# Patient Record
Sex: FEMALE | Race: BLACK OR AFRICAN AMERICAN | NOT HISPANIC OR LATINO | Employment: UNEMPLOYED | URBAN - METROPOLITAN AREA
[De-identification: names, ages, dates, MRNs, and addresses within clinical notes are randomized per-mention and may not be internally consistent; named-entity substitution may affect disease eponyms.]

---

## 2019-03-03 ENCOUNTER — HOSPITAL ENCOUNTER (EMERGENCY)
Facility: HOSPITAL | Age: 34
Discharge: HOME | End: 2019-03-03
Attending: EMERGENCY MEDICINE
Payer: MEDICAID

## 2019-03-03 VITALS
DIASTOLIC BLOOD PRESSURE: 66 MMHG | HEIGHT: 62 IN | WEIGHT: 200 LBS | RESPIRATION RATE: 18 BRPM | HEART RATE: 86 BPM | BODY MASS INDEX: 36.8 KG/M2 | SYSTOLIC BLOOD PRESSURE: 123 MMHG | TEMPERATURE: 99 F | OXYGEN SATURATION: 98 %

## 2019-03-03 DIAGNOSIS — S71.112A LACERATION OF LEFT THIGH, INITIAL ENCOUNTER: Primary | ICD-10-CM

## 2019-03-03 PROCEDURE — 63600175 PHARM REV CODE 636 W HCPCS: Performed by: EMERGENCY MEDICINE

## 2019-03-03 PROCEDURE — 90471 IMMUNIZATION ADMIN: CPT | Performed by: EMERGENCY MEDICINE

## 2019-03-03 PROCEDURE — 12001 RPR S/N/AX/GEN/TRNK 2.5CM/<: CPT

## 2019-03-03 PROCEDURE — 99284 EMERGENCY DEPT VISIT MOD MDM: CPT | Mod: 25

## 2019-03-03 PROCEDURE — 90715 TDAP VACCINE 7 YRS/> IM: CPT | Performed by: EMERGENCY MEDICINE

## 2019-03-03 PROCEDURE — 99283 PR EMERGENCY DEPT VISIT,LEVEL III: ICD-10-PCS | Mod: 25,,, | Performed by: EMERGENCY MEDICINE

## 2019-03-03 PROCEDURE — 12001 PR RESUPERF WND BODY <2.5CM: ICD-10-PCS | Mod: LT,,, | Performed by: EMERGENCY MEDICINE

## 2019-03-03 PROCEDURE — 99283 EMERGENCY DEPT VISIT LOW MDM: CPT | Mod: 25,,, | Performed by: EMERGENCY MEDICINE

## 2019-03-03 PROCEDURE — 12001 RPR S/N/AX/GEN/TRNK 2.5CM/<: CPT | Mod: LT,,, | Performed by: EMERGENCY MEDICINE

## 2019-03-03 RX ORDER — LIDOCAINE HYDROCHLORIDE 10 MG/ML
10 INJECTION INFILTRATION; PERINEURAL
Status: DISCONTINUED | OUTPATIENT
Start: 2019-03-03 | End: 2019-03-03 | Stop reason: HOSPADM

## 2019-03-03 RX ADMIN — CLOSTRIDIUM TETANI TOXOID ANTIGEN (FORMALDEHYDE INACTIVATED), CORYNEBACTERIUM DIPHTHERIAE TOXOID ANTIGEN (FORMALDEHYDE INACTIVATED), BORDETELLA PERTUSSIS TOXOID ANTIGEN (GLUTARALDEHYDE INACTIVATED), BORDETELLA PERTUSSIS FILAMENTOUS HEMAGGLUTININ ANTIGEN (FORMALDEHYDE INACTIVATED), BORDETELLA PERTUSSIS PERTACTIN ANTIGEN, AND BORDETELLA PERTUSSIS FIMBRIAE 2/3 ANTIGEN 0.5 ML: 5; 2; 2.5; 5; 3; 5 INJECTION, SUSPENSION INTRAMUSCULAR at 01:03

## 2019-03-03 NOTE — ED TRIAGE NOTES
Kevin Mancini, a 34 y.o. female presents to the ED w/ complaint of laceration. Pt reports cutting thigh accidentally with  while working on floor. Approx. 4 cm lac noted to right thigh - no active bleeding.     Triage note:  Chief Complaint   Patient presents with    Laceration     laceration to left upper thigh, currently not bleeding, pt reports cutting leg with       Review of patient's allergies indicates:  No Known Allergies  Past Medical History:   Diagnosis Date    Asthma       Adult Physical Assessment  LOC: Kevin Mancini, 34 y.o. female verified via two identifiers.  The patient is awake, alert, oriented and speaking appropriately at this time.  APPEARANCE: Patient resting comfortably and appears to be in no acute distress at this time. Patient is clean and well groomed, patient's clothing is properly fastened.  SKIN:The skin is warm and dry, color consistent with ethnicity, patient has normal skin turgor and moist mucus membranes, lac noted to right upper thigh, approximately 4 cm - no active bleeding.  MUSCULOSKELETAL: Patient moving all extremities well, no obvious swelling or deformities noted.  RESPIRATORY: Airway is open and patent, respirations are spontaneous, patient has a normal effort and rate, no accessory muscle use noted.  CARDIAC: Patient has a normal rate and rhythm, no periphreal edema noted in any extremity, capillary refill < 3 seconds in all extremities  ABDOMEN: Soft and non tender to palpation, no abdominal distention noted. Bowel sounds present in all four quadrants.  NEUROLOGIC: Eyes open spontaneously, behavior appropriate to situation, follows commands, facial expression symmetrical, bilateral hand grasp equal and even, purposeful motor response noted, normal sensation in all extremities when touched with a finger.

## 2019-03-03 NOTE — ED PROVIDER NOTES
Encounter Date: 3/3/2019    SCRIBE #1 NOTE: I, Dora Hany, am scribing for, and in the presence of,  Kang Ortiz MD. I have scribed the following portions of the note - Other sections scribed: HPI ROS PE.       History     Chief Complaint   Patient presents with    Laceration     laceration to left upper thigh, currently not bleeding, pt reports cutting leg with       The patient is a 34 year old female with a history of asthma who presents with a laceration to the left thigh. She was using a  to cut ribbons when she accidentally cut herself.  This occurred just prior to arrival.  She reports mild pain. There is a small amount of bleeding that was controlled with applying direct pressure.  She denies other injuries.  She denies loss of function to the extremity.  She does not know when her last tetanus immunization was received.      The history is provided by the patient.     Review of patient's allergies indicates:  No Known Allergies  Past Medical History:   Diagnosis Date    Asthma      History reviewed. No pertinent surgical history.  History reviewed. No pertinent family history.  Social History     Tobacco Use    Smoking status: Current Some Day Smoker     Types: Cigars   Substance Use Topics    Alcohol use: Not on file    Drug use: Not on file     Review of Systems   Gastrointestinal: Negative for nausea and vomiting.   Musculoskeletal: Negative for arthralgias and myalgias.   Skin:        + laceration to the anterior surface of the left thigh   Neurological: Negative for dizziness, syncope, weakness, light-headedness, numbness and headaches.   Hematological: Does not bruise/bleed easily.       Physical Exam     Initial Vitals [03/03/19 0006]   BP Pulse Resp Temp SpO2   123/66 86 18 98.6 °F (37 °C) 98 %      MAP       --         Physical Exam    Nursing note and vitals reviewed.  Constitutional: She appears well-developed and well-nourished. She is not diaphoretic. No distress.    Cardiovascular: Normal rate, regular rhythm and normal heart sounds. Exam reveals no gallop and no friction rub.    No murmur heard.  Pulmonary/Chest: Breath sounds normal. No respiratory distress. She has no wheezes. She has no rhonchi. She has no rales.   Musculoskeletal: Normal range of motion. She exhibits no edema or tenderness.   Neurological: She is alert and oriented to person, place, and time. She has normal strength.   Skin: Skin is warm and dry. Laceration noted. No pallor.   2.5 cm simple laceration to anterior surface of mid left thigh. Mild tenderness. No active bleeding.         ED Course   Lac Repair  Date/Time: 3/3/2019 3:15 AM  Performed by: Kang Ortiz III, MD  Authorized by: Kang Ortiz III, MD   Body area: lower extremity  Location details: left upper leg  Laceration length: 2.5 cm  Foreign bodies: no foreign bodies  Tendon involvement: none  Nerve involvement: none  Anesthesia: local infiltration    Anesthesia:  Local Anesthetic: lidocaine 1% without epinephrine  Anesthetic total: 2 mL  Patient sedated: no  Preparation: Patient was prepped and draped in the usual sterile fashion.  Irrigation solution: saline  Irrigation method: syringe  Amount of cleaning: standard  Debridement: none  Degree of undermining: none  Skin closure: 3-0 Prolene  Number of sutures: 5  Technique: simple  Approximation: close  Approximation difficulty: simple  Dressing: 4x4 sterile gauze  Patient tolerance: Patient tolerated the procedure well with no immediate complications        Labs Reviewed - No data to display       Imaging Results    None          Medical Decision Making:   History:   Old Medical Records: I decided to obtain old medical records.            Scribe Attestation:   Scribe #1: I performed the above scribed service and the documentation accurately describes the services I performed. I attest to the accuracy of the note.    Attending Attestation:             Attending ED Notes:   Portions of  this chart were completed by the scribe by interpretive transcription of statements made the patient as a result of my questions and ques at the bedside. Other portions were completed by the scribe from statements made by me for direct transcription into the medical record. Following completion of the charting by the scribe, I made modifications for both correctness and proper phrasing.  - Kang Ortiz III, M.D.             Clinical Impression:       ICD-10-CM ICD-9-CM   1. Laceration of left thigh, initial encounter S71.112A 890.0         Disposition:   Disposition: Discharged  Condition: Stable                        Kang Ortiz III, MD  03/03/19 0322

## 2019-03-13 ENCOUNTER — HOSPITAL ENCOUNTER (EMERGENCY)
Facility: HOSPITAL | Age: 34
Discharge: HOME | End: 2019-03-13
Attending: EMERGENCY MEDICINE
Payer: MEDICAID

## 2019-03-13 VITALS
BODY MASS INDEX: 34.41 KG/M2 | HEIGHT: 62 IN | WEIGHT: 187 LBS | TEMPERATURE: 99 F | DIASTOLIC BLOOD PRESSURE: 86 MMHG | RESPIRATION RATE: 18 BRPM | HEART RATE: 88 BPM | SYSTOLIC BLOOD PRESSURE: 126 MMHG | OXYGEN SATURATION: 100 %

## 2019-03-13 DIAGNOSIS — Z48.02 VISIT FOR SUTURE REMOVAL: Primary | ICD-10-CM

## 2019-03-13 LAB
B-HCG UR QL: NEGATIVE
CTP QC/QA: YES

## 2019-03-13 PROCEDURE — 99283 EMERGENCY DEPT VISIT LOW MDM: CPT | Mod: ER

## 2019-03-13 PROCEDURE — 81025 URINE PREGNANCY TEST: CPT | Mod: ER | Performed by: EMERGENCY MEDICINE

## 2019-03-13 NOTE — ED PROVIDER NOTES
Encounter Date: 3/13/2019    SCRIBE #1 NOTE: I, Anup Anderson, am scribing for, and in the presence of,  Toussaint Battley, FNP. I have scribed the following portions of the note - Other sections scribed: HPI, ROS, PE.       History     Chief Complaint   Patient presents with    Suture / Staple Removal     pt here to have sutures removed from left thigh that were placed 3/3 at [facility]     This is a 34 y.o. female who presents to the ED for a removal of five sutures on her left thigh that were placed on 3/3/19 at [facility].  Pt accidentally injured herself while using a boxcutter.  Pt states her symptoms have been improving.  Denies drainage, redness, swelling, or pain.  Denies fever, chills, CP, SOB, nausea, emesis, or abdominal pain.          The history is provided by the patient. No  was used.   Suture / Staple Removal    The sutures were placed 11 to 14 days ago (10 days ago ). There has been no drainage from the wound. There is no redness present. There is no swelling present. There is no pain present. She has no difficulty moving the affected extremity or digit.     Review of patient's allergies indicates:  No Known Allergies  Past Medical History:   Diagnosis Date    Asthma      History reviewed. No pertinent surgical history.  History reviewed. No pertinent family history.  Social History     Tobacco Use    Smoking status: Current Some Day Smoker     Types: Cigars   Substance Use Topics    Alcohol use: Not on file    Drug use: Not on file     Review of Systems   Constitutional: Negative for chills and fever.   HENT: Negative for congestion, rhinorrhea and sore throat.    Eyes: Negative for visual disturbance.   Respiratory: Negative for shortness of breath.    Cardiovascular: Negative for chest pain and leg swelling.   Gastrointestinal: Negative for abdominal pain, nausea and vomiting.   Genitourinary: Negative for dysuria.   Musculoskeletal: Negative for myalgias.   Skin: Positive  for wound (Suture removal). Negative for color change.   Neurological: Negative for weakness and numbness.   All other systems reviewed and are negative.      Physical Exam     Initial Vitals [03/13/19 1208]   BP Pulse Resp Temp SpO2   124/70 67 18 98.1 °F (36.7 °C) 100 %      MAP       --         Physical Exam    Nursing note and vitals reviewed.  Constitutional: She appears well-developed and well-nourished.   HENT:   Head: Normocephalic and atraumatic.   Eyes: Conjunctivae are normal.   Neck: Normal range of motion. Neck supple.   Cardiovascular: Normal rate, regular rhythm, normal heart sounds and intact distal pulses. Exam reveals no gallop and no friction rub.    No murmur heard.  Pulmonary/Chest: Effort normal and breath sounds normal. No respiratory distress. She has no wheezes. She has no rhonchi. She has no rales. She exhibits no tenderness.   Musculoskeletal: Normal range of motion.   Neurological: She is alert and oriented to person, place, and time.   Skin: Skin is warm and dry. No erythema.        Psychiatric: She has a normal mood and affect.         ED Course   Suture Removal  Date/Time: 3/13/2019 1:18 PM  Location procedure was performed: [facility] EMERGENCY DEPARTMENT  Performed by: Toussaint Battley III, FNP  Authorized by: Lynette Mcleod MD   Pre-operative diagnosis: suture removal  Post-operative diagnosis: suture removal  Body area: lower extremity  Location details: left upper leg  Wound Appearance: clean, well healed, normal color, nontender, no drainage and nonpurulent  Sutures Removed: 5  Post-removal: dressing applied and antibiotic ointment applied  Complications: No  Estimated blood loss (mL): 0.  Specimens: No  Implants: No  Patient tolerance: Patient tolerated the procedure well with no immediate complications        Labs Reviewed   POCT URINE PREGNANCY          Imaging Results    None          Medical Decision Making:   Initial Assessment:   Suture removal  Differential Diagnosis:    Wound infection  Clinical Tests:   Lab Tests: Ordered  ED Management:  Patient will be discharged home with instructions to follow up with her primary care provider in 2 days for wound recheck and return to the ER as needed if symptoms worsen or fail to improve.  Patient verbalized understanding of discharge instruction treatment plan.            Scribe Attestation:   Scribe #1: I performed the above scribed service and the documentation accurately describes the services I performed. I attest to the accuracy of the note.               Clinical Impression:     1. Visit for suture removal                                  Toussaint Battley III, [facility]  03/13/19 3024

## 2020-07-13 ENCOUNTER — HOSPITAL ENCOUNTER (EMERGENCY)
Facility: HOSPITAL | Age: 35
Discharge: HOME | End: 2020-07-13
Attending: EMERGENCY MEDICINE
Payer: MEDICAID

## 2020-07-13 VITALS
DIASTOLIC BLOOD PRESSURE: 72 MMHG | HEART RATE: 79 BPM | HEIGHT: 65 IN | OXYGEN SATURATION: 98 % | BODY MASS INDEX: 33.32 KG/M2 | TEMPERATURE: 99 F | WEIGHT: 200 LBS | RESPIRATION RATE: 16 BRPM | SYSTOLIC BLOOD PRESSURE: 145 MMHG

## 2020-07-13 DIAGNOSIS — Z20.822 SUSPECTED COVID-19 VIRUS INFECTION: ICD-10-CM

## 2020-07-13 DIAGNOSIS — R07.9 CHEST PAIN: ICD-10-CM

## 2020-07-13 DIAGNOSIS — R07.89 CHEST TIGHTNESS: ICD-10-CM

## 2020-07-13 DIAGNOSIS — J06.9 UPPER RESPIRATORY TRACT INFECTION, UNSPECIFIED TYPE: Primary | ICD-10-CM

## 2020-07-13 LAB
B-HCG UR QL: NEGATIVE
CTP QC/QA: YES
CTP QC/QA: YES
POC MOLECULAR INFLUENZA A AGN: NEGATIVE
POC MOLECULAR INFLUENZA B AGN: NEGATIVE

## 2020-07-13 PROCEDURE — U0003 INFECTIOUS AGENT DETECTION BY NUCLEIC ACID (DNA OR RNA); SEVERE ACUTE RESPIRATORY SYNDROME CORONAVIRUS 2 (SARS-COV-2) (CORONAVIRUS DISEASE [COVID-19]), AMPLIFIED PROBE TECHNIQUE, MAKING USE OF HIGH THROUGHPUT TECHNOLOGIES AS DESCRIBED BY CMS-2020-01-R: HCPCS

## 2020-07-13 PROCEDURE — 87502 INFLUENZA DNA AMP PROBE: CPT | Mod: ER

## 2020-07-13 PROCEDURE — 93005 ELECTROCARDIOGRAM TRACING: CPT | Mod: ER

## 2020-07-13 PROCEDURE — 93010 EKG 12-LEAD: ICD-10-PCS | Mod: ,,, | Performed by: INTERNAL MEDICINE

## 2020-07-13 PROCEDURE — 25000003 PHARM REV CODE 250: Mod: ER | Performed by: NURSE PRACTITIONER

## 2020-07-13 PROCEDURE — 99284 EMERGENCY DEPT VISIT MOD MDM: CPT | Mod: 25,ER

## 2020-07-13 PROCEDURE — 93010 ELECTROCARDIOGRAM REPORT: CPT | Mod: ,,, | Performed by: INTERNAL MEDICINE

## 2020-07-13 PROCEDURE — 81025 URINE PREGNANCY TEST: CPT | Mod: ER | Performed by: EMERGENCY MEDICINE

## 2020-07-13 RX ORDER — ACETAMINOPHEN 500 MG
1000 TABLET ORAL EVERY 6 HOURS PRN
Qty: 30 TABLET | Refills: 0 | Status: SHIPPED | OUTPATIENT
Start: 2020-07-13 | End: 2021-06-15

## 2020-07-13 RX ORDER — ONDANSETRON 4 MG/1
8 TABLET, ORALLY DISINTEGRATING ORAL ONCE
Status: COMPLETED | OUTPATIENT
Start: 2020-07-13 | End: 2020-07-13

## 2020-07-13 RX ORDER — ONDANSETRON 4 MG/1
4 TABLET, ORALLY DISINTEGRATING ORAL EVERY 8 HOURS PRN
Qty: 20 TABLET | Refills: 0 | Status: SHIPPED | OUTPATIENT
Start: 2020-07-13 | End: 2021-06-15

## 2020-07-13 RX ORDER — PROMETHAZINE HYDROCHLORIDE AND DEXTROMETHORPHAN HYDROBROMIDE 6.25; 15 MG/5ML; MG/5ML
5 SYRUP ORAL EVERY 6 HOURS PRN
Qty: 150 ML | Refills: 0 | Status: SHIPPED | OUTPATIENT
Start: 2020-07-13 | End: 2020-07-23

## 2020-07-13 RX ADMIN — ONDANSETRON 8 MG: 4 TABLET, ORALLY DISINTEGRATING ORAL at 06:07

## 2020-07-13 NOTE — ED PROVIDER NOTES
Encounter Date: 7/13/2020    SCRIBE #1 NOTE: I, Angelita Gisselle, am scribing for, and in the presence of,  JUANCARLOS Zamora. I have scribed the following portions of the note - Other sections scribed: HPI, ROS, PE.       History   No chief complaint on file.    This is a nontoxic appearing 35 y.o. female who presents to the ED for evaluation of weakness, fever, cough, and generalized body aches for a few days. She reports chest soreness with coughing. Patient reports vomiting and diarrhea that started yesterday. She has had 2 episode of vomiting today and 4-5 episodes of diarrhea today. Denies abdominal pain.  Patient has had positive exposure to COVID-19. Pt has a hx of asthma.    The history is provided by the patient. No  was used.   URI  The primary symptoms include fever, cough and vomiting. Primary symptoms do not include abdominal pain. The current episode started several days ago. The problem has been gradually worsening. The fever began several days ago (100.4). The fever has been gradually improving since its onset.   The cough began 3 to 5 days ago. The cough is non-productive.   Symptoms associated with the illness include congestion.   Emesis   This is a new problem. The current episode started 2 to 3 hours ago. The problem occurs 2 - 4 times per day. The problem has been unchanged. Associated symptoms include cough, diarrhea, a fever and URI. Pertinent negatives include no abdominal pain.     Review of patient's allergies indicates:  No Known Allergies  Past Medical History:   Diagnosis Date    Asthma      History reviewed. No pertinent surgical history.  No family history on file.  Social History     Tobacco Use    Smoking status: Current Some Day Smoker     Types: Cigars    Smokeless tobacco: Never Used    Tobacco comment: social   Substance Use Topics    Alcohol use: Yes     Alcohol/week: 1.0 standard drinks     Types: 1 Glasses of wine per week     Comment: social    Drug  use: Not Currently     Review of Systems   Constitutional: Positive for fever.        Body aches   HENT: Positive for congestion.    Eyes: Negative.    Respiratory: Positive for cough. Negative for shortness of breath.    Cardiovascular: Negative for chest pain.        Chest soreness with cough   Gastrointestinal: Positive for diarrhea and vomiting. Negative for abdominal pain and blood in stool.   Endocrine: Negative.    Genitourinary: Negative.    Musculoskeletal: Negative.    Skin: Negative.    Allergic/Immunologic: Negative.    Neurological: Negative.    Hematological: Negative.    Psychiatric/Behavioral: Negative.    All other systems reviewed and are negative.      Physical Exam     Initial Vitals [07/13/20 1603]   BP Pulse Resp Temp SpO2   115/78 85 18 98.6 °F (37 °C) 98 %      MAP       --         Physical Exam    Nursing note and vitals reviewed.  Constitutional: She appears well-developed.   HENT:   Nose: Nose normal.   Mouth/Throat: Oropharynx is clear and moist.   Eyes: Conjunctivae are normal.   Neck: Normal range of motion. Neck supple.   Cardiovascular: Normal rate, regular rhythm, S1 normal, S2 normal and normal heart sounds. Exam reveals no gallop and no friction rub.    No murmur heard.  LLE with swelling or tenderness.    Pulmonary/Chest: Breath sounds normal.   Abdominal: Soft. Bowel sounds are normal. There is no abdominal tenderness. There is no CVA tenderness.   Musculoskeletal: Normal range of motion.   Neurological: She is alert and oriented to person, place, and time.   Skin: Skin is warm and dry. Capillary refill takes less than 2 seconds.   Psychiatric: She has a normal mood and affect. Her behavior is normal.         ED Course   Procedures  Labs Reviewed   SARS-COV-2 (COVID-19) QUALITATIVE PCR   POCT INFLUENZA A/B MOLECULAR   POCT URINE PREGNANCY     EKG Readings: (Independently Interpreted)   No STEMI. Rate of 74. Sinus Arrhythmia. Normal Axis. Abnormal EKG. Non-specific T-waves. QTc  normal at 435. No prior EKG for comparison.       Imaging Results          X-Ray Chest AP Portable (Final result)  Result time 07/13/20 17:35:28    Final result by Mateo Grissom MD (07/13/20 17:35:28)                 Narrative:    EXAMINATION:  XR CHEST AP PORTABLE    CLINICAL HISTORY:  Chest pain, unspecified    TECHNIQUE:  Single frontal view of the chest was performed.    COMPARISON:  None    FINDINGS:  The cardiomediastinal silhouette is not enlarged.  There is no pleural effusion.  The trachea is midline.  The lungs are symmetrically expanded bilaterally without evidence of acute parenchymal process. No large focal consolidation seen.  There is no pneumothorax.  The osseous structures are unremarkable.      Electronically signed by: Mateo Grissom MD  Date:    07/13/2020  Time:    17:35                               Medical Decision Making:   History:   Old Medical Records: I decided to obtain old medical records.  Initial Assessment:   This is a nontoxic appearing 35 y.o. female who presents to the ED for evaluation of weakness, fever, cough, and generalized body aches for a few days. Pt reports vomiting and diarrhea that started yesterday. She has had 2 episode of vomiting today and 4-5 episodes of diarrhea today. + COVID-19 exposure.   Differential Diagnosis:   Gastroenteritis, Influenza, Sinusitis, Bronchitis, Pneumonia, COVID-19   Independently Interpreted Test(s):   I have ordered and independently interpreted X-rays - see prior notes.  I have ordered and independently interpreted EKG Reading(s) - see prior notes  Clinical Tests:   Lab Tests: Ordered and Reviewed  The following lab test(s) were unremarkable: UPT  Radiological Study: Ordered and Reviewed  Medical Tests: Ordered and Reviewed  ED Management:  Physical exam.  EKG with sinus arrhythmia. No evidence of STEMI.  Discharge with Promethazine DM, Zofran, and Tylenol prn.   Follow-up with PCP in 2 days.                Scribe Attestation:    Scribe #1: I performed the above scribed service and the documentation accurately describes the services I performed. I attest to the accuracy of the note.    This document was produced by a scribe under my direction and in my presence. I agree with the content of the note and have made any necessary edits.     JUANCARLOS Zamora    07/14/2020 10:44 AM                      Clinical Impression:     1. Upper respiratory tract infection, unspecified type    2. Chest tightness    3. Chest pain    4. Suspected Covid-19 Virus Infection                ED Disposition Condition    Discharge Stable        ED Prescriptions     Medication Sig Dispense Start Date End Date Auth. Provider    promethazine-dextromethorphan (PROMETHAZINE-DM) 6.25-15 mg/5 mL Syrp Take 5 mLs by mouth every 6 (six) hours as needed (cough). 150 mL 7/13/2020 7/23/2020 JUANCARLOS Milner    ondansetron (ZOFRAN-ODT) 4 MG TbDL Take 1 tablet (4 mg total) by mouth every 8 (eight) hours as needed (nausea). 20 tablet 7/13/2020  JUANCARLOS Milner    acetaminophen (TYLENOL) 500 MG tablet Take 2 tablets (1,000 mg total) by mouth every 6 (six) hours as needed for Pain. 30 tablet 7/13/2020  JUANCARLOS Milner        Follow-up Information     Follow up With Specialties Details Why Contact Info    Gay Mejía, JOHN Family [facility] In 2 days  [address] [facility] [address] [facility]  [place] [id number] [phone]                                       JUANCARLOS Milner  07/14/20 1043       JUANCARLOS Milner  07/14/20 1043

## 2020-07-13 NOTE — Clinical Note
Kevin Mancini was seen and treated in our emergency department on 7/13/2020.  She may return to work on 07/17/2020.       If you have any questions or concerns, please don't hesitate to call.      JUANCARLOS Milner

## 2020-07-13 NOTE — PROVIDER PROGRESS NOTES - EMERGENCY DEPT.
Emergency Department TeleTRIAGE Encounter Note      CHIEF COMPLAINT    No chief complaint on file.      VITAL SIGNS   Initial Vitals [07/13/20 1603]   BP Pulse Resp Temp SpO2   115/78 85 18 98.6 °F (37 °C) 98 %      MAP       --            ALLERGIES    Review of patient's allergies indicates:  No Known Allergies    PROVIDER TRIAGE NOTE  This is a teletriage evaluation of a 35 y.o. female presenting to the ED with c/o cough congestion, GI symptoms and chest tightness.  Reports family member with similar symptoms. Initial orders will be placed and care will be transferred to an alternate provider when patient is roomed for a full evaluation. Any additional orders and the final disposition will be determined by that provider.         ORDERS  Labs Reviewed   SARS-COV-2 (COVID-19) QUALITATIVE PCR   POCT INFLUENZA A/B MOLECULAR   POCT URINE PREGNANCY       ED Orders (720h ago, onward)    Start Ordered     Status Ordering Provider    07/13/20 1712 07/13/20 1711  X-Ray Chest AP Portable  1 time imaging      Ordered NICHOLAS OGDEN    07/13/20 1702 07/13/20 1701  POCT urine pregnancy  Once      Acknowledged YANA PHELAN    07/13/20 1657 07/13/20 1656  EKG 12-lead  Once  Completed    Completed by JACKI BOYD on 7/13/2020 at  4:57 PM YANA PHELAN    07/13/20 1657 07/13/20 1656  COVID-19 Routine Screening  STAT      In process YANA PHELAN    07/13/20 1657 07/13/20 1656  POCT Influenza A/B Molecular  Once      Acknowledged YANA PHELAN            Virtual Visit Note: The provider triage portion of this emergency department evaluation and documentation was performed via [employer], a HIPAA-compliant telemedicine application, in concert with a tele-presenter in the room. A face to face patient evaluation with one of my colleagues will occur once the patient is placed in an emergency department room.      DISCLAIMER: This note was prepared with [employer] voice recognition transcription software. Garbled syntax, mangled pronouns,  and other bizarre constructions may be attributed to that software system.

## 2020-07-15 LAB — SARS-COV-2 RNA RESP QL NAA+PROBE: NOT DETECTED

## 2021-04-12 ENCOUNTER — IMMUNIZATION (OUTPATIENT)
Dept: PRIMARY CARE CLINIC | Facility: CLINIC | Age: 36
End: 2021-04-12
Payer: MEDICAID

## 2021-04-12 DIAGNOSIS — Z23 NEED FOR VACCINATION: Primary | ICD-10-CM

## 2021-04-12 PROCEDURE — 0031A PR IMMUNIZ ADMIN, SARS-COV-2 COVID-19 VACC, 5X10VP/0.5ML: ICD-10-PCS | Mod: CV19,S$GLB,, | Performed by: INTERNAL MEDICINE

## 2021-04-12 PROCEDURE — 91303 PR SARSCOV2 VAC AD26 .5ML IM: CPT | Mod: S$GLB,,, | Performed by: INTERNAL MEDICINE

## 2021-04-12 PROCEDURE — 0031A PR IMMUNIZ ADMIN, SARS-COV-2 COVID-19 VACC, 5X10VP/0.5ML: CPT | Mod: CV19,S$GLB,, | Performed by: INTERNAL MEDICINE

## 2021-04-12 PROCEDURE — 91303 PR SARSCOV2 VAC AD26 .5ML IM: ICD-10-PCS | Mod: S$GLB,,, | Performed by: INTERNAL MEDICINE

## 2021-06-11 ENCOUNTER — TELEPHONE (OUTPATIENT)
Dept: ORTHOPEDICS | Facility: CLINIC | Age: 36
End: 2021-06-11

## 2021-06-11 DIAGNOSIS — R52 PAIN: Primary | ICD-10-CM

## 2021-06-15 ENCOUNTER — OFFICE VISIT (OUTPATIENT)
Dept: ORTHOPEDICS | Facility: CLINIC | Age: 36
End: 2021-06-15
Payer: MEDICAID

## 2021-06-15 VITALS
SYSTOLIC BLOOD PRESSURE: 110 MMHG | HEART RATE: 64 BPM | WEIGHT: 193 LBS | DIASTOLIC BLOOD PRESSURE: 72 MMHG | BODY MASS INDEX: 32.12 KG/M2 | RESPIRATION RATE: 18 BRPM

## 2021-06-15 DIAGNOSIS — M17.12 PRIMARY OSTEOARTHRITIS OF LEFT KNEE: Primary | ICD-10-CM

## 2021-06-15 DIAGNOSIS — M71.22 BAKER'S CYST OF KNEE, LEFT: ICD-10-CM

## 2021-06-15 PROCEDURE — 99999 PR PBB SHADOW E&M-EST. PATIENT-LVL III: ICD-10-PCS | Mod: PBBFAC,,, | Performed by: PHYSICIAN ASSISTANT

## 2021-06-15 PROCEDURE — 99203 PR OFFICE/OUTPT VISIT, NEW, LEVL III, 30-44 MIN: ICD-10-PCS | Mod: S$PBB,,, | Performed by: PHYSICIAN ASSISTANT

## 2021-06-15 PROCEDURE — 99213 OFFICE O/P EST LOW 20 MIN: CPT | Mod: PBBFAC,PN | Performed by: PHYSICIAN ASSISTANT

## 2021-06-15 PROCEDURE — 99999 PR PBB SHADOW E&M-EST. PATIENT-LVL III: CPT | Mod: PBBFAC,,, | Performed by: PHYSICIAN ASSISTANT

## 2021-06-15 PROCEDURE — 99203 OFFICE O/P NEW LOW 30 MIN: CPT | Mod: S$PBB,,, | Performed by: PHYSICIAN ASSISTANT

## 2021-06-15 RX ORDER — DICLOFENAC SODIUM 75 MG/1
75 TABLET, DELAYED RELEASE ORAL 2 TIMES DAILY WITH MEALS
Qty: 60 TABLET | Refills: 2 | Status: ON HOLD | OUTPATIENT
Start: 2021-06-15 | End: 2022-03-02 | Stop reason: HOSPADM

## 2021-06-21 ENCOUNTER — OFFICE VISIT (OUTPATIENT)
Dept: SPORTS MEDICINE | Facility: CLINIC | Age: 36
End: 2021-06-21
Payer: MEDICAID

## 2021-06-21 VITALS
WEIGHT: 193 LBS | HEIGHT: 65 IN | BODY MASS INDEX: 32.15 KG/M2 | DIASTOLIC BLOOD PRESSURE: 78 MMHG | SYSTOLIC BLOOD PRESSURE: 130 MMHG

## 2021-06-21 DIAGNOSIS — M71.22 BAKER'S CYST, LEFT: ICD-10-CM

## 2021-06-21 DIAGNOSIS — M25.562 CHRONIC PAIN OF LEFT KNEE: Primary | ICD-10-CM

## 2021-06-21 DIAGNOSIS — M25.362 INSTABILITY OF LEFT KNEE JOINT: ICD-10-CM

## 2021-06-21 DIAGNOSIS — G89.29 CHRONIC PAIN OF LEFT KNEE: Primary | ICD-10-CM

## 2021-06-21 PROCEDURE — 99999 PR PBB SHADOW E&M-EST. PATIENT-LVL III: CPT | Mod: PBBFAC,,, | Performed by: ORTHOPAEDIC SURGERY

## 2021-06-21 PROCEDURE — 99999 PR PBB SHADOW E&M-EST. PATIENT-LVL III: ICD-10-PCS | Mod: PBBFAC,,, | Performed by: ORTHOPAEDIC SURGERY

## 2021-06-21 PROCEDURE — 99204 OFFICE O/P NEW MOD 45 MIN: CPT | Mod: S$PBB,,, | Performed by: ORTHOPAEDIC SURGERY

## 2021-06-21 PROCEDURE — 99213 OFFICE O/P EST LOW 20 MIN: CPT | Mod: PBBFAC,PN | Performed by: ORTHOPAEDIC SURGERY

## 2021-06-21 PROCEDURE — 99204 PR OFFICE/OUTPT VISIT, NEW, LEVL IV, 45-59 MIN: ICD-10-PCS | Mod: S$PBB,,, | Performed by: ORTHOPAEDIC SURGERY

## 2021-06-21 RX ORDER — PREDNISONE 10 MG/1
30 TABLET ORAL DAILY
Qty: 15 TABLET | Refills: 0 | Status: SHIPPED | OUTPATIENT
Start: 2021-06-21 | End: 2021-10-21

## 2021-06-30 ENCOUNTER — HOSPITAL ENCOUNTER (OUTPATIENT)
Dept: RADIOLOGY | Facility: HOSPITAL | Age: 36
Discharge: HOME | End: 2021-06-30
Attending: ORTHOPAEDIC SURGERY
Payer: MEDICAID

## 2021-06-30 DIAGNOSIS — M25.362 INSTABILITY OF LEFT KNEE JOINT: ICD-10-CM

## 2021-06-30 DIAGNOSIS — M25.562 CHRONIC PAIN OF LEFT KNEE: ICD-10-CM

## 2021-06-30 DIAGNOSIS — G89.29 CHRONIC PAIN OF LEFT KNEE: ICD-10-CM

## 2021-06-30 PROCEDURE — 73721 MRI JNT OF LWR EXTRE W/O DYE: CPT | Mod: 26,LT,, | Performed by: RADIOLOGY

## 2021-06-30 PROCEDURE — 73721 MRI KNEE WITHOUT CONTRAST LEFT: ICD-10-PCS | Mod: 26,LT,, | Performed by: RADIOLOGY

## 2021-06-30 PROCEDURE — 73721 MRI JNT OF LWR EXTRE W/O DYE: CPT | Mod: TC,LT

## 2021-07-01 ENCOUNTER — PATIENT MESSAGE (OUTPATIENT)
Dept: SPORTS MEDICINE | Facility: CLINIC | Age: 36
End: 2021-07-01

## 2021-07-12 ENCOUNTER — OFFICE VISIT (OUTPATIENT)
Dept: SPORTS MEDICINE | Facility: CLINIC | Age: 36
End: 2021-07-12
Payer: MEDICAID

## 2021-07-12 VITALS
BODY MASS INDEX: 32.15 KG/M2 | HEIGHT: 65 IN | SYSTOLIC BLOOD PRESSURE: 124 MMHG | DIASTOLIC BLOOD PRESSURE: 72 MMHG | WEIGHT: 193 LBS

## 2021-07-12 DIAGNOSIS — M25.862 CYST OF LEFT KNEE JOINT: ICD-10-CM

## 2021-07-12 DIAGNOSIS — G89.29 CHRONIC PAIN OF LEFT KNEE: Primary | ICD-10-CM

## 2021-07-12 DIAGNOSIS — M25.562 CHRONIC PAIN OF LEFT KNEE: Primary | ICD-10-CM

## 2021-07-12 PROCEDURE — 99999 PR PBB SHADOW E&M-EST. PATIENT-LVL II: ICD-10-PCS | Mod: PBBFAC,,, | Performed by: ORTHOPAEDIC SURGERY

## 2021-07-12 PROCEDURE — 99214 PR OFFICE/OUTPT VISIT, EST, LEVL IV, 30-39 MIN: ICD-10-PCS | Mod: 25,S$PBB,, | Performed by: ORTHOPAEDIC SURGERY

## 2021-07-12 PROCEDURE — 20612 PR ASPIRAT/INJECTION GANGLION CYST(S): ICD-10-PCS | Mod: S$PBB,LT,, | Performed by: ORTHOPAEDIC SURGERY

## 2021-07-12 PROCEDURE — 76942 ECHO GUIDE FOR BIOPSY: CPT | Mod: 26,S$PBB,, | Performed by: ORTHOPAEDIC SURGERY

## 2021-07-12 PROCEDURE — 99999 PR PBB SHADOW E&M-EST. PATIENT-LVL II: CPT | Mod: PBBFAC,,, | Performed by: ORTHOPAEDIC SURGERY

## 2021-07-12 PROCEDURE — 76942 ECHO GUIDE FOR BIOPSY: CPT | Mod: PBBFAC,PN | Performed by: ORTHOPAEDIC SURGERY

## 2021-07-12 PROCEDURE — 99214 OFFICE O/P EST MOD 30 MIN: CPT | Mod: 25,S$PBB,, | Performed by: ORTHOPAEDIC SURGERY

## 2021-07-12 PROCEDURE — 76942 PR U/S GUIDANCE FOR NEEDLE GUIDANCE: ICD-10-PCS | Mod: 26,S$PBB,, | Performed by: ORTHOPAEDIC SURGERY

## 2021-07-12 PROCEDURE — 20612 ASPIRATE/INJ GANGLION CYST: CPT | Mod: S$PBB,LT,, | Performed by: ORTHOPAEDIC SURGERY

## 2021-07-12 PROCEDURE — 99212 OFFICE O/P EST SF 10 MIN: CPT | Mod: PBBFAC,PN | Performed by: ORTHOPAEDIC SURGERY

## 2021-07-12 PROCEDURE — 20612 ASPIRATE/INJ GANGLION CYST: CPT | Mod: PBBFAC,PN | Performed by: ORTHOPAEDIC SURGERY

## 2021-07-20 ENCOUNTER — PATIENT MESSAGE (OUTPATIENT)
Dept: SPORTS MEDICINE | Facility: CLINIC | Age: 36
End: 2021-07-20

## 2021-07-20 ENCOUNTER — TELEPHONE (OUTPATIENT)
Dept: SPORTS MEDICINE | Facility: CLINIC | Age: 36
End: 2021-07-20

## 2021-07-20 DIAGNOSIS — M54.42 CHRONIC LEFT-SIDED LOW BACK PAIN WITH LEFT-SIDED SCIATICA: Primary | ICD-10-CM

## 2021-07-20 DIAGNOSIS — G89.29 CHRONIC LEFT-SIDED LOW BACK PAIN WITH LEFT-SIDED SCIATICA: Primary | ICD-10-CM

## 2021-07-22 ENCOUNTER — PATIENT MESSAGE (OUTPATIENT)
Dept: SPORTS MEDICINE | Facility: CLINIC | Age: 36
End: 2021-07-22

## 2021-07-22 ENCOUNTER — TELEPHONE (OUTPATIENT)
Dept: PAIN MEDICINE | Facility: CLINIC | Age: 36
End: 2021-07-22

## 2021-09-21 ENCOUNTER — PATIENT MESSAGE (OUTPATIENT)
Dept: SPORTS MEDICINE | Facility: CLINIC | Age: 36
End: 2021-09-21

## 2021-10-18 ENCOUNTER — HOSPITAL ENCOUNTER (EMERGENCY)
Facility: HOSPITAL | Age: 36
Discharge: HOME | End: 2021-10-18
Attending: EMERGENCY MEDICINE
Payer: MEDICAID

## 2021-10-18 VITALS
HEIGHT: 65 IN | OXYGEN SATURATION: 99 % | DIASTOLIC BLOOD PRESSURE: 80 MMHG | WEIGHT: 200 LBS | SYSTOLIC BLOOD PRESSURE: 134 MMHG | RESPIRATION RATE: 16 BRPM | BODY MASS INDEX: 33.32 KG/M2 | TEMPERATURE: 98 F | HEART RATE: 80 BPM

## 2021-10-18 DIAGNOSIS — M71.22 BAKER'S CYST OF KNEE, LEFT: Primary | ICD-10-CM

## 2021-10-18 DIAGNOSIS — M79.605 LEFT LEG PAIN: ICD-10-CM

## 2021-10-18 LAB
B-HCG UR QL: NEGATIVE
CTP QC/QA: YES

## 2021-10-18 PROCEDURE — 99284 PR EMERGENCY DEPT VISIT,LEVEL IV: ICD-10-PCS | Mod: ,,, | Performed by: PHYSICIAN ASSISTANT

## 2021-10-18 PROCEDURE — 81025 URINE PREGNANCY TEST: CPT | Performed by: PHYSICIAN ASSISTANT

## 2021-10-18 PROCEDURE — 99284 EMERGENCY DEPT VISIT MOD MDM: CPT | Mod: 25

## 2021-10-18 PROCEDURE — 99284 EMERGENCY DEPT VISIT MOD MDM: CPT | Mod: ,,, | Performed by: PHYSICIAN ASSISTANT

## 2021-10-18 PROCEDURE — 25000003 PHARM REV CODE 250: Performed by: PHYSICIAN ASSISTANT

## 2021-10-18 RX ORDER — TRAMADOL HYDROCHLORIDE AND ACETAMINOPHEN 37.5; 325 MG/1; MG/1
1 TABLET, FILM COATED ORAL EVERY 6 HOURS PRN
Qty: 12 TABLET | Refills: 0 | Status: SHIPPED | OUTPATIENT
Start: 2021-10-18 | End: 2021-10-21

## 2021-10-18 RX ORDER — LIDOCAINE 50 MG/G
1 PATCH TOPICAL
Status: DISCONTINUED | OUTPATIENT
Start: 2021-10-18 | End: 2021-10-18 | Stop reason: HOSPADM

## 2021-10-18 RX ADMIN — LIDOCAINE 5% 1 PATCH: 700 PATCH TOPICAL at 09:10

## 2021-10-21 ENCOUNTER — OFFICE VISIT (OUTPATIENT)
Dept: ORTHOPEDICS | Facility: CLINIC | Age: 36
End: 2021-10-21
Payer: MEDICAID

## 2021-10-21 VITALS
SYSTOLIC BLOOD PRESSURE: 126 MMHG | DIASTOLIC BLOOD PRESSURE: 74 MMHG | WEIGHT: 199 LBS | RESPIRATION RATE: 18 BRPM | HEART RATE: 68 BPM | BODY MASS INDEX: 33.12 KG/M2

## 2021-10-21 DIAGNOSIS — G89.29 CHRONIC PAIN OF LEFT KNEE: ICD-10-CM

## 2021-10-21 DIAGNOSIS — M54.14 THORACIC AND LUMBOSACRAL NEURITIS: Primary | ICD-10-CM

## 2021-10-21 DIAGNOSIS — M54.17 THORACIC AND LUMBOSACRAL NEURITIS: Primary | ICD-10-CM

## 2021-10-21 DIAGNOSIS — M25.562 CHRONIC PAIN OF LEFT KNEE: ICD-10-CM

## 2021-10-21 PROCEDURE — 99213 PR OFFICE/OUTPT VISIT, EST, LEVL III, 20-29 MIN: ICD-10-PCS | Mod: S$PBB,,, | Performed by: PHYSICIAN ASSISTANT

## 2021-10-21 PROCEDURE — 99999 PR PBB SHADOW E&M-EST. PATIENT-LVL III: ICD-10-PCS | Mod: PBBFAC,,, | Performed by: PHYSICIAN ASSISTANT

## 2021-10-21 PROCEDURE — 99213 OFFICE O/P EST LOW 20 MIN: CPT | Mod: S$PBB,,, | Performed by: PHYSICIAN ASSISTANT

## 2021-10-21 PROCEDURE — 99999 PR PBB SHADOW E&M-EST. PATIENT-LVL III: CPT | Mod: PBBFAC,,, | Performed by: PHYSICIAN ASSISTANT

## 2021-10-21 PROCEDURE — 99213 OFFICE O/P EST LOW 20 MIN: CPT | Mod: PBBFAC,PN | Performed by: PHYSICIAN ASSISTANT

## 2021-10-21 RX ORDER — CYCLOBENZAPRINE HCL 10 MG
10 TABLET ORAL 3 TIMES DAILY PRN
Qty: 30 TABLET | Refills: 0 | Status: SHIPPED | OUTPATIENT
Start: 2021-10-21 | End: 2021-10-31

## 2021-10-22 ENCOUNTER — TELEPHONE (OUTPATIENT)
Dept: NEUROSURGERY | Facility: CLINIC | Age: 36
End: 2021-10-22
Payer: MEDICAID

## 2021-10-22 DIAGNOSIS — M79.605 PAIN OF LEFT LOWER EXTREMITY: Primary | ICD-10-CM

## 2021-10-26 ENCOUNTER — HOSPITAL ENCOUNTER (OUTPATIENT)
Dept: RADIOLOGY | Facility: HOSPITAL | Age: 36
Discharge: HOME | End: 2021-10-26
Attending: PHYSICIAN ASSISTANT
Payer: MEDICAID

## 2021-10-26 ENCOUNTER — OFFICE VISIT (OUTPATIENT)
Dept: NEUROSURGERY | Facility: CLINIC | Age: 36
End: 2021-10-26
Payer: MEDICAID

## 2021-10-26 VITALS — BODY MASS INDEX: 33.16 KG/M2 | HEIGHT: 65 IN | WEIGHT: 199.06 LBS

## 2021-10-26 DIAGNOSIS — R29.898 LEFT LEG WEAKNESS: Primary | ICD-10-CM

## 2021-10-26 DIAGNOSIS — M79.605 PAIN OF LEFT LOWER EXTREMITY: ICD-10-CM

## 2021-10-26 DIAGNOSIS — M51.26 HNP (HERNIATED NUCLEUS PULPOSUS), LUMBAR: ICD-10-CM

## 2021-10-26 DIAGNOSIS — M79.605 LEFT LEG PAIN: ICD-10-CM

## 2021-10-26 DIAGNOSIS — M51.36 DDD (DEGENERATIVE DISC DISEASE), LUMBAR: ICD-10-CM

## 2021-10-26 PROCEDURE — 72110 X-RAY EXAM L-2 SPINE 4/>VWS: CPT | Mod: 26,,, | Performed by: RADIOLOGY

## 2021-10-26 PROCEDURE — 99204 PR OFFICE/OUTPT VISIT, NEW, LEVL IV, 45-59 MIN: ICD-10-PCS | Mod: S$PBB,,, | Performed by: PHYSICIAN ASSISTANT

## 2021-10-26 PROCEDURE — 99999 PR PBB SHADOW E&M-EST. PATIENT-LVL III: ICD-10-PCS | Mod: PBBFAC,,, | Performed by: PHYSICIAN ASSISTANT

## 2021-10-26 PROCEDURE — 99204 OFFICE O/P NEW MOD 45 MIN: CPT | Mod: S$PBB,,, | Performed by: PHYSICIAN ASSISTANT

## 2021-10-26 PROCEDURE — 99999 PR PBB SHADOW E&M-EST. PATIENT-LVL III: CPT | Mod: PBBFAC,,, | Performed by: PHYSICIAN ASSISTANT

## 2021-10-26 PROCEDURE — 72110 X-RAY EXAM L-2 SPINE 4/>VWS: CPT | Mod: TC,PN

## 2021-10-26 PROCEDURE — 72110 XR LUMBAR SPINE AP AND LAT WITH FLEX/EXT: ICD-10-PCS | Mod: 26,,, | Performed by: RADIOLOGY

## 2021-10-26 PROCEDURE — 99213 OFFICE O/P EST LOW 20 MIN: CPT | Mod: PBBFAC,PN | Performed by: PHYSICIAN ASSISTANT

## 2021-10-26 RX ORDER — GABAPENTIN 300 MG/1
CAPSULE ORAL
Qty: 270 CAPSULE | Refills: 0 | Status: SHIPPED | OUTPATIENT
Start: 2021-10-26 | End: 2022-02-09

## 2021-11-09 ENCOUNTER — HOSPITAL ENCOUNTER (OUTPATIENT)
Dept: RADIOLOGY | Facility: HOSPITAL | Age: 36
Discharge: HOME | End: 2021-11-09
Attending: PHYSICIAN ASSISTANT
Payer: MEDICAID

## 2021-11-09 DIAGNOSIS — M79.605 LEFT LEG PAIN: ICD-10-CM

## 2021-11-09 DIAGNOSIS — R29.898 LEFT LEG WEAKNESS: ICD-10-CM

## 2021-11-09 PROCEDURE — 72148 MRI LUMBAR SPINE W/O DYE: CPT | Mod: 26,,, | Performed by: RADIOLOGY

## 2021-11-09 PROCEDURE — 72148 MRI LUMBAR SPINE W/O DYE: CPT | Mod: TC

## 2021-11-09 PROCEDURE — 72148 MRI LUMBAR SPINE WITHOUT CONTRAST: ICD-10-PCS | Mod: 26,,, | Performed by: RADIOLOGY

## 2021-11-11 ENCOUNTER — PATIENT MESSAGE (OUTPATIENT)
Dept: NEUROSURGERY | Facility: CLINIC | Age: 36
End: 2021-11-11
Payer: MEDICAID

## 2021-11-11 RX ORDER — OXYCODONE AND ACETAMINOPHEN 5; 325 MG/1; MG/1
1 TABLET ORAL EVERY 6 HOURS PRN
Qty: 28 TABLET | Refills: 0 | Status: SHIPPED | OUTPATIENT
Start: 2021-11-11 | End: 2021-11-26 | Stop reason: SDUPTHER

## 2021-11-12 ENCOUNTER — PATIENT MESSAGE (OUTPATIENT)
Dept: NEUROSURGERY | Facility: CLINIC | Age: 36
End: 2021-11-12
Payer: MEDICAID

## 2021-11-18 ENCOUNTER — CLINICAL SUPPORT (OUTPATIENT)
Dept: REHABILITATION | Facility: HOSPITAL | Age: 36
End: 2021-11-18
Payer: MEDICAID

## 2021-11-18 DIAGNOSIS — M54.17 THORACIC AND LUMBOSACRAL NEURITIS: ICD-10-CM

## 2021-11-18 DIAGNOSIS — G89.29 CHRONIC PAIN OF LEFT KNEE: ICD-10-CM

## 2021-11-18 DIAGNOSIS — M54.42 CHRONIC LEFT-SIDED LOW BACK PAIN WITH LEFT-SIDED SCIATICA: ICD-10-CM

## 2021-11-18 DIAGNOSIS — G89.29 CHRONIC LEFT-SIDED LOW BACK PAIN WITH LEFT-SIDED SCIATICA: ICD-10-CM

## 2021-11-18 DIAGNOSIS — M25.562 CHRONIC PAIN OF LEFT KNEE: ICD-10-CM

## 2021-11-18 DIAGNOSIS — M53.86 DECREASED RANGE OF MOTION OF LUMBAR SPINE: ICD-10-CM

## 2021-11-18 DIAGNOSIS — M54.14 THORACIC AND LUMBOSACRAL NEURITIS: ICD-10-CM

## 2021-11-18 DIAGNOSIS — R29.898 WEAKNESS OF LEFT LOWER EXTREMITY: ICD-10-CM

## 2021-11-18 DIAGNOSIS — R26.9 GAIT DIFFICULTY: ICD-10-CM

## 2021-11-18 PROCEDURE — 97110 THERAPEUTIC EXERCISES: CPT | Mod: PN

## 2021-11-18 PROCEDURE — 97161 PT EVAL LOW COMPLEX 20 MIN: CPT | Mod: PN

## 2021-11-23 ENCOUNTER — CLINICAL SUPPORT (OUTPATIENT)
Dept: REHABILITATION | Facility: HOSPITAL | Age: 36
End: 2021-11-23
Payer: MEDICAID

## 2021-11-23 DIAGNOSIS — R26.9 GAIT DIFFICULTY: ICD-10-CM

## 2021-11-23 DIAGNOSIS — G89.29 CHRONIC LEFT-SIDED LOW BACK PAIN WITH LEFT-SIDED SCIATICA: ICD-10-CM

## 2021-11-23 DIAGNOSIS — R29.898 WEAKNESS OF LEFT LOWER EXTREMITY: ICD-10-CM

## 2021-11-23 DIAGNOSIS — M54.42 CHRONIC LEFT-SIDED LOW BACK PAIN WITH LEFT-SIDED SCIATICA: ICD-10-CM

## 2021-11-23 DIAGNOSIS — M53.86 DECREASED RANGE OF MOTION OF LUMBAR SPINE: ICD-10-CM

## 2021-11-23 PROCEDURE — 97110 THERAPEUTIC EXERCISES: CPT | Mod: PN

## 2021-11-26 ENCOUNTER — PATIENT MESSAGE (OUTPATIENT)
Dept: NEUROSURGERY | Facility: CLINIC | Age: 36
End: 2021-11-26
Payer: MEDICAID

## 2021-11-29 RX ORDER — OXYCODONE AND ACETAMINOPHEN 5; 325 MG/1; MG/1
1 TABLET ORAL EVERY 6 HOURS PRN
Qty: 28 TABLET | Refills: 0 | Status: SHIPPED | OUTPATIENT
Start: 2021-11-29 | End: 2021-12-14 | Stop reason: SDUPTHER

## 2021-12-07 ENCOUNTER — CLINICAL SUPPORT (OUTPATIENT)
Dept: REHABILITATION | Facility: HOSPITAL | Age: 36
End: 2021-12-07
Payer: MEDICAID

## 2021-12-07 DIAGNOSIS — M54.42 CHRONIC LEFT-SIDED LOW BACK PAIN WITH LEFT-SIDED SCIATICA: Primary | ICD-10-CM

## 2021-12-07 DIAGNOSIS — R26.9 GAIT DIFFICULTY: ICD-10-CM

## 2021-12-07 DIAGNOSIS — M53.86 DECREASED RANGE OF MOTION OF LUMBAR SPINE: ICD-10-CM

## 2021-12-07 DIAGNOSIS — R29.898 WEAKNESS OF LEFT LOWER EXTREMITY: ICD-10-CM

## 2021-12-07 DIAGNOSIS — G89.29 CHRONIC LEFT-SIDED LOW BACK PAIN WITH LEFT-SIDED SCIATICA: Primary | ICD-10-CM

## 2021-12-07 PROCEDURE — 97140 MANUAL THERAPY 1/> REGIONS: CPT | Mod: PN,CQ

## 2021-12-07 PROCEDURE — 97110 THERAPEUTIC EXERCISES: CPT | Mod: PN,CQ

## 2021-12-10 ENCOUNTER — CLINICAL SUPPORT (OUTPATIENT)
Dept: REHABILITATION | Facility: HOSPITAL | Age: 36
End: 2021-12-10
Payer: MEDICAID

## 2021-12-10 DIAGNOSIS — G89.29 CHRONIC LEFT-SIDED LOW BACK PAIN WITH LEFT-SIDED SCIATICA: ICD-10-CM

## 2021-12-10 DIAGNOSIS — M54.42 CHRONIC LEFT-SIDED LOW BACK PAIN WITH LEFT-SIDED SCIATICA: ICD-10-CM

## 2021-12-10 DIAGNOSIS — R29.898 WEAKNESS OF LEFT LOWER EXTREMITY: ICD-10-CM

## 2021-12-10 DIAGNOSIS — M53.86 DECREASED RANGE OF MOTION OF LUMBAR SPINE: ICD-10-CM

## 2021-12-10 DIAGNOSIS — R26.9 GAIT DIFFICULTY: ICD-10-CM

## 2021-12-10 PROCEDURE — 97110 THERAPEUTIC EXERCISES: CPT | Mod: PN

## 2021-12-14 ENCOUNTER — CLINICAL SUPPORT (OUTPATIENT)
Dept: REHABILITATION | Facility: HOSPITAL | Age: 36
End: 2021-12-14
Payer: MEDICAID

## 2021-12-14 DIAGNOSIS — G89.29 CHRONIC LEFT-SIDED LOW BACK PAIN WITH LEFT-SIDED SCIATICA: ICD-10-CM

## 2021-12-14 DIAGNOSIS — M54.42 CHRONIC LEFT-SIDED LOW BACK PAIN WITH LEFT-SIDED SCIATICA: ICD-10-CM

## 2021-12-14 DIAGNOSIS — R26.9 GAIT DIFFICULTY: ICD-10-CM

## 2021-12-14 DIAGNOSIS — M53.86 DECREASED RANGE OF MOTION OF LUMBAR SPINE: ICD-10-CM

## 2021-12-14 DIAGNOSIS — R29.898 WEAKNESS OF LEFT LOWER EXTREMITY: ICD-10-CM

## 2021-12-14 PROCEDURE — 97110 THERAPEUTIC EXERCISES: CPT | Mod: PN

## 2021-12-16 ENCOUNTER — CLINICAL SUPPORT (OUTPATIENT)
Dept: REHABILITATION | Facility: HOSPITAL | Age: 36
End: 2021-12-16
Payer: MEDICAID

## 2021-12-16 DIAGNOSIS — M53.86 DECREASED RANGE OF MOTION OF LUMBAR SPINE: ICD-10-CM

## 2021-12-16 DIAGNOSIS — M54.42 CHRONIC LEFT-SIDED LOW BACK PAIN WITH LEFT-SIDED SCIATICA: ICD-10-CM

## 2021-12-16 DIAGNOSIS — R29.898 WEAKNESS OF LEFT LOWER EXTREMITY: ICD-10-CM

## 2021-12-16 DIAGNOSIS — G89.29 CHRONIC LEFT-SIDED LOW BACK PAIN WITH LEFT-SIDED SCIATICA: ICD-10-CM

## 2021-12-16 DIAGNOSIS — R26.9 GAIT DIFFICULTY: ICD-10-CM

## 2021-12-16 PROCEDURE — 97110 THERAPEUTIC EXERCISES: CPT | Mod: PN

## 2021-12-20 ENCOUNTER — CLINICAL SUPPORT (OUTPATIENT)
Dept: REHABILITATION | Facility: HOSPITAL | Age: 36
End: 2021-12-20
Payer: MEDICAID

## 2021-12-20 DIAGNOSIS — G89.29 CHRONIC LEFT-SIDED LOW BACK PAIN WITH LEFT-SIDED SCIATICA: Primary | ICD-10-CM

## 2021-12-20 DIAGNOSIS — R29.898 WEAKNESS OF LEFT LOWER EXTREMITY: ICD-10-CM

## 2021-12-20 DIAGNOSIS — R26.9 GAIT DIFFICULTY: ICD-10-CM

## 2021-12-20 DIAGNOSIS — M54.42 CHRONIC LEFT-SIDED LOW BACK PAIN WITH LEFT-SIDED SCIATICA: Primary | ICD-10-CM

## 2021-12-20 DIAGNOSIS — M53.86 DECREASED RANGE OF MOTION OF LUMBAR SPINE: ICD-10-CM

## 2021-12-20 PROCEDURE — 97110 THERAPEUTIC EXERCISES: CPT | Mod: PN

## 2021-12-21 ENCOUNTER — CLINICAL SUPPORT (OUTPATIENT)
Dept: REHABILITATION | Facility: HOSPITAL | Age: 36
End: 2021-12-21
Payer: MEDICAID

## 2021-12-21 DIAGNOSIS — R26.9 GAIT DIFFICULTY: ICD-10-CM

## 2021-12-21 DIAGNOSIS — M54.42 CHRONIC LEFT-SIDED LOW BACK PAIN WITH LEFT-SIDED SCIATICA: ICD-10-CM

## 2021-12-21 DIAGNOSIS — R29.898 WEAKNESS OF LEFT LOWER EXTREMITY: ICD-10-CM

## 2021-12-21 DIAGNOSIS — M53.86 DECREASED RANGE OF MOTION OF LUMBAR SPINE: ICD-10-CM

## 2021-12-21 DIAGNOSIS — G89.29 CHRONIC LEFT-SIDED LOW BACK PAIN WITH LEFT-SIDED SCIATICA: ICD-10-CM

## 2021-12-21 PROCEDURE — 97110 THERAPEUTIC EXERCISES: CPT | Mod: PN

## 2021-12-22 ENCOUNTER — HOSPITAL ENCOUNTER (OUTPATIENT)
Dept: INTERVENTIONAL RADIOLOGY/VASCULAR | Facility: HOSPITAL | Age: 36
Discharge: HOME | End: 2021-12-22
Attending: PHYSICIAN ASSISTANT
Payer: MEDICAID

## 2021-12-22 VITALS
HEART RATE: 71 BPM | SYSTOLIC BLOOD PRESSURE: 142 MMHG | OXYGEN SATURATION: 99 % | DIASTOLIC BLOOD PRESSURE: 81 MMHG | RESPIRATION RATE: 18 BRPM

## 2021-12-22 DIAGNOSIS — R29.898 LEFT LEG WEAKNESS: ICD-10-CM

## 2021-12-22 DIAGNOSIS — M79.605 LEFT LEG PAIN: ICD-10-CM

## 2021-12-22 DIAGNOSIS — M51.36 DDD (DEGENERATIVE DISC DISEASE), LUMBAR: ICD-10-CM

## 2021-12-22 DIAGNOSIS — M51.26 HNP (HERNIATED NUCLEUS PULPOSUS), LUMBAR: ICD-10-CM

## 2021-12-22 PROCEDURE — A4550 SURGICAL TRAYS: HCPCS

## 2021-12-22 PROCEDURE — 64484 NJX AA&/STRD TFRM EPI L/S EA: CPT | Mod: LT,,, | Performed by: RADIOLOGY

## 2021-12-22 PROCEDURE — 64484 NJX AA&/STRD TFRM EPI L/S EA: CPT | Performed by: RADIOLOGY

## 2021-12-22 PROCEDURE — 64483 IR EPIDURAL TRANSFORAMINAL INJ  EA ADD VERT LUMBAR UNI: ICD-10-PCS | Mod: LT,,, | Performed by: RADIOLOGY

## 2021-12-22 PROCEDURE — 63600175 PHARM REV CODE 636 W HCPCS: Performed by: RADIOLOGY

## 2021-12-22 PROCEDURE — 64484 PRA INJECT ANES/STEROID FORAMEN LUMBAR/SACRAL W IMG GUIDE ,EA ADD LEVEL: ICD-10-PCS | Mod: LT,,, | Performed by: RADIOLOGY

## 2021-12-22 PROCEDURE — 64483 NJX AA&/STRD TFRM EPI L/S 1: CPT | Mod: LT | Performed by: RADIOLOGY

## 2021-12-22 RX ORDER — METHYLPREDNISOLONE ACETATE 40 MG/ML
INJECTION, SUSPENSION INTRA-ARTICULAR; INTRALESIONAL; INTRAMUSCULAR; SOFT TISSUE CODE/TRAUMA/SEDATION MEDICATION
Status: COMPLETED | OUTPATIENT
Start: 2021-12-22 | End: 2021-12-22

## 2021-12-22 RX ADMIN — METHYLPREDNISOLONE ACETATE 80 MG: 40 INJECTION, SUSPENSION INTRA-ARTICULAR; INTRALESIONAL; INTRAMUSCULAR; SOFT TISSUE at 10:12

## 2021-12-31 ENCOUNTER — PATIENT MESSAGE (OUTPATIENT)
Dept: NEUROSURGERY | Facility: CLINIC | Age: 36
End: 2021-12-31
Payer: MEDICAID

## 2022-01-12 RX ORDER — OXYCODONE AND ACETAMINOPHEN 5; 325 MG/1; MG/1
1 TABLET ORAL EVERY 8 HOURS PRN
Qty: 21 TABLET | Refills: 0 | Status: SHIPPED | OUTPATIENT
Start: 2022-01-12 | End: 2022-01-20 | Stop reason: SDUPTHER

## 2022-01-20 ENCOUNTER — PATIENT MESSAGE (OUTPATIENT)
Dept: NEUROSURGERY | Facility: CLINIC | Age: 37
End: 2022-01-20
Payer: MEDICAID

## 2022-01-21 RX ORDER — OXYCODONE AND ACETAMINOPHEN 5; 325 MG/1; MG/1
1 TABLET ORAL EVERY 8 HOURS PRN
Qty: 21 TABLET | Refills: 0 | Status: SHIPPED | OUTPATIENT
Start: 2022-01-21 | End: 2022-01-31 | Stop reason: SDUPTHER

## 2022-01-21 NOTE — TELEPHONE ENCOUNTER
Please schedule her a surgical consult with Dr. Zaragoza within the next 2 weeks.    Thanks,  Stacey Thakur [facility], PA-C  Neurosurgery  [facility]  01/21/2022

## 2022-01-26 ENCOUNTER — PATIENT MESSAGE (OUTPATIENT)
Dept: NEUROSURGERY | Facility: CLINIC | Age: 37
End: 2022-01-26
Payer: MEDICAID

## 2022-01-31 ENCOUNTER — OFFICE VISIT (OUTPATIENT)
Dept: NEUROSURGERY | Facility: CLINIC | Age: 37
End: 2022-01-31
Payer: MEDICAID

## 2022-01-31 VITALS — BODY MASS INDEX: 33.16 KG/M2 | HEIGHT: 65 IN | WEIGHT: 199.06 LBS

## 2022-01-31 DIAGNOSIS — M54.17 LUMBOSACRAL RADICULOPATHY AT S1: ICD-10-CM

## 2022-01-31 DIAGNOSIS — M51.16 LUMBAR DISC HERNIATION WITH RADICULOPATHY: Primary | ICD-10-CM

## 2022-01-31 DIAGNOSIS — M51.36 DDD (DEGENERATIVE DISC DISEASE), LUMBAR: ICD-10-CM

## 2022-01-31 PROCEDURE — 1159F MED LIST DOCD IN RCRD: CPT | Mod: CPTII,,, | Performed by: NEUROLOGICAL SURGERY

## 2022-01-31 PROCEDURE — 3008F PR BODY MASS INDEX (BMI) DOCUMENTED: ICD-10-PCS | Mod: CPTII,,, | Performed by: NEUROLOGICAL SURGERY

## 2022-01-31 PROCEDURE — 99999 PR PBB SHADOW E&M-EST. PATIENT-LVL II: ICD-10-PCS | Mod: PBBFAC,,, | Performed by: NEUROLOGICAL SURGERY

## 2022-01-31 PROCEDURE — 1159F PR MEDICATION LIST DOCUMENTED IN MEDICAL RECORD: ICD-10-PCS | Mod: CPTII,,, | Performed by: NEUROLOGICAL SURGERY

## 2022-01-31 PROCEDURE — 99214 PR OFFICE/OUTPT VISIT, EST, LEVL IV, 30-39 MIN: ICD-10-PCS | Mod: S$PBB,,, | Performed by: NEUROLOGICAL SURGERY

## 2022-01-31 PROCEDURE — 3008F BODY MASS INDEX DOCD: CPT | Mod: CPTII,,, | Performed by: NEUROLOGICAL SURGERY

## 2022-01-31 PROCEDURE — 99999 PR PBB SHADOW E&M-EST. PATIENT-LVL II: CPT | Mod: PBBFAC,,, | Performed by: NEUROLOGICAL SURGERY

## 2022-01-31 PROCEDURE — 99214 OFFICE O/P EST MOD 30 MIN: CPT | Mod: S$PBB,,, | Performed by: NEUROLOGICAL SURGERY

## 2022-01-31 PROCEDURE — 99212 OFFICE O/P EST SF 10 MIN: CPT | Mod: PBBFAC,PN | Performed by: NEUROLOGICAL SURGERY

## 2022-01-31 RX ORDER — OXYCODONE AND ACETAMINOPHEN 5; 325 MG/1; MG/1
1 TABLET ORAL EVERY 8 HOURS PRN
Qty: 21 TABLET | Refills: 0 | Status: SHIPPED | OUTPATIENT
Start: 2022-01-31 | End: 2022-02-05 | Stop reason: SDUPTHER

## 2022-01-31 NOTE — PROGRESS NOTES
NEUROSURGICAL PROGRESS NOTE    DATE OF SERVICE:  01/31/2022    ATTENDING PHYSICIAN:  Bakari Zaragoza MD    SUBJECTIVE:  Kevin Mancini is a 36 y.o. female who presents with the above CC.  Patent states she fell backwards in December 2020 and at that time her left leg twisted outwards.  2 months later she started to have pain in the left posterior leg from the mid thigh region to the ankle and numbness in the last three toes.  Pain is much worse with walking and better when she leaning her body over in back flexion.  She had knee cysts addressed but this did not help relieve the left leg pain.  She denies and back or right leg pain.     EMG in April 2021.     Patient has not had PT or ESIs.  No spine surgery.  Patient is currently taking diclofenac and flexeril with minimal relief.     Patient denies any recent accidents or trauma, no saddle anesthesias, and no bowel or bladder incontinence.    INTERIM HISTORY:  She works as a manager at [employer]  Did a full conservative management including physical therapy, recently had a right L5-S1 transforaminal epidural injection and S1 intraforaminal injection at [facility].  Unfortunately she did not have significant pain relief after the injection.  She is taking gabapentin 300 mg 3 times daily.  She reports that the right leg pain in the S1 distribution is affecting her quality of life and functional status.  She is unable to cook, she has difficulty doing her grocery, walking for prolonged period of time.  She has some relief when she lean over.  Her gait has changed.  Left leg pain is associated with numbness.  No new onset of motor weakness or sphincter dysfunction symptoms.              PAST MEDICAL HISTORY:  Active Ambulatory Problems     Diagnosis Date Noted    Chronic left-sided low back pain with left-sided sciatica 11/18/2021    Decreased range of motion of lumbar spine 11/18/2021    Weakness of left lower extremity 11/18/2021    Gait  difficulty 11/18/2021     Resolved Ambulatory Problems     Diagnosis Date Noted    No Resolved Ambulatory Problems     Past Medical History:   Diagnosis Date    Asthma        PAST SURGICAL HISTORY:  No past surgical history on file.    SOCIAL HISTORY:   Social History     Socioeconomic History    Marital status: Single   Tobacco Use    Smoking status: Current Some Day Smoker     Types: Cigars    Smokeless tobacco: Never Used    Tobacco comment: social   Substance and Sexual Activity    Alcohol use: Yes     Alcohol/week: 1.0 standard drink     Types: 1 Glasses of wine per week     Comment: social    Drug use: Not Currently       FAMILY HISTORY:  No family history on file.    CURRENTS MEDICATIONS:  Current Outpatient Medications on File Prior to Visit   Medication Sig Dispense Refill    diclofenac (VOLTAREN) 75 MG EC tablet Take 1 tablet (75 mg total) by mouth 2 (two) times daily with meals. 60 tablet 2    gabapentin (NEURONTIN) 300 MG capsule Take one capsule PO at night for one week then increased to one capsule PO every 12 hours for one week then increased to one capsule PO every 8 hours and continue with three times a day 270 capsule 0    [DISCONTINUED] oxyCODONE-acetaminophen (PERCOCET) 5-325 mg per tablet Take 1 tablet by mouth every 8 (eight) hours as needed for Pain. 21 tablet 0     No current facility-administered medications on file prior to visit.       ALLERGIES:  Review of patient's allergies indicates:  No Known Allergies    REVIEW OF SYSTEMS:  Review of Systems   Constitutional: Negative for diaphoresis, fever and weight loss.   Respiratory: Negative for shortness of breath.    Cardiovascular: Negative for chest pain.   Gastrointestinal: Negative for blood in stool.   Genitourinary: Negative for hematuria.   Endo/Heme/Allergies: Does not bruise/bleed easily.   All other systems reviewed and are negative.        OBJECTIVE:    PHYSICAL EXAMINATION:   There were no vitals filed for this  visit.    Physical Exam:  Vitals reviewed.    Constitutional: She appears well-developed and well-nourished.     Eyes: Pupils are equal, round, and reactive to light. Conjunctivae and EOM are normal.     Cardiovascular: Normal distal pulses and no edema.     Abdominal: Soft.     Skin: Skin displays no rash on trunk and no rash on extremities. Skin displays no lesions on trunk and no lesions on extremities.     Psych/Behavior: She is alert. She is oriented to person, place, and time. She has a normal mood and affect.     Musculoskeletal:        Neck: Range of motion is full.     Neurological:        DTRs: Tricep reflexes are 2+ on the right side and 2+ on the left side. Bicep reflexes are 2+ on the right side and 2+ on the left side. Brachioradialis reflexes are 2+ on the right side and 2+ on the left side. Patellar reflexes are 2+ on the right side and 2+ on the left side. Achilles reflexes are 2+ on the right side and 0 on the left side.       Back Exam     Tenderness   The patient is experiencing no tenderness.     Range of Motion   Extension: normal   Flexion: normal   Lateral bend right: normal   Lateral bend left: normal   Rotation right: normal   Rotation left: normal     Muscle Strength   Right Quadriceps:  5/5   Left Quadriceps:  5/5   Right Hamstrings:  5/5   Left Hamstrings:  5/5     Tests   Straight leg raise right: negative  Straight leg raise left: positive    Other   Toe walk: abnormal (Some difficulty with standing on to be toe on the left side but is able to do so)  Heel walk: normal            SI joint:   Palpation at the right and left SI joints not painful  RABIA test is negative bilaterally  Gaenslen test is negative bilaterally  Thigh thrust test is negative bilaterally    Neurologic Exam     Mental Status   Oriented to person, place, and time.   Speech: speech is normal   Level of consciousness: alert    Cranial Nerves   Cranial nerves II through XII intact.     CN III, IV, VI   Pupils are  equal, round, and reactive to light.  Extraocular motions are normal.     Motor Exam   Muscle bulk: normal  Overall muscle tone: normal    Strength   Right deltoid: 5/5  Left deltoid: 5/5  Right biceps: 5/5  Left biceps: 5/5  Right triceps: 5/5  Left triceps: 5/5  Right wrist flexion: 5/5  Left wrist flexion: 5/5  Right wrist extension: 5/5  Left wrist extension: 5/5  Right interossei: 5/5  Left interossei: 5/5  Right iliopsoas: 5/5  Left iliopsoas: 5/5  Right quadriceps: 5/5  Left quadriceps: 5/5  Right hamstrin/5  Left hamstrin/5  Right anterior tibial: 5/5  Left anterior tibial: 5/5  Right posterior tibial: 5/5  Left posterior tibial: 5/5  Right peroneal: 5/5  Left peroneal: 5/5  Right gastroc: 5/5  Left gastroc: 5/5    Sensory Exam   Light touch normal.   Pinprick normal.     Gait, Coordination, and Reflexes     Gait  Gait: normal    Coordination   Finger to nose coordination: normal  Tandem walking coordination: normal    Reflexes   Right brachioradialis: 2+  Left brachioradialis: 2+  Right biceps: 2+  Left biceps: 2+  Right triceps: 2+  Left triceps: 2+  Right patellar: 2+  Left patellar: 2+  Right achilles: 2+  Left achilles: 0  Right plantar: normal  Left plantar: normal  Right Villasenor: absent  Left Villasenor: absent  Right ankle clonus: absent  Left ankle clonus: absent        DIAGNOSTIC DATA:  I personally interpreted the following imaging:   Lumbar spine MRI 2020 shows degenerative disc disease at L4-5 and L5-S1, L4-5 central disc bulge with mild stenosis, left L5-S1 disc herniation with extrusion of disc posteriorly but more on the left side, causing severe left L5-S1 lateral recess stenosis and compression of the left S1 nerve root,    ASSESMENT:  This is a 36 y.o. female with     Problem List Items Addressed This Visit    None     Visit Diagnoses     Lumbar disc herniation with radiculopathy    -  Primary    Lumbosacral radiculopathy at S1        DDD (degenerative disc disease), lumbar         BMI 33.0-33.9,adult                PLAN:  I explained the natural history of the disease and all treatment options. I recommended a left L5-S1 laminotomy and microdiskectomy to prevent worsening S1 radiculopathy, improved left leg pain, ability to walk on functional status/quality of life.      We have discussed the risks of surgery including death, coma, bleeding, infection, failure of surgery, CSF leak, nerve root injury, spinal cord injury, ureter injury, weakness, paralysis, peripheral neuropathy, need for reoperation. Patient understands the risks and would like to proceed with surgery.          Bakari Zaragoza MD  Cell:[phone]

## 2022-02-08 RX ORDER — OXYCODONE AND ACETAMINOPHEN 5; 325 MG/1; MG/1
1 TABLET ORAL EVERY 8 HOURS PRN
Qty: 21 TABLET | Refills: 0 | OUTPATIENT
Start: 2022-02-08

## 2022-02-08 RX ORDER — OXYCODONE AND ACETAMINOPHEN 5; 325 MG/1; MG/1
1 TABLET ORAL EVERY 8 HOURS PRN
Qty: 21 TABLET | Refills: 0 | Status: SHIPPED | OUTPATIENT
Start: 2022-02-08 | End: 2022-02-15 | Stop reason: SDUPTHER

## 2022-02-09 ENCOUNTER — TELEPHONE (OUTPATIENT)
Dept: NEUROSURGERY | Facility: CLINIC | Age: 37
End: 2022-02-09
Payer: MEDICAID

## 2022-02-09 DIAGNOSIS — M54.16 LUMBAR RADICULOPATHY: ICD-10-CM

## 2022-02-09 DIAGNOSIS — Z41.9 SURGERY, ELECTIVE: Primary | ICD-10-CM

## 2022-02-09 DIAGNOSIS — M51.26 HERNIATED NUCLEUS PULPOSUS, LUMBAR: ICD-10-CM

## 2022-02-09 DIAGNOSIS — M54.30 SCIATICA, UNSPECIFIED LATERALITY: ICD-10-CM

## 2022-02-16 RX ORDER — OXYCODONE AND ACETAMINOPHEN 5; 325 MG/1; MG/1
1 TABLET ORAL EVERY 8 HOURS PRN
Qty: 21 TABLET | Refills: 0 | Status: SHIPPED | OUTPATIENT
Start: 2022-02-16 | End: 2022-02-22 | Stop reason: SDUPTHER

## 2022-02-21 ENCOUNTER — PATIENT MESSAGE (OUTPATIENT)
Dept: SURGERY | Facility: HOSPITAL | Age: 37
End: 2022-02-21
Payer: MEDICAID

## 2022-02-22 ENCOUNTER — PATIENT MESSAGE (OUTPATIENT)
Dept: SURGERY | Facility: HOSPITAL | Age: 37
End: 2022-02-22
Payer: MEDICAID

## 2022-02-22 NOTE — DISCHARGE INSTRUCTIONS
Your surgery is scheduled for 3/2/22.    Please report to [facility] on the 1st Floor at 0530 a.m.    THIS TIME IS SUBJECT TO CHANGE.  YOU WILL RECEIVE A PHONE CALL THE DAY BEFORE SURGERY BY 3:30 PM TO CONFIRM YOUR TIME OF ARRIVAL.  IF YOU HAVE NOT RECEIVED A PHONE CALL BY 3:30 PM THE DAY BEFORE YOUR SURGERY PLEASE CALL [phone]     INSTRUCTIONS IMPORTANT!!!  ¨ Do not eat or drink after 12 midnight-including water, candy, gum, & mints. OK to brush teeth.      ____  Proceed to [facility] on *** for additional testing.        ____  Do not wear makeup, including mascara.  ____  No powder, lotions or creams to surgical area.  ____  Please remove all jewelry, including piercings and leave at home.  ____  No money or valuables needed. Please leave at home.  ____  Please bring any documents given by your doctor.  ____  If going home the same day, arrange for a ride home. You will not be able to             drive if Anesthesia was used.  ____  Children under 18 years require a parent / guardian present the entire time             they are in surgery / recovery.  ____  Wear loose fitting clothing. Allow for dressings, bandages.  ____  Stop Aspirin, Ibuprofen, Motrin, Aleve, Goody's/BC powders, Excedrine and Naproxen (NSAIDS) at least 3-5 days before surgery, unless otherwise instructed by your doctor, or the nurse.   You MAY use Tylenol/acetaminophen until day of surgery.  ____  Wash the surgical area with Hibiclens the night before surgery, and again the             morning of surgery.  Be sure to rinse hibiclens off completely (if instructed by   nurse).  ____  If you take diabetic medication, do not take am of surgery unless instructed by Doctor.  ____  Call MD for temperature above 101 degrees or any other signs of infection such as Urinary (bladder) infection, Upper respiratory infection, skin boils, etc.  ____ Stop taking any Fish Oil supplement or any Vitamins that contain Vitamin E at  least 5 days prior to surgery.  ____ Do Not wear your contact lenses the day of your procedure.  You may wear your glasses.      ____Do not shave surgical site for 3 days prior to surgery.  ____ Practice Good hand washing before, during, and after procedure.      I have read or had read and explained to me, and understand the above information.  Additional comments or instructions:  For additional questions call [phone]      ANESTHESIA SIDE EFFECTS  -For the first 24 hours after surgery:  Do not drive, use heavy equipment, make important decisions, or drink alcohol  -It is normal to feel sleepy for several hours.  Rest until you are more awake.  -Have someone stay with you, if needed.  They can watch for problems and help keep you safe.  -Some possible post anesthesia side effects include: nausea and vomiting, sore throat and hoarseness, sleepiness, and dizziness.        Pre-Op Bathing Instructions    Before surgery, you can play an important role in your own health.    Because skin is not sterile, we need to be sure that your skin is as free of germs as possible. By following the instructions below, you can reduce the number of germs on your skin before surgery.    IMPORTANT: You will need to shower with a special soap called Hibiclens*, available at any pharmacy.  If you are allergic to Chlorhexidine (the antiseptic in Hibiclens), use an antibacterial soap such as Dial Soap for your preoperative shower.  You will shower with Hibiclens both the night before your surgery and the morning of your surgery.  Do not use Hibiclens on the head, face or genitals to avoid injury to those areas.    STEP #1: THE NIGHT BEFORE YOUR SURGERY     Do not shave the area of your body where your surgery will be performed.  Shower and wash your hair and body as usual with your normal soap and shampoo.  Rinse your hair and body thoroughly after you shower to remove all soap residue.  With your hand, apply one packet of Hibiclens soap to  the surgical site.   Wash the site gently for five (5) minutes. Do not scrub your skin too hard.   Do not wash with your regular soap after Hibiclens is used.  Rinse your body thoroughly.  Pat yourself dry with a clean, soft towel.  Do not use lotion, cream, or powder.  Wear clean clothes.    STEP #2: THE MORNING OF YOUR SURGERY     Repeat Step #1.    * Not to be used by people allergic to Chlorhexidine.

## 2022-02-22 NOTE — TELEPHONE ENCOUNTER
----- Message from Lalito Tom sent at 2/22/2022 11:31 AM CST -----  Type:  Patient Requesting Call Back    Who Called:Katherine Mancini  Would the patient rather a call back or a response via [facility]? Call back  Best Call Back Number:[phone]  Additional Information: Patient Requesting Call Back regarding Neurosurgery.

## 2022-02-23 ENCOUNTER — TELEPHONE (OUTPATIENT)
Dept: NEUROSURGERY | Facility: CLINIC | Age: 37
End: 2022-02-23
Payer: MEDICAID

## 2022-02-23 ENCOUNTER — HOSPITAL ENCOUNTER (OUTPATIENT)
Dept: PREADMISSION TESTING | Facility: HOSPITAL | Age: 37
Discharge: HOME | End: 2022-02-23
Attending: NEUROLOGICAL SURGERY
Payer: MEDICAID

## 2022-02-23 ENCOUNTER — ANESTHESIA EVENT (OUTPATIENT)
Dept: SURGERY | Facility: HOSPITAL | Age: 37
End: 2022-02-23
Payer: MEDICAID

## 2022-02-23 VITALS
RESPIRATION RATE: 16 BRPM | SYSTOLIC BLOOD PRESSURE: 137 MMHG | HEIGHT: 65 IN | BODY MASS INDEX: 33.72 KG/M2 | WEIGHT: 202.38 LBS | HEART RATE: 97 BPM | OXYGEN SATURATION: 98 % | TEMPERATURE: 98 F | DIASTOLIC BLOOD PRESSURE: 77 MMHG

## 2022-02-23 PROBLEM — Z72.0 TOBACCO USER: Status: ACTIVE | Noted: 2019-01-02

## 2022-02-23 PROBLEM — J44.1 CHRONIC ASTHMATIC BRONCHITIS WITH ACUTE EXACERBATION: Status: ACTIVE | Noted: 2019-08-01

## 2022-02-23 PROBLEM — J45.990 EXERCISE-INDUCED ASTHMA: Status: ACTIVE | Noted: 2019-01-02

## 2022-02-23 RX ORDER — FERROUS SULFATE 325(65) MG
325 TABLET ORAL
Status: ON HOLD | COMMUNITY
End: 2024-03-01 | Stop reason: HOSPADM

## 2022-02-23 RX ORDER — OXYCODONE AND ACETAMINOPHEN 5; 325 MG/1; MG/1
1 TABLET ORAL EVERY 8 HOURS PRN
Qty: 21 TABLET | Refills: 0 | Status: SHIPPED | OUTPATIENT
Start: 2022-02-23 | End: 2022-02-24

## 2022-02-23 RX ORDER — SODIUM CHLORIDE, SODIUM LACTATE, POTASSIUM CHLORIDE, CALCIUM CHLORIDE 600; 310; 30; 20 MG/100ML; MG/100ML; MG/100ML; MG/100ML
INJECTION, SOLUTION INTRAVENOUS CONTINUOUS
Status: CANCELLED | OUTPATIENT
Start: 2022-02-23

## 2022-02-23 RX ORDER — LIDOCAINE HYDROCHLORIDE 10 MG/ML
1 INJECTION, SOLUTION EPIDURAL; INFILTRATION; INTRACAUDAL; PERINEURAL ONCE
Status: CANCELLED | OUTPATIENT
Start: 2022-02-23 | End: 2022-02-23

## 2022-02-23 NOTE — TELEPHONE ENCOUNTER
----- Message from Yuly Cole LPN sent at 2/23/2022 11:49 AM CST -----  Morning, she was seen in pre admit today. I do not see a clearance on her in the chart. Do you by chance have it? Thanks Gi

## 2022-02-23 NOTE — ANESTHESIA PREPROCEDURE EVALUATION
02/23/2022  Kevin Mancini is a 36 y.o., female scheduled for MICRODISCECTOMY, SPINE Left L5-S1 Microdiscectomy (Left ) 3/2/22.    Past Medical History:   Diagnosis Date    Asthma      No past surgical history on file.    Pre-op Assessment    I have reviewed the Patient Summary Reports.     I have reviewed the Nursing Notes.    I have reviewed the Medications.     Review of Systems  Anesthesia Hx:  No problems with previous Anesthesia Denies Hx of Anesthetic complications  Neg history of prior surgery. Denies Family Hx of Anesthesia complications.   Denies Personal Hx of Anesthesia complications.   Social:  Smoker, Alcohol Use    Hematology/Oncology:  Hematology Normal   Oncology Normal     EENT/Dental:EENT/Dental Normal   Cardiovascular:   Exercise tolerance: good Denies Pacemaker.  Denies Hypertension.  Denies Dysrhythmias.   Denies Angina. no hyperlipidemia    Pulmonary:   COPD, mild Asthma mild    Renal/:  Renal/ Normal     Hepatic/GI:  Hepatic/GI Normal  Denies GERD.    Musculoskeletal:  Spine Disorders: lumbar Chronic Pain, Degenerative disease and Disc disease    Neurological:   Denies TIA. Denies CVA. Neuromuscular Disease,  Denies Seizures.    Endocrine:  Endocrine Normal  Obesity / BMI > 30  Psych:  Psychiatric Normal           Physical Exam  General: Well nourished and Cooperative    Airway:  Mallampati: I   Mouth Opening: Normal  TM Distance: Normal  Tongue: Normal  Neck ROM: Normal ROM    Dental:  Intact    Chest/Lungs:  Clear to auscultation, Normal Respiratory Rate    Heart:  Rate: Normal  Rhythm: Regular Rhythm  Sounds: Normal        Anesthesia Plan  Type of Anesthesia, risks & benefits discussed:    Anesthesia Type: Gen ETT  Intra-op Monitoring Plan: Standard ASA Monitors  Post Op Pain Control Plan: multimodal analgesia  Induction:  IV  ASA Score: 2  Anesthesia Plan Notes:  Anesthesia consent to be signed prior to surgery 3/2/22      Ready For Surgery From Anesthesia Perspective.     .

## 2022-02-23 NOTE — PRE-PROCEDURE INSTRUCTIONS
Carlos dalton [phone]    Allergies, medical, surgical, family and psychosocial histories reviewed with patient. Periop plan of care reviewed. Preop instructions given, including medications to take and to hold. Hibiclens soap and instructions on use given. Time allotted for questions to be addressed.  Patient verbalized understanding.

## 2022-02-24 ENCOUNTER — TELEPHONE (OUTPATIENT)
Dept: NEUROSURGERY | Facility: CLINIC | Age: 37
End: 2022-02-24
Payer: MEDICAID

## 2022-02-24 RX ORDER — OXYCODONE AND ACETAMINOPHEN 5; 325 MG/1; MG/1
1 TABLET ORAL EVERY 8 HOURS PRN
Qty: 21 TABLET | Refills: 0 | Status: ON HOLD | OUTPATIENT
Start: 2022-02-24 | End: 2022-03-02 | Stop reason: HOSPADM

## 2022-02-24 NOTE — TELEPHONE ENCOUNTER
----- Message from Darin Pitt sent at 2/24/2022  9:46 AM CST -----  Contact: patient/  [phone]  Patient states Rx oxyCODONE-acetaminophen (PERCOCET) 5-325 mg per tablet [facility] [address] [place] [address]# [phone]

## 2022-02-24 NOTE — TELEPHONE ENCOUNTER
----- Message from Iona Vee sent at 2/24/2022 12:55 PM CST -----  Regarding: Call back  Contact: [phone]  Type:  Patient Call Back    Who Called:pt  What this is regarding?:was received fax for x ray  Would the patient rather a call back or a response via [facility]? Call back  Best Call Back Number:[phone]  Additional Information:     Advised to call back directly if there are further questions, or if these symptoms fail to improve as anticipated or worsen.

## 2022-02-24 NOTE — TELEPHONE ENCOUNTER
Call [facility] to cancel the Percocet prescription Dr. Zaragoza just did.    Thanks,  Stacey Thakur, [facility], PA-C  Neurosurgery  [facility]  02/24/2022

## 2022-02-25 ENCOUNTER — HOSPITAL ENCOUNTER (OUTPATIENT)
Dept: RADIOLOGY | Facility: HOSPITAL | Age: 37
Discharge: HOME | End: 2022-02-25
Attending: NURSE PRACTITIONER
Payer: MEDICAID

## 2022-02-25 DIAGNOSIS — Z01.818 ENCOUNTER FOR OTHER PREPROCEDURAL EXAMINATION: ICD-10-CM

## 2022-02-25 PROCEDURE — 71045 XR CHEST 1 VIEW PRE-OP: ICD-10-PCS | Mod: 26,,, | Performed by: RADIOLOGY

## 2022-02-25 PROCEDURE — 71045 X-RAY EXAM CHEST 1 VIEW: CPT | Mod: 26,,, | Performed by: RADIOLOGY

## 2022-02-25 PROCEDURE — 71045 X-RAY EXAM CHEST 1 VIEW: CPT | Mod: TC,FY

## 2022-02-27 ENCOUNTER — LAB VISIT (OUTPATIENT)
Dept: PRIMARY CARE CLINIC | Facility: CLINIC | Age: 37
End: 2022-02-27
Payer: MEDICAID

## 2022-02-27 DIAGNOSIS — Z41.9 SURGERY, ELECTIVE: ICD-10-CM

## 2022-02-27 PROCEDURE — U0005 INFEC AGEN DETEC AMPLI PROBE: HCPCS | Performed by: NEUROLOGICAL SURGERY

## 2022-02-27 PROCEDURE — U0003 INFECTIOUS AGENT DETECTION BY NUCLEIC ACID (DNA OR RNA); SEVERE ACUTE RESPIRATORY SYNDROME CORONAVIRUS 2 (SARS-COV-2) (CORONAVIRUS DISEASE [COVID-19]), AMPLIFIED PROBE TECHNIQUE, MAKING USE OF HIGH THROUGHPUT TECHNOLOGIES AS DESCRIBED BY CMS-2020-01-R: HCPCS | Performed by: NEUROLOGICAL SURGERY

## 2022-02-28 LAB
SARS-COV-2 RNA RESP QL NAA+PROBE: NOT DETECTED
SARS-COV-2- CYCLE NUMBER: NORMAL

## 2022-03-02 ENCOUNTER — HOSPITAL ENCOUNTER (OUTPATIENT)
Facility: HOSPITAL | Age: 37
Discharge: HOME | End: 2022-03-02
Attending: NEUROLOGICAL SURGERY | Admitting: NEUROLOGICAL SURGERY
Payer: MEDICAID

## 2022-03-02 ENCOUNTER — ANESTHESIA (OUTPATIENT)
Dept: SURGERY | Facility: HOSPITAL | Age: 37
End: 2022-03-02
Payer: MEDICAID

## 2022-03-02 VITALS
HEART RATE: 72 BPM | TEMPERATURE: 98 F | RESPIRATION RATE: 16 BRPM | DIASTOLIC BLOOD PRESSURE: 64 MMHG | SYSTOLIC BLOOD PRESSURE: 128 MMHG | WEIGHT: 201 LBS | BODY MASS INDEX: 34.31 KG/M2 | OXYGEN SATURATION: 100 % | HEIGHT: 64 IN

## 2022-03-02 VITALS — OXYGEN SATURATION: 96 % | DIASTOLIC BLOOD PRESSURE: 40 MMHG | HEART RATE: 73 BPM | SYSTOLIC BLOOD PRESSURE: 89 MMHG

## 2022-03-02 DIAGNOSIS — Z98.890 POST-OPERATIVE STATE: ICD-10-CM

## 2022-03-02 DIAGNOSIS — M51.26 LUMBAR DISC HERNIATION: Primary | ICD-10-CM

## 2022-03-02 DIAGNOSIS — I49.9 DISORDER OF HEART RHYTHM: ICD-10-CM

## 2022-03-02 PROBLEM — M51.16 LUMBAR DISC HERNIATION WITH RADICULOPATHY: Status: ACTIVE | Noted: 2022-03-02

## 2022-03-02 LAB
B-HCG UR QL: NEGATIVE
CTP QC/QA: YES

## 2022-03-02 PROCEDURE — 00630 ANES PX LUMBAR REGION NOS: CPT | Performed by: NEUROLOGICAL SURGERY

## 2022-03-02 PROCEDURE — 27201423 OPTIME MED/SURG SUP & DEVICES STERILE SUPPLY: Performed by: NEUROLOGICAL SURGERY

## 2022-03-02 PROCEDURE — 93005 ELECTROCARDIOGRAM TRACING: CPT | Mod: 59

## 2022-03-02 PROCEDURE — 93010 ELECTROCARDIOGRAM REPORT: CPT | Mod: ,,, | Performed by: INTERNAL MEDICINE

## 2022-03-02 PROCEDURE — 63600175 PHARM REV CODE 636 W HCPCS: Performed by: NURSE PRACTITIONER

## 2022-03-02 PROCEDURE — 63600175 PHARM REV CODE 636 W HCPCS: Performed by: NEUROLOGICAL SURGERY

## 2022-03-02 PROCEDURE — 25000003 PHARM REV CODE 250: Performed by: NEUROLOGICAL SURGERY

## 2022-03-02 PROCEDURE — 36000710: Performed by: NEUROLOGICAL SURGERY

## 2022-03-02 PROCEDURE — 63600175 PHARM REV CODE 636 W HCPCS: Performed by: ANESTHESIOLOGY

## 2022-03-02 PROCEDURE — 37000008 HC ANESTHESIA 1ST 15 MINUTES: Performed by: NEUROLOGICAL SURGERY

## 2022-03-02 PROCEDURE — 63600175 PHARM REV CODE 636 W HCPCS: Performed by: NURSE ANESTHETIST, CERTIFIED REGISTERED

## 2022-03-02 PROCEDURE — 37000009 HC ANESTHESIA EA ADD 15 MINS: Performed by: NEUROLOGICAL SURGERY

## 2022-03-02 PROCEDURE — 71000015 HC POSTOP RECOV 1ST HR: Performed by: NEUROLOGICAL SURGERY

## 2022-03-02 PROCEDURE — 63030 PR LAMINOTOMY,LUMBAR DISK,1 INTRSP: ICD-10-PCS | Mod: LT,,, | Performed by: NEUROLOGICAL SURGERY

## 2022-03-02 PROCEDURE — 81025 URINE PREGNANCY TEST: CPT | Performed by: NEUROLOGICAL SURGERY

## 2022-03-02 PROCEDURE — 97161 PT EVAL LOW COMPLEX 20 MIN: CPT

## 2022-03-02 PROCEDURE — 71000033 HC RECOVERY, INTIAL HOUR: Performed by: NEUROLOGICAL SURGERY

## 2022-03-02 PROCEDURE — 63030 LAMOT DCMPRN NRV RT 1 LMBR: CPT | Mod: LT,,, | Performed by: NEUROLOGICAL SURGERY

## 2022-03-02 PROCEDURE — 97116 GAIT TRAINING THERAPY: CPT

## 2022-03-02 PROCEDURE — 36000711: Performed by: NEUROLOGICAL SURGERY

## 2022-03-02 PROCEDURE — 93010 EKG 12-LEAD: ICD-10-PCS | Mod: ,,, | Performed by: INTERNAL MEDICINE

## 2022-03-02 PROCEDURE — 25000003 PHARM REV CODE 250: Performed by: NURSE ANESTHETIST, CERTIFIED REGISTERED

## 2022-03-02 PROCEDURE — 71000016 HC POSTOP RECOV ADDL HR: Performed by: NEUROLOGICAL SURGERY

## 2022-03-02 PROCEDURE — 71000039 HC RECOVERY, EACH ADD'L HOUR: Performed by: NEUROLOGICAL SURGERY

## 2022-03-02 RX ORDER — VASOPRESSIN 20 [USP'U]/ML
INJECTION, SOLUTION INTRAMUSCULAR; SUBCUTANEOUS
Status: DISCONTINUED | OUTPATIENT
Start: 2022-03-02 | End: 2022-03-02

## 2022-03-02 RX ORDER — ONDANSETRON 2 MG/ML
4 INJECTION INTRAMUSCULAR; INTRAVENOUS EVERY 8 HOURS PRN
Status: DISCONTINUED | OUTPATIENT
Start: 2022-03-02 | End: 2022-03-02 | Stop reason: HOSPADM

## 2022-03-02 RX ORDER — FENTANYL CITRATE 50 UG/ML
INJECTION, SOLUTION INTRAMUSCULAR; INTRAVENOUS
Status: DISCONTINUED | OUTPATIENT
Start: 2022-03-02 | End: 2022-03-02

## 2022-03-02 RX ORDER — ACETAMINOPHEN 325 MG/1
650 TABLET ORAL
Status: COMPLETED | OUTPATIENT
Start: 2022-03-02 | End: 2022-03-02

## 2022-03-02 RX ORDER — HYDROMORPHONE HYDROCHLORIDE 2 MG/ML
0.5 INJECTION, SOLUTION INTRAMUSCULAR; INTRAVENOUS; SUBCUTANEOUS EVERY 5 MIN PRN
Status: ACTIVE | OUTPATIENT
Start: 2022-03-02 | End: 2022-03-02

## 2022-03-02 RX ORDER — OXYCODONE AND ACETAMINOPHEN 10; 325 MG/1; MG/1
1 TABLET ORAL EVERY 4 HOURS PRN
Status: DISCONTINUED | OUTPATIENT
Start: 2022-03-02 | End: 2022-03-02 | Stop reason: HOSPADM

## 2022-03-02 RX ORDER — SUCCINYLCHOLINE CHLORIDE 20 MG/ML
INJECTION INTRAMUSCULAR; INTRAVENOUS
Status: DISCONTINUED | OUTPATIENT
Start: 2022-03-02 | End: 2022-03-02

## 2022-03-02 RX ORDER — METHOCARBAMOL 750 MG/1
750 TABLET, FILM COATED ORAL EVERY 8 HOURS PRN
Status: DISCONTINUED | OUTPATIENT
Start: 2022-03-02 | End: 2022-03-02 | Stop reason: HOSPADM

## 2022-03-02 RX ORDER — CEFAZOLIN SODIUM 2 G/50ML
2 SOLUTION INTRAVENOUS ONCE
Status: COMPLETED | OUTPATIENT
Start: 2022-03-02 | End: 2022-03-02

## 2022-03-02 RX ORDER — CELECOXIB 100 MG/1
200 CAPSULE ORAL
Status: COMPLETED | OUTPATIENT
Start: 2022-03-02 | End: 2022-03-02

## 2022-03-02 RX ORDER — PHENYLEPHRINE HYDROCHLORIDE 10 MG/ML
INJECTION INTRAVENOUS
Status: DISCONTINUED | OUTPATIENT
Start: 2022-03-02 | End: 2022-03-02

## 2022-03-02 RX ORDER — HYDROMORPHONE HYDROCHLORIDE 2 MG/ML
0.5 INJECTION, SOLUTION INTRAMUSCULAR; INTRAVENOUS; SUBCUTANEOUS EVERY 5 MIN PRN
Status: DISPENSED | OUTPATIENT
Start: 2022-03-02 | End: 2022-03-02

## 2022-03-02 RX ORDER — OXYCODONE AND ACETAMINOPHEN 5; 325 MG/1; MG/1
1 TABLET ORAL EVERY 4 HOURS PRN
Status: DISCONTINUED | OUTPATIENT
Start: 2022-03-02 | End: 2022-03-02 | Stop reason: HOSPADM

## 2022-03-02 RX ORDER — ONDANSETRON 2 MG/ML
4 INJECTION INTRAMUSCULAR; INTRAVENOUS ONCE AS NEEDED
Status: DISCONTINUED | OUTPATIENT
Start: 2022-03-02 | End: 2022-03-02 | Stop reason: HOSPADM

## 2022-03-02 RX ORDER — SODIUM CHLORIDE 0.9 % (FLUSH) 0.9 %
10 SYRINGE (ML) INJECTION
Status: DISCONTINUED | OUTPATIENT
Start: 2022-03-02 | End: 2022-03-02 | Stop reason: HOSPADM

## 2022-03-02 RX ORDER — METHYLPREDNISOLONE ACETATE 40 MG/ML
INJECTION, SUSPENSION INTRA-ARTICULAR; INTRALESIONAL; INTRAMUSCULAR; SOFT TISSUE
Status: DISCONTINUED | OUTPATIENT
Start: 2022-03-02 | End: 2022-03-02 | Stop reason: HOSPADM

## 2022-03-02 RX ORDER — ONDANSETRON HYDROCHLORIDE 2 MG/ML
INJECTION, SOLUTION INTRAMUSCULAR; INTRAVENOUS
Status: DISCONTINUED | OUTPATIENT
Start: 2022-03-02 | End: 2022-03-02

## 2022-03-02 RX ORDER — MIDAZOLAM HYDROCHLORIDE 1 MG/ML
INJECTION INTRAMUSCULAR; INTRAVENOUS
Status: DISCONTINUED | OUTPATIENT
Start: 2022-03-02 | End: 2022-03-02

## 2022-03-02 RX ORDER — CYCLOBENZAPRINE HCL 10 MG
10 TABLET ORAL
Status: COMPLETED | OUTPATIENT
Start: 2022-03-02 | End: 2022-03-02

## 2022-03-02 RX ORDER — PROPOFOL 10 MG/ML
VIAL (ML) INTRAVENOUS
Status: DISCONTINUED | OUTPATIENT
Start: 2022-03-02 | End: 2022-03-02

## 2022-03-02 RX ORDER — METHOCARBAMOL 750 MG/1
750 TABLET, FILM COATED ORAL 3 TIMES DAILY PRN
Qty: 45 TABLET | Refills: 2 | Status: SHIPPED | OUTPATIENT
Start: 2022-03-02 | End: 2022-04-16

## 2022-03-02 RX ORDER — SODIUM CHLORIDE 9 MG/ML
INJECTION, SOLUTION INTRAVENOUS CONTINUOUS
Status: DISCONTINUED | OUTPATIENT
Start: 2022-03-02 | End: 2022-03-02 | Stop reason: HOSPADM

## 2022-03-02 RX ORDER — METOPROLOL TARTRATE 1 MG/ML
INJECTION, SOLUTION INTRAVENOUS
Status: DISCONTINUED | OUTPATIENT
Start: 2022-03-02 | End: 2022-03-02

## 2022-03-02 RX ORDER — BUPIVACAINE HYDROCHLORIDE 5 MG/ML
INJECTION, SOLUTION EPIDURAL; INTRACAUDAL
Status: DISCONTINUED | OUTPATIENT
Start: 2022-03-02 | End: 2022-03-02 | Stop reason: HOSPADM

## 2022-03-02 RX ORDER — HYDROMORPHONE HYDROCHLORIDE 2 MG/ML
1 INJECTION, SOLUTION INTRAMUSCULAR; INTRAVENOUS; SUBCUTANEOUS
Status: DISCONTINUED | OUTPATIENT
Start: 2022-03-02 | End: 2022-03-02 | Stop reason: HOSPADM

## 2022-03-02 RX ORDER — OXYCODONE AND ACETAMINOPHEN 5; 325 MG/1; MG/1
1 TABLET ORAL EVERY 8 HOURS PRN
Qty: 21 TABLET | Refills: 0 | OUTPATIENT
Start: 2022-03-02

## 2022-03-02 RX ORDER — NEOSTIGMINE METHYLSULFATE 1 MG/ML
INJECTION, SOLUTION INTRAVENOUS
Status: DISCONTINUED | OUTPATIENT
Start: 2022-03-02 | End: 2022-03-02

## 2022-03-02 RX ORDER — OXYCODONE AND ACETAMINOPHEN 10; 325 MG/1; MG/1
1 TABLET ORAL EVERY 6 HOURS PRN
Qty: 28 TABLET | Refills: 0 | Status: SHIPPED | OUTPATIENT
Start: 2022-03-02 | End: 2022-03-09 | Stop reason: SDUPTHER

## 2022-03-02 RX ORDER — EPINEPHRINE 1 MG/ML
INJECTION INTRAMUSCULAR; INTRAVENOUS; SUBCUTANEOUS
Status: DISCONTINUED | OUTPATIENT
Start: 2022-03-02 | End: 2022-03-02 | Stop reason: HOSPADM

## 2022-03-02 RX ORDER — ACETAMINOPHEN 325 MG/1
325 TABLET ORAL EVERY 6 HOURS PRN
Status: DISCONTINUED | OUTPATIENT
Start: 2022-03-02 | End: 2022-03-02 | Stop reason: HOSPADM

## 2022-03-02 RX ORDER — PREGABALIN 75 MG/1
75 CAPSULE ORAL
Status: COMPLETED | OUTPATIENT
Start: 2022-03-02 | End: 2022-03-02

## 2022-03-02 RX ORDER — SODIUM CHLORIDE, SODIUM LACTATE, POTASSIUM CHLORIDE, CALCIUM CHLORIDE 600; 310; 30; 20 MG/100ML; MG/100ML; MG/100ML; MG/100ML
INJECTION, SOLUTION INTRAVENOUS CONTINUOUS
Status: DISCONTINUED | OUTPATIENT
Start: 2022-03-02 | End: 2022-03-02 | Stop reason: HOSPADM

## 2022-03-02 RX ORDER — OXYCODONE HCL 10 MG/1
10 TABLET, FILM COATED, EXTENDED RELEASE ORAL
Status: COMPLETED | OUTPATIENT
Start: 2022-03-02 | End: 2022-03-02

## 2022-03-02 RX ORDER — ROCURONIUM BROMIDE 10 MG/ML
INJECTION, SOLUTION INTRAVENOUS
Status: DISCONTINUED | OUTPATIENT
Start: 2022-03-02 | End: 2022-03-02

## 2022-03-02 RX ORDER — LIDOCAINE HCL/PF 100 MG/5ML
SYRINGE (ML) INTRAVENOUS
Status: DISCONTINUED | OUTPATIENT
Start: 2022-03-02 | End: 2022-03-02

## 2022-03-02 RX ORDER — LIDOCAINE HYDROCHLORIDE 10 MG/ML
1 INJECTION, SOLUTION EPIDURAL; INFILTRATION; INTRACAUDAL; PERINEURAL ONCE
Status: DISCONTINUED | OUTPATIENT
Start: 2022-03-02 | End: 2022-03-02 | Stop reason: HOSPADM

## 2022-03-02 RX ORDER — MUPIROCIN 20 MG/G
OINTMENT TOPICAL 2 TIMES DAILY
Status: DISCONTINUED | OUTPATIENT
Start: 2022-03-02 | End: 2022-03-02 | Stop reason: HOSPADM

## 2022-03-02 RX ORDER — DEXAMETHASONE SODIUM PHOSPHATE 4 MG/ML
INJECTION, SOLUTION INTRA-ARTICULAR; INTRALESIONAL; INTRAMUSCULAR; INTRAVENOUS; SOFT TISSUE
Status: DISCONTINUED | OUTPATIENT
Start: 2022-03-02 | End: 2022-03-02

## 2022-03-02 RX ADMIN — GLYCOPYRROLATE 0.2 MG: 0.2 INJECTION, SOLUTION INTRAMUSCULAR; INTRAVITREAL at 08:03

## 2022-03-02 RX ADMIN — GLYCOPYRROLATE 0.6 MG: 0.2 INJECTION, SOLUTION INTRAMUSCULAR; INTRAVITREAL at 09:03

## 2022-03-02 RX ADMIN — HYDROMORPHONE HYDROCHLORIDE 0.5 MG: 2 INJECTION, SOLUTION INTRAMUSCULAR; INTRAVENOUS; SUBCUTANEOUS at 10:03

## 2022-03-02 RX ADMIN — CEFAZOLIN SODIUM 2 G: 2 SOLUTION INTRAVENOUS at 07:03

## 2022-03-02 RX ADMIN — MIDAZOLAM HYDROCHLORIDE 2 MG: 1 INJECTION, SOLUTION INTRAMUSCULAR; INTRAVENOUS at 07:03

## 2022-03-02 RX ADMIN — SODIUM CHLORIDE, SODIUM LACTATE, POTASSIUM CHLORIDE, AND CALCIUM CHLORIDE: .6; .31; .03; .02 INJECTION, SOLUTION INTRAVENOUS at 06:03

## 2022-03-02 RX ADMIN — ESMOLOL HYDROCHLORIDE 20 MG: 10 INJECTION INTRAVENOUS at 08:03

## 2022-03-02 RX ADMIN — OXYCODONE AND ACETAMINOPHEN 1 TABLET: 10; 325 TABLET ORAL at 12:03

## 2022-03-02 RX ADMIN — VASOPRESSIN 1 UNITS: 20 INJECTION, SOLUTION INTRAMUSCULAR; SUBCUTANEOUS at 08:03

## 2022-03-02 RX ADMIN — PROPOFOL 100 MG: 10 INJECTION, EMULSION INTRAVENOUS at 08:03

## 2022-03-02 RX ADMIN — NEOSTIGMINE METHYLSULFATE 4 MG: 1 INJECTION INTRAVENOUS at 09:03

## 2022-03-02 RX ADMIN — FENTANYL CITRATE 150 MCG: 50 INJECTION, SOLUTION INTRAMUSCULAR; INTRAVENOUS at 07:03

## 2022-03-02 RX ADMIN — ROCURONIUM BROMIDE 25 MG: 10 INJECTION, SOLUTION INTRAVENOUS at 07:03

## 2022-03-02 RX ADMIN — METOPROLOL TARTRATE 2.5 MG: 1 INJECTION, SOLUTION INTRAVENOUS at 08:03

## 2022-03-02 RX ADMIN — CELECOXIB 200 MG: 100 CAPSULE ORAL at 06:03

## 2022-03-02 RX ADMIN — LIDOCAINE HYDROCHLORIDE 80 MG: 20 INJECTION, SOLUTION INTRAVENOUS at 07:03

## 2022-03-02 RX ADMIN — PROPOFOL 100 MG: 10 INJECTION, EMULSION INTRAVENOUS at 07:03

## 2022-03-02 RX ADMIN — CYCLOBENZAPRINE 10 MG: 10 TABLET, FILM COATED ORAL at 06:03

## 2022-03-02 RX ADMIN — ONDANSETRON 8 MG: 2 INJECTION, SOLUTION INTRAMUSCULAR; INTRAVENOUS at 09:03

## 2022-03-02 RX ADMIN — FENTANYL CITRATE 100 MCG: 50 INJECTION, SOLUTION INTRAMUSCULAR; INTRAVENOUS at 07:03

## 2022-03-02 RX ADMIN — SODIUM CHLORIDE, SODIUM LACTATE, POTASSIUM CHLORIDE, AND CALCIUM CHLORIDE: .6; .31; .03; .02 INJECTION, SOLUTION INTRAVENOUS at 09:03

## 2022-03-02 RX ADMIN — PHENYLEPHRINE HYDROCHLORIDE 100 MCG: 10 INJECTION INTRAVENOUS at 08:03

## 2022-03-02 RX ADMIN — PROPOFOL 200 MG: 10 INJECTION, EMULSION INTRAVENOUS at 07:03

## 2022-03-02 RX ADMIN — SUCCINYLCHOLINE CHLORIDE 120 MG: 20 INJECTION, SOLUTION INTRAMUSCULAR; INTRAVENOUS at 07:03

## 2022-03-02 RX ADMIN — OXYCODONE HYDROCHLORIDE 10 MG: 10 TABLET, FILM COATED, EXTENDED RELEASE ORAL at 06:03

## 2022-03-02 RX ADMIN — ROCURONIUM BROMIDE 5 MG: 10 INJECTION, SOLUTION INTRAVENOUS at 07:03

## 2022-03-02 RX ADMIN — PREGABALIN 75 MG: 75 CAPSULE ORAL at 06:03

## 2022-03-02 RX ADMIN — DEXAMETHASONE SODIUM PHOSPHATE 8 MG: 4 INJECTION, SOLUTION INTRA-ARTICULAR; INTRALESIONAL; INTRAMUSCULAR; INTRAVENOUS; SOFT TISSUE at 07:03

## 2022-03-02 RX ADMIN — ACETAMINOPHEN 650 MG: 325 TABLET ORAL at 06:03

## 2022-03-02 NOTE — TRANSFER OF CARE
"Anesthesia Transfer of Care Note    Patient: Kevin Mancini    Procedure(s) Performed: Procedure(s) (LRB):  MICRODISCECTOMY, SPINE Left L5-S1 Microdiscectomy (N/A)    Patient location: PACU    Transport from OR: Transported from OR on 6-10 L/min O2 by face mask with adequate spontaneous ventilation    Post pain: adequate analgesia    Post assessment: no apparent anesthetic complications    Post vital signs: stable    Level of consciousness: sedated    Nausea/Vomiting: no nausea/vomiting    Complications: none    Transfer of care protocol was followedComments: EKG ordered in PACU due to labile BP and HR during surgery      Last vitals:   Visit Vitals  /74 (BP Location: Left arm, Patient Position: Lying)   Pulse 66   Temp 37.1 °C (98.8 °F) (Skin)   Resp 18   Ht 5' 4" (1.626 m)   Wt 91.2 kg (201 lb)   LMP 02/09/2022 (Approximate)   SpO2 100%   Breastfeeding No   BMI 34.50 kg/m²     "

## 2022-03-02 NOTE — DISCHARGE INSTRUCTIONS
ANESTHESIA  -For the first 24 hours after surgery:  Do not drive, use heavy equipment, make important decisions, or drink alcohol  -It is normal to feel sleepy for several hours.  Rest until you are more awake.  -Have someone stay with you, if needed.  They can watch for problems and help keep you safe.  -Some possible post anesthesia side effects include: nausea and vomiting, sore throat and hoarseness, sleepiness, and dizziness.    PAIN  -If you have pain after surgery, pain medicine will help you feel better.  Take it as directed, before pain becomes severe.  Most pain relievers taken by mouth need at least 20-30 minutes to start working.  -Do not drive or drink alcohol while taking pain medicine.  -Pain medication can upset your stomach.  Taking them with a little food may help.  -Other ways to help control pain: elevation, ice, and relaxation  -Call your surgeon if still having unmanageable pain an hour after taking pain medicine.  -Pain medicine can cause constipation.  Taking an over-the counter stool softener while on prescription pain medicine and drinking plenty of fluids can prevent this side effect.  -Call your surgeon if you have severe side effects like: breathing problems, trouble waking up, dizziness, confusion, or severe constipation.    NAUSEA  -Some people have nausea after surgery.  This is often because of anesthesia, pain, pain medicine, or the stress of surgery.  -Do not push yourself to eat.  Start off with clear liquids and soup.  Slowly move to solid foods.  Don't eat fatty, rich, spicy foods at first.  Eat smaller amounts.  -If you develop persistent nausea and vomiting please notify your surgeon immediately.    BLEEDING  -Different types of surgery require different types of care and dressing changes.  It is important to follow all instructions and advice from your surgeon.  Change dressing as directed.  Call your surgeon for any concerns regarding postop bleeding.    SIGNS OF  INFECTION  -Signs of infection include: fever, swelling, drainage, and redness  -Notify your surgeon if you have a fever of 100.4 F (38.0 C) or higher.  -Notify your surgeon if you notice redness, swelling, increased pain, pus, or a foul smell at the incision site.

## 2022-03-02 NOTE — OP NOTE
Date of surgery 03/02/2022    Preop diagnosis  1. L5-S1 disc herniation with radiculopathy    Postop diagnosis  Same    Surgery  1. Left minimally invasive L5-S1 laminotomy, medial facetectomy, microdiskectomy for decompression of left S1 traversing nerve root  2. Fluoroscopy    Surgeon  Bakari Zaragoza MD    Indication   Kevin Mancini is a 36 y.o. female who presents with the above CC.  Patent states she fell backwards in December 2020 and at that time her left leg twisted outwards.  2 months later she started to have pain in the left posterior leg from the mid thigh region to the ankle and numbness in the last three toes.  Pain is much worse with walking and better when she leaning her body over in back flexion.  She had knee cysts addressed but this did not help relieve the left leg pain.  She denies and back or right leg pain.     EMG in April 2021.     Patient has not had PT or ESIs.  No spine surgery.  Patient is currently taking diclofenac and flexeril with minimal relief.     Patient denies any recent accidents or trauma, no saddle anesthesias, and no bowel or bladder incontinence.     INTERIM HISTORY:  She works as a manager at [employer]  Did a full conservative management including physical therapy, recently had a left L5-S1 transforaminal epidural injection and S1 intraforaminal injection at [facility].  Unfortunately she did not have significant pain relief after the injection.  She is taking gabapentin 300 mg 3 times daily.  She reports that the left leg pain in the S1 distribution is affecting her quality of life and functional status.  She is unable to cook, she has difficulty doing her grocery, walking for prolonged period of time.  She has some relief when she lean over.  Her gait has changed.  Left leg pain is associated with numbness.  No new onset of motor weakness or sphincter dysfunction symptoms.    Procedure  The patient was intubated under general anesthesia and positioned  prone on a Girish table.  All pressure points were carefully padded.  The lumbar area was prepped and draped in a typical sterile fashion.  Using fluoroscopy we planned a 20 mm incision at L5-S1, 15 mm of midline on the left side.  Local anesthesia with 0.5 Marcaine with epi.  The skin was incised and serial dilation was performed through the thoracal lumbar fascia and a 7 cm by 18 mm tubular retractor was docked at the junction of the left L5-S1 lamina and medial facet.  Subperiosteal dissection with the Bovie.  Using the high-speed drill we drilled the ipsilateral inferior L5 laminotomy and S1 superior laminotomy as well as a left medial L5-S1 facetectomy.  The yellow ligament was identified and resected in a piecemeal fashion.  We saw the traversing S1 nerve root.  We disconnected the S1 nerve root from the epidural venous plexus and retracted the nerve root medially.  A large disc herniation was palpated.  The annulus was divided and the herniation was resected in a piecemeal fashion.  All disc material that was loose was removed.  The disc space was irrigated.  Hemostasis.  We then left 40 mg of Depo-Medrol mixed with Marcaine over the nerve root.  The retractor was removed.  Hemostasis.  The thoracolumbar fascia was closed with interrupted 0 Vicryl.  The dermal layer was closed with inverted interrupted 2 0 Vicryl.  The skin was closed with staple.  The incision was dressed.  No complication.  The patient was transferred to recovery under the care of the anesthesiologist.  Blood loss was estimated at less than 30 cc.

## 2022-03-02 NOTE — PT/OT/SLP EVAL
Physical Therapy Evaluation and Discharge Note    Patient Name:  Kevin Mancini   MRN:  [id number]    Recommendations:     Discharge Recommendations:  home   Discharge Equipment Recommendations: none   Barriers to Discharge: None    Assessment:     Kevin Mancini is a 37 y.o. female admitted with a medical diagnosis of Lumbar disc herniation with radiculopathy. Educated pt and pt's fiance on spinal precautions and ambulated 50 ft with no AD at SBA/supervision level. Completed step negotiation with CGA. Plan for pt to d/c home today.    Recent Surgery: Procedure(s) (LRB):  MICRODISCECTOMY, SPINE Left L5-S1 Microdiscectomy (N/A) Day of Surgery    Plan:     During this hospitalization, patient does not require further acute PT services.  Please re-consult if situation changes.      Subjective     Chief Complaint: pain  Patient/Family Comments/goals: pt reporting no questions/concerns regarding d/c  Pain/Comfort:  · Pain Rating 1: 7/10  · Location - Orientation 1: lower  · Location 1: back to LLE  · Pain Addressed 1: Reposition, Distraction  · Pain Rating Post-Intervention 1: 7/10    Patients cultural, spiritual, Sikhism conflicts given the current situation: no    Living Environment:  Pt lives with her fiance in a [facility] with 14 JONAH with B rails and tub/shower combo.  Prior to admission, patients level of function was independent without AD for mobility and ADLs, drives, and works at [employer].  Equipment used at home: none.  DME owned (not currently used): none.  Upon discharge, patient will have assistance from her fiance.    Objective:     Communicated with nurse Howell prior to session.  Patient found HOB elevated with peripheral IV upon PT entry to room.    General Precautions: Standard,     Orthopedic Precautions:spinal precautions   Braces: N/A   Respiratory Status: Room air    Exams:  · Gross Motor Coordination:  WFL  · Postural Exam:  Patient presented with the following abnormalities:    · -  "      Rounded shoulders  · Sensation:    · -       Intact  · Skin Integrity/Edema:      · -       Skin integrity: surgical incision with dressing in place to lower back  · RLE ROM: WFL  · RLE Strength: WFL  · LLE ROM: WFL  · LLE Strength: WFL    Functional Mobility:  · Bed Mobility:     · Rolling Left:  contact guard assistance  · Scooting: stand by assistance  · Supine to Sit: minimum assistance  · Sit to Supine: stand by assistance  · Transfers:     · Sit to Stand:  contact guard assistance with no AD  · Gait: 50 ft with no AD with SBA progressing to supervision - ambulates at slow pace with stiff gait pattern, minimal arm swing. Improves with continued ambulation and VCs.   · Stairs: 6" step x 2 with HHA and CGA    AM-PAC 6 CLICK MOBILITY  Total Score:18       Therapeutic Activities and Exercises:  Educated pt and pt's fiance on role of PT and pt agreeable to participate in therapy session.   Educated on spinal precautions and logroll method.  Transitioned to sit EOB ambulated as reported above.  Sat to rest then negotiated 6" step 2x with HHA to simulate HR.  Instructed in car transfers.  Pt and pt's fiance reporting no further questions/concerns.    AM-PAC 6 CLICK MOBILITY  Total Score:18     Patient left HOB elevated with all lines intact, call button in reach, nurse notified and pt's fiance present.    GOALS:   Multidisciplinary Problems     Physical Therapy Goals     Not on file          Multidisciplinary Problems (Resolved)        Problem: Physical Therapy Goal    Goal Priority Disciplines Outcome Goal Variances Interventions   Physical Therapy Goal   (Resolved)     PT, PT/OT Met                     History:     Past Medical History:   Diagnosis Date    Asthma        History reviewed. No pertinent surgical history.    Time Tracking:     PT Received On: 03/02/22  PT Start Time: 1234     PT Stop Time: 1257  PT Total Time (min): 23 min     Billable Minutes: Evaluation 13 and Gait Training 10      03/02/2022  "

## 2022-03-02 NOTE — H&P
NEUROSURGICAL PROGRESS NOTE     DATE OF SERVICE:  03/02/22     ATTENDING PHYSICIAN:  Bakari Zaragoza MD     SUBJECTIVE:  Kevin Mancini is a 36 y.o. female who presents with the above CC.  Patent states she fell backwards in December 2020 and at that time her left leg twisted outwards.  2 months later she started to have pain in the left posterior leg from the mid thigh region to the ankle and numbness in the last three toes.  Pain is much worse with walking and better when she leaning her body over in back flexion.  She had knee cysts addressed but this did not help relieve the left leg pain.  She denies and back or right leg pain.     EMG in April 2021.     Patient has not had PT or ESIs.  No spine surgery.  Patient is currently taking diclofenac and flexeril with minimal relief.     Patient denies any recent accidents or trauma, no saddle anesthesias, and no bowel or bladder incontinence.     INTERIM HISTORY:  She works as a manager at [employer]  Did a full conservative management including physical therapy, recently had a left L5-S1 transforaminal epidural injection and S1 intraforaminal injection at [facility].  Unfortunately she did not have significant pain relief after the injection.  She is taking gabapentin 300 mg 3 times daily.  She reports that the left leg pain in the S1 distribution is affecting her quality of life and functional status.  She is unable to cook, she has difficulty doing her grocery, walking for prolonged period of time.  She has some relief when she lean over.  Her gait has changed.  Left leg pain is associated with numbness.  No new onset of motor weakness or sphincter dysfunction symptoms.           PAST MEDICAL HISTORY:       Active Ambulatory Problems     Diagnosis Date Noted    Chronic left-sided low back pain with left-sided sciatica 11/18/2021    Decreased range of motion of lumbar spine 11/18/2021    Weakness of left lower extremity 11/18/2021    Gait  difficulty 11/18/2021      Resolved Ambulatory Problems     Diagnosis Date Noted    No Resolved Ambulatory Problems           Past Medical History:   Diagnosis Date    Asthma           PAST SURGICAL HISTORY:  No past surgical history on file.     SOCIAL HISTORY:   Social History               Socioeconomic History    Marital status: Single   Tobacco Use    Smoking status: Current Some Day Smoker       Types: Cigars    Smokeless tobacco: Never Used    Tobacco comment: social   Substance and Sexual Activity    Alcohol use: Yes       Alcohol/week: 1.0 standard drink       Types: 1 Glasses of wine per week       Comment: social    Drug use: Not Currently            FAMILY HISTORY:  No family history on file.     CURRENTS MEDICATIONS:         Current Outpatient Medications on File Prior to Visit   Medication Sig Dispense Refill    diclofenac (VOLTAREN) 75 MG EC tablet Take 1 tablet (75 mg total) by mouth 2 (two) times daily with meals. 60 tablet 2    gabapentin (NEURONTIN) 300 MG capsule Take one capsule PO at night for one week then increased to one capsule PO every 12 hours for one week then increased to one capsule PO every 8 hours and continue with three times a day 270 capsule 0    [DISCONTINUED] oxyCODONE-acetaminophen (PERCOCET) 5-325 mg per tablet Take 1 tablet by mouth every 8 (eight) hours as needed for Pain. 21 tablet 0      No current facility-administered medications on file prior to visit.         ALLERGIES:  Review of patient's allergies indicates:  No Known Allergies     REVIEW OF SYSTEMS:  Review of Systems   Constitutional: Negative for diaphoresis, fever and weight loss.   Respiratory: Negative for shortness of breath.    Cardiovascular: Negative for chest pain.   Gastrointestinal: Negative for blood in stool.   Genitourinary: Negative for hematuria.   Endo/Heme/Allergies: Does not bruise/bleed easily.   All other systems reviewed and are negative.           OBJECTIVE:     PHYSICAL  EXAMINATION:   There were no vitals filed for this visit.     Physical Exam:  Vitals reviewed.     Constitutional: She appears well-developed and well-nourished.      Eyes: Pupils are equal, round, and reactive to light. Conjunctivae and EOM are normal.      Cardiovascular: Normal distal pulses and no edema.      Abdominal: Soft.      Skin: Skin displays no rash on trunk and no rash on extremities. Skin displays no lesions on trunk and no lesions on extremities.     Psych/Behavior: She is alert. She is oriented to person, place, and time. She has a normal mood and affect.     Musculoskeletal:        Neck: Range of motion is full.     Neurological:        DTRs: Tricep reflexes are 2+ on the right side and 2+ on the left side. Bicep reflexes are 2+ on the right side and 2+ on the left side. Brachioradialis reflexes are 2+ on the right side and 2+ on the left side. Patellar reflexes are 2+ on the right side and 2+ on the left side. Achilles reflexes are 2+ on the right side and 0 on the left side.         Back Exam      Tenderness   The patient is experiencing no tenderness.      Range of Motion   Extension: normal   Flexion: normal   Lateral bend right: normal   Lateral bend left: normal   Rotation right: normal   Rotation left: normal      Muscle Strength   Right Quadriceps:  5/5   Left Quadriceps:  5/5   Right Hamstrings:  5/5   Left Hamstrings:  5/5      Tests   Straight leg raise right: negative  Straight leg raise left: positive     Other   Toe walk: abnormal (Some difficulty with standing on to be toe on the left side but is able to do so)  Heel walk: normal                 SI joint:   Palpation at the right and left SI joints not painful  RABIA test is negative bilaterally  Gaenslen test is negative bilaterally  Thigh thrust test is negative bilaterally     Neurologic Exam      Mental Status   Oriented to person, place, and time.   Speech: speech is normal   Level of consciousness: alert     Cranial Nerves    Cranial nerves II through XII intact.      CN III, IV, VI   Pupils are equal, round, and reactive to light.  Extraocular motions are normal.      Motor Exam   Muscle bulk: normal  Overall muscle tone: normal     Strength   Right deltoid: 5/5  Left deltoid: 5/5  Right biceps: 5/5  Left biceps: 5/5  Right triceps: 5/5  Left triceps: 5/5  Right wrist flexion: 5/5  Left wrist flexion: 5/5  Right wrist extension: 5/5  Left wrist extension: 5/5  Right interossei: 5/5  Left interossei: 5/5  Right iliopsoas: 5/5  Left iliopsoas: 5/5  Right quadriceps: 5/5  Left quadriceps: 5/5  Right hamstrin/5  Left hamstrin/5  Right anterior tibial: 5/5  Left anterior tibial: 5/5  Right posterior tibial: 5/5  Left posterior tibial: 5/5  Right peroneal: 5/5  Left peroneal: 5/5  Right gastroc: 5/5  Left gastroc: 5/5     Sensory Exam   Light touch normal.   Pinprick normal.      Gait, Coordination, and Reflexes      Gait  Gait: normal     Coordination   Finger to nose coordination: normal  Tandem walking coordination: normal     Reflexes   Right brachioradialis: 2+  Left brachioradialis: 2+  Right biceps: 2+  Left biceps: 2+  Right triceps: 2+  Left triceps: 2+  Right patellar: 2+  Left patellar: 2+  Right achilles: 2+  Left achilles: 0  Right plantar: normal  Left plantar: normal  Right Villasenor: absent  Left Villasenor: absent  Right ankle clonus: absent  Left ankle clonus: absent           DIAGNOSTIC DATA:  I personally interpreted the following imaging:   Lumbar spine MRI 2020 shows degenerative disc disease at L4-5 and L5-S1, L4-5 central disc bulge with mild stenosis, left L5-S1 disc herniation with extrusion of disc posteriorly but more on the left side, causing severe left L5-S1 lateral recess stenosis and compression of the left S1 nerve root,     ASSESMENT:  This is a 36 y.o. female with      Problem List Items Addressed This Visit    None              Visit Diagnoses      Lumbar disc herniation with radiculopathy     -  Primary     Lumbosacral radiculopathy at S1         DDD (degenerative disc disease), lumbar         BMI 33.0-33.9,adult                    PLAN:  I explained the natural history of the disease and all treatment options. I recommended a left L5-S1 laminotomy and microdiskectomy to prevent worsening S1 radiculopathy, improved left leg pain, ability to walk on functional status/quality of life.       We have discussed the risks of surgery including death, coma, bleeding, infection, failure of surgery, CSF leak, nerve root injury, spinal cord injury, ureter injury, weakness, paralysis, peripheral neuropathy, need for reoperation. Patient understands the risks and would like to proceed with surgery.             Bakari Zaragoza MD  Cell:[phone]

## 2022-03-02 NOTE — ANESTHESIA PROCEDURE NOTES
Intubation    Date/Time: 3/2/2022 7:06 AM  Performed by: Francisca Briceno CRNA  Authorized by: Navjot Bhagat MD     Intubation:     Induction:  Intravenous    Intubated:  Postinduction    Mask Ventilation:  Easy mask    Attempts:  1    Attempted By:  Student    Method of Intubation:  Direct    Blade:  Meli 3    Laryngeal View Grade: Grade I - full view of cords      Difficult Airway Encountered?: No      Complications:  None    Airway Device:  Oral endotracheal tube    Airway Device Size:  7.5    Style/Cuff Inflation:  Cuffed    Tube secured:  21    Secured at:  The lips    Placement Verified By:  Capnometry    Complicating Factors:  None    Findings Post-Intubation:  BS equal bilateral

## 2022-03-02 NOTE — ANESTHESIA POSTPROCEDURE EVALUATION
Anesthesia Post Evaluation    Patient: Kevin Mancini    Procedure(s) Performed: Procedure(s) (LRB):  MICRODISCECTOMY, SPINE Left L5-S1 Microdiscectomy (N/A)    Final Anesthesia Type: general      Patient location during evaluation: PACU  Patient participation: Yes- Able to Participate  Level of consciousness: awake and alert  Post-procedure vital signs: reviewed and stable  Pain management: adequate  Airway patency: patent    PONV status at discharge: No PONV  Anesthetic complications: no      Cardiovascular status: blood pressure returned to baseline and hemodynamically stable  Respiratory status: unassisted  Hydration status: euvolemic  Follow-up not needed.          Vitals Value Taken Time   /64 03/02/22 1315   Temp 36.8 °C (98.2 °F) 03/02/22 1315   Pulse 72 03/02/22 1315   Resp 16 03/02/22 1315   SpO2 100 % 03/02/22 1315         Event Time   Out of Recovery 11:38:53         Pain/Serjio Score: Pain Rating Prior to Med Admin: 7 (3/2/2022 12:19 PM)  Serjio Score: 10 (3/2/2022  1:15 PM)

## 2022-03-02 NOTE — PLAN OF CARE
12 lead EKG done.  Dr. Bhagat at bedside to review it.  ok'd to discharge pt once PT is finished with the eval.  PT is currently at bedside for eval.

## 2022-03-02 NOTE — PLAN OF CARE
Pt has met criteria for discharge home.  Called and spoke with Dr. Bhagat to see if we can repeat her EKG now.  Dr. Bhagat ok'd to have EKG done now.  Orders placed in Epic and EKG tech notified

## 2022-03-02 NOTE — PATIENT INSTRUCTIONS
Remain active with walking and other light activities daily.  No heavy lifting, no extreme bending or twisting.  Limit upright sitting to 15 minutes per hour.   Remove dressing on 03/04/22. Ok to shower on 03/04/22, but do not immerse wound in water

## 2022-03-02 NOTE — PLAN OF CARE
Problem: Physical Therapy Goal  Goal: Physical Therapy Goal  Outcome: Met     Educated pt and pt's fiance on spinal precautions and ambulated 50 ft with no AD at SBA/supervision level. Completed step negotiation with CGA. Plan for pt to d/c home today.

## 2022-03-08 ENCOUNTER — PATIENT MESSAGE (OUTPATIENT)
Dept: NEUROSURGERY | Facility: CLINIC | Age: 37
End: 2022-03-08
Payer: MEDICAID

## 2022-03-09 RX ORDER — OXYCODONE AND ACETAMINOPHEN 10; 325 MG/1; MG/1
1 TABLET ORAL EVERY 6 HOURS PRN
Qty: 28 TABLET | Refills: 0 | Status: SHIPPED | OUTPATIENT
Start: 2022-03-09 | End: 2022-03-14 | Stop reason: SDUPTHER

## 2022-03-09 NOTE — DISCHARGE SUMMARY
[facility]  Neurosurgery  Discharge Summary      Patient Name: Kevin Mancini  MRN: [id number]  Admission Date: 3/2/2022  Hospital Length of Stay: 0 days  Discharge Date and Time: 3/2/2022  1:20 PM  Attending Physician: No att. providers found   Discharging Provider: Bakari Zaragoza MD  Primary Care Provider: Lily Baires NP     HPI: Kevin Mancini is a 36 y.o. female who presents with the above CC.  Patent states she fell backwards in December 2020 and at that time her left leg twisted outwards.  2 months later she started to have pain in the left posterior leg from the mid thigh region to the ankle and numbness in the last three toes.  Pain is much worse with walking and better when she leaning her body over in back flexion.  She had knee cysts addressed but this did not help relieve the left leg pain.  She denies and back or right leg pain.     EMG in April 2021.     Patient has not had PT or ESIs.  No spine surgery.  Patient is currently taking diclofenac and flexeril with minimal relief.     Patient denies any recent accidents or trauma, no saddle anesthesias, and no bowel or bladder incontinence.     INTERIM HISTORY:  She works as a manager at [employer]  Did a full conservative management including physical therapy, recently had a left L5-S1 transforaminal epidural injection and S1 intraforaminal injection at [facility].  Unfortunately she did not have significant pain relief after the injection.  She is taking gabapentin 300 mg 3 times daily.  She reports that the left leg pain in the S1 distribution is affecting her quality of life and functional status.  She is unable to cook, she has difficulty doing her grocery, walking for prolonged period of time.  She has some relief when she lean over.  Her gait has changed.  Left leg pain is associated with numbness.  No new onset of motor weakness or sphincter dysfunction symptoms.    Procedure(s)  (LRB):  MICRODISCECTOMY, SPINE Left L5-S1 Microdiscectomy (N/A)     Hospital Course: The surgery was uncomplicated and postoperative course uneventful.  Patient was able to be discharged after voiding.  No new onset of motor weakness or numbness.      Consults:  PT    Significant Diagnostic Studies:  None      Pending Diagnostic Studies:     None        Final Active Diagnoses:    Diagnosis Date Noted POA    PRINCIPAL PROBLEM:  Lumbar disc herniation with radiculopathy [M51.16] 03/02/2022 Yes      Problems Resolved During this Admission:      Discharged Condition: good    Disposition: Home or Self Care    Follow Up:    Patient Instructions:      Diet general     Remove dressing in 48 hours     Medications:  Reconciled Home Medications:      Medication List      START taking these medications    methocarbamoL 750 MG Tab  Commonly known as: ROBAXIN  Take 1 tablet (750 mg total) by mouth 3 (three) times daily as needed (muscles spasms).     oxyCODONE-acetaminophen  mg per tablet  Commonly known as: PERCOCET  Take 1 tablet by mouth every 6 (six) hours as needed for Pain.  Replaces: oxyCODONE-acetaminophen 5-325 mg per tablet        CONTINUE taking these medications    ferrous sulfate 325 mg (65 mg iron) Tab tablet  Commonly known as: FEOSOL  Take 325 mg by mouth daily with breakfast.     gabapentin 300 MG capsule  Commonly known as: NEURONTIN  Take 1 capsule (300 mg total) by mouth 3 (three) times daily.        STOP taking these medications    diclofenac 75 MG EC tablet  Commonly known as: VOLTAREN     oxyCODONE-acetaminophen 5-325 mg per tablet  Commonly known as: PERCOCET  Replaced by: oxyCODONE-acetaminophen  mg per tablet            Bakari Zaragoza MD  Neurosurgery  [facility])

## 2022-03-16 ENCOUNTER — OFFICE VISIT (OUTPATIENT)
Dept: NEUROSURGERY | Facility: CLINIC | Age: 37
End: 2022-03-16
Payer: MEDICAID

## 2022-03-16 VITALS
HEIGHT: 64 IN | BODY MASS INDEX: 34.33 KG/M2 | HEART RATE: 90 BPM | DIASTOLIC BLOOD PRESSURE: 69 MMHG | WEIGHT: 201.06 LBS | SYSTOLIC BLOOD PRESSURE: 106 MMHG

## 2022-03-16 DIAGNOSIS — Z98.890 S/P LUMBAR MICRODISCECTOMY: Primary | ICD-10-CM

## 2022-03-16 PROCEDURE — 99024 POSTOP FOLLOW-UP VISIT: CPT | Mod: ,,, | Performed by: PHYSICIAN ASSISTANT

## 2022-03-16 PROCEDURE — 1160F RVW MEDS BY RX/DR IN RCRD: CPT | Mod: CPTII,,, | Performed by: PHYSICIAN ASSISTANT

## 2022-03-16 PROCEDURE — 1159F PR MEDICATION LIST DOCUMENTED IN MEDICAL RECORD: ICD-10-PCS | Mod: CPTII,,, | Performed by: PHYSICIAN ASSISTANT

## 2022-03-16 PROCEDURE — 99999 PR PBB SHADOW E&M-EST. PATIENT-LVL III: ICD-10-PCS | Mod: PBBFAC,,, | Performed by: PHYSICIAN ASSISTANT

## 2022-03-16 PROCEDURE — 3074F PR MOST RECENT SYSTOLIC BLOOD PRESSURE < 130 MM HG: ICD-10-PCS | Mod: CPTII,,, | Performed by: PHYSICIAN ASSISTANT

## 2022-03-16 PROCEDURE — 1159F MED LIST DOCD IN RCRD: CPT | Mod: CPTII,,, | Performed by: PHYSICIAN ASSISTANT

## 2022-03-16 PROCEDURE — 1160F PR REVIEW ALL MEDS BY PRESCRIBER/CLIN PHARMACIST DOCUMENTED: ICD-10-PCS | Mod: CPTII,,, | Performed by: PHYSICIAN ASSISTANT

## 2022-03-16 PROCEDURE — 3078F DIAST BP <80 MM HG: CPT | Mod: CPTII,,, | Performed by: PHYSICIAN ASSISTANT

## 2022-03-16 PROCEDURE — 3078F PR MOST RECENT DIASTOLIC BLOOD PRESSURE < 80 MM HG: ICD-10-PCS | Mod: CPTII,,, | Performed by: PHYSICIAN ASSISTANT

## 2022-03-16 PROCEDURE — 99024 PR POST-OP FOLLOW-UP VISIT: ICD-10-PCS | Mod: ,,, | Performed by: PHYSICIAN ASSISTANT

## 2022-03-16 PROCEDURE — 99213 OFFICE O/P EST LOW 20 MIN: CPT | Mod: PBBFAC,PN | Performed by: PHYSICIAN ASSISTANT

## 2022-03-16 PROCEDURE — 3008F PR BODY MASS INDEX (BMI) DOCUMENTED: ICD-10-PCS | Mod: CPTII,,, | Performed by: PHYSICIAN ASSISTANT

## 2022-03-16 PROCEDURE — 3074F SYST BP LT 130 MM HG: CPT | Mod: CPTII,,, | Performed by: PHYSICIAN ASSISTANT

## 2022-03-16 PROCEDURE — 99999 PR PBB SHADOW E&M-EST. PATIENT-LVL III: CPT | Mod: PBBFAC,,, | Performed by: PHYSICIAN ASSISTANT

## 2022-03-16 PROCEDURE — 3008F BODY MASS INDEX DOCD: CPT | Mod: CPTII,,, | Performed by: PHYSICIAN ASSISTANT

## 2022-03-16 NOTE — PROGRESS NOTES
"Postoperative Wound Check:    Date of surgery 03/02/2022     Preop diagnosis  1. L5-S1 disc herniation with radiculopathy     Postop diagnosis  Same     Surgery  1. Left minimally invasive L5-S1 laminotomy, medial facetectomy, microdiskectomy for decompression of left S1 traversing nerve root  2. Fluoroscopy     Surgeon  Bakari Zaragoza MD    HPI:    Patient states that the left leg pain she had before surgery is gone but she has a different type of pain.  She has pain in the bilateral buttocks and pain and spasms in the posterior left leg to the calf that comes and goes in intensity.  No right leg pain.    She is taking tizanidine, oxycodone, and gabapentin.    Patient denies and problems with the incisions.  No redness, swelling, or drainage, and no fever, chills, or sweats.      Physical Exam:    Vitals:    03/16/22 1357   BP: 106/69   Pulse: 90   Weight: 91.2 kg (201 lb 1 oz)   Height: 5' 4" (1.626 m)   PainSc:   6         Incision:  Wound edges are approximated.  No redness, swelling, or drainage.      Diagnosis:     1. S/P lumbar microdiscectomy              Plan:           -staples removed without complication.  ChloraPrep applied  -start taking Aleve 2 tablets twice a day for 10 days and then as needed  -start taking tizanidine and oxycodone as needed  -keep taking gabapentin schedule  -continue walking  -left leg pain should get better over time    The patient will follow up with me in 4 weeks for the 6 week po fu.    Stacey Thakur, [facility], PA-C  Neurosurgery  [facility]          "

## 2022-03-21 ENCOUNTER — PATIENT MESSAGE (OUTPATIENT)
Dept: NEUROSURGERY | Facility: CLINIC | Age: 37
End: 2022-03-21
Payer: MEDICAID

## 2022-03-21 RX ORDER — OXYCODONE AND ACETAMINOPHEN 10; 325 MG/1; MG/1
1 TABLET ORAL EVERY 6 HOURS PRN
Qty: 28 TABLET | Refills: 0 | Status: SHIPPED | OUTPATIENT
Start: 2022-03-21 | End: 2022-03-28 | Stop reason: SDUPTHER

## 2022-03-29 RX ORDER — OXYCODONE AND ACETAMINOPHEN 10; 325 MG/1; MG/1
1 TABLET ORAL EVERY 6 HOURS PRN
Qty: 28 TABLET | Refills: 0 | Status: SHIPPED | OUTPATIENT
Start: 2022-03-29 | End: 2022-04-04 | Stop reason: SDUPTHER

## 2022-04-05 RX ORDER — OXYCODONE AND ACETAMINOPHEN 10; 325 MG/1; MG/1
1 TABLET ORAL EVERY 6 HOURS PRN
Qty: 28 TABLET | Refills: 0 | Status: SHIPPED | OUTPATIENT
Start: 2022-04-05 | End: 2022-04-06 | Stop reason: SDUPTHER

## 2022-04-06 NOTE — TELEPHONE ENCOUNTER
----- Message from Letty Kitchen sent at 4/6/2022  3:40 PM CDT -----  Type:  RX Refill Request    Who Called: Pt  Refill or New Rx:refill  RX Name and Strength: oxyCODONE-acetaminophen (PERCOCET)  mg per tablet and methocarbamoL (ROBAXIN) 750 MG Tab  How is the patient currently taking it? (ex. 1XDay):1  Is this a 30 day or 90 day RX:28  Preferred Pharmacy with phone number: [id number] [place] [address]  Local or Mail Order:local  Ordering Provider:mallory  Would the patient rather a call back or a response via [facility]? call  Best Call Back Number:[phone]  Additional Information:     Pharmacy stated they did not receive anything

## 2022-04-09 RX ORDER — OXYCODONE AND ACETAMINOPHEN 10; 325 MG/1; MG/1
1 TABLET ORAL EVERY 6 HOURS PRN
Qty: 28 TABLET | Refills: 0 | Status: SHIPPED | OUTPATIENT
Start: 2022-04-09 | End: 2022-04-11 | Stop reason: SDUPTHER

## 2022-04-12 RX ORDER — OXYCODONE AND ACETAMINOPHEN 10; 325 MG/1; MG/1
1 TABLET ORAL EVERY 6 HOURS PRN
Qty: 28 TABLET | Refills: 0 | Status: SHIPPED | OUTPATIENT
Start: 2022-04-12 | End: 2022-04-18 | Stop reason: SDUPTHER

## 2022-04-13 ENCOUNTER — OFFICE VISIT (OUTPATIENT)
Dept: NEUROSURGERY | Facility: CLINIC | Age: 37
End: 2022-04-13
Payer: MEDICAID

## 2022-04-13 ENCOUNTER — TELEPHONE (OUTPATIENT)
Dept: NEUROSURGERY | Facility: CLINIC | Age: 37
End: 2022-04-13
Payer: MEDICAID

## 2022-04-13 VITALS — WEIGHT: 201.06 LBS | BODY MASS INDEX: 34.33 KG/M2 | HEIGHT: 64 IN

## 2022-04-13 DIAGNOSIS — Z98.890 S/P LUMBAR MICRODISCECTOMY: ICD-10-CM

## 2022-04-13 DIAGNOSIS — M54.16 LUMBAR RADICULOPATHY: ICD-10-CM

## 2022-04-13 DIAGNOSIS — M54.42 CHRONIC LEFT-SIDED LOW BACK PAIN WITH LEFT-SIDED SCIATICA: Primary | ICD-10-CM

## 2022-04-13 DIAGNOSIS — R29.2 HOFFMAN SIGN PRESENT: ICD-10-CM

## 2022-04-13 DIAGNOSIS — R29.2 HYPERREFLEXIA: ICD-10-CM

## 2022-04-13 DIAGNOSIS — G89.29 CHRONIC LEFT-SIDED LOW BACK PAIN WITH LEFT-SIDED SCIATICA: Primary | ICD-10-CM

## 2022-04-13 PROCEDURE — 99024 PR POST-OP FOLLOW-UP VISIT: ICD-10-PCS | Mod: ,,, | Performed by: PHYSICIAN ASSISTANT

## 2022-04-13 PROCEDURE — 99999 PR PBB SHADOW E&M-EST. PATIENT-LVL III: CPT | Mod: PBBFAC,,, | Performed by: PHYSICIAN ASSISTANT

## 2022-04-13 PROCEDURE — 3008F PR BODY MASS INDEX (BMI) DOCUMENTED: ICD-10-PCS | Mod: CPTII,,, | Performed by: PHYSICIAN ASSISTANT

## 2022-04-13 PROCEDURE — 99213 OFFICE O/P EST LOW 20 MIN: CPT | Mod: PBBFAC,PN | Performed by: PHYSICIAN ASSISTANT

## 2022-04-13 PROCEDURE — 99999 PR PBB SHADOW E&M-EST. PATIENT-LVL III: ICD-10-PCS | Mod: PBBFAC,,, | Performed by: PHYSICIAN ASSISTANT

## 2022-04-13 PROCEDURE — 3008F BODY MASS INDEX DOCD: CPT | Mod: CPTII,,, | Performed by: PHYSICIAN ASSISTANT

## 2022-04-13 PROCEDURE — 1160F PR REVIEW ALL MEDS BY PRESCRIBER/CLIN PHARMACIST DOCUMENTED: ICD-10-PCS | Mod: CPTII,,, | Performed by: PHYSICIAN ASSISTANT

## 2022-04-13 PROCEDURE — 99024 POSTOP FOLLOW-UP VISIT: CPT | Mod: ,,, | Performed by: PHYSICIAN ASSISTANT

## 2022-04-13 PROCEDURE — 1159F PR MEDICATION LIST DOCUMENTED IN MEDICAL RECORD: ICD-10-PCS | Mod: CPTII,,, | Performed by: PHYSICIAN ASSISTANT

## 2022-04-13 PROCEDURE — 1160F RVW MEDS BY RX/DR IN RCRD: CPT | Mod: CPTII,,, | Performed by: PHYSICIAN ASSISTANT

## 2022-04-13 PROCEDURE — 1159F MED LIST DOCD IN RCRD: CPT | Mod: CPTII,,, | Performed by: PHYSICIAN ASSISTANT

## 2022-04-13 RX ORDER — METHYLPREDNISOLONE 4 MG/1
TABLET ORAL
Qty: 1 EACH | Refills: 0 | Status: SHIPPED | OUTPATIENT
Start: 2022-04-13 | End: 2022-12-12

## 2022-04-13 NOTE — TELEPHONE ENCOUNTER
Called patient on yesterday 4/12 to confirm appointment on 4/13. Patient called at 10:56am stating she would be about 15min late for 11am appointment. Marked patient as a no show at 11:30am.

## 2022-04-13 NOTE — TELEPHONE ENCOUNTER
----- Message from Kelsey Morales sent at 4/13/2022 10:56 AM CDT -----  Type:  Needs Medical Advice    Who Called: pt  Symptoms (please be specific): pt running about 15 min. Late for appt for today @11:00     Would the patient rather a call back or a response via [facility]? #call  Best Call Back Number: [phone]  Additional Information:

## 2022-04-13 NOTE — PROGRESS NOTES
"Subjective:     Patient ID:  Kevin Mancini is a 37 y.o. female.    Nik    Chief Complaint:  6 week po fu    Date of surgery 03/02/2022     Preop diagnosis  1. L5-S1 disc herniation with radiculopathy     Postop diagnosis  Same     Surgery  1. Left minimally invasive L5-S1 laminotomy, medial facetectomy, microdiskectomy for decompression of left S1 traversing nerve root  2. Fluoroscopy     Surgeon      HPI    Kevin Mancini is a 37 y.o. female who presents for follow up.  Patient states that the severe left leg pain she had before surgery is gone but she has a different type of pain that she had at the postop wound check visit.  It has gotten slightly better but is still very painful.  She has pain and spasms in the left low back buttock and down the left lateral leg to the mid calf.  At times her leg will spasm and cramp and get very stiff.  She had difficulty walking and ambulating because the pain.  She has difficulty switching positions and standing.  This happens for about 3 minutes and then eases off.  No right leg pain or paresthesias.    No neck pain or arm pain or paresthesias.  No difficulty with fine motor skills.  She does have some balance trouble but primarily due to pain.    She is taking oxycodone, methocarbamol, and gabapentin 3 times a day.  She tried disc space up oxycodone son but it was too painful.    Patient denies any recent accidents or trauma, no saddle anesthesias, and no bowel or bladder incontinence.        Review of Systems:  Constitution: Negative for chills, fever, night sweats and weight loss.   Musculoskeletal: Negative for falls.   Gastrointestinal: Negative for bowel incontinence, nausea and vomiting.   Genitourinary: Negative for bladder incontinence.   Neurological: Negative for disturbances in coordination and loss of balance.      Objective:      Vitals:    04/13/22 1150   Weight: 91.2 kg (201 lb 1 oz)   Height: 5' 4" (1.626 m)   PainSc:   7 "       Physical Exam:      General:  Kevin Mancini is well-developed, well-nourished, appears stated age, in no acute distress, alert and oriented to person, place, and time.    Pulmonary/Chest:  Respiratory effort normal  Abdominal: Exhibits no distension  Psychiatric:  Normal mood and affect.  Behavior is normal.  Judgement and thought content normal      Musculoskeletal:    Patient gets up from a sitting to a standing position with pain.    Lumbar ROM:   Pain in lumbar flexion, extension, left lateral bending, and right lateral bending.    Lumbar Spine Inspection:  Healed surgical scars with no visible rashes.    Lumbar Spine Palpation:  No tenderness to low back palpation.    SI Joint Palpation:  No tenderness to SI Joint palpation.    Straight Leg Raise:  Negative right and positive left SLR.      Neurological:     Muscle strength against resistance:     Right Left   Deltoid  5 / 5 5 / 5   Biceps  5 / 5 5 / 5   Triceps 5 / 5 5 / 5   Wrist flexion  5 / 5 5 / 5   Wrist extension 5 / 5 5 / 5   Finger abduction 5 / 5 5 / 5   Thumb opposition 5 / 5 5 / 5   Handgrip 5 / 5 5 / 5   Hip flexion  5 / 5 4 / 5   Hip extension 5 / 5 4 / 5   Hip abduction 5 / 5 4 / 5   Hip adduction 5/ 5 4 / 5   Knee extension  5 / 5 5 / 5   Knee flexion  5 / 5 5 / 5   Dorsiflexion  5 / 5 5 / 5   EHL  5 / 5 4 / 5   Plantar flexion  5 / 5 5 / 5   Inversion of the feet 5 / 5 5 / 5   Eversion of the feet 5 / 5 5 / 5       Reflexes:     Right Left   Triceps 3+ 3+   Biceps 3+ 3+   Brachioradialis 3+ 3+   Patellar 3+ 3+   Achilles 3+ 3+     Clonus:  Negative bilaterally  Villasenor: Positive bilaterally    On gross examination of the bilateral upper and lower extremities, patient has no signs of clubbing, cyanosis, or edema.         Assessment:          1. Chronic left-sided low back pain with left-sided sciatica    2. Lumbar radiculopathy    3. S/P lumbar microdiscectomy    4. Hyperreflexia    5. Villasenor sign present            Plan:           Orders Placed This Encounter    MRI Cervical Spine Without Contrast    MRI Lumbar Spine Without Contrast    methylPREDNISolone (MEDROL DOSEPACK) 4 mg tablet     Patient 6 weeks status post left L5-S1 microdiskectomy with significant leg pain spasms and cramping.  She is still taking oxycodone, methocarbamol, and gabapentin to 3 times a day.  She has increased reflexes on exam throughout the upper and lower extremities and bilateral Hannah.  We will get MRI lumbar spine and cervical spine for further evaluation.  Will do a phone review after for results.    Follow-up in 6 weeks with Dr. Zaragoza for 3 month postop follow-up.      Follow-Up:  Follow up in about 6 weeks (around 5/25/2022). If there are any questions prior to this, the patient was instructed to contact the office.       Stacey Thakur [facility], PA-C  Neurosurgery  [facility]  04/13/2022

## 2022-04-18 RX ORDER — OXYCODONE AND ACETAMINOPHEN 10; 325 MG/1; MG/1
1 TABLET ORAL EVERY 6 HOURS PRN
Qty: 28 TABLET | Refills: 0 | Status: SHIPPED | OUTPATIENT
Start: 2022-04-18 | End: 2022-04-25 | Stop reason: SDUPTHER

## 2022-04-27 ENCOUNTER — HOSPITAL ENCOUNTER (OUTPATIENT)
Dept: RADIOLOGY | Facility: HOSPITAL | Age: 37
Discharge: HOME | End: 2022-04-27
Attending: PHYSICIAN ASSISTANT
Payer: MEDICAID

## 2022-04-27 ENCOUNTER — TELEPHONE (OUTPATIENT)
Dept: NEUROSURGERY | Facility: CLINIC | Age: 37
End: 2022-04-27
Payer: MEDICAID

## 2022-04-27 DIAGNOSIS — M54.42 CHRONIC LEFT-SIDED LOW BACK PAIN WITH LEFT-SIDED SCIATICA: ICD-10-CM

## 2022-04-27 DIAGNOSIS — M54.16 LUMBAR RADICULOPATHY: ICD-10-CM

## 2022-04-27 DIAGNOSIS — G89.29 CHRONIC LEFT-SIDED LOW BACK PAIN WITH LEFT-SIDED SCIATICA: ICD-10-CM

## 2022-04-27 DIAGNOSIS — R29.2 HOFFMAN SIGN PRESENT: ICD-10-CM

## 2022-04-27 DIAGNOSIS — Z98.890 S/P LUMBAR MICRODISCECTOMY: ICD-10-CM

## 2022-04-27 DIAGNOSIS — R29.2 HYPERREFLEXIA: ICD-10-CM

## 2022-04-27 PROCEDURE — 72148 MRI LUMBAR SPINE W/O DYE: CPT | Mod: 26,,, | Performed by: RADIOLOGY

## 2022-04-27 PROCEDURE — 72148 MRI LUMBAR SPINE W/O DYE: CPT | Mod: TC,PO

## 2022-04-27 PROCEDURE — 72141 MRI CERVICAL SPINE WITHOUT CONTRAST: ICD-10-PCS | Mod: 26,,, | Performed by: RADIOLOGY

## 2022-04-27 PROCEDURE — 72148 MRI LUMBAR SPINE WITHOUT CONTRAST: ICD-10-PCS | Mod: 26,,, | Performed by: RADIOLOGY

## 2022-04-27 PROCEDURE — 72141 MRI NECK SPINE W/O DYE: CPT | Mod: TC,PO

## 2022-04-27 PROCEDURE — 72141 MRI NECK SPINE W/O DYE: CPT | Mod: 26,,, | Performed by: RADIOLOGY

## 2022-04-27 NOTE — TELEPHONE ENCOUNTER
----- Message from Shannon Ramsay sent at 4/27/2022 12:31 PM CDT -----  Regarding: Refill  Type:  RX Refill Request    Who Called: pt  Refill or New Rx: refill  RX Name and Strength: Methocarbamol 750 mg  Preferred Pharmacy with phone number: [facility]/pharmacy #[id number] [address] [address]  Phone: [phone] Fax: [phone]      Would the patient rather a call back or a response via [facility]? call  Best Call Back Number: [phone]

## 2022-04-28 RX ORDER — METHOCARBAMOL 750 MG/1
750 TABLET, FILM COATED ORAL
Qty: 90 TABLET | Refills: 2 | Status: SHIPPED | OUTPATIENT
Start: 2022-04-28 | End: 2022-05-08

## 2022-05-03 RX ORDER — OXYCODONE AND ACETAMINOPHEN 10; 325 MG/1; MG/1
1 TABLET ORAL EVERY 8 HOURS PRN
Qty: 21 TABLET | Refills: 0 | Status: SHIPPED | OUTPATIENT
Start: 2022-05-03 | End: 2022-05-09 | Stop reason: SDUPTHER

## 2022-05-10 RX ORDER — OXYCODONE AND ACETAMINOPHEN 10; 325 MG/1; MG/1
1 TABLET ORAL EVERY 8 HOURS PRN
Qty: 21 TABLET | Refills: 0 | Status: SHIPPED | OUTPATIENT
Start: 2022-05-10 | End: 2022-05-16 | Stop reason: SDUPTHER

## 2022-05-10 NOTE — TELEPHONE ENCOUNTER
Dr. Zaragoza,    I keep getting refill requests for oxycodone for her.  How would you like this handled?    Thanks,  Stacey Thakur, [facility], PA-C  Neurosurgery  [facility]  05/10/2022

## 2022-05-17 ENCOUNTER — OFFICE VISIT (OUTPATIENT)
Dept: NEUROSURGERY | Facility: CLINIC | Age: 37
End: 2022-05-17
Payer: MEDICAID

## 2022-05-17 DIAGNOSIS — Z98.890 S/P LUMBAR MICRODISCECTOMY: Primary | ICD-10-CM

## 2022-05-17 PROCEDURE — 1159F PR MEDICATION LIST DOCUMENTED IN MEDICAL RECORD: ICD-10-PCS | Mod: CPTII,95,, | Performed by: PHYSICIAN ASSISTANT

## 2022-05-17 PROCEDURE — 99024 PR POST-OP FOLLOW-UP VISIT: ICD-10-PCS | Mod: 95,,, | Performed by: PHYSICIAN ASSISTANT

## 2022-05-17 PROCEDURE — 99024 POSTOP FOLLOW-UP VISIT: CPT | Mod: 95,,, | Performed by: PHYSICIAN ASSISTANT

## 2022-05-17 PROCEDURE — 1159F MED LIST DOCD IN RCRD: CPT | Mod: CPTII,95,, | Performed by: PHYSICIAN ASSISTANT

## 2022-05-17 PROCEDURE — 1160F RVW MEDS BY RX/DR IN RCRD: CPT | Mod: CPTII,95,, | Performed by: PHYSICIAN ASSISTANT

## 2022-05-17 PROCEDURE — 1160F PR REVIEW ALL MEDS BY PRESCRIBER/CLIN PHARMACIST DOCUMENTED: ICD-10-PCS | Mod: CPTII,95,, | Performed by: PHYSICIAN ASSISTANT

## 2022-05-17 RX ORDER — GABAPENTIN 600 MG/1
600 TABLET ORAL 3 TIMES DAILY
Qty: 90 TABLET | Refills: 2 | Status: SHIPPED | OUTPATIENT
Start: 2022-05-17 | End: 2022-06-06

## 2022-05-17 RX ORDER — OXYCODONE AND ACETAMINOPHEN 10; 325 MG/1; MG/1
1 TABLET ORAL EVERY 8 HOURS PRN
Qty: 21 TABLET | Refills: 0 | Status: SHIPPED | OUTPATIENT
Start: 2022-05-17 | End: 2022-05-25 | Stop reason: SDUPTHER

## 2022-05-17 NOTE — PROGRESS NOTES
Established Patient - Audio Only Telehealth Visit     The patient location is: [place]  The chief complaint leading to consultation is: Left leg pain   Visit type: Virtual visit with audio only (telephone)  Total time spent with patient: 7 minutes    More than half of the time was spent counseling or coordinating care including prognosis, differential diagnosis, risks and benefits of treatment, instructions, compliance risk reductions         The reason for the audio only service rather than synchronous audio and video virtual visit was related to technical difficulties or patient preference/necessity.     Each patient to whom I provide medical services by telemedicine is:  (1) informed of the relationship between the physician and patient and the respective role of any other health care provider with respect to management of the patient; and (2) notified that they may decline to receive medical services by telemedicine and may withdraw from such care at any time. Patient verbally consented to receive this service via voice-only telephone call.         This service was not originating from a related E/M service provided within the previous 7 days nor will  to an E/M service or procedure within the next 24 hours or my soonest available appointment.  Prevailing standard of care was able to be met in this audio-only visit.      Patient ID:  Kevin Mancini is a 37 y.o. female.     Nik     Chief Complaint:  MRI FU    Date of surgery 03/02/2022     Preop diagnosis  1. L5-S1 disc herniation with radiculopathy     Postop diagnosis  Same     Surgery  1. Left minimally invasive L5-S1 laminotomy, medial facetectomy, microdiskectomy for decompression of left S1 traversing nerve root  2. Fluoroscopy     Surgeon        HPI     Kevin Mancini is a 37 y.o. female who presents for follow up.  Patient states that the severe left leg pain she had before surgery is gone but she has a different type of pain that  she had at the postop wound check visit.  It has gotten slightly better but is still very painful.  She has pain and spasms in the left low back buttock and down the left lateral leg to the mid calf.  At times her leg will spasm and cramp and get very stiff.  She had difficulty walking and ambulating because the pain.  She has difficulty switching positions and standing.  This happens for about 3 minutes and then eases off.  No right leg pain or paresthesias.     No neck pain or arm pain or paresthesias.  No difficulty with fine motor skills.  She does have some balance trouble but primarily due to pain.     She is taking oxycodone, methocarbamol, and gabapentin 3 times a day.  She tried disc space up oxycodone son but it was too painful.     Patient denies any recent accidents or trauma, no saddle anesthesias, and no bowel or bladder incontinence.       Interval history    Since I saw her last the pain has gotten somewhat better.  She continues to have pain in the left buttocks down the left lateral thigh to the calf.  She describes this as pain and spasms.  The numbness and tingling is gone.  She denies any right leg pain or paresthesias.    She is taking oxycodone every 8 hours, Robaxin at night, and gabapentin 300 mg 3 times a day.        Review of Systems:  Constitution: Negative for chills, fever, night sweats and weight loss.   Musculoskeletal: Negative for falls.   Gastrointestinal: Negative for bowel incontinence, nausea and vomiting.   Genitourinary: Negative for bladder incontinence.   Neurological: Negative for disturbances in coordination and loss of balance.   Objective:       Cervical spine MRI was personally reviewed today.  No central stenosis, herniated disc, neural foraminal stenosis.    Lumbar spine MRI was personally reviewed today.  Left L5-S1 laminotomy defect.  I do not see any recurrent disc or nerve compression.        Assessment:       1. Chronic left-sided low back pain with left-sided  sciatica    2. Lumbar radiculopathy    3. S/P lumbar microdiscectomy    4. Hyperreflexia    5. Villasenor sign present          Plan:         MRI cervical spine negative for cord compression.  MRI lumbar spine looks much improved compared to preoperatively.  I would think that the lef leg pain would continue to get better over time.  I do recommend increasing gabapentin from 300-600 mg 3 times a day which I have prescribed today.    She is already scheduled to follow-up with Dr. Zaragoza in 3 weeks for 3 month postop follow-up.       Follow-Up:  Follow up in about 6 weeks (around 5/25/2022). If there are any questions prior to this, the patient was instructed to contact the office.         Stacey Thakur [facility], PA-C  Neurosurgery  [facility]  04/13/2022

## 2022-05-25 RX ORDER — OXYCODONE AND ACETAMINOPHEN 10; 325 MG/1; MG/1
1 TABLET ORAL EVERY 8 HOURS PRN
Qty: 21 TABLET | Refills: 0 | Status: SHIPPED | OUTPATIENT
Start: 2022-05-25 | End: 2022-05-31 | Stop reason: SDUPTHER

## 2022-06-01 RX ORDER — OXYCODONE AND ACETAMINOPHEN 10; 325 MG/1; MG/1
1 TABLET ORAL EVERY 8 HOURS PRN
Qty: 21 TABLET | Refills: 0 | Status: SHIPPED | OUTPATIENT
Start: 2022-06-01 | End: 2022-06-08 | Stop reason: SDUPTHER

## 2022-06-06 ENCOUNTER — HOSPITAL ENCOUNTER (OUTPATIENT)
Dept: RADIOLOGY | Facility: HOSPITAL | Age: 37
Discharge: HOME | End: 2022-06-06
Attending: NEUROLOGICAL SURGERY
Payer: MEDICAID

## 2022-06-06 ENCOUNTER — OFFICE VISIT (OUTPATIENT)
Dept: NEUROSURGERY | Facility: CLINIC | Age: 37
End: 2022-06-06
Payer: MEDICAID

## 2022-06-06 VITALS — WEIGHT: 201.06 LBS | BODY MASS INDEX: 34.33 KG/M2 | HEIGHT: 64 IN

## 2022-06-06 DIAGNOSIS — Z98.890 S/P LUMBAR MICRODISCECTOMY: ICD-10-CM

## 2022-06-06 DIAGNOSIS — Z98.890 S/P LUMBAR MICRODISCECTOMY: Primary | ICD-10-CM

## 2022-06-06 DIAGNOSIS — M51.36 DDD (DEGENERATIVE DISC DISEASE), LUMBAR: ICD-10-CM

## 2022-06-06 PROCEDURE — 1159F MED LIST DOCD IN RCRD: CPT | Mod: CPTII,,, | Performed by: NEUROLOGICAL SURGERY

## 2022-06-06 PROCEDURE — 99214 PR OFFICE/OUTPT VISIT, EST, LEVL IV, 30-39 MIN: ICD-10-PCS | Mod: S$PBB,,, | Performed by: NEUROLOGICAL SURGERY

## 2022-06-06 PROCEDURE — 1159F PR MEDICATION LIST DOCUMENTED IN MEDICAL RECORD: ICD-10-PCS | Mod: CPTII,,, | Performed by: NEUROLOGICAL SURGERY

## 2022-06-06 PROCEDURE — 99999 PR PBB SHADOW E&M-EST. PATIENT-LVL III: CPT | Mod: PBBFAC,,, | Performed by: NEUROLOGICAL SURGERY

## 2022-06-06 PROCEDURE — 99999 PR PBB SHADOW E&M-EST. PATIENT-LVL III: ICD-10-PCS | Mod: PBBFAC,,, | Performed by: NEUROLOGICAL SURGERY

## 2022-06-06 PROCEDURE — 72110 X-RAY EXAM L-2 SPINE 4/>VWS: CPT | Mod: 26,,, | Performed by: INTERNAL MEDICINE

## 2022-06-06 PROCEDURE — 3008F BODY MASS INDEX DOCD: CPT | Mod: CPTII,,, | Performed by: NEUROLOGICAL SURGERY

## 2022-06-06 PROCEDURE — 3008F PR BODY MASS INDEX (BMI) DOCUMENTED: ICD-10-PCS | Mod: CPTII,,, | Performed by: NEUROLOGICAL SURGERY

## 2022-06-06 PROCEDURE — 72110 XR LUMBAR SPINE AP AND LAT WITH FLEX/EXT: ICD-10-PCS | Mod: 26,,, | Performed by: INTERNAL MEDICINE

## 2022-06-06 PROCEDURE — 72110 X-RAY EXAM L-2 SPINE 4/>VWS: CPT | Mod: TC,PN

## 2022-06-06 PROCEDURE — 99214 OFFICE O/P EST MOD 30 MIN: CPT | Mod: S$PBB,,, | Performed by: NEUROLOGICAL SURGERY

## 2022-06-06 PROCEDURE — 99213 OFFICE O/P EST LOW 20 MIN: CPT | Mod: PBBFAC,PN | Performed by: NEUROLOGICAL SURGERY

## 2022-06-06 RX ORDER — GABAPENTIN 600 MG/1
900 TABLET ORAL 3 TIMES DAILY
Qty: 135 TABLET | Refills: 11 | Status: SHIPPED | OUTPATIENT
Start: 2022-06-06 | End: 2022-10-24 | Stop reason: SDUPTHER

## 2022-06-06 NOTE — PROGRESS NOTES
NEUROSURGICAL PROGRESS NOTE    DATE OF SERVICE:  06/06/2022    ATTENDING PHYSICIAN:  Bakari Zaragoza MD    SUBJECTIVE:   Kevin Mancini is a 36 y.o. female who presents with the above CC.  Patent states she fell backwards in December 2020 and at that time her left leg twisted outwards.  2 months later she started to have pain in the left posterior leg from the mid thigh region to the ankle and numbness in the last three toes.  Pain is much worse with walking and better when she leaning her body over in back flexion.  She had knee cysts addressed but this did not help relieve the left leg pain.  She denies and back or right leg pain.     EMG in April 2021.     Patient has not had PT or ESIs.  No spine surgery.  Patient is currently taking diclofenac and flexeril with minimal relief.     Patient denies any recent accidents or trauma, no saddle anesthesias, and no bowel or bladder incontinence.     INTERIM HISTORY:  She works as a manager at [email or web address]  Did a full conservative management including physical therapy, recently had a left L5-S1 transforaminal epidural injection and S1 intraforaminal injection at [facility].  Unfortunately she did not have significant pain relief after the injection.  She is taking gabapentin 300 mg 3 times daily.  She reports that the left leg pain in the S1 distribution is affecting her quality of life and functional status.  She is unable to cook, she has difficulty doing her grocery, walking for prolonged period of time.  She has some relief when she lean over.  Her gait has changed.  Left leg pain is associated with numbness.  No new onset of motor weakness or sphincter dysfunction symptoms.    INTERIM HISTORY:    She is 3 months status post left L5-S1 microdiskectomy.  She reports improved sensation down her left leg compared to before surgery.  Overall the pain is better compared to before surgery.  She still has significant low back pain and left leg pain.  She is  unable to sleep on her side due to increased pain.  She is taking gabapentin 600 mg 3 times daily, Percocet 10 mg twice daily and Robaxin during the day.  She still has difficulty function at home due to the pain.  Sitting for long period of time increase the pain.  She has not started physical therapy yet.  No new onset of motor weakness numbness or sphincter dysfunction symptoms.              PAST MEDICAL HISTORY:  Active Ambulatory Problems     Diagnosis Date Noted    Chronic left-sided low back pain with left-sided sciatica 11/18/2021    Decreased range of motion of lumbar spine 11/18/2021    Weakness of left lower extremity 11/18/2021    Gait difficulty 11/18/2021    Tobacco user 01/02/2019    Chronic asthmatic bronchitis with acute exacerbation 08/01/2019    Exercise-induced asthma 01/02/2019    Lumbar disc herniation with radiculopathy 03/02/2022     Resolved Ambulatory Problems     Diagnosis Date Noted    No Resolved Ambulatory Problems     Past Medical History:   Diagnosis Date    Asthma        PAST SURGICAL HISTORY:  Past Surgical History:   Procedure Laterality Date    MICRODISCECTOMY OF SPINE N/A 3/2/2022    Procedure: MICRODISCECTOMY, SPINE Left L5-S1 Microdiscectomy;  Surgeon: Bakari Zaragoza MD;  Location: [facility];  Service: Neurosurgery;  Laterality: N/A;  Procedure: Left L5-S1 Microdiscectomy  Surgery Time: 1.5hr  LOS: 0-1  Anesthesia: General  Blood: Type & Screen  Radiology: C-arm  Microscope: Metrx  Bed: [place] [id number] Poster  Position: Prone       SOCIAL HISTORY:   Social History     Socioeconomic History    Marital status: Single   Tobacco Use    Smoking status: Current Some Day Smoker     Types: Cigars    Smokeless tobacco: Never Used    Tobacco comment: social   Substance and Sexual Activity    Alcohol use: Yes     Alcohol/week: 1.0 standard drink     Types: 1 Glasses of wine per week     Comment: social    Drug use: Not Currently       FAMILY HISTORY:  No family history on  file.    CURRENTS MEDICATIONS:  Current Outpatient Medications on File Prior to Visit   Medication Sig Dispense Refill    ferrous sulfate (FEOSOL) 325 mg (65 mg iron) Tab tablet Take 325 mg by mouth daily with breakfast.      methylPREDNISolone (MEDROL DOSEPACK) 4 mg tablet use as directed 1 each 0    oxyCODONE-acetaminophen (PERCOCET)  mg per tablet Take 1 tablet by mouth every 8 (eight) hours as needed for Pain. 21 tablet 0    [DISCONTINUED] gabapentin (NEURONTIN) 300 MG capsule Take 1 capsule (300 mg total) by mouth 3 (three) times daily. 90 capsule 2    [DISCONTINUED] gabapentin (NEURONTIN) 600 MG tablet Take 1 tablet (600 mg total) by mouth 3 (three) times daily. 90 tablet 2     No current facility-administered medications on file prior to visit.       ALLERGIES:  Review of patient's allergies indicates:  No Known Allergies    REVIEW OF SYSTEMS:  Review of Systems   Constitutional: Negative for diaphoresis, fever and weight loss.   Respiratory: Negative for shortness of breath.    Cardiovascular: Negative for chest pain.   Gastrointestinal: Negative for blood in stool.   Genitourinary: Negative for hematuria.   Endo/Heme/Allergies: Does not bruise/bleed easily.   All other systems reviewed and are negative.        OBJECTIVE:    PHYSICAL EXAMINATION:   There were no vitals filed for this visit.    Physical Exam:  Vitals reviewed.    Constitutional: She appears well-developed and well-nourished.     Eyes: Pupils are equal, round, and reactive to light. Conjunctivae and EOM are normal.     Cardiovascular: Normal distal pulses and no edema.     Abdominal: Soft.     Skin: Skin displays no rash on trunk and no rash on extremities. Skin displays no lesions on trunk and no lesions on extremities.     Psych/Behavior: She is alert. She is oriented to person, place, and time. She has a normal mood and affect.     Musculoskeletal:        Neck: Range of motion is full.     Neurological:        DTRs: Tricep  reflexes are 2+ on the right side and 2+ on the left side. Bicep reflexes are 2+ on the right side and 2+ on the left side. Brachioradialis reflexes are 2+ on the right side and 2+ on the left side. Patellar reflexes are 2+ on the right side and 2+ on the left side. Achilles reflexes are 2+ on the right side and 2+ on the left side.       Back Exam     Tenderness   The patient is experiencing tenderness in the lumbar.    Range of Motion   Extension: normal   Flexion: normal   Lateral bend right: normal   Lateral bend left: normal   Rotation right: normal   Rotation left: normal     Muscle Strength   Right Quadriceps:  5/5   Left Quadriceps:  5/5   Right Hamstrings:  5/5   Left Hamstrings:  5/5     Tests   Straight leg raise right: negative  Straight leg raise left: negative    Other   Toe walk: normal  Heel walk: normal            SI joint:   Palpation at the right and left SI joints not painful  RABIA test is negative bilaterally  Gaenslen test is negative bilaterally  Thigh thrust test is negative bilaterally    Neurologic Exam     Mental Status   Oriented to person, place, and time.   Speech: speech is normal   Level of consciousness: alert    Cranial Nerves   Cranial nerves II through XII intact.     CN III, IV, VI   Pupils are equal, round, and reactive to light.  Extraocular motions are normal.     Motor Exam   Muscle bulk: normal  Overall muscle tone: normal    Strength   Right deltoid: 5/5  Left deltoid: 5/5  Right biceps: 5/5  Left biceps: 5/5  Right triceps: 5/5  Left triceps: 5/5  Right wrist flexion: 5/5  Left wrist flexion: 5/5  Right wrist extension: 5/5  Left wrist extension: 5/5  Right interossei: 5/5  Left interossei: 5/5  Right iliopsoas: 5/5  Left iliopsoas: 5/5  Right quadriceps: 5/5  Left quadriceps: 5/5  Right hamstrin/5  Left hamstrin/5  Right anterior tibial: 5/5  Left anterior tibial: 5/5  Right posterior tibial: 5/5  Left posterior tibial: 5/5  Right peroneal: 5/5  Left peroneal:  5/5  Right gastroc: 5/5  Left gastroc: 5/5    Sensory Exam   Light touch normal.   Pinprick normal.     Gait, Coordination, and Reflexes     Gait  Gait: normal    Coordination   Finger to nose coordination: normal  Tandem walking coordination: normal    Reflexes   Right brachioradialis: 2+  Left brachioradialis: 2+  Right biceps: 2+  Left biceps: 2+  Right triceps: 2+  Left triceps: 2+  Right patellar: 2+  Left patellar: 2+  Right achilles: 2+  Left achilles: 2+  Right plantar: normal  Left plantar: normal  Right Villasenor: absent  Left Villasenor: absent  Right ankle clonus: absent  Left ankle clonus: absent        DIAGNOSTIC DATA:  I personally interpreted the following imaging:   Flexion-extension x-rays today shows hypermobility at the L4-5 level without significant spondylolisthesis  Lumbar spine MRI April 2022 postoperative shows good decompression at L5-S1, L4-5 central disc bulge with annular tear    ASSESMENT:  This is a 37 y.o. female with     Problem List Items Addressed This Visit    None     Visit Diagnoses     S/P lumbar microdiscectomy    -  Primary    Relevant Orders    X-Ray Lumbar Spine Ap Lateral w/Flex Ext    Ambulatory referral/consult to Physical/Occupational Therapy    DDD (degenerative disc disease), lumbar        Relevant Orders    X-Ray Lumbar Spine Ap Lateral w/Flex Ext    Ambulatory referral/consult to Physical/Occupational Therapy            PLAN:  Physical therapy 3 times a week for 6 weeks, Sharmila exercises  Increasing gabapentin to 900 mg 3 times daily  Follow-up in 3 months  Avoid bending at the waist, heavy lifting  All questions answered  More than 50% of the time was spent on discussing conservative management treatments (medication, physical therapy exercises) and possible interventions (spinal injections and surgical procedures). Care coordination was discussed.      30 minutes      Bakari Zaragoza MD  Cell:[phone]

## 2022-06-10 ENCOUNTER — PATIENT MESSAGE (OUTPATIENT)
Dept: NEUROSURGERY | Facility: CLINIC | Age: 37
End: 2022-06-10
Payer: MEDICAID

## 2022-06-12 RX ORDER — OXYCODONE AND ACETAMINOPHEN 10; 325 MG/1; MG/1
1 TABLET ORAL EVERY 8 HOURS PRN
Qty: 21 TABLET | Refills: 0 | Status: SHIPPED | OUTPATIENT
Start: 2022-06-12 | End: 2022-06-17 | Stop reason: SDUPTHER

## 2022-06-12 RX ORDER — OXYCODONE AND ACETAMINOPHEN 10; 325 MG/1; MG/1
1 TABLET ORAL EVERY 8 HOURS PRN
Qty: 21 TABLET | Refills: 0 | OUTPATIENT
Start: 2022-06-12

## 2022-06-13 ENCOUNTER — CLINICAL SUPPORT (OUTPATIENT)
Dept: REHABILITATION | Facility: HOSPITAL | Age: 37
End: 2022-06-13
Attending: NEUROLOGICAL SURGERY
Payer: MEDICAID

## 2022-06-13 DIAGNOSIS — R26.9 GAIT DIFFICULTY: ICD-10-CM

## 2022-06-13 DIAGNOSIS — G89.29 CHRONIC LEFT-SIDED LOW BACK PAIN WITH LEFT-SIDED SCIATICA: Primary | ICD-10-CM

## 2022-06-13 DIAGNOSIS — Z98.890 S/P LUMBAR MICRODISCECTOMY: ICD-10-CM

## 2022-06-13 DIAGNOSIS — M51.36 DDD (DEGENERATIVE DISC DISEASE), LUMBAR: ICD-10-CM

## 2022-06-13 DIAGNOSIS — M53.86 DECREASED RANGE OF MOTION OF LUMBAR SPINE: ICD-10-CM

## 2022-06-13 DIAGNOSIS — R29.898 WEAKNESS OF LEFT LOWER EXTREMITY: ICD-10-CM

## 2022-06-13 DIAGNOSIS — M54.42 CHRONIC LEFT-SIDED LOW BACK PAIN WITH LEFT-SIDED SCIATICA: Primary | ICD-10-CM

## 2022-06-13 PROCEDURE — 97161 PT EVAL LOW COMPLEX 20 MIN: CPT

## 2022-06-13 NOTE — PLAN OF CARE
[facility]  [place] [id number]  Physical Therapy Initial Evaluation    Date: 6/13/2022   Name: Kevin Mancini  Clinic Number: [id number]    Therapy Diagnosis:   Encounter Diagnoses   Name Primary?    S/P lumbar microdiscectomy     DDD (degenerative disc disease), lumbar     Chronic left-sided low back pain with left-sided sciatica Yes    Weakness of left lower extremity     Gait difficulty     Decreased range of motion of lumbar spine      Physician: Bakari Zaragoza MD    Physician Orders: PT Eval and Treat 3 months s/p left L5-S1 microdiscectomy. Lumbar PT 3 times a week for 6 weeks. Sharmila exercises.   Medical Diagnosis from Referral: Z98.890 (ICD-10-CM) - S/P lumbar microdiscectomy M51.36 (ICD-10-CM) - DDD (degenerative disc disease), lumbar   Evaluation Date: 6/13/2022  Authorization Period Expiration: 6/6/2023  Plan of Care Expiration: 12/13/2022  Visit # / Visits authorized: 1/ 1    Time In: 11:06  Time Out: 12:01  Total Appointment Time (timed & untimed codes): 55 minutes    Precautions: Standard         Subjective   Date of onset: 3/2/2022 surgery date  History of current condition - [facility] reports: left sided low back pain that travels to left buttock, posterior thigh, calf, and foot. Standing and sitting upright are better than sitting and bending. She tries to walk 2 miles each day, but has to take breaks during the 2 miles. Patient has taken 8 weeks off from work and will return to work in August.   Initial injury: Patent states she fell backwards in December 2020 and at that time her left leg twisted outwards.  2 months later she started to have pain in the left posterior leg from the mid thigh region to the ankle and numbness in the last three toes.  Pain is much worse with walking and better when she leaning her body over in back flexion.  She had knee cysts addressed but this did not help relieve the left leg pain.        Medical History:   Past Medical History:    Diagnosis Date    Asthma        Surgical History:   Kevin Mancini  has a past surgical history that includes Microdiscectomy of spine (N/A, 3/2/2022).    Medications:   Kevin has a current medication list which includes the following prescription(s): ferrous sulfate, gabapentin, methylprednisolone, and oxycodone-acetaminophen.    Allergies:   Review of patient's allergies indicates:  No Known Allergies     Imaging, see chart for all    Prior Therapy: completed prior to surgery  Social History:  lives with their family; 16 steps up to home  Occupation: She works as a manager at [employer]     Prior Level of Function: able to work, complete ADLs, cook, and exercise without limitations or pain  Current Level of Function: can only stand 1.5-2 hours before needing to sit; difficulty going from sitting to standing; weakness in left leg; symptoms down left leg    Pain:  Current 5/10, worst 10/10, best 3/10   Location: { left low back, buttock, and posterior leg   Description: variable  Aggravating Factors: bending, sitting in slouched posture, prolonged static positioning   Easing Factors: changing positions, sitting upright, lying supine (flat)    Pts goals: get back to work, improve mobility, bend down and walk without breaks or increased symptoms    Objective     Observation: enters clinic walking with antalgic gait pattern and right sided lean      Lumbar Range of Motion:    Limitations Pain   Flexion 50%   increased        Extension 25%   decreased            Lower Extremity Strength  Right LE  Left LE    Quadriceps: 5/5 Quadriceps: 4/5   Hamstrings: 5/5 Hamstrings: 4/5   Hip flexion: 5/5 Hip flexion: 4/5   Ankle dorsiflexion: 5/5 Ankle dorsiflexion: 4/5   Ankle plantarflexion: 5/5 Ankle plantarflexion: 4/5     Sensation: reduced light touch on left posterior thigh and calf as compared to right    Reflexes:  -Patellar (L3-L4): 2+ bilaterally  -Achilles (S1): 2+ bilaterally    Special Tests:  -SLR  Test: positive on left  -Repeated Flexion: increased pain  -Repeated Ext: reduced pain  -Slump Test: positive on left        TREATMENT   Treatment Time In: 11:30  Treatment Time Out: 12:02  Total Treatment time (time-based codes) separate from Evaluation: 32 minutes    Kevin received therapeutic exercises to develop strength, endurance, ROM, flexibility and posture for 22 minutes including:  Repeated extension in standing x10  Supine sciatic nerve glides x20  Education on HEP, current condition      Kevin received hot pack for 10 minutes to low back - skin intact post.      Home Exercises and Patient Education Provided    Education provided:   - HEP    Written Home Exercises Provided: yes.  Exercises were reviewed and patient was able to demonstrate them prior to the end of the session.  Patient demonstrated good  understanding of the education provided.     See EMR under Patient Instructions for exercisesprovided 6/13/2022.    Assessment   Kevin is a 37 y.o. female referred to outpatient Physical Therapy with a medical diagnosis of low back pain with radiculopathy. Pt presents with limited lumbar ROM, poor transitional movement, ,gait abnormality, LLE weakness, and adverse neural tension on the left.     Pt prognosis is Good.   Pt will benefit from skilled outpatient Physical Therapy to address the deficits stated above and in the chart below, provide pt/family education, and to maximize pt's level of independence.     Plan of care discussed with patient: Yes  Pt's spiritual, cultural and educational needs considered and patient is agreeable to the plan of care and goals as stated below:     Anticipated Barriers for therapy: previous PT with limited benefit    Medical Necessity is demonstrated by the following  History  Co-morbidities and personal factors that may impact the plan of care Co-morbidities:   asthma    Personal Factors:   no deficits     low   Examination  Body Structures and Functions,  activity limitations and participation restrictions that may impact the plan of care Body Regions:   back    Body Systems:    gross symmetry  ROM  strength  gait  transitions  motor control    Participation Restrictions:   covid-19    Activity limitations:   Learning and applying knowledge  no deficits    General Tasks and Commands  no deficits    Communication  no deficits    Mobility  lifting and carrying objects  walking  driving (bike, car, motorcycle)    Self care  washing oneself (bathing, drying, washing hands)  dressing    Domestic Life  no deficits    Interactions/Relationships  no deficits    Life Areas  no deficits    Community and Social Life  no deficits         low   Clinical Presentation stable and uncomplicated low   Decision Making/ Complexity Score: low     Goals:  Short Term Goals:  4 weeks  1.Report decreased LLE pain  < / =  5/10  to increase tolerance for walking and standing.  2. Increase ROM by 25% where limited in order to perform ADLs without difficulty.  3. Increase strength by 1/3 MMT grade in LLE  to increase tolerance for ADL and work activities.  4. Pt to tolerate HEP to improve ROM and independence with ADL's    Long Term Goals: 6 months  1.Report decreased low back pain < / = 2/10  to increase tolerance for prolonged sitting at work.  2.Patient goal: walk 2 miles without taking breaks  3.Increase strength to 5/5 in  LLE  to increase tolerance for ADL and work activities.      Plan   Plan of care Certification: 6/13/2022 to 12/13/2022.    Outpatient Physical Therapy 2-3 times weekly for 6 months to include the following interventions: Manual Therapy, Neuromuscular Re-ed, Patient Education, Therapeutic Activities and Therapeutic Exercise.     Nila Rivera, PT, DPT, OCS

## 2022-06-20 NOTE — TELEPHONE ENCOUNTER
----- Message from Elsa Peterson sent at 6/17/2022  4:03 PM CDT -----  Type:  RX Refill Request    Who Called: pt        oxyCODONE-acetaminophen (PERCOCET)  mg per tablet 21 tablet 0 6/12/2022  No  Sig - Route: Take 1 tablet by mouth every 8 (eight) hours as needed for Pain. - Oral  Sent to pharmacy as: oxyCODONE-acetaminophen (PERCOCET)  mg per tablet  Class: Normal  Earliest Fill Date: 6/12/2022  Notes to Pharmacy: Quantity prescribed more than 7 day supply? No  Order: [id number]  Date/Time Signed: 6/12/2022 17:18      E-Prescribing Status: Receipt confirmed by pharmacy (6/12/2022  5:18 PM CDT        [id number] [address]   Phone:  [phone]  Fax:  [phone]          Would the patient rather a call back or a response via [facility]? call  Best Call Back Number:[phone] (M)   Additional Information: pt will be out of medicine by tomorrow

## 2022-06-21 RX ORDER — OXYCODONE AND ACETAMINOPHEN 10; 325 MG/1; MG/1
1 TABLET ORAL EVERY 8 HOURS PRN
Qty: 21 TABLET | Refills: 0 | Status: SHIPPED | OUTPATIENT
Start: 2022-06-21 | End: 2022-06-26 | Stop reason: SDUPTHER

## 2022-06-21 RX ORDER — OXYCODONE AND ACETAMINOPHEN 10; 325 MG/1; MG/1
1 TABLET ORAL EVERY 8 HOURS PRN
Qty: 21 TABLET | Refills: 0 | OUTPATIENT
Start: 2022-06-21

## 2022-06-22 NOTE — PROGRESS NOTES
[facility]   Physical Therapy Treatment Note     Name: Kevin Mancini  Clinic Number: [id number]    Therapy Diagnosis:   Encounter Diagnoses   Name Primary?    Weakness of left lower extremity Yes    Chronic left-sided low back pain with left-sided sciatica     Gait difficulty      Physician: Bakari Zaragoza MD    Visit Date: 6/23/2022    Physician Orders: PT Eval and Treat 3 months s/p left L5-S1 microdiscectomy. Lumbar PT 3 times a week for 6 weeks. Sharmila exercises.   Medical Diagnosis from Referral: Z98.890 (ICD-10-CM) - S/P lumbar microdiscectomy M51.36 (ICD-10-CM) - DDD (degenerative disc disease), lumbar   Evaluation Date: 6/13/2022  Authorization Period Expiration: 6/6/2023  Plan of Care Expiration: 12/13/2022  Visit # / Visits authorized: 1/ 20  PTA Visit #: 0/5     Time In: 12:50  Time Out: 13:45  Total Billable Time: 55 minutes    Precautions: Standard    SUBJECTIVE     Pt reports: minor improvements in symptoms, but still having significant radicular pain with flexion activities..  She was compliant with home exercise program.  Response to previous treatment: Initial Evaluation  Functional change: Ongoing    Pain: 7/10  Location: bilateral low back and Left lower extremity     OBJECTIVE     Objective Measures updated at progress report unless specified.     Special Tests:  -SLR Test: positive on left  -Repeated Flexion: increased pain  -Repeated Ext: reduced pain  -Slump Test: positive on left    Treatment     Kevin received the treatments listed below:      Therapeutic Exercises to develop strength, endurance, ROM and flexibility for 55 minutes including:  Repeated extension in standing x10  Supine sciatic nerve glides 2x20  Prone on Elbows - 3 minutes x2  Prone Press ups - 3x30  Prone hip extension - bilateral 3x10  Bridges - 3x10    Patient Education and Home Exercises     Home Exercises Provided and Patient Education Provided     Education provided:    Symptom management and plan of care progressions.  Home Exercise Program.  Log Roll    Written Home Exercises Provided: yes. Exercises were reviewed and Kevin was able to demonstrate them prior to the end of the session.  Kevin demonstrated good  understanding of the education provided. See EMR under Patient Instructions for exercises provided during therapy sessions    ASSESSMENT     Kevin presents back for her first follow-up. Minor improvements in symptoms following initial evaluation. Continued with extension based activities with marked centralization of symptoms (up to the knee). Updated home exercise program with today's exercises.     From Initial Evaluation on 6/13/2022 - Kevin is a 37 y.o. female referred to outpatient Physical Therapy with a medical diagnosis of low back pain with radiculopathy. Pt presents with limited lumbar ROM, poor transitional movement, ,gait abnormality, LLE weakness, and adverse neural tension on the left.     Kevin Is progressing well towards her goals.   Pt prognosis is Good.     Pt will continue to benefit from skilled outpatient physical therapy to address the deficits listed in the problem list box on initial evaluation, provide pt/family education and to maximize pt's level of independence in the home and community environment.   Pt's spiritual, cultural and educational needs considered and pt agreeable to plan of care and goals.     Anticipated barriers to physical therapy: previous physical therapy with limited benefit.    Goals:  Short Term Goals:  4 weeks  1.Report decreased LLE pain  < / =  5/10  to increase tolerance for walking and standing. (Not Met - Progressing)   2. Increase ROM by 25% where limited in order to perform ADLs without difficulty. (Not Met - Progressing)  3. Increase strength by 1/3 MMT grade in LLE  to increase tolerance for ADL and work activities. (Not Met - Progressing)  4. Pt to tolerate HEP to improve ROM and independence with  activities of daily living's. (Not Met - Progressing)     Long Term Goals: 6 months  1.Report decreased low back pain < / = 2/10  to increase tolerance for prolonged sitting at work. (Not Met - Progressing)  2.Patient goal: walk 2 miles without taking breaks. (Not Met - Progressing)  3.Increase strength to 5/5 in  LLE  to increase tolerance for ADL and work activities. (Not Met - Progressing)    PLAN     Continue with extension based treatment. Progress core strengthening as symptoms improve.    Plan of care Certification: 6/13/2022 to 12/13/2022.  Outpatient Physical Therapy 2-3 times weekly for 6 months     Faraz Doe, PT , DPT, OCS

## 2022-06-23 ENCOUNTER — CLINICAL SUPPORT (OUTPATIENT)
Dept: REHABILITATION | Facility: HOSPITAL | Age: 37
End: 2022-06-23
Attending: NEUROLOGICAL SURGERY
Payer: MEDICAID

## 2022-06-23 DIAGNOSIS — R26.9 GAIT DIFFICULTY: ICD-10-CM

## 2022-06-23 DIAGNOSIS — R29.898 WEAKNESS OF LEFT LOWER EXTREMITY: Primary | ICD-10-CM

## 2022-06-23 DIAGNOSIS — M54.42 CHRONIC LEFT-SIDED LOW BACK PAIN WITH LEFT-SIDED SCIATICA: ICD-10-CM

## 2022-06-23 DIAGNOSIS — G89.29 CHRONIC LEFT-SIDED LOW BACK PAIN WITH LEFT-SIDED SCIATICA: ICD-10-CM

## 2022-06-23 PROCEDURE — 97110 THERAPEUTIC EXERCISES: CPT

## 2022-06-27 ENCOUNTER — CLINICAL SUPPORT (OUTPATIENT)
Dept: REHABILITATION | Facility: HOSPITAL | Age: 37
End: 2022-06-27
Attending: NEUROLOGICAL SURGERY
Payer: MEDICAID

## 2022-06-27 DIAGNOSIS — M54.42 CHRONIC LEFT-SIDED LOW BACK PAIN WITH LEFT-SIDED SCIATICA: ICD-10-CM

## 2022-06-27 DIAGNOSIS — R29.898 WEAKNESS OF LEFT LOWER EXTREMITY: Primary | ICD-10-CM

## 2022-06-27 DIAGNOSIS — R26.9 GAIT DIFFICULTY: ICD-10-CM

## 2022-06-27 DIAGNOSIS — G89.29 CHRONIC LEFT-SIDED LOW BACK PAIN WITH LEFT-SIDED SCIATICA: ICD-10-CM

## 2022-06-27 PROCEDURE — 97110 THERAPEUTIC EXERCISES: CPT

## 2022-06-27 NOTE — PROGRESS NOTES
[facility]   Physical Therapy Treatment Note     Name: Kevin Mancini  [facility] Number: [id number]    Therapy Diagnosis:   Encounter Diagnoses   Name Primary?    Weakness of left lower extremity Yes    Chronic left-sided low back pain with left-sided sciatica     Gait difficulty      Physician: Bakari Zaragoza MD    Visit Date: 6/27/2022    Physician Orders: PT Eval and Treat 3 months s/p left L5-S1 microdiscectomy. Lumbar PT 3 times a week for 6 weeks. Sharmila exercises.   Medical Diagnosis from Referral: Z98.890 (ICD-10-CM) - S/P lumbar microdiscectomy M51.36 (ICD-10-CM) - DDD (degenerative disc disease), lumbar   Evaluation Date: 6/13/2022  Authorization Period Expiration: 6/6/2023  Plan of Care Expiration: 12/13/2022  Visit # / Visits authorized: 2/ 20  PTA Visit #: 0/5     Time In: 16:45  Time Out: 17:40  Total Billable Time: 55 minutes    Precautions: Standard    SUBJECTIVE     Pt reports: decreased numbness and increased spasms since the last visit.  She was compliant with home exercise program.  Response to previous treatment: centralization of pain  Functional change: Ongoing    Pain: 6/10  Location: bilateral low back and Left lower extremity     OBJECTIVE     Objective Measures updated at progress report unless specified.     Special Tests:  · SLR Test:  · Pre manual treatment = 10 degrees  · Post manual treatment = 25 degrees  · Repeated Flexion: increased pain  · Repeated Ext: reduced pain  · Slump Test: positive on left    Treatment     Kevin received the treatments listed below:      Therapeutic Exercises to develop strength, endurance, ROM and flexibility for 50 minutes including:  Supine sciatic nerve glides 2x20  lower trunk rotation - 5 second holds x20 bilateral  Prone on Elbows - 5 minutes  Prone Press ups - 2x30  Prone hip extension - bilateral 3x10  Superman's - 5second holds 2x10  Bridges - 3x10  Straight Leg Raise - 2x10  Stationary Bike - Level 2;  8 minutes    Manual Therapy Techniques: Joint mobilizations were applied to the: thoracic spine and Left lower extremity  for 5 minutes, including:  Left lower extremity long axis HVLAT  Thoracic HVLAT    Patient Education and Home Exercises     Home Exercises Provided and Patient Education Provided     Education provided:   Symptom management and plan of care progressions.  Home Exercise Program.    Written Home Exercises Provided: yes. Exercises were reviewed and Kevin was able to demonstrate them prior to the end of the session.  Kevin demonstrated good  understanding of the education provided. See EMR under Patient Instructions for exercises provided during therapy sessions    ASSESSMENT     Kevin presents back with continued decreases in numbness symptoms. Increased Straight Leg Raise following manual therapy treatments. Progressed exercise program to reinforce positive changes following manual. Ongoing centralization with no exacerbations following treatments.    From Initial Evaluation on 6/13/2022 - Kevin is a 37 y.o. female referred to outpatient Physical Therapy with a medical diagnosis of low back pain with radiculopathy. Pt presents with limited lumbar ROM, poor transitional movement, ,gait abnormality, LLE weakness, and adverse neural tension on the left.     Kevin Is progressing well towards her goals.   Pt prognosis is Good.     Pt will continue to benefit from skilled outpatient physical therapy to address the deficits listed in the problem list box on initial evaluation, provide pt/family education and to maximize pt's level of independence in the home and community environment.   Pt's spiritual, cultural and educational needs considered and pt agreeable to plan of care and goals.     Anticipated barriers to physical therapy: previous physical therapy with limited benefit.    Goals:  Short Term Goals:  4 weeks  1.Report decreased LLE pain  < / =  5/10  to increase tolerance for  walking and standing. (Not Met - Progressing)   2. Increase ROM by 25% where limited in order to perform ADLs without difficulty. (Not Met - Progressing)  3. Increase strength by 1/3 MMT grade in LLE  to increase tolerance for ADL and work activities. (Not Met - Progressing)  4. Pt to tolerate HEP to improve ROM and independence with activities of daily living's. (Not Met - Progressing)     Long Term Goals: 6 months  1.Report decreased low back pain < / = 2/10  to increase tolerance for prolonged sitting at work. (Not Met - Progressing)  2.Patient goal: walk 2 miles without taking breaks. (Not Met - Progressing)  3.Increase strength to 5/5 in  LLE  to increase tolerance for ADL and work activities. (Not Met - Progressing)    PLAN     Continue with extension based treatment. Progress core strengthening as symptoms improve.    Plan of care Certification: 6/13/2022 to 12/13/2022.  Outpatient Physical Therapy 2-3 times weekly for 6 months     Faraz Doe, PT , DPT, OCS

## 2022-06-29 ENCOUNTER — CLINICAL SUPPORT (OUTPATIENT)
Dept: REHABILITATION | Facility: HOSPITAL | Age: 37
End: 2022-06-29
Attending: NEUROLOGICAL SURGERY
Payer: MEDICAID

## 2022-06-29 DIAGNOSIS — G89.29 CHRONIC LEFT-SIDED LOW BACK PAIN WITH LEFT-SIDED SCIATICA: ICD-10-CM

## 2022-06-29 DIAGNOSIS — M54.42 CHRONIC LEFT-SIDED LOW BACK PAIN WITH LEFT-SIDED SCIATICA: ICD-10-CM

## 2022-06-29 DIAGNOSIS — R26.9 GAIT DIFFICULTY: ICD-10-CM

## 2022-06-29 DIAGNOSIS — R29.898 WEAKNESS OF LEFT LOWER EXTREMITY: Primary | ICD-10-CM

## 2022-06-29 PROCEDURE — 97110 THERAPEUTIC EXERCISES: CPT

## 2022-06-29 NOTE — PROGRESS NOTES
[facility]   Physical Therapy Treatment Note     Name: Kevin Mancini  [facility] Number: [id number]    Therapy Diagnosis:   Encounter Diagnoses   Name Primary?    Weakness of left lower extremity Yes    Chronic left-sided low back pain with left-sided sciatica     Gait difficulty      Physician: Bakari Zaragoza MD    Visit Date: 6/29/2022    Physician Orders: PT Eval and Treat 3 months s/p left L5-S1 microdiscectomy. Lumbar PT 3 times a week for 6 weeks. Sharmila exercises.   Medical Diagnosis from Referral: Z98.890 (ICD-10-CM) - S/P lumbar microdiscectomy M51.36 (ICD-10-CM) - DDD (degenerative disc disease), lumbar   Evaluation Date: 6/13/2022  Authorization Period Expiration: 6/6/2023  Plan of Care Expiration: 12/13/2022  Visit # / Visits authorized: 3/ 20     Time In: 12:00  Time Out: 13:00  Total Billable Time: 55 minutes    Precautions: Standard    SUBJECTIVE     Pt reports: she went from taking breaks every 10 minutes during her walks to now breaking at 18 minutes.  She was compliant with home exercise program.  Response to previous treatment: centralization of pain  Functional change: increased walking duration.    Pain: 8/10 (soreness from yesterday's increased walking distance from 2 miles to 3 miles)  Location: bilateral low back and Left lower extremity     OBJECTIVE     Objective Measures updated at progress report unless specified.     Special Tests:  · SLR Test:  · Pre manual treatment = 20 degrees  · Post manual treatment = 30 degrees  · Repeated Flexion: increased pain  · Repeated Ext: reduced pain  · Slump Test: positive on left    Treatment     Kevin received the treatments listed below:      Therapeutic Exercises to develop strength, endurance, ROM and flexibility for 50 minutes including:  Supine sciatic nerve glides 2x20  lower trunk rotation - 5 second holds x20 bilateral  Prone on Elbows - 3 minutes  Prone Press ups - x30  Prone hip extension -  bilateral 3x10  Superman's - 5 second holds 2x10  Bridges - 3x10  Straight Leg Raise - 2x10  Stationary Bike - Level 2; 10 minutes    Manual Therapy Techniques: Joint mobilizations were applied to the: thoracic spine and Left lower extremity  for 5 minutes, including:  Left lower extremity long axis HVLAT  Thoracic HVLAT    Patient Education and Home Exercises     Home Exercises Provided and Patient Education Provided     Education provided:   Symptom management and plan of care progressions.  Home Exercise Program.    Written Home Exercises Provided: yes. Exercises were reviewed and Kevin was able to demonstrate them prior to the end of the session.  Kevin demonstrated good  understanding of the education provided. See EMR under Patient Instructions for exercises provided during therapy sessions    ASSESSMENT     Excellent therapeutic carry over from the last session as noted by increased overall walking duration and increased baseline Straight Leg Raise. Continued current exercise program due to soreness from increased walking distance yesterday. Encouraged continued exercise and functional activities outside of the clinic. She gave verbal acknowledgement and understanding of all instructions. Pain at 6/10 upon completion of today's treatments.    From Initial Evaluation on 6/13/2022 - Kevin is a 37 y.o. female referred to outpatient Physical Therapy with a medical diagnosis of low back pain with radiculopathy. Pt presents with limited lumbar ROM, poor transitional movement, ,gait abnormality, LLE weakness, and adverse neural tension on the left.     Kevin Is progressing well towards her goals.   Pt prognosis is Good.     Pt will continue to benefit from skilled outpatient physical therapy to address the deficits listed in the problem list box on initial evaluation, provide pt/family education and to maximize pt's level of independence in the home and community environment.   Pt's spiritual, cultural  and educational needs considered and pt agreeable to plan of care and goals.     Anticipated barriers to physical therapy: previous physical therapy with limited benefit.    Goals:  Short Term Goals:  4 weeks  1.Report decreased LLE pain  < / =  5/10  to increase tolerance for walking and standing. (Not Met - Progressing)   2. Increase ROM by 25% where limited in order to perform ADLs without difficulty. (Not Met - Progressing)  3. Increase strength by 1/3 MMT grade in LLE  to increase tolerance for ADL and work activities. (Not Met - Progressing)  4. Pt to tolerate HEP to improve ROM and independence with activities of daily living's. (Not Met - Progressing)     Long Term Goals: 6 months  1.Report decreased low back pain < / = 2/10  to increase tolerance for prolonged sitting at work. (Not Met - Progressing)  2.Patient goal: walk 2 miles without taking breaks. (Not Met - Progressing)  3.Increase strength to 5/5 in  LLE  to increase tolerance for ADL and work activities. (Not Met - Progressing)    PLAN     Continue with extension based treatment. Progress core strengthening as symptoms improve.    Plan of care Certification: 6/13/2022 to 12/13/2022.  Outpatient Physical Therapy 2-3 times weekly for 6 months     Faraz Doe, PT , DPT, OCS

## 2022-07-06 ENCOUNTER — CLINICAL SUPPORT (OUTPATIENT)
Dept: REHABILITATION | Facility: HOSPITAL | Age: 37
End: 2022-07-06
Attending: NEUROLOGICAL SURGERY
Payer: MEDICAID

## 2022-07-06 DIAGNOSIS — G89.29 CHRONIC LEFT-SIDED LOW BACK PAIN WITH LEFT-SIDED SCIATICA: ICD-10-CM

## 2022-07-06 DIAGNOSIS — M54.42 CHRONIC LEFT-SIDED LOW BACK PAIN WITH LEFT-SIDED SCIATICA: ICD-10-CM

## 2022-07-06 DIAGNOSIS — R26.9 GAIT DIFFICULTY: ICD-10-CM

## 2022-07-06 DIAGNOSIS — R29.898 WEAKNESS OF LEFT LOWER EXTREMITY: Primary | ICD-10-CM

## 2022-07-06 PROCEDURE — 97110 THERAPEUTIC EXERCISES: CPT

## 2022-07-06 NOTE — PROGRESS NOTES
[facility]   Physical Therapy Treatment Note     Name: Kevin Mancini  [facility] Number: [id number]    Therapy Diagnosis:   Encounter Diagnoses   Name Primary?    Weakness of left lower extremity Yes    Chronic left-sided low back pain with left-sided sciatica     Gait difficulty      Physician: Bakari Zaragoza MD    Visit Date: 7/6/2022    Physician Orders: PT Eval and Treat 3 months s/p left L5-S1 microdiscectomy. Lumbar PT 3 times a week for 6 weeks. Sharmila exercises.   Medical Diagnosis from Referral: Z98.890 (ICD-10-CM) - S/P lumbar microdiscectomy M51.36 (ICD-10-CM) - DDD (degenerative disc disease), lumbar   Evaluation Date: 6/13/2022  Authorization Period Expiration: 6/6/2023  Plan of Care Expiration: 12/13/2022  Visit # / Visits authorized: 4/ 20     Time In: 12:00  Time Out: 13:00  Total Billable Time: 55 minutes    Precautions: Standard    SUBJECTIVE     Pt reports: minimal numbness and tingling, but increases in pain down the posterior Left lower extremity.  She was compliant with home exercise program.  Response to previous treatment: increased nervous function  Functional change: increased walking duration.    Pain: 8/10  Location: bilateral low back and Left lower extremity     OBJECTIVE     Objective Measures updated at progress report unless specified.     Special Tests:  · SLR Test:  · Pre manual treatment = 30 degrees  · Post manual treatment = 40 degrees  · Repeated Flexion: increased pain  · Repeated Ext: reduced pain  · Slump Test: positive on left    Treatment     Kevin received the treatments listed below:      Therapeutic Exercises to develop strength, endurance, ROM and flexibility for 50 minutes including:  Supine sciatic nerve glides 2x20  lower trunk rotation - 5 second holds x20 bilateral  Prone on Elbows - 3 minutes  Prone Press ups - x30  Prone hip extension - bilateral 3x10  Superman's - 5 second holds 2x10  Bridges - 3x10  Straight Leg  Raise - 2x10  Stationary Bike - seat at level 1; Level 2; 10 minutes    Manual Therapy Techniques: Joint mobilizations were applied to the: thoracic spine and Left lower extremity  for 5 minutes, including:  Left lower extremity long axis HVLAT  Thoracic HVLAT    Patient Education and Home Exercises     Home Exercises Provided and Patient Education Provided     Education provided:   Symptom management and plan of care progressions.  Home Exercise Program.    Written Home Exercises Provided: yes. Exercises were reviewed and Kevin was able to demonstrate them prior to the end of the session.  Kevin demonstrated good  understanding of the education provided. See EMR under Patient Instructions for exercises provided during therapy sessions    ASSESSMENT     Steady increases baseline Straight Leg Raise measurement. No changes to exercise program secondary to increased pain in the Left lower extremity from increased nervous function. Encouraged continued exercise and functional activities outside of the clinic.    From Initial Evaluation on 6/13/2022 - Kevin is a 37 y.o. female referred to outpatient Physical Therapy with a medical diagnosis of low back pain with radiculopathy. Pt presents with limited lumbar ROM, poor transitional movement, ,gait abnormality, LLE weakness, and adverse neural tension on the left.     Kevin Is progressing well towards her goals.   Pt prognosis is Good.     Pt will continue to benefit from skilled outpatient physical therapy to address the deficits listed in the problem list box on initial evaluation, provide pt/family education and to maximize pt's level of independence in the home and community environment.   Pt's spiritual, cultural and educational needs considered and pt agreeable to plan of care and goals.     Anticipated barriers to physical therapy: previous physical therapy with limited benefit.    Goals:  Short Term Goals:  4 weeks  1.Report decreased LLE pain  < / =   5/10  to increase tolerance for walking and standing. (Not Met - Progressing)   2. Increase ROM by 25% where limited in order to perform ADLs without difficulty. (Not Met - Progressing)  3. Increase strength by 1/3 MMT grade in LLE  to increase tolerance for ADL and work activities. (Not Met - Progressing)  4. Pt to tolerate HEP to improve ROM and independence with activities of daily living's. (Not Met - Progressing)     Long Term Goals: 6 months  1.Report decreased low back pain < / = 2/10  to increase tolerance for prolonged sitting at work. (Not Met - Progressing)  2.Patient goal: walk 2 miles without taking breaks. (Not Met - Progressing)  3.Increase strength to 5/5 in  LLE  to increase tolerance for ADL and work activities. (Not Met - Progressing)    PLAN     Continue with extension based treatment. Progress core strengthening as symptoms improve.    Plan of care Certification: 6/13/2022 to 12/13/2022.  Outpatient Physical Therapy 2-3 times weekly for 6 months     Faraz Doe, PT , DPT, OCS

## 2022-07-08 ENCOUNTER — CLINICAL SUPPORT (OUTPATIENT)
Dept: REHABILITATION | Facility: HOSPITAL | Age: 37
End: 2022-07-08
Attending: NEUROLOGICAL SURGERY
Payer: MEDICAID

## 2022-07-08 DIAGNOSIS — M54.42 CHRONIC LEFT-SIDED LOW BACK PAIN WITH LEFT-SIDED SCIATICA: ICD-10-CM

## 2022-07-08 DIAGNOSIS — G89.29 CHRONIC LEFT-SIDED LOW BACK PAIN WITH LEFT-SIDED SCIATICA: ICD-10-CM

## 2022-07-08 DIAGNOSIS — R29.898 WEAKNESS OF LEFT LOWER EXTREMITY: Primary | ICD-10-CM

## 2022-07-08 DIAGNOSIS — R26.9 GAIT DIFFICULTY: ICD-10-CM

## 2022-07-08 PROCEDURE — 97110 THERAPEUTIC EXERCISES: CPT

## 2022-07-08 NOTE — PROGRESS NOTES
[facility]   Physical Therapy Treatment Note     Name: Kevin Mancini  [facility] Number: [id number]    Therapy Diagnosis:   Encounter Diagnoses   Name Primary?    Weakness of left lower extremity Yes    Chronic left-sided low back pain with left-sided sciatica     Gait difficulty      Physician: Bakari Zaragoza MD    Visit Date: 7/8/2022    Physician Orders: PT Eval and Treat 3 months s/p left L5-S1 microdiscectomy. Lumbar PT 3 times a week for 6 weeks. Sharmila exercises.   Medical Diagnosis from Referral: Z98.890 (ICD-10-CM) - S/P lumbar microdiscectomy M51.36 (ICD-10-CM) - DDD (degenerative disc disease), lumbar   Evaluation Date: 6/13/2022  Authorization Period Expiration: 6/6/2023  Plan of Care Expiration: 12/13/2022  Visit # / Visits authorized: 5/ 20     Time In: 12:00  Time Out: 13:00  Total Billable Time: 55 minutes    Precautions: Standard    SUBJECTIVE     Pt reports: pain and spasms have continued to centralize. Now to posterior knee.  She was compliant with home exercise program.  Response to previous treatment: increased nervous function  Functional change: increased walking duration.    Pain: 7/10  Location: bilateral low back and Left lower extremity     OBJECTIVE     Objective Measures updated at progress report unless specified.     Special Tests:  · SLR Test:  · Pre manual treatment = 35 degrees  · Post manual treatment = 45 degrees  · Repeated Flexion: increased pain  · Repeated Ext: reduced pain    Treatment     Kevin received the treatments listed below:      Therapeutic Exercises to develop strength, endurance, ROM and flexibility for 50 minutes including:  Supine sciatic nerve glides 2x20  lower trunk rotation - 5 second holds x20 bilateral  Prone on Elbows - 3 minutes  Prone Press ups - x30  Prone hip extension - bilateral 3x10  Superman's - 5 second holds 2x10  Bridges - 3x10  Straight Leg Raise - 2x10  Standing Hip Extension - bilateral  x30  Stationary Bike - seat at level 1; Level 2; 8 minutes    Manual Therapy Techniques: Joint mobilizations were applied to the: thoracic spine and Left lower extremity  for 5 minutes, including:  Left lower extremity long axis HVLAT  Thoracic HVLAT    Patient Education and Home Exercises     Home Exercises Provided and Patient Education Provided     Education provided:   Symptom management and plan of care progressions.  Home Exercise Program.    Written Home Exercises Provided: yes. Exercises were reviewed and Kevin was able to demonstrate them prior to the end of the session.  Kevin demonstrated good  understanding of the education provided. See EMR under Patient Instructions for exercises provided during therapy sessions    ASSESSMENT     Steady increases baseline Straight Leg Raise measurement.  Left lower extremity spasm during superman exercise, but relieved with plantarflexion and dorsiflexion. Progressed extension program to standing hip extension with no difficulties. Continues to display ongoing centralization.    From Initial Evaluation on 6/13/2022 - Kevin is a 37 y.o. female referred to outpatient Physical Therapy with a medical diagnosis of low back pain with radiculopathy. Pt presents with limited lumbar ROM, poor transitional movement, ,gait abnormality, LLE weakness, and adverse neural tension on the left.     Kevin Is progressing well towards her goals.   Pt prognosis is Good.     Pt will continue to benefit from skilled outpatient physical therapy to address the deficits listed in the problem list box on initial evaluation, provide pt/family education and to maximize pt's level of independence in the home and community environment.   Pt's spiritual, cultural and educational needs considered and pt agreeable to plan of care and goals.     Anticipated barriers to physical therapy: previous physical therapy with limited benefit.    Goals:  Short Term Goals:  4 weeks  1.Report decreased  LLE pain  < / =  5/10  to increase tolerance for walking and standing. (Not Met - Progressing)   2. Increase ROM by 25% where limited in order to perform ADLs without difficulty. (Not Met - Progressing)  3. Increase strength by 1/3 MMT grade in LLE  to increase tolerance for ADL and work activities. (Not Met - Progressing)  4. Pt to tolerate HEP to improve ROM and independence with activities of daily living's. (Not Met - Progressing)     Long Term Goals: 6 months  1.Report decreased low back pain < / = 2/10  to increase tolerance for prolonged sitting at work. (Not Met - Progressing)  2.Patient goal: walk 2 miles without taking breaks. (Not Met - Progressing)  3.Increase strength to 5/5 in  LLE  to increase tolerance for ADL and work activities. (Not Met - Progressing)    PLAN     Continue with extension based treatment. Progress core strengthening as symptoms improve.    Plan of care Certification: 6/13/2022 to 12/13/2022.  Outpatient Physical Therapy 2-3 times weekly for 6 months     Faraz Doe, PT , DPT, OCS

## 2022-07-13 ENCOUNTER — CLINICAL SUPPORT (OUTPATIENT)
Dept: REHABILITATION | Facility: HOSPITAL | Age: 37
End: 2022-07-13
Attending: NEUROLOGICAL SURGERY
Payer: MEDICAID

## 2022-07-13 DIAGNOSIS — R29.898 WEAKNESS OF LEFT LOWER EXTREMITY: Primary | ICD-10-CM

## 2022-07-13 DIAGNOSIS — G89.29 CHRONIC LEFT-SIDED LOW BACK PAIN WITH LEFT-SIDED SCIATICA: ICD-10-CM

## 2022-07-13 DIAGNOSIS — M54.42 CHRONIC LEFT-SIDED LOW BACK PAIN WITH LEFT-SIDED SCIATICA: ICD-10-CM

## 2022-07-13 DIAGNOSIS — R26.9 GAIT DIFFICULTY: ICD-10-CM

## 2022-07-13 PROCEDURE — 97110 THERAPEUTIC EXERCISES: CPT

## 2022-07-13 NOTE — PROGRESS NOTES
[facility]   Physical Therapy Treatment Note     Name: Kevin Mancini  [facility] Number: [id number]    Therapy Diagnosis:   Encounter Diagnoses   Name Primary?    Weakness of left lower extremity Yes    Chronic left-sided low back pain with left-sided sciatica     Gait difficulty      Physician: Bakari Zaragoza MD    Visit Date: 7/13/2022    Physician Orders: PT Eval and Treat 3 months s/p left L5-S1 microdiscectomy. Lumbar PT 3 times a week for 6 weeks. Sharmila exercises.   Medical Diagnosis from Referral: Z98.890 (ICD-10-CM) - S/P lumbar microdiscectomy M51.36 (ICD-10-CM) - DDD (degenerative disc disease), lumbar   Evaluation Date: 6/13/2022  Authorization Period Expiration: 6/6/2023  Plan of Care Expiration: 12/13/2022  Visit # / Visits authorized: 6/ 20     Time In: 12:00  Time Out: 13:00  Total Billable Time: 55 minutes    Precautions: Standard    SUBJECTIVE     Pt reports: decreased spasms, but unremarkable centralization since the last visit.  She was compliant with home exercise program.  Response to previous treatment: increased nervous function  Functional change: increased walking duration.    Pain: 6/10  Location: bilateral low back and Left lower extremity     OBJECTIVE     Objective Measures updated at progress report unless specified.     Special Tests:  · SLR Test:  · Pre manual treatment = 40 degrees  · Post manual treatment = 55 degrees  · Repeated Flexion: increased pain  · Repeated Ext: reduced pain    Treatment     Kevin received the treatments listed below:      Therapeutic Exercises to develop strength, endurance, ROM and flexibility for 52 minutes including:  Supine sciatic nerve glides 2x20  lower trunk rotation - 5 second holds x20 bilateral  Prone on Elbows - 3 minutes  Prone Press ups - x30  Superman's - 5 second holds 2x10  Bridges - 3x10  Straight Leg Raise - 2x10  Standing Hip Extension - bilateral x30  Shuttle Leg Press Double leg - 2 bands  top / 1 band bottom; 3x8  Shuttle leg press Single leg - 2.5 bands top; 3x8  Stationary Bike - seat at level 1; Level 2; 8 minutes - Np    Manual Therapy Techniques: Joint mobilizations were applied to the: thoracic spine and Left lower extremity  for 3 minutes, including:  Left lower extremity long axis HVLAT  Thoracic HVLAT  NEXT > left prone SI HVLAT    Patient Education and Home Exercises     Home Exercises Provided and Patient Education Provided     Education provided:   Symptom management and plan of care progressions.  Home Exercise Program.    Written Home Exercises Provided: yes. Exercises were reviewed and Kevin was able to demonstrate them prior to the end of the session.  Kevin demonstrated good  understanding of the education provided. See EMR under Patient Instructions for exercises provided during therapy sessions    ASSESSMENT     Steady increases baseline Straight Leg Raise measurement. Increasing speed of rolling transfer and gait speed. Decreased left SI pain following shuttle leg press.    From Initial Evaluation on 6/13/2022 - Kevin is a 37 y.o. female referred to outpatient Physical Therapy with a medical diagnosis of low back pain with radiculopathy. Pt presents with limited lumbar ROM, poor transitional movement, ,gait abnormality, LLE weakness, and adverse neural tension on the left.     Kevin Is progressing well towards her goals.   Pt prognosis is Good.     Pt will continue to benefit from skilled outpatient physical therapy to address the deficits listed in the problem list box on initial evaluation, provide pt/family education and to maximize pt's level of independence in the home and community environment.   Pt's spiritual, cultural and educational needs considered and pt agreeable to plan of care and goals.     Anticipated barriers to physical therapy: previous physical therapy with limited benefit.    Goals:  Short Term Goals:  4 weeks  1.Report decreased LLE pain  < / =   5/10  to increase tolerance for walking and standing. (Not Met - Progressing)   2. Increase ROM by 25% where limited in order to perform ADLs without difficulty. (Not Met - Progressing)  3. Increase strength by 1/3 MMT grade in LLE  to increase tolerance for ADL and work activities. (Not Met - Progressing)  4. Pt to tolerate HEP to improve ROM and independence with activities of daily living's. (Not Met - Progressing)     Long Term Goals: 6 months  1.Report decreased low back pain < / = 2/10  to increase tolerance for prolonged sitting at work. (Not Met - Progressing)  2.Patient goal: walk 2 miles without taking breaks. (Not Met - Progressing)  3.Increase strength to 5/5 in  LLE  to increase tolerance for ADL and work activities. (Not Met - Progressing)    PLAN     Continue with extension based treatment. Progress core strengthening as symptoms improve.    Plan of care Certification: 6/13/2022 to 12/13/2022.  Outpatient Physical Therapy 2-3 times weekly for 6 months     Faraz Doe, PT , DPT, OCS

## 2022-07-15 ENCOUNTER — CLINICAL SUPPORT (OUTPATIENT)
Dept: REHABILITATION | Facility: HOSPITAL | Age: 37
End: 2022-07-15
Attending: NEUROLOGICAL SURGERY
Payer: MEDICAID

## 2022-07-15 DIAGNOSIS — R29.898 WEAKNESS OF LEFT LOWER EXTREMITY: Primary | ICD-10-CM

## 2022-07-15 DIAGNOSIS — M54.42 CHRONIC LEFT-SIDED LOW BACK PAIN WITH LEFT-SIDED SCIATICA: ICD-10-CM

## 2022-07-15 DIAGNOSIS — G89.29 CHRONIC LEFT-SIDED LOW BACK PAIN WITH LEFT-SIDED SCIATICA: ICD-10-CM

## 2022-07-15 DIAGNOSIS — R26.9 GAIT DIFFICULTY: ICD-10-CM

## 2022-07-15 PROCEDURE — 97110 THERAPEUTIC EXERCISES: CPT

## 2022-07-15 NOTE — PROGRESS NOTES
[facility]   Physical Therapy Treatment Note     Name: Kevin Mancini  Clinic Number: [id number]    Therapy Diagnosis:   Encounter Diagnoses   Name Primary?    Weakness of left lower extremity Yes    Chronic left-sided low back pain with left-sided sciatica     Gait difficulty      Physician: Bakari Zaragoza MD    Visit Date: 7/15/2022    Physician Orders: PT Eval and Treat 3 months s/p left L5-S1 microdiscectomy. Lumbar PT 3 times a week for 6 weeks. Sharmila exercises.   Medical Diagnosis from Referral: Z98.890 (ICD-10-CM) - S/P lumbar microdiscectomy M51.36 (ICD-10-CM) - DDD (degenerative disc disease), lumbar   Evaluation Date: 6/13/2022  Authorization Period Expiration: 6/6/2023  Plan of Care Expiration: 12/13/2022  Visit # / Visits authorized: 6/ 20     Time In: 12:05  Time Out: 13:00  Total Billable Time: 55 minutes    Precautions: Standard    SUBJECTIVE     Pt reports: sore from cooking all day yesterday.  She was compliant with home exercise program.  Response to previous treatment: increased nervous function  Functional change: increased walking duration.    Pain: 7/10  Location: bilateral low back and Left lower extremity     OBJECTIVE     Objective Measures updated at progress report unless specified.     Special Tests:  · SLR Test:  · Pre manual treatment = 51 degrees  · Post manual treatment = 60 degrees  · Repeated Flexion: increased pain  · Repeated Ext: reduced pain    Treatment     Kevin received the treatments listed below:      Therapeutic Exercises to develop strength, endurance, ROM and flexibility for 53 minutes including:  Supine sciatic nerve glides 2x20  lower trunk rotation with Swiss ball- 5 second holds x20 bilateral  Prone Press ups - x30  Superman's - 5 second holds 2x10  Bridges - 3x10  Straight Leg Raise - 2x10  Standing Hip Extension - bilateral x30  Shuttle Leg Press Double leg - 2 bands top / 1 band bottom; 3x8  Shuttle leg press Single  leg - 2.5 bands top; 3x8  Stationary Bike - seat at level 1; Level 2; 8 minutes    Manual Therapy Techniques: Joint mobilizations were applied to the: thoracic spine and Left lower extremity  for 2 minutes, including:  Left lower extremity long axis HVLAT  Thoracic HVLAT  NEXT > left prone SI HVLAT    Patient Education and Home Exercises     Home Exercises Provided and Patient Education Provided     Education provided:   Symptom management and plan of care progressions.  Home Exercise Program.    Written Home Exercises Provided: yes. Exercises were reviewed and Kevin was able to demonstrate them prior to the end of the session.  Kevin demonstrated good  understanding of the education provided. See EMR under Patient Instructions for exercises provided during therapy sessions    ASSESSMENT     Increases in functional abilities as mentioned in subjective. 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 Progressing well under current plan of care.    From Initial Evaluation on 6/13/2022 - Kevin is a 37 y.o. female referred to outpatient Physical Therapy with a medical diagnosis of low back pain with radiculopathy. Pt presents with limited lumbar ROM, poor transitional movement, ,gait abnormality, LLE weakness, and adverse neural tension on the left.     Kevin Is progressing well towards her goals.   Pt prognosis is Good.     Pt will continue to benefit from skilled outpatient physical therapy to address the deficits listed in the problem list box on initial evaluation, provide pt/family education and to maximize pt's level of independence in the home and community environment.   Pt's spiritual, cultural and educational needs considered and pt agreeable to plan of care and goals.     Anticipated barriers to physical therapy: previous physical therapy with limited benefit.    Goals:  Short Term Goals:  4 weeks  1.Report decreased LLE pain  < / =  5/10  to increase tolerance for walking and standing. (Not  Met - Progressing)   2. Increase ROM by 25% where limited in order to perform ADLs without difficulty. (Not Met - Progressing)  3. Increase strength by 1/3 MMT grade in LLE  to increase tolerance for ADL and work activities. (Not Met - Progressing)  4. Pt to tolerate HEP to improve ROM and independence with activities of daily living's. (Not Met - Progressing)     Long Term Goals: 6 months  1.Report decreased low back pain < / = 2/10  to increase tolerance for prolonged sitting at work. (Not Met - Progressing)  2.Patient goal: walk 2 miles without taking breaks. (Not Met - Progressing)  3.Increase strength to 5/5 in  LLE  to increase tolerance for ADL and work activities. (Not Met - Progressing)    PLAN     Continue with extension based treatment. Progress core strengthening as symptoms improve.    Plan of care Certification: 6/13/2022 to 12/13/2022.  Outpatient Physical Therapy 2-3 times weekly for 6 months     Faraz Doe, PT , DPT, OCS

## 2022-07-20 ENCOUNTER — CLINICAL SUPPORT (OUTPATIENT)
Dept: REHABILITATION | Facility: HOSPITAL | Age: 37
End: 2022-07-20
Attending: NEUROLOGICAL SURGERY
Payer: MEDICAID

## 2022-07-20 DIAGNOSIS — R29.898 WEAKNESS OF LEFT LOWER EXTREMITY: Primary | ICD-10-CM

## 2022-07-20 DIAGNOSIS — M54.42 CHRONIC LEFT-SIDED LOW BACK PAIN WITH LEFT-SIDED SCIATICA: ICD-10-CM

## 2022-07-20 DIAGNOSIS — R26.9 GAIT DIFFICULTY: ICD-10-CM

## 2022-07-20 DIAGNOSIS — G89.29 CHRONIC LEFT-SIDED LOW BACK PAIN WITH LEFT-SIDED SCIATICA: ICD-10-CM

## 2022-07-20 PROCEDURE — 97110 THERAPEUTIC EXERCISES: CPT

## 2022-07-20 NOTE — PROGRESS NOTES
[facility]   Physical Therapy Treatment Note     Name: Kevin Mancini  [facility] Number: [id number]    Therapy Diagnosis:   Encounter Diagnoses   Name Primary?    Weakness of left lower extremity Yes    Chronic left-sided low back pain with left-sided sciatica     Gait difficulty      Physician: Bakari Zaragoza MD    Visit Date: 7/20/2022    Physician Orders: PT Eval and Treat 3 months s/p left L5-S1 microdiscectomy. Lumbar PT 3 times a week for 6 weeks. Sharmila exercises.   Medical Diagnosis from Referral: Z98.890 (ICD-10-CM) - S/P lumbar microdiscectomy M51.36 (ICD-10-CM) - DDD (degenerative disc disease), lumbar   Evaluation Date: 6/13/2022  Authorization Period Expiration: 6/6/2023  Plan of Care Expiration: 12/13/2022  Visit # / Visits authorized: 8/ 20     Time In: 12:00  Time Out: 12:57  Total Billable Time: 55 minutes    Precautions: Standard    SUBJECTIVE     Pt reports: she has left SI pain after standing for 2-3 hours. Decreasing Left lower extremity pain.  She was compliant with home exercise program.  Response to previous treatment: increased nervous function  Functional change: increased walking duration.    Pain: 7/10  Location: bilateral low back and Left lower extremity     OBJECTIVE     Objective Measures updated at progress report unless specified.     Special Tests:  · SLR Test:  · Pre manual treatment = 55 degrees  · Post manual treatment = 60 degrees    Treatment     Kevin received the treatments listed below:      Therapeutic Exercises to develop strength, endurance, ROM and flexibility for 53 minutes including:  Supine sciatic nerve glides x20  lower trunk rotation with Swiss ball- 5 second holds x20 bilateral  Prone Press ups - x30  Superman's - 5 second holds 2x10  Bridges - 3x10  Straight Leg Raise - 2x10  Sit to stands - 2x15  Farmer's Carries - unilateral 15# DB; 1 lap each extremity  Shuttle Leg Press Double leg - 2.5 bands top / 1 band bottom;  3x8  Shuttle leg press Single leg - 2.5 bands top; 3x8  Stationary Bike - seat at level 1; Level 2; 8 minutes - NP    Manual Therapy Techniques: Joint mobilizations were applied to the: thoracic spine and Left lower extremity for 2 minutes, including:  Thoracic HVLAT (not today)  Left prone SI HVLAT    Patient Education and Home Exercises     Home Exercises Provided and Patient Education Provided     Education provided:   Symptom management and plan of care progressions.  Home Exercise Program.    Written Home Exercises Provided: yes. Exercises were reviewed and Kevin was able to demonstrate them prior to the end of the session.  Kevin demonstrated good  understanding of the education provided. See EMR under Patient Instructions for exercises provided during therapy sessions    ASSESSMENT     Beginning transition from table exercises to standing activities to progress hip and core utilizations. Adequately challenged with all activities. Demonstrated good understanding of core control for gait and sit to stand mechanics. Decreasing fear avoidance movement patterns. No exacerbation in symptoms upon completion of today's treatments.    From Initial Evaluation on 6/13/2022 - Kevin is a 37 y.o. female referred to outpatient Physical Therapy with a medical diagnosis of low back pain with radiculopathy. Pt presents with limited lumbar ROM, poor transitional movement, ,gait abnormality, LLE weakness, and adverse neural tension on the left.     Kevin Is progressing well towards her goals.   Pt prognosis is Good.     Pt will continue to benefit from skilled outpatient physical therapy to address the deficits listed in the problem list box on initial evaluation, provide pt/family education and to maximize pt's level of independence in the home and community environment.   Pt's spiritual, cultural and educational needs considered and pt agreeable to plan of care and goals.     Anticipated barriers to physical  therapy: previous physical therapy with limited benefit.    Goals:  Short Term Goals:  4 weeks  1.Report decreased LLE pain  < / =  5/10  to increase tolerance for walking and standing. (Not Met - Progressing)   2. Increase ROM by 25% where limited in order to perform ADLs without difficulty. (Not Met - Progressing)  3. Increase strength by 1/3 MMT grade in LLE  to increase tolerance for ADL and work activities. (Not Met - Progressing)  4. Pt to tolerate HEP to improve ROM and independence with activities of daily living's. (Not Met - Progressing)     Long Term Goals: 6 months  1.Report decreased low back pain < / = 2/10  to increase tolerance for prolonged sitting at work. (Not Met - Progressing)  2.Patient goal: walk 2 miles without taking breaks. (Not Met - Progressing)  3.Increase strength to 5/5 in  LLE  to increase tolerance for ADL and work activities. (Not Met - Progressing)    PLAN     Continue with extension based treatment. Progress core strengthening as symptoms improve.    Plan of care Certification: 6/13/2022 to 12/13/2022.  Outpatient Physical Therapy 2-3 times weekly for 6 months     Faraz Doe, PT , DPT, OCS

## 2022-07-21 NOTE — PROGRESS NOTES
[facility]   Physical Therapy Treatment Note     Name: Kevin Mancini  Clinic Number: [id number]    Therapy Diagnosis:   Encounter Diagnoses   Name Primary?    Weakness of left lower extremity Yes    Chronic left-sided low back pain with left-sided sciatica     Gait difficulty      Physician: Bakari Zaragoza MD    Visit Date: 7/22/2022    Physician Orders: PT Eval and Treat 3 months s/p left L5-S1 microdiscectomy. Lumbar PT 3 times a week for 6 weeks. Sharmila exercises.   Medical Diagnosis from Referral: Z98.890 (ICD-10-CM) - S/P lumbar microdiscectomy M51.36 (ICD-10-CM) - DDD (degenerative disc disease), lumbar   Evaluation Date: 6/13/2022  Authorization Period Expiration: 6/6/2023  Plan of Care Expiration: 12/13/2022  Visit # / Visits authorized: 9/ 20     Time In: 12:00  Time Out: 13:00  Total Billable Time: 55 minutes    Precautions: Standard    SUBJECTIVE     Pt reports: pain relief for one day following last session  She was compliant with home exercise program.  Response to previous treatment: no pain  Functional change: increased walking duration.    Pain: 6/10  Location: bilateral low back and Left lower extremity     OBJECTIVE     Objective Measures updated at progress report unless specified.     Special Tests:  · SLR Test:  · Pre manual treatment = 55 degrees  · Post manual treatment = 60 degrees    Treatment     Kevin received the treatments listed below:      Therapeutic Exercises to develop strength, endurance, ROM and flexibility for 53 minutes including:  Supine Hamstring stretch with strap - 45 seconds x2  lower trunk rotation with Swiss ball- 5 second holds x20 bilateral  Prone Press ups - x30  Superman's - 5 second holds 2x10  Bridges - 3x10  Straight Leg Raise - 2x10  Sit to stands - 15# DB 2x15  Farmer's Carries - unilateral 25# KB; 1 lap each extremity  Shuttle Leg Press Double leg - 2.5 bands top / 1 band bottom; 3x10  Shuttle leg press Single leg  - 2.5 bands top; 3x8  Stationary Bike - seat at level 1; Level 2; 8 minutes - NP    Manual Therapy Techniques: Joint mobilizations were applied to the: thoracic spine and Left lower extremity for 2 minutes, including:  Thoracic HVLAT   Left prone SI HVLAT (not today)    Patient Education and Home Exercises     Home Exercises Provided and Patient Education Provided     Education provided:   Symptom management and plan of care progressions.  Home Exercise Program.    Written Home Exercises Provided: yes. Exercises were reviewed and Kevin was able to demonstrate them prior to the end of the session.  Kevin demonstrated good  understanding of the education provided. See EMR under Patient Instructions for exercises provided during therapy sessions    ASSESSMENT     Unremarkable changes in Straight Leg Raise secondary to restricted hamstrings. Progressed resistances of several activities today with decreased symptoms upon completion.    From Initial Evaluation on 6/13/2022 - Kevin is a 37 y.o. female referred to outpatient Physical Therapy with a medical diagnosis of low back pain with radiculopathy. Pt presents with limited lumbar ROM, poor transitional movement, ,gait abnormality, LLE weakness, and adverse neural tension on the left.     Kevin Is progressing well towards her goals.   Pt prognosis is Good.     Pt will continue to benefit from skilled outpatient physical therapy to address the deficits listed in the problem list box on initial evaluation, provide pt/family education and to maximize pt's level of independence in the home and community environment.   Pt's spiritual, cultural and educational needs considered and pt agreeable to plan of care and goals.     Anticipated barriers to physical therapy: previous physical therapy with limited benefit.    Goals:  Short Term Goals:  4 weeks  1.Report decreased LLE pain  < / =  5/10  to increase tolerance for walking and standing. (Not Met - Progressing)    2. Increase ROM by 25% where limited in order to perform ADLs without difficulty. (Not Met - Progressing)  3. Increase strength by 1/3 MMT grade in LLE  to increase tolerance for ADL and work activities. (Not Met - Progressing)  4. Pt to tolerate HEP to improve ROM and independence with activities of daily living's. (Not Met - Progressing)     Long Term Goals: 6 months  1.Report decreased low back pain < / = 2/10  to increase tolerance for prolonged sitting at work. (Not Met - Progressing)  2.Patient goal: walk 2 miles without taking breaks. (Not Met - Progressing)  3.Increase strength to 5/5 in  LLE  to increase tolerance for ADL and work activities. (Not Met - Progressing)    PLAN     Continue with extension based treatment. Progress core strengthening as symptoms improve.    Plan of care Certification: 6/13/2022 to 12/13/2022.  Outpatient Physical Therapy 2-3 times weekly for 6 months     Faraz Doe, PT , DPT, OCS

## 2022-07-22 ENCOUNTER — CLINICAL SUPPORT (OUTPATIENT)
Dept: REHABILITATION | Facility: HOSPITAL | Age: 37
End: 2022-07-22
Attending: NEUROLOGICAL SURGERY
Payer: MEDICAID

## 2022-07-22 DIAGNOSIS — G89.29 CHRONIC LEFT-SIDED LOW BACK PAIN WITH LEFT-SIDED SCIATICA: ICD-10-CM

## 2022-07-22 DIAGNOSIS — R29.898 WEAKNESS OF LEFT LOWER EXTREMITY: Primary | ICD-10-CM

## 2022-07-22 DIAGNOSIS — M54.42 CHRONIC LEFT-SIDED LOW BACK PAIN WITH LEFT-SIDED SCIATICA: ICD-10-CM

## 2022-07-22 DIAGNOSIS — R26.9 GAIT DIFFICULTY: ICD-10-CM

## 2022-07-22 PROCEDURE — 97110 THERAPEUTIC EXERCISES: CPT

## 2022-07-27 ENCOUNTER — CLINICAL SUPPORT (OUTPATIENT)
Dept: REHABILITATION | Facility: HOSPITAL | Age: 37
End: 2022-07-27
Attending: NEUROLOGICAL SURGERY
Payer: MEDICAID

## 2022-07-27 DIAGNOSIS — G89.29 CHRONIC LEFT-SIDED LOW BACK PAIN WITH LEFT-SIDED SCIATICA: ICD-10-CM

## 2022-07-27 DIAGNOSIS — R26.9 GAIT DIFFICULTY: ICD-10-CM

## 2022-07-27 DIAGNOSIS — R29.898 WEAKNESS OF LEFT LOWER EXTREMITY: Primary | ICD-10-CM

## 2022-07-27 DIAGNOSIS — M54.42 CHRONIC LEFT-SIDED LOW BACK PAIN WITH LEFT-SIDED SCIATICA: ICD-10-CM

## 2022-07-27 PROCEDURE — 97110 THERAPEUTIC EXERCISES: CPT

## 2022-07-27 NOTE — PROGRESS NOTES
[facility]   Physical Therapy Treatment Note     Name: Kevin Méndez [facility] Number: [id number]    Therapy Diagnosis:   Encounter Diagnoses   Name Primary?    Weakness of left lower extremity Yes    Chronic left-sided low back pain with left-sided sciatica     Gait difficulty      Physician: Bakari Zaragoza MD    Visit Date: 7/27/2022    Physician Orders: PT Eval and Treat 3 months s/p left L5-S1 microdiscectomy. Lumbar PT 3 times a week for 6 weeks. Sharmila exercises.   Medical Diagnosis from Referral: Z98.890 (ICD-10-CM) - S/P lumbar microdiscectomy M51.36 (ICD-10-CM) - DDD (degenerative disc disease), lumbar   Evaluation Date: 6/13/2022  Authorization Period Expiration: 9/30/2023  Plan of Care Expiration: 12/13/2022  Visit # / Visits authorized: 10/ 12     Time In: 12:00  Time Out: 13:00  Total Billable Time: 55 minutes    Precautions: Standard    SUBJECTIVE     Pt reports: her symptoms increase the day before therapy because she is feeling well enough to do more  She was compliant with home exercise program.  Response to previous treatment: no pain  Functional change: increased walking duration.    Pain: 6/10  Location: bilateral low back and Left lower extremity     OBJECTIVE     Objective Measures updated at progress report unless specified.     Lumbar Range of Motion:     Limitations Pain   Flexion 80%    None         Extension 75%    Normal               Lower Extremity Strength  Right LE   Left LE     Quadriceps: 5/5 Quadriceps: 4+/5   Hamstrings: 5/5 Hamstrings: 4+/5   Hip flexion: 5/5 Hip flexion: 4+/5   Ankle dorsiflexion: 5/5 Ankle dorsiflexion: 4+/5   Ankle plantarflexion: 5/5 Ankle plantarflexion: 4+/5       Special Tests:  · SLR Test:  · Pre manual treatment = 60 degrees  · Post manual treatment = 70 degrees    Treatment     Kevin received the treatments listed below:      Therapeutic Exercises to develop strength, endurance, ROM and flexibility for 53  minutes including:  Supine Hamstring stretch with strap - 45 seconds x2  lower trunk rotation with Swiss ball- 5 second holds x20 bilateral  Prone Press ups - x30  Superman's - 5 second holds 2x10  Straight Leg Raise - 3#; 2x10  Sit to stands - 20# DB 2x15  Farmer's Carries - unilateral 25# KB; 1 lap each extremity  Shuttle Leg Press Double leg - 3 bands top / 1 band bottom; 3x10  Shuttle leg press Single leg - 3 bands top; 3x8  Stationary Bike - seat at level 1; Level 3; 8 minutes    Manual Therapy Techniques: Joint mobilizations were applied to the: thoracic spine and Left lower extremity for 2 minutes, including:  Thoracic HVLAT   Left L4-5 rotation mobilization (grade 5)    Patient Education and Home Exercises     Home Exercises Provided and Patient Education Provided     Education provided:   Symptom management and plan of care progressions.  Home Exercise Program.    Written Home Exercises Provided: yes. Exercises were reviewed and Kevin was able to demonstrate them prior to the end of the session.  Kevin demonstrated good  understanding of the education provided. See EMR under Patient Instructions for exercises provided during therapy sessions    ASSESSMENT     Kevin has met 4 out of 7 goals as of the 11th visit and is progressing as expected towards her remaining goals. She continues to demonstrate functional improvements as noted by increased standing duration, increased walking, duration, and improved range of motion and strength. It is recommended that she continue with skilled physical therapy in order to meet her remaining goals.     From Initial Evaluation on 6/13/2022 - Kevin is a 37 y.o. female referred to outpatient Physical Therapy with a medical diagnosis of low back pain with radiculopathy. Pt presents with limited lumbar ROM, poor transitional movement, ,gait abnormality, LLE weakness, and adverse neural tension on the left.     Kevin Is progressing well towards her goals.   Pt  prognosis is Good.     Pt will continue to benefit from skilled outpatient physical therapy to address the deficits listed in the problem list box on initial evaluation, provide pt/family education and to maximize pt's level of independence in the home and community environment.   Pt's spiritual, cultural and educational needs considered and pt agreeable to plan of care and goals.     Anticipated barriers to physical therapy: previous physical therapy with limited benefit.    Goals:  Short Term Goals:  4 weeks  1.Report decreased LLE pain  < / =  5/10  to increase tolerance for walking and standing. (Met - 7/27/2022)   2. Increase ROM by 25% where limited in order to perform ADLs without difficulty. (Met - 7/27/2022)  3. Increase strength by 1/3 MMT grade in LLE  to increase tolerance for ADL and work activities. (Met - 7/27/2022)  4. Pt to tolerate HEP to improve ROM and independence with activities of daily living's. (Met - 7/27/2022)     Long Term Goals: 6 months  1.Report decreased low back pain < / = 2/10  to increase tolerance for prolonged sitting at work. (Not Met - Progressing)  2.Patient goal: walk 2 miles without taking breaks. (Not Met - Progressing)  3.Increase strength to 5/5 in  LLE  to increase tolerance for ADL and work activities. (Not Met - Progressing)    PLAN     Continue with extension based treatment. Progress core strengthening as symptoms improve.    Plan of care Certification: 6/13/2022 to 12/13/2022.  Outpatient Physical Therapy 2-3 times weekly for 6 months     Faraz Doe, PT , DPT, OCS

## 2022-07-29 ENCOUNTER — CLINICAL SUPPORT (OUTPATIENT)
Dept: REHABILITATION | Facility: HOSPITAL | Age: 37
End: 2022-07-29
Attending: NEUROLOGICAL SURGERY
Payer: MEDICAID

## 2022-07-29 DIAGNOSIS — M54.42 CHRONIC LEFT-SIDED LOW BACK PAIN WITH LEFT-SIDED SCIATICA: ICD-10-CM

## 2022-07-29 DIAGNOSIS — R29.898 WEAKNESS OF LEFT LOWER EXTREMITY: Primary | ICD-10-CM

## 2022-07-29 DIAGNOSIS — G89.29 CHRONIC LEFT-SIDED LOW BACK PAIN WITH LEFT-SIDED SCIATICA: ICD-10-CM

## 2022-07-29 DIAGNOSIS — R26.9 GAIT DIFFICULTY: ICD-10-CM

## 2022-07-29 PROCEDURE — 97110 THERAPEUTIC EXERCISES: CPT

## 2022-07-29 NOTE — PROGRESS NOTES
[facility]   Physical Therapy Treatment Note     Name: Kevin Mancini  Clinic Number: [id number]    Therapy Diagnosis:   Encounter Diagnoses   Name Primary?    Weakness of left lower extremity Yes    Chronic left-sided low back pain with left-sided sciatica     Gait difficulty      Physician: Bakari Zaragoza MD    Visit Date: 7/29/2022    Physician Orders: PT Eval and Treat 3 months s/p left L5-S1 microdiscectomy. Lumbar PT 3 times a week for 6 weeks. Sharmila exercises.   Medical Diagnosis from Referral: Z98.890 (ICD-10-CM) - S/P lumbar microdiscectomy M51.36 (ICD-10-CM) - DDD (degenerative disc disease), lumbar   Evaluation Date: 6/13/2022  Authorization Period Expiration: 9/30/2023  Plan of Care Expiration: 12/13/2022  Visit # / Visits authorized: 11/ 12     Time In: 12:15  Time Out: 13:00  Total Billable Time: 45 minutes    Precautions: Standard    SUBJECTIVE     Pt reports: she is regaining sensation in the left toes 3-5.  She was compliant with home exercise program.  Response to previous treatment: no pain  Functional change: decreasing     Pain: 6/10  Location: bilateral low back and Left lower extremity     OBJECTIVE     Objective Measures updated at progress report unless specified.     Special Tests:  · SLR Test:  · Pre manual treatment = 60 degrees  · Post manual treatment = 70 degrees    Treatment     Kevin received the treatments listed below:      Therapeutic Exercises to develop strength, endurance, ROM and flexibility for 43 minutes including:  Supine Hamstring stretch with strap - 45 seconds x2 - NP  lower trunk rotation with Swiss ball- 5 second holds x20 bilateral  Prone Press ups - x30 - NP  Prone Bird Dog on Swiss Ball- 5 second holds 2x10  Straight Leg Raise - 3#; 2x15  Sit to stands - 20# DB 2x15  Farmer's Carries - unilateral 25# KB; 1 lap each extremity  Shuttle Leg Press Double leg - 3 bands top / 1 band bottom; 3x10  Shuttle leg press Single leg  - 3 bands top; 3x8  Stationary Bike - seat at level 1; Level 3; 8 minutes - NP    Manual Therapy Techniques: Joint mobilizations were applied to the: thoracic spine and Left lower extremity for 2 minutes, including:  Thoracic HVLAT   Left L4-5 rotation mobilization (grade 5)    Patient Education and Home Exercises     Home Exercises Provided and Patient Education Provided     Education provided:   Symptom management and plan of care progressions.  Home Exercise Program.    Written Home Exercises Provided: yes. Exercises were reviewed and Kevin was able to demonstrate them prior to the end of the session.  Kevin demonstrated good  understanding of the education provided. See EMR under Patient Instructions for exercises provided during therapy sessions    ASSESSMENT     Steady centralization of symptoms and gradual return of sensation under current program. Mild progressions in exercise program today, primarily for core utilization. No exacerbations following treatments..     From Initial Evaluation on 6/13/2022 - Kevin is a 37 y.o. female referred to outpatient Physical Therapy with a medical diagnosis of low back pain with radiculopathy. Pt presents with limited lumbar ROM, poor transitional movement, ,gait abnormality, LLE weakness, and adverse neural tension on the left.     Kevin Is progressing well towards her goals.   Pt prognosis is Good.     Pt will continue to benefit from skilled outpatient physical therapy to address the deficits listed in the problem list box on initial evaluation, provide pt/family education and to maximize pt's level of independence in the home and community environment.   Pt's spiritual, cultural and educational needs considered and pt agreeable to plan of care and goals.     Anticipated barriers to physical therapy: previous physical therapy with limited benefit.    Goals:  Short Term Goals:  4 weeks  1.Report decreased LLE pain  < / =  5/10  to increase tolerance for  walking and standing. (Met - 7/27/2022)   2. Increase ROM by 25% where limited in order to perform ADLs without difficulty. (Met - 7/27/2022)  3. Increase strength by 1/3 MMT grade in LLE  to increase tolerance for ADL and work activities. (Met - 7/27/2022)  4. Pt to tolerate HEP to improve ROM and independence with activities of daily living's. (Met - 7/27/2022)     Long Term Goals: 6 months  1.Report decreased low back pain < / = 2/10  to increase tolerance for prolonged sitting at work. (Not Met - Progressing)  2.Patient goal: walk 2 miles without taking breaks. (Not Met - Progressing)  3.Increase strength to 5/5 in  LLE  to increase tolerance for ADL and work activities. (Not Met - Progressing)    PLAN     Continue with extension based treatment. Progress core strengthening as symptoms improve.    Plan of care Certification: 6/13/2022 to 12/13/2022.  Outpatient Physical Therapy 2-3 times weekly for 6 months     Faraz Doe, PT , DPT, OCS

## 2022-08-03 ENCOUNTER — CLINICAL SUPPORT (OUTPATIENT)
Dept: REHABILITATION | Facility: HOSPITAL | Age: 37
End: 2022-08-03
Attending: NEUROLOGICAL SURGERY
Payer: MEDICAID

## 2022-08-03 DIAGNOSIS — G89.29 CHRONIC LEFT-SIDED LOW BACK PAIN WITH LEFT-SIDED SCIATICA: ICD-10-CM

## 2022-08-03 DIAGNOSIS — M54.42 CHRONIC LEFT-SIDED LOW BACK PAIN WITH LEFT-SIDED SCIATICA: ICD-10-CM

## 2022-08-03 DIAGNOSIS — R29.898 WEAKNESS OF LEFT LOWER EXTREMITY: Primary | ICD-10-CM

## 2022-08-03 DIAGNOSIS — R26.9 GAIT DIFFICULTY: ICD-10-CM

## 2022-08-03 PROCEDURE — 97110 THERAPEUTIC EXERCISES: CPT

## 2022-08-03 NOTE — PROGRESS NOTES
[facility]   Physical Therapy Treatment Note     Name: Kevin Mancini  Clinic Number: [id number]    Therapy Diagnosis:   Encounter Diagnoses   Name Primary?    Weakness of left lower extremity Yes    Chronic left-sided low back pain with left-sided sciatica     Gait difficulty      Physician: Bakari Zaragoza MD    Visit Date: 8/3/2022    Physician Orders: PT Eval and Treat 3 months s/p left L5-S1 microdiscectomy. Lumbar PT 3 times a week for 6 weeks. Sharmila exercises.   Medical Diagnosis from Referral: Z98.890 (ICD-10-CM) - S/P lumbar microdiscectomy M51.36 (ICD-10-CM) - DDD (degenerative disc disease), lumbar   Evaluation Date: 6/13/2022  Authorization Period Expiration: 9/30/2023  Plan of Care Expiration: 12/13/2022  Visit # / Visits authorized: 12/ 12     Time In: 12:00  Time Out: 13:00  Total Billable Time: 45 minutes    Precautions: Standard    SUBJECTIVE     Pt reports: she is regaining sensation in the left toes 3-5.  She was compliant with home exercise program.  Response to previous treatment: no pain  Functional change: decreasing     Pain: 6/10  Location: bilateral low back and Left lower extremity     OBJECTIVE     Objective Measures updated at progress report unless specified.     Special Tests:  · SLR Test:  · Pre manual treatment = 60 degrees  · Post manual treatment = 70 degrees    Treatment     Kevin received the treatments listed below:      Therapeutic Exercises to develop strength, endurance, ROM and flexibility for 43 minutes including:  Supine Hamstring stretch with strap - 45 seconds x2 - NP  lower trunk rotation with Swiss ball- 5 second holds x20 bilateral  Prone Bird Dog on Swiss Ball- 5 second holds 2x10  Straight Leg Raise - 4#; 2x15  Sit to stands - 20# DB 2x15  Paloff Press - x20 bilateral; 7#  Farmer's Carries - unilateral 25# KB; 1 lap each extremity  Shuttle Leg Press Double leg - 3 bands top / 1 band bottom; 3x10  Shuttle leg press  Single leg - 3 bands top; 3x8  Stationary Bike - seat at level 1; Level 3; 5 minutes    Manual Therapy Techniques: Joint mobilizations were applied to the: thoracic spine and Left lower extremity for 2 minutes, including:  Thoracic HVLAT   Left L4-5 rotation mobilization (grade 5)    Patient Education and Home Exercises     Home Exercises Provided and Patient Education Provided     Education provided:   Symptom management and plan of care progressions.  Home Exercise Program.    Written Home Exercises Provided: yes. Exercises were reviewed and Kevin was able to demonstrate them prior to the end of the session.  Kevin demonstrated good  understanding of the education provided. See EMR under Patient Instructions for exercises provided during therapy sessions    ASSESSMENT     Unremarkable changes in symptoms following the last visit. Introduced Paloff press, primarily for core utilization, with adequate challenge noted. No exacerbations following treatments..     From Initial Evaluation on 6/13/2022 - Kevin is a 37 y.o. female referred to outpatient Physical Therapy with a medical diagnosis of low back pain with radiculopathy. Pt presents with limited lumbar ROM, poor transitional movement, ,gait abnormality, LLE weakness, and adverse neural tension on the left.     Kevin Is progressing well towards her goals.   Pt prognosis is Good.     Pt will continue to benefit from skilled outpatient physical therapy to address the deficits listed in the problem list box on initial evaluation, provide pt/family education and to maximize pt's level of independence in the home and community environment.   Pt's spiritual, cultural and educational needs considered and pt agreeable to plan of care and goals.     Anticipated barriers to physical therapy: previous physical therapy with limited benefit.    Goals:  Short Term Goals:  4 weeks  1.Report decreased LLE pain  < / =  5/10  to increase tolerance for walking and  standing. (Met - 7/27/2022)   2. Increase ROM by 25% where limited in order to perform ADLs without difficulty. (Met - 7/27/2022)  3. Increase strength by 1/3 MMT grade in LLE  to increase tolerance for ADL and work activities. (Met - 7/27/2022)  4. Pt to tolerate HEP to improve ROM and independence with activities of daily living's. (Met - 7/27/2022)     Long Term Goals: 6 months  1.Report decreased low back pain < / = 2/10  to increase tolerance for prolonged sitting at work. (Not Met - Progressing)  2.Patient goal: walk 2 miles without taking breaks. (Not Met - Progressing)  3.Increase strength to 5/5 in  LLE  to increase tolerance for ADL and work activities. (Not Met - Progressing)    PLAN     Continue with extension based treatment. Progress core strengthening as symptoms improve.    Plan of care Certification: 6/13/2022 to 12/13/2022.  Outpatient Physical Therapy 2-3 times weekly for 6 months     Faraz Doe, PT , DPT, OCS

## 2022-08-05 ENCOUNTER — CLINICAL SUPPORT (OUTPATIENT)
Dept: REHABILITATION | Facility: HOSPITAL | Age: 37
End: 2022-08-05
Attending: NEUROLOGICAL SURGERY
Payer: MEDICAID

## 2022-08-05 DIAGNOSIS — R29.898 WEAKNESS OF LEFT LOWER EXTREMITY: Primary | ICD-10-CM

## 2022-08-05 DIAGNOSIS — R26.9 GAIT DIFFICULTY: ICD-10-CM

## 2022-08-05 DIAGNOSIS — G89.29 CHRONIC LEFT-SIDED LOW BACK PAIN WITH LEFT-SIDED SCIATICA: ICD-10-CM

## 2022-08-05 DIAGNOSIS — M54.42 CHRONIC LEFT-SIDED LOW BACK PAIN WITH LEFT-SIDED SCIATICA: ICD-10-CM

## 2022-08-05 PROCEDURE — 97110 THERAPEUTIC EXERCISES: CPT

## 2022-08-05 NOTE — PROGRESS NOTES
[facility]   Physical Therapy Treatment Note     Name: Kevin Mancini  [facility] Number: [id number]    Therapy Diagnosis:   Encounter Diagnoses   Name Primary?    Weakness of left lower extremity Yes    Chronic left-sided low back pain with left-sided sciatica     Gait difficulty      Physician: Bakari Zaragoza MD    Visit Date: 8/5/2022    Physician Orders: PT Eval and Treat 3 months s/p left L5-S1 microdiscectomy. Lumbar PT 3 times a week for 6 weeks. Sharmila exercises.   Medical Diagnosis from Referral: Z98.890 (ICD-10-CM) - S/P lumbar microdiscectomy M51.36 (ICD-10-CM) - DDD (degenerative disc disease), lumbar   Evaluation Date: 6/13/2022  Authorization Period Expiration: 9/30/2023  Plan of Care Expiration: 12/13/2022  Visit # / Visits authorized: 13/ 12     Time In: 11:45  Time Out: 12:45  Total Billable Time: 55 minutes    Precautions: Standard    SUBJECTIVE     Pt reports: she was sore following the last visit.  She was compliant with home exercise program.  Response to previous treatment: no pain  Functional change: decreasing     Pain: 5/10  Location: bilateral low back and Left lower extremity     OBJECTIVE     Objective Measures updated at progress report unless specified.     Special Tests:  · SLR Test:  · Pre manual treatment = 60 degrees  · Post manual treatment = 70 degrees    Treatment     Kevin received the treatments listed below:      Therapeutic Exercises to develop strength, endurance, ROM and flexibility for 53 minutes including:  Supine Hamstring stretch with strap - 45 seconds x2 - NP  lower trunk rotation with Swiss ball- 5 second holds x20 bilateral - NP  Prone Bird Dog on Swiss Ball- 5 second holds 2x10  Straight Leg Raise - 4#; 2x15  Sit to stands - 20# DB 2x15  Paloff Press - x20 bilateral; 7#  Farmer's Carries - unilateral 25# KB; 2 laps each extremity  Shuttle Leg Press Double leg - 3 bands top / 1 band bottom; 3x10  Shuttle leg press Single  leg - 3 bands top; 3x8  Stationary Bike - seat at level 1; Level 3; 8 minutes    Manual Therapy Techniques: Joint mobilizations were applied to the: thoracic spine and Left lower extremity for 2 minutes, including:  Prone Left SI mobilization (grade 5)  Left L4-5 rotation mobilization (grade 5)    Patient Education and Home Exercises     Home Exercises Provided and Patient Education Provided     Education provided:   Symptom management and plan of care progressions.  Home Exercise Program.    Written Home Exercises Provided: Patient instructed to cont prior HEP. Exercises were reviewed and Kevin was able to demonstrate them prior to the end of the session.  Kevin demonstrated good  understanding of the education provided. See EMR under Patient Instructions for exercises provided during therapy sessions    ASSESSMENT     Continued current exercise program secondary to soreness and good prior treatment response. Improved performance of Paloff Press exercise. Gradual improvements in functional mobility and decreases in symptoms.    From Initial Evaluation on 6/13/2022 - Kevin is a 37 y.o. female referred to outpatient Physical Therapy with a medical diagnosis of low back pain with radiculopathy. Pt presents with limited lumbar ROM, poor transitional movement, ,gait abnormality, LLE weakness, and adverse neural tension on the left.     Kevin Is progressing well towards her goals.   Pt prognosis is Good.     Pt will continue to benefit from skilled outpatient physical therapy to address the deficits listed in the problem list box on initial evaluation, provide pt/family education and to maximize pt's level of independence in the home and community environment.   Pt's spiritual, cultural and educational needs considered and pt agreeable to plan of care and goals.     Anticipated barriers to physical therapy: previous physical therapy with limited benefit.    Goals:  Short Term Goals:  4 weeks  1.Report  decreased LLE pain  < / =  5/10  to increase tolerance for walking and standing. (Met - 7/27/2022)   2. Increase ROM by 25% where limited in order to perform ADLs without difficulty. (Met - 7/27/2022)  3. Increase strength by 1/3 MMT grade in LLE  to increase tolerance for ADL and work activities. (Met - 7/27/2022)  4. Pt to tolerate HEP to improve ROM and independence with activities of daily living's. (Met - 7/27/2022)     Long Term Goals: 6 months  1.Report decreased low back pain < / = 2/10  to increase tolerance for prolonged sitting at work. (Not Met - Progressing)  2.Patient goal: walk 2 miles without taking breaks. (Not Met - Progressing)  3.Increase strength to 5/5 in  LLE  to increase tolerance for ADL and work activities. (Not Met - Progressing)    PLAN     Continue with extension based treatment. Progress core strengthening as symptoms improve.    Plan of care Certification: 6/13/2022 to 12/13/2022.  Outpatient Physical Therapy 2-3 times weekly for 6 months     Faraz Doe, PT , DPT, OCS

## 2022-08-08 ENCOUNTER — CLINICAL SUPPORT (OUTPATIENT)
Dept: REHABILITATION | Facility: HOSPITAL | Age: 37
End: 2022-08-08
Attending: NEUROLOGICAL SURGERY
Payer: MEDICAID

## 2022-08-08 DIAGNOSIS — G89.29 CHRONIC LEFT-SIDED LOW BACK PAIN WITH LEFT-SIDED SCIATICA: ICD-10-CM

## 2022-08-08 DIAGNOSIS — R29.898 WEAKNESS OF LEFT LOWER EXTREMITY: Primary | ICD-10-CM

## 2022-08-08 DIAGNOSIS — M54.42 CHRONIC LEFT-SIDED LOW BACK PAIN WITH LEFT-SIDED SCIATICA: ICD-10-CM

## 2022-08-08 DIAGNOSIS — R26.9 GAIT DIFFICULTY: ICD-10-CM

## 2022-08-08 PROCEDURE — 97110 THERAPEUTIC EXERCISES: CPT

## 2022-08-08 NOTE — PROGRESS NOTES
[facility]   Physical Therapy Treatment Note     Name: Kevin Mancini  [facility] Number: [id number]    Therapy Diagnosis:   Encounter Diagnoses   Name Primary?    Weakness of left lower extremity Yes    Chronic left-sided low back pain with left-sided sciatica     Gait difficulty      Physician: Bakari Zaragoza MD    Visit Date: 8/8/2022    Physician Orders: PT Eval and Treat 3 months s/p left L5-S1 microdiscectomy. Lumbar PT 3 times a week for 6 weeks. Sharmila exercises.   Medical Diagnosis from Referral: Z98.890 (ICD-10-CM) - S/P lumbar microdiscectomy M51.36 (ICD-10-CM) - DDD (degenerative disc disease), lumbar   Evaluation Date: 6/13/2022  Authorization Period Expiration: 9/30/2023  Plan of Care Expiration: 12/13/2022  Visit # / Visits authorized: 14/ 12     Time In: 12:05  Time Out: 13:00  Total Billable Time: 55 minutes    Precautions: Standard    SUBJECTIVE     Pt reports: she did a lot of house work and cooking over the weekend since her back was feeling so good.  She was compliant with home exercise program.  Response to previous treatment: no pain  Functional change: decreasing     Pain: 5/10  Location: bilateral low back and Left lower extremity     OBJECTIVE     Objective Measures updated at progress report unless specified.     Special Tests:  · SLR Test:  · Pre manual treatment = 60 degrees  · Post manual treatment = 70 degrees    Treatment     Kevin received the treatments listed below:      Therapeutic Exercises to develop strength, endurance, ROM and flexibility for 50 minutes including:  lower trunk rotation with Swiss ball- 5 second holds x20 bilateral - NP  Prone Bird Dog on Swiss Ball- 5 second holds 2x10  Straight Leg Raise - 4#; 2x15 - NP  Sit to stands - 25# DB 2x15  Paloff Press - x20 bilateral; 7#  Farmer's Carries - unilateral 25# KB; 2 laps each extremity  Shuttle Leg Press Double leg - 3 bands top / 1 band bottom; 3x10 - NP  Shuttle leg press  Single leg - 3 bands top; 3x8 - NP  Stationary Bike - seat at level 1; Level 3; 8 minutes - NP  Hip Hinge - with dowel; x15  Dead Lift - 25# x10    Manual Therapy Techniques: Joint mobilizations were applied to the: thoracic spine and Left lower extremity for 5 minutes, including:  Prone Left SI mobilization (grade 5)  Left L4-5 rotation mobilization (grade 5)    Patient Education and Home Exercises     Home Exercises Provided and Patient Education Provided     Education provided:   Symptom management and plan of care progressions.  Home Exercise Program.    Written Home Exercises Provided: Patient instructed to cont prior HEP. Exercises were reviewed and Kevin was able to demonstrate them prior to the end of the session.  Kevin demonstrated good  understanding of the education provided. See EMR under Patient Instructions for exercises provided during therapy sessions    ASSESSMENT     Initiated education for lifting mechanics consisting of hip hinging and dead lifts. She was able to demonstrate good understanding and performance of today's education. No exacerbation in symptoms upon completion.    From Initial Evaluation on 6/13/2022 - Kevin is a 37 y.o. female referred to outpatient Physical Therapy with a medical diagnosis of low back pain with radiculopathy. Pt presents with limited lumbar ROM, poor transitional movement, ,gait abnormality, LLE weakness, and adverse neural tension on the left.     Kevin Is progressing well towards her goals.   Pt prognosis is Good.     Pt will continue to benefit from skilled outpatient physical therapy to address the deficits listed in the problem list box on initial evaluation, provide pt/family education and to maximize pt's level of independence in the home and community environment.   Pt's spiritual, cultural and educational needs considered and pt agreeable to plan of care and goals.     Anticipated barriers to physical therapy: previous physical therapy with  limited benefit.    Goals:  Short Term Goals:  4 weeks  1.Report decreased LLE pain  < / =  5/10  to increase tolerance for walking and standing. (Met - 7/27/2022)   2. Increase ROM by 25% where limited in order to perform ADLs without difficulty. (Met - 7/27/2022)  3. Increase strength by 1/3 MMT grade in LLE  to increase tolerance for ADL and work activities. (Met - 7/27/2022)  4. Pt to tolerate HEP to improve ROM and independence with activities of daily living's. (Met - 7/27/2022)     Long Term Goals: 6 months  1.Report decreased low back pain < / = 2/10  to increase tolerance for prolonged sitting at work. (Not Met - Progressing)  2.Patient goal: walk 2 miles without taking breaks. (Not Met - Progressing)  3.Increase strength to 5/5 in  LLE  to increase tolerance for ADL and work activities. (Not Met - Progressing)    PLAN     Continue with extension based treatment. Progress core strengthening as symptoms improve.    Plan of care Certification: 6/13/2022 to 12/13/2022.  Outpatient Physical Therapy 2-3 times weekly for 6 months     Faraz Doe, PT , DPT, OCS

## 2022-08-10 ENCOUNTER — CLINICAL SUPPORT (OUTPATIENT)
Dept: REHABILITATION | Facility: HOSPITAL | Age: 37
End: 2022-08-10
Attending: NEUROLOGICAL SURGERY
Payer: MEDICAID

## 2022-08-10 DIAGNOSIS — R29.898 WEAKNESS OF LEFT LOWER EXTREMITY: Primary | ICD-10-CM

## 2022-08-10 DIAGNOSIS — M54.42 CHRONIC LEFT-SIDED LOW BACK PAIN WITH LEFT-SIDED SCIATICA: ICD-10-CM

## 2022-08-10 DIAGNOSIS — G89.29 CHRONIC LEFT-SIDED LOW BACK PAIN WITH LEFT-SIDED SCIATICA: ICD-10-CM

## 2022-08-10 DIAGNOSIS — R26.9 GAIT DIFFICULTY: ICD-10-CM

## 2022-08-10 PROCEDURE — 97110 THERAPEUTIC EXERCISES: CPT

## 2022-08-10 NOTE — PROGRESS NOTES
[facility]   Physical Therapy Treatment Note     Name: Kevin Mancini  Clinic Number: [id number]    Therapy Diagnosis:   Encounter Diagnoses   Name Primary?    Weakness of left lower extremity Yes    Chronic left-sided low back pain with left-sided sciatica     Gait difficulty      Physician: Bakari Zaragoza MD    Visit Date: 8/10/2022    Physician Orders: PT Eval and Treat 3 months s/p left L5-S1 microdiscectomy. Lumbar PT 3 times a week for 6 weeks. Sharmila exercises.   Medical Diagnosis from Referral: Z98.890 (ICD-10-CM) - S/P lumbar microdiscectomy M51.36 (ICD-10-CM) - DDD (degenerative disc disease), lumbar   Evaluation Date: 6/13/2022  Authorization Period Expiration: 9/30/2023  Plan of Care Expiration: 12/13/2022  Visit # / Visits authorized: 15/ 12     Time In: 12:05  Time Out: 13:00  Total Billable Time: 55 minutes    Precautions: Standard    SUBJECTIVE     Pt reports: no changes in Left SI pain  She was compliant with home exercise program.  Response to previous treatment: no pain  Functional change: decreasing     Pain: 5/10  Location: bilateral low back and Left lower extremity     OBJECTIVE     Objective Measures updated at progress report unless specified.     Special Tests:  · SLR Test:  · Pre manual treatment = 60 degrees  · Post manual treatment = 70 degrees    Treatment     Kevin received the treatments listed below:      Therapeutic Exercises to develop strength, endurance, ROM and flexibility for 50 minutes including:  Sit to stands - 25# DB 2x15  Bicycles - green bands ; 2x10  Paloff Press - x20 bilateral; 7# - NP  Geller's Carries - unilateral 25# KB; 2 laps each extremity  Shuttle Leg Press Double leg - 3 bands top / 1 band bottom; 3x10  Shuttle leg press Single leg - 3 bands top; 3x8  Stationary Bike - seat at level 1; Level 3; 8 minutes - NP  Dead Lift - 25# x10 - NP    Manual Therapy Techniques: Joint mobilizations were applied to the: thoracic  spine and Left lower extremity for 5 minutes, including:  Prone Left SI mobilization (grade 5) - NP  Left L4-5 rotation mobilization (grade 5)  Pelvic muscle energy technique for left anterior innominate    Patient Education and Home Exercises     Home Exercises Provided and Patient Education Provided     Education provided:   Symptom management and plan of care progressions.  Home Exercise Program.    Written Home Exercises Provided: Patient instructed to cont prior HEP. Exercises were reviewed and Kevin was able to demonstrate them prior to the end of the session.  Kevin demonstrated good  understanding of the education provided. See EMR under Patient Instructions for exercises provided during therapy sessions    ASSESSMENT     Progressed core stability training due to ongoing left SI symptoms. Re-education on proper transfer techniques to reduce exacerbations. She was able to demonstrate good understanding and performance of today's education. No exacerbation in symptoms upon completion.    From Initial Evaluation on 6/13/2022 - Kevin is a 37 y.o. female referred to outpatient Physical Therapy with a medical diagnosis of low back pain with radiculopathy. Pt presents with limited lumbar ROM, poor transitional movement, ,gait abnormality, LLE weakness, and adverse neural tension on the left.     Kevin Is progressing well towards her goals.   Pt prognosis is Good.     Pt will continue to benefit from skilled outpatient physical therapy to address the deficits listed in the problem list box on initial evaluation, provide pt/family education and to maximize pt's level of independence in the home and community environment.   Pt's spiritual, cultural and educational needs considered and pt agreeable to plan of care and goals.     Anticipated barriers to physical therapy: previous physical therapy with limited benefit.    Goals:  Short Term Goals:  4 weeks  1.Report decreased LLE pain  < / =  5/10  to  increase tolerance for walking and standing. (Met - 7/27/2022)   2. Increase ROM by 25% where limited in order to perform ADLs without difficulty. (Met - 7/27/2022)  3. Increase strength by 1/3 MMT grade in LLE  to increase tolerance for ADL and work activities. (Met - 7/27/2022)  4. Pt to tolerate HEP to improve ROM and independence with activities of daily living's. (Met - 7/27/2022)     Long Term Goals: 6 months  1.Report decreased low back pain < / = 2/10  to increase tolerance for prolonged sitting at work. (Not Met - Progressing)  2.Patient goal: walk 2 miles without taking breaks. (Not Met - Progressing)  3.Increase strength to 5/5 in  LLE  to increase tolerance for ADL and work activities. (Not Met - Progressing)    PLAN     Continue with extension based treatment. Progress core strengthening as symptoms improve.    Plan of care Certification: 6/13/2022 to 12/13/2022.  Outpatient Physical Therapy 2-3 times weekly for 6 months     Faraz Doe, PT , DPT, OCS

## 2022-08-17 ENCOUNTER — CLINICAL SUPPORT (OUTPATIENT)
Dept: REHABILITATION | Facility: HOSPITAL | Age: 37
End: 2022-08-17
Attending: NEUROLOGICAL SURGERY
Payer: MEDICAID

## 2022-08-17 DIAGNOSIS — M54.42 CHRONIC LEFT-SIDED LOW BACK PAIN WITH LEFT-SIDED SCIATICA: ICD-10-CM

## 2022-08-17 DIAGNOSIS — G89.29 CHRONIC LEFT-SIDED LOW BACK PAIN WITH LEFT-SIDED SCIATICA: ICD-10-CM

## 2022-08-17 DIAGNOSIS — R29.898 WEAKNESS OF LEFT LOWER EXTREMITY: Primary | ICD-10-CM

## 2022-08-17 DIAGNOSIS — R26.9 GAIT DIFFICULTY: ICD-10-CM

## 2022-08-17 PROCEDURE — 97110 THERAPEUTIC EXERCISES: CPT

## 2022-08-17 NOTE — PROGRESS NOTES
[facility]   Physical Therapy Treatment Note     Name: Kevin Mancini  Clinic Number: [id number]    Therapy Diagnosis:   Encounter Diagnoses   Name Primary?    Weakness of left lower extremity Yes    Chronic left-sided low back pain with left-sided sciatica     Gait difficulty      Physician: Bakari Zaragoza MD    Visit Date: 8/17/2022    Physician Orders: PT Eval and Treat 3 months s/p left L5-S1 microdiscectomy. Lumbar PT 3 times a week for 6 weeks. Sharmila exercises.   Medical Diagnosis from Referral: Z98.890 (ICD-10-CM) - S/P lumbar microdiscectomy M51.36 (ICD-10-CM) - DDD (degenerative disc disease), lumbar   Evaluation Date: 6/13/2022  Authorization Period Expiration: 9/17/2023  Plan of Care Expiration: 12/13/2022  Visit # / Visits authorized: 16/ 24     Time In: 12:00  Time Out: 13:00  Total Billable Time: 55 minutes    Precautions: Standard    SUBJECTIVE     Pt reports: mild decrease in Left SI pain. Increased Left lower extremity spasms and improving sensation  She was compliant with home exercise program.  Response to previous treatment: no pain  Functional change: decreasing     Pain: 5/10  Location: bilateral low back and Left lower extremity     OBJECTIVE     Objective Measures updated at progress report unless specified.     Treatment     Kevin received the treatments listed below:      Therapeutic Exercises to develop strength, endurance, ROM and flexibility for 50 minutes including:  Sit to stands - 20# DB 2x15  Clamshells - Yellow band; bilateral x30  Bicycles - green bands ; 2x10 - NP  Paloff Press - x20 bilateral; 7#  Resisted Lateral Walking - Green band; 10 steps bilateral; x4  Farmer's Carries - unilateral 25# KB; 2 laps each extremity  Shuttle Leg Press Double leg - 3 bands top / 1 band bottom; 3x10 - NP  Shuttle leg press Single leg - 3 bands top; 3x8 - NP  Treadmill - 1.5 mph; 5 minutes  Dead Lift - 25# x10 - NP    Manual Therapy Techniques: Joint  mobilizations were applied to the: thoracic spine and Left lower extremity for 5 minutes, including:  Prone Left SI mobilization (grade 5)  Left L4-5 rotation mobilization (grade 5)  Pelvic muscle energy technique for left anterior innominate - NP    Patient Education and Home Exercises     Home Exercises Provided and Patient Education Provided     Education provided:   Symptom management and plan of care progressions.  Home Exercise Program.    Written Home Exercises Provided: Patient instructed to cont prior HEP. Exercises were reviewed and Kevin was able to demonstrate them prior to the end of the session.  Kevin demonstrated good  understanding of the education provided. See EMR under Patient Instructions for exercises provided during therapy sessions    ASSESSMENT     Continued core stability training in conjunction with new hip strengthening activities due to ongoing left SI symptoms. Challenged with all hip exercises today as noted by fatigue.    From Initial Evaluation on 6/13/2022 - Kevin is a 37 y.o. female referred to outpatient Physical Therapy with a medical diagnosis of low back pain with radiculopathy. Pt presents with limited lumbar ROM, poor transitional movement, ,gait abnormality, LLE weakness, and adverse neural tension on the left.     Kevin Is progressing well towards her goals.   Pt prognosis is Good.     Pt will continue to benefit from skilled outpatient physical therapy to address the deficits listed in the problem list box on initial evaluation, provide pt/family education and to maximize pt's level of independence in the home and community environment.   Pt's spiritual, cultural and educational needs considered and pt agreeable to plan of care and goals.     Anticipated barriers to physical therapy: previous physical therapy with limited benefit.    Goals:  Short Term Goals:  4 weeks  1.Report decreased LLE pain  < / =  5/10  to increase tolerance for walking and standing.  (Met - 7/27/2022)   2. Increase ROM by 25% where limited in order to perform ADLs without difficulty. (Met - 7/27/2022)  3. Increase strength by 1/3 MMT grade in LLE  to increase tolerance for ADL and work activities. (Met - 7/27/2022)  4. Pt to tolerate HEP to improve ROM and independence with activities of daily living's. (Met - 7/27/2022)     Long Term Goals: 6 months  1.Report decreased low back pain < / = 2/10  to increase tolerance for prolonged sitting at work. (Not Met - Progressing)  2.Patient goal: walk 2 miles without taking breaks. (Not Met - Progressing)  3.Increase strength to 5/5 in  LLE  to increase tolerance for ADL and work activities. (Not Met - Progressing)    PLAN     Continue with extension based treatment. Progress core strengthening as symptoms improve.    Plan of care Certification: 6/13/2022 to 12/13/2022.  Outpatient Physical Therapy 2-3 times weekly for 6 months     Faraz Doe, PT , DPT, OCS

## 2022-08-19 ENCOUNTER — CLINICAL SUPPORT (OUTPATIENT)
Dept: REHABILITATION | Facility: HOSPITAL | Age: 37
End: 2022-08-19
Attending: NEUROLOGICAL SURGERY
Payer: MEDICAID

## 2022-08-19 DIAGNOSIS — M54.42 CHRONIC LEFT-SIDED LOW BACK PAIN WITH LEFT-SIDED SCIATICA: ICD-10-CM

## 2022-08-19 DIAGNOSIS — R29.898 WEAKNESS OF LEFT LOWER EXTREMITY: Primary | ICD-10-CM

## 2022-08-19 DIAGNOSIS — G89.29 CHRONIC LEFT-SIDED LOW BACK PAIN WITH LEFT-SIDED SCIATICA: ICD-10-CM

## 2022-08-19 DIAGNOSIS — R26.9 GAIT DIFFICULTY: ICD-10-CM

## 2022-08-19 PROCEDURE — 97110 THERAPEUTIC EXERCISES: CPT

## 2022-08-19 NOTE — PROGRESS NOTES
[facility]   Physical Therapy Treatment Note     Name: Kevin Mancini  Clinic Number: [id number]    Therapy Diagnosis:   Encounter Diagnoses   Name Primary?    Weakness of left lower extremity Yes    Chronic left-sided low back pain with left-sided sciatica     Gait difficulty      Physician: Bakari Zaragoza MD    Visit Date: 8/19/2022    Physician Orders: PT Eval and Treat 3 months s/p left L5-S1 microdiscectomy. Lumbar PT 3 times a week for 6 weeks. Sharmila exercises.   Medical Diagnosis from Referral: Z98.890 (ICD-10-CM) - S/P lumbar microdiscectomy M51.36 (ICD-10-CM) - DDD (degenerative disc disease), lumbar   Evaluation Date: 6/13/2022  Authorization Period Expiration: 9/17/2023  Plan of Care Expiration: 12/13/2022  Visit # / Visits authorized: 16/ 24     Time In: 12:00  Time Out: 13:00  Total Billable Time: 55 minutes    Precautions: Standard    SUBJECTIVE     Pt reports: feeling more soreness in her left hip today  She was compliant with home exercise program.  Response to previous treatment: soreness  Functional change: decreasing     Pain: 5/10  Location: bilateral low back and Left lower extremity     OBJECTIVE     Objective Measures updated at progress report unless specified.     Treatment     Kevin received the treatments listed below:      Therapeutic Exercises to develop strength, endurance, ROM and flexibility for 53 minutes including:  Sit to stands - 20# DB 2x15 - NP  Clamshells - green band; bilateral x30  Bicycles - green band; 2x10  Paloff Press with rotation - x20 bilateral; 10#  Resisted Lateral Walking - Green band; 10 steps bilateral; x4 -NP  Farmer's Carries - unilateral 25# KB; 2 laps each extremity  Shuttle Leg Press Double leg - 3 bands top / 1 band bottom; 3x10 - NP  Shuttle leg press Single leg - 3 bands top; 3x8 - NP  Treadmill - 1.5 mph; 5 minutes  Dead Lift - 25# 2x10    Manual Therapy Techniques: Joint mobilizations were applied to the:  thoracic spine and Left lower extremity for 2 minutes, including:  Prone Left SI mobilization (grade 5)  Left L4-5 rotation mobilization (grade 5) - NP  Pelvic muscle energy technique for left anterior innominate - NP    Patient Education and Home Exercises     Home Exercises Provided and Patient Education Provided     Education provided:   Symptom management and plan of care progressions.  Home Exercise Program.    Written Home Exercises Provided: Patient instructed to cont prior HEP. Exercises were reviewed and Kevin was able to demonstrate them prior to the end of the session.  Kevin demonstrated good  understanding of the education provided. See EMR under Patient Instructions for exercises provided during therapy sessions    ASSESSMENT     Ongoing progressions for SI / hip stabilization as noted with resisted table exercises and increased reps and resistances of closed chain lifting. Decreased cues needed for proper hip hinge during dead lift. Reinforced all positive changes with gait on treadmill for treatment conclusion. Decreased overall soreness upon completion of session.    From Initial Evaluation on 6/13/2022 - Kevin is a 37 y.o. female referred to outpatient Physical Therapy with a medical diagnosis of low back pain with radiculopathy. Pt presents with limited lumbar ROM, poor transitional movement, ,gait abnormality, LLE weakness, and adverse neural tension on the left.     Kevin Is progressing well towards her goals.   Pt prognosis is Good.     Pt will continue to benefit from skilled outpatient physical therapy to address the deficits listed in the problem list box on initial evaluation, provide pt/family education and to maximize pt's level of independence in the home and community environment.   Pt's spiritual, cultural and educational needs considered and pt agreeable to plan of care and goals.     Anticipated barriers to physical therapy: previous physical therapy with limited  benefit.    Goals:  Short Term Goals:  4 weeks  1.Report decreased LLE pain  < / =  5/10  to increase tolerance for walking and standing. (Met - 7/27/2022)   2. Increase ROM by 25% where limited in order to perform ADLs without difficulty. (Met - 7/27/2022)  3. Increase strength by 1/3 MMT grade in LLE  to increase tolerance for ADL and work activities. (Met - 7/27/2022)  4. Pt to tolerate HEP to improve ROM and independence with activities of daily living's. (Met - 7/27/2022)     Long Term Goals: 6 months  1.Report decreased low back pain < / = 2/10  to increase tolerance for prolonged sitting at work. (Not Met - Progressing)  2.Patient goal: walk 2 miles without taking breaks. (Not Met - Progressing)  3.Increase strength to 5/5 in  LLE  to increase tolerance for ADL and work activities. (Not Met - Progressing)    PLAN     Continue with extension based treatment. Progress core strengthening as symptoms improve.    Plan of care Certification: 6/13/2022 to 12/13/2022.  Outpatient Physical Therapy 2-3 times weekly for 6 months     Faraz Doe, PT , DPT, OCS

## 2022-08-26 ENCOUNTER — CLINICAL SUPPORT (OUTPATIENT)
Dept: REHABILITATION | Facility: HOSPITAL | Age: 37
End: 2022-08-26
Payer: MEDICAID

## 2022-08-26 DIAGNOSIS — R29.898 WEAKNESS OF LEFT LOWER EXTREMITY: Primary | ICD-10-CM

## 2022-08-26 DIAGNOSIS — G89.29 CHRONIC LEFT-SIDED LOW BACK PAIN WITH LEFT-SIDED SCIATICA: ICD-10-CM

## 2022-08-26 DIAGNOSIS — R26.9 GAIT DIFFICULTY: ICD-10-CM

## 2022-08-26 DIAGNOSIS — M54.42 CHRONIC LEFT-SIDED LOW BACK PAIN WITH LEFT-SIDED SCIATICA: ICD-10-CM

## 2022-08-26 PROCEDURE — 97110 THERAPEUTIC EXERCISES: CPT

## 2022-08-26 NOTE — PROGRESS NOTES
[facility]   Physical Therapy Treatment Note     Name: Kevin Mancini  Clinic Number: [id number]    Therapy Diagnosis:   Encounter Diagnoses   Name Primary?    Weakness of left lower extremity Yes    Chronic left-sided low back pain with left-sided sciatica     Gait difficulty      Physician: Bakari Zaragoza MD    Visit Date: 8/26/2022    Physician Orders: PT Eval and Treat 3 months s/p left L5-S1 microdiscectomy. Lumbar PT 3 times a week for 6 weeks. Sharmila exercises.   Medical Diagnosis from Referral: Z98.890 (ICD-10-CM) - S/P lumbar microdiscectomy M51.36 (ICD-10-CM) - DDD (degenerative disc disease), lumbar   Evaluation Date: 6/13/2022  Authorization Period Expiration: 9/17/2023  Plan of Care Expiration: 12/13/2022  Visit # / Visits authorized: 18/ 24     Time In: 12:10  Time Out: 13:00  Total Billable Time: 45 minutes    Precautions: Standard    SUBJECTIVE     Pt reports: small decrease in stiffness and soreness in Left SI / glute region today.  She was compliant with home exercise program.  Response to previous treatment: soreness  Functional change: decreasing     Pain: 5/10  Location: bilateral low back and Left lower extremity     OBJECTIVE     Objective Measures updated at progress report unless specified.     Treatment     Kevin received the treatments listed below:      Therapeutic Exercises to develop strength, endurance, ROM and flexibility for 45 minutes including:  Sit to stands - 20# DB 2x15 - NP  Clamshells - green band; 5 second holds 2x10  Step Ups - forward; 6 inches; Left lower extremity only; 2x10  Paloff Press with rotation - x20 bilateral; 10#  Resisted Lateral Walking - Green band; 10 steps bilateral; x4 -NP  Farmer's Carries - unilateral 25# KB; 2 laps each extremity - NP  Shuttle Leg Press Double leg - 3 bands top / 1 band bottom; 3x10 - NP  Shuttle leg press Single leg - 3 bands top; 3x8 - NP  Treadmill - backwards at 0.5 mph (3 minutes) and  forward at 1.5 mph (3 minutes)  Dead Lift - 25# 2x10 - NP  Sled Push - 45#; 2 turf laps    Manual Therapy Techniques: Joint mobilizations were applied to the: thoracic spine and Left lower extremity for 0 minutes, including:  Prone Left SI mobilization (grade 5)  Left L4-5 rotation mobilization (grade 5) - NP  Pelvic muscle energy technique for left anterior innominate - NP    Patient Education and Home Exercises     Home Exercises Provided and Patient Education Provided     Education provided:   Symptom management and plan of care progressions.  Home Exercise Program.    Written Home Exercises Provided: Patient instructed to cont prior HEP. Exercises were reviewed and Kevin was able to demonstrate them prior to the end of the session.  Kevin demonstrated good  understanding of the education provided. See EMR under Patient Instructions for exercises provided during therapy sessions    ASSESSMENT     Progressed excise program to address ongoing Left SI / hip radicular symptoms with continued hip strengthening and the addition of functional training activities (pushing, step ups, walking, etc). She was adequately challenged with all exercises today as noted by discomfort during initial reps and decreased symptoms by end of last set.    From Initial Evaluation on 6/13/2022 - Kevin is a 37 y.o. female referred to outpatient Physical Therapy with a medical diagnosis of low back pain with radiculopathy. Pt presents with limited lumbar ROM, poor transitional movement, ,gait abnormality, LLE weakness, and adverse neural tension on the left.     Kevin Is progressing well towards her goals.   Pt prognosis is Good.     Pt will continue to benefit from skilled outpatient physical therapy to address the deficits listed in the problem list box on initial evaluation, provide pt/family education and to maximize pt's level of independence in the home and community environment.   Pt's spiritual, cultural and educational  needs considered and pt agreeable to plan of care and goals.     Anticipated barriers to physical therapy: previous physical therapy with limited benefit.    Goals:  Short Term Goals:  4 weeks  1.Report decreased LLE pain  < / =  5/10  to increase tolerance for walking and standing. (Met - 7/27/2022)   2. Increase ROM by 25% where limited in order to perform ADLs without difficulty. (Met - 7/27/2022)  3. Increase strength by 1/3 MMT grade in LLE  to increase tolerance for ADL and work activities. (Met - 7/27/2022)  4. Pt to tolerate HEP to improve ROM and independence with activities of daily living's. (Met - 7/27/2022)     Long Term Goals: 6 months  1.Report decreased low back pain < / = 2/10  to increase tolerance for prolonged sitting at work. (Not Met - Progressing)  2.Patient goal: walk 2 miles without taking breaks. (Not Met - Progressing)  3.Increase strength to 5/5 in  LLE  to increase tolerance for ADL and work activities. (Not Met - Progressing)    PLAN     Continue with extension based treatment. Progress core strengthening as symptoms improve.    Plan of care Certification: 6/13/2022 to 12/13/2022.  Outpatient Physical Therapy 2-3 times weekly for 6 months     Faraz Doe, PT , DPT, OCS

## 2022-08-29 RX ORDER — OXYCODONE AND ACETAMINOPHEN 10; 325 MG/1; MG/1
1 TABLET ORAL EVERY 12 HOURS PRN
Qty: 60 TABLET | Refills: 0 | Status: SHIPPED | OUTPATIENT
Start: 2022-08-29 | End: 2022-09-03

## 2022-08-31 ENCOUNTER — CLINICAL SUPPORT (OUTPATIENT)
Dept: REHABILITATION | Facility: HOSPITAL | Age: 37
End: 2022-08-31
Attending: NEUROLOGICAL SURGERY
Payer: MEDICAID

## 2022-08-31 DIAGNOSIS — M54.42 CHRONIC LEFT-SIDED LOW BACK PAIN WITH LEFT-SIDED SCIATICA: ICD-10-CM

## 2022-08-31 DIAGNOSIS — G89.29 CHRONIC LEFT-SIDED LOW BACK PAIN WITH LEFT-SIDED SCIATICA: ICD-10-CM

## 2022-08-31 DIAGNOSIS — R29.898 WEAKNESS OF LEFT LOWER EXTREMITY: Primary | ICD-10-CM

## 2022-08-31 DIAGNOSIS — R26.9 GAIT DIFFICULTY: ICD-10-CM

## 2022-08-31 PROCEDURE — 97110 THERAPEUTIC EXERCISES: CPT

## 2022-08-31 NOTE — PROGRESS NOTES
[facility]   Physical Therapy Treatment Note     Name: Kevin M[facility]dez [facility] Number: [id number]    Therapy Diagnosis:   Encounter Diagnoses   Name Primary?    Weakness of left lower extremity Yes    Chronic left-sided low back pain with left-sided sciatica     Gait difficulty        Physician: Bakari Zaragoza MD    Visit Date: 8/31/2022    Physician Orders: PT Eval and Treat 3 months s/p left L5-S1 microdiscectomy. Lumbar PT 3 times a week for 6 weeks. Sharmila exercises.   Medical Diagnosis from Referral: Z98.890 (ICD-10-CM) - S/P lumbar microdiscectomy M51.36 (ICD-10-CM) - DDD (degenerative disc disease), lumbar   Evaluation Date: 6/13/2022  Authorization Period Expiration: 9/17/2023  Plan of Care Expiration: 12/13/2022  Visit # / Visits authorized: 19/ 24     Time In: 12:10  Time Out: 13:00  Total Billable Time: 45 minutes    Precautions: Standard    SUBJECTIVE     Pt reports: that she can stand for 4 hours and sit for 2 hours before she notices her symptoms.  She was compliant with home exercise program.  Response to previous treatment: soreness  Functional change: increasing activity duration     Pain: 5/10  Location: bilateral low back and Left lower extremity     OBJECTIVE     Objective Measures updated at progress report unless specified.     Treatment     Kevin received the treatments listed below:      Therapeutic Exercises to develop strength, endurance, ROM and flexibility for 50 minutes including:  Forward Step Ups - 12 inches; Left lower extremity only; 2x10  Paloff Press with lunge - x20 bilateral; 10#  Resisted Lateral Walking - Green band; 10 steps bilateral; x4  Sled Push - 55#; 2 turf laps  Banded Hip Drags - Red power band; bilateral x15    Not Today:  Sit to stands - 20# DB 2x15 - NP  Clamshells - green band; 5 second holds 2x10  Farmer's Carries - unilateral 25# KB; 2 laps each extremity - NP  Shuttle leg press Single leg - 3 bands top; 3x8 -  NP  Treadmill - backwards at 0.5 mph (3 minutes) and forward at 1.5 mph (3 minutes)    Manual Therapy Techniques: Joint mobilizations were applied to the: thoracic spine and Left lower extremity for 3 minutes, including:  Prone Left SI mobilization (grade 5) - NP  Bilateral L4-5 rotation mobilization (grade 5)    Patient Education and Home Exercises     Home Exercises Provided and Patient Education Provided     Education provided:   Symptom management and plan of care progressions.  Home Exercise Program.    Written Home Exercises Provided: Patient instructed to cont prior HEP. Exercises were reviewed and Kevin was able to demonstrate them prior to the end of the session.  Kevin demonstrated good  understanding of the education provided. See EMR under Patient Instructions for exercises provided during therapy sessions    ASSESSMENT     Decreased SI symptoms as compared to previous visits. Progressed core and hip strengthening activities today while in closed chain for greater emphasis on functional improvements.    From Initial Evaluation on 6/13/2022 - Kevin is a 37 y.o. female referred to outpatient Physical Therapy with a medical diagnosis of low back pain with radiculopathy. Pt presents with limited lumbar ROM, poor transitional movement, ,gait abnormality, LLE weakness, and adverse neural tension on the left.     Kevin Is progressing well towards her goals.   Pt prognosis is Good.     Pt will continue to benefit from skilled outpatient physical therapy to address the deficits listed in the problem list box on initial evaluation, provide pt/family education and to maximize pt's level of independence in the home and community environment. Pt's spiritual, cultural and educational needs considered and pt agreeable to plan of care and goals.     Anticipated barriers to physical therapy: previous physical therapy with limited benefit.    Goals:  Short Term Goals:  4 weeks  1.Report decreased LLE pain  < /  =  5/10  to increase tolerance for walking and standing. (Met - 7/27/2022)   2. Increase ROM by 25% where limited in order to perform ADLs without difficulty. (Met - 7/27/2022)  3. Increase strength by 1/3 MMT grade in LLE  to increase tolerance for ADL and work activities. (Met - 7/27/2022)  4. Pt to tolerate HEP to improve ROM and independence with activities of daily living's. (Met - 7/27/2022)     Long Term Goals: 6 months  1.Report decreased low back pain < / = 2/10  to increase tolerance for prolonged sitting at work. (Not Met - Progressing)  2.Patient goal: walk 2 miles without taking breaks. (Not Met - Progressing)  3.Increase strength to 5/5 in  LLE  to increase tolerance for ADL and work activities. (Not Met - Progressing)    PLAN     Continue with extension based treatment. Progress core strengthening as symptoms improve.    Plan of care Certification: 6/13/2022 to 12/13/2022.  Outpatient Physical Therapy 2-3 times weekly for 6 months     Faraz Doe, PT , DPT, OCS

## 2022-09-02 ENCOUNTER — CLINICAL SUPPORT (OUTPATIENT)
Dept: REHABILITATION | Facility: HOSPITAL | Age: 37
End: 2022-09-02
Attending: NEUROLOGICAL SURGERY
Payer: MEDICAID

## 2022-09-02 DIAGNOSIS — R26.9 GAIT DIFFICULTY: ICD-10-CM

## 2022-09-02 DIAGNOSIS — M54.42 CHRONIC LEFT-SIDED LOW BACK PAIN WITH LEFT-SIDED SCIATICA: ICD-10-CM

## 2022-09-02 DIAGNOSIS — G89.29 CHRONIC LEFT-SIDED LOW BACK PAIN WITH LEFT-SIDED SCIATICA: ICD-10-CM

## 2022-09-02 DIAGNOSIS — R29.898 WEAKNESS OF LEFT LOWER EXTREMITY: Primary | ICD-10-CM

## 2022-09-02 PROCEDURE — 97110 THERAPEUTIC EXERCISES: CPT

## 2022-09-02 NOTE — PROGRESS NOTES
[facility]   Physical Therapy Treatment Note     Name: Kevin Mancini  Clinic Number: [id number]    Therapy Diagnosis:   Encounter Diagnoses   Name Primary?    Weakness of left lower extremity Yes    Chronic left-sided low back pain with left-sided sciatica     Gait difficulty        Physician: Bakari Zaragoza MD    Visit Date: 9/2/2022    Physician Orders: PT Eval and Treat 3 months s/p left L5-S1 microdiscectomy. Lumbar PT 3 times a week for 6 weeks. Sharmila exercises.   Medical Diagnosis from Referral: Z98.890 (ICD-10-CM) - S/P lumbar microdiscectomy M51.36 (ICD-10-CM) - DDD (degenerative disc disease), lumbar   Evaluation Date: 6/13/2022  Authorization Period Expiration: 9/17/2023  Plan of Care Expiration: 12/13/2022  Visit # / Visits authorized: 20/ 24     Time In: 12:00  Time Out: 13:00  Total Billable Time: 45 minutes    Precautions: Standard    SUBJECTIVE     Pt reports: she flared up her symptoms from standing and cooking this morning (over 4 hours).  She was compliant with home exercise program.  Response to previous treatment: soreness  Functional change: increasing activity duration     Pain: 5/10  Location: bilateral low back and Left lower extremity     OBJECTIVE     Objective Measures updated at progress report unless specified.     Treatment     Kevin received the treatments listed below:      Therapeutic Exercises to develop strength, endurance, ROM and flexibility for 45 minutes including:  Forward Step Ups - 4 inches with 15# KB in RUE; Left lower extremity only; 2x10  Paloff Press with lunge - x20 bilateral; 10# - NP  Seated Lumbar Flexion - x20  Resisted Lateral Walking - Green band; 10 steps bilateral; x4  Banded Hip Drags - Red power band; bilateral x15  Treadmill - backwards at 0.5 mph (3 minutes) and forward at 1.5 mph (3 minutes)    Not Today:  Sit to stands - 20# DB 2x15 - NP  Clamshells - green band; 5 second holds 2x10  Farmer's Carries -  unilateral 25# KB; 2 laps each extremity - NP  Shuttle leg press Single leg - 3 bands top; 3x8 - NP      Manual Therapy Techniques: Joint mobilizations were applied to the: thoracic spine and Left lower extremity for 0 minutes, including:  Prone Left SI mobilization (grade 5) - NP  Bilateral L4-5 rotation mobilization (grade 5)    Patient Education and Home Exercises     Home Exercises Provided and Patient Education Provided     Education provided:   Symptom management and plan of care progressions.  Home Exercise Program.    Written Home Exercises Provided: Patient instructed to cont prior HEP. Exercises were reviewed and Kevin was able to demonstrate them prior to the end of the session.  Kevin demonstrated good  understanding of the education provided. See EMR under Patient Instructions for exercises provided during therapy sessions    ASSESSMENT     Increased SI symptoms due to prolonged standing. Avoidance of glute activation during step ups and abhorrent motion during standing lumbar flexion and extension. Decreased symptoms following proper cuing and form. Updated home exercise program with today's exercises and cues. Reassess for carryover at next visit. Reduce to 1 visit per week frequency.    From Initial Evaluation on 6/13/2022 - Kevin is a 37 y.o. female referred to outpatient Physical Therapy with a medical diagnosis of low back pain with radiculopathy. Pt presents with limited lumbar ROM, poor transitional movement, ,gait abnormality, LLE weakness, and adverse neural tension on the left.     Kevin Is progressing well towards her goals.   Pt prognosis is Good.     Pt will continue to benefit from skilled outpatient physical therapy to address the deficits listed in the problem list box on initial evaluation, provide pt/family education and to maximize pt's level of independence in the home and community environment. Pt's spiritual, cultural and educational needs considered and pt agreeable  to plan of care and goals.     Anticipated barriers to physical therapy: previous physical therapy with limited benefit.    Goals:  Short Term Goals:  4 weeks  1.Report decreased LLE pain  < / =  5/10  to increase tolerance for walking and standing. (Met - 7/27/2022)   2. Increase ROM by 25% where limited in order to perform ADLs without difficulty. (Met - 7/27/2022)  3. Increase strength by 1/3 MMT grade in LLE  to increase tolerance for ADL and work activities. (Met - 7/27/2022)  4. Pt to tolerate HEP to improve ROM and independence with activities of daily living's. (Met - 7/27/2022)     Long Term Goals: 6 months  1.Report decreased low back pain < / = 2/10  to increase tolerance for prolonged sitting at work. (Not Met - Progressing)  2.Patient goal: walk 2 miles without taking breaks. (Not Met - Progressing)  3.Increase strength to 5/5 in  LLE  to increase tolerance for ADL and work activities. (Not Met - Progressing)    PLAN     Continue with extension based treatment. Progress core strengthening as symptoms improve.    Plan of care Certification: 6/13/2022 to 12/13/2022.  Outpatient Physical Therapy 2-3 times weekly for 6 months     Faraz Doe, PT , DPT, OCS

## 2022-09-03 ENCOUNTER — PATIENT MESSAGE (OUTPATIENT)
Dept: NEUROSURGERY | Facility: CLINIC | Age: 37
End: 2022-09-03
Payer: MEDICAID

## 2022-09-03 RX ORDER — TRAMADOL HYDROCHLORIDE 50 MG/1
50 TABLET ORAL EVERY 8 HOURS PRN
Qty: 90 TABLET | Refills: 3 | Status: SHIPPED | OUTPATIENT
Start: 2022-09-03 | End: 2024-01-09

## 2022-09-07 ENCOUNTER — PATIENT MESSAGE (OUTPATIENT)
Dept: NEUROSURGERY | Facility: CLINIC | Age: 37
End: 2022-09-07
Payer: MEDICAID

## 2022-09-08 ENCOUNTER — TELEPHONE (OUTPATIENT)
Dept: NEUROSURGERY | Facility: CLINIC | Age: 37
End: 2022-09-08

## 2022-09-08 ENCOUNTER — OFFICE VISIT (OUTPATIENT)
Dept: NEUROSURGERY | Facility: CLINIC | Age: 37
End: 2022-09-08
Payer: MEDICAID

## 2022-09-08 VITALS
HEART RATE: 81 BPM | DIASTOLIC BLOOD PRESSURE: 80 MMHG | WEIGHT: 193.13 LBS | BODY MASS INDEX: 33.15 KG/M2 | SYSTOLIC BLOOD PRESSURE: 127 MMHG

## 2022-09-08 DIAGNOSIS — Z98.890 S/P LUMBAR MICRODISCECTOMY: Primary | ICD-10-CM

## 2022-09-08 PROCEDURE — 99999 PR PBB SHADOW E&M-EST. PATIENT-LVL III: CPT | Mod: PBBFAC,,, | Performed by: PHYSICIAN ASSISTANT

## 2022-09-08 PROCEDURE — 3008F PR BODY MASS INDEX (BMI) DOCUMENTED: ICD-10-PCS | Mod: CPTII,,, | Performed by: PHYSICIAN ASSISTANT

## 2022-09-08 PROCEDURE — 3008F BODY MASS INDEX DOCD: CPT | Mod: CPTII,,, | Performed by: PHYSICIAN ASSISTANT

## 2022-09-08 PROCEDURE — 99214 PR OFFICE/OUTPT VISIT, EST, LEVL IV, 30-39 MIN: ICD-10-PCS | Mod: S$PBB,,, | Performed by: PHYSICIAN ASSISTANT

## 2022-09-08 PROCEDURE — 99999 PR PBB SHADOW E&M-EST. PATIENT-LVL III: ICD-10-PCS | Mod: PBBFAC,,, | Performed by: PHYSICIAN ASSISTANT

## 2022-09-08 PROCEDURE — 3079F DIAST BP 80-89 MM HG: CPT | Mod: CPTII,,, | Performed by: PHYSICIAN ASSISTANT

## 2022-09-08 PROCEDURE — 99213 OFFICE O/P EST LOW 20 MIN: CPT | Mod: PBBFAC,PN | Performed by: PHYSICIAN ASSISTANT

## 2022-09-08 PROCEDURE — 1160F RVW MEDS BY RX/DR IN RCRD: CPT | Mod: CPTII,,, | Performed by: PHYSICIAN ASSISTANT

## 2022-09-08 PROCEDURE — 99214 OFFICE O/P EST MOD 30 MIN: CPT | Mod: S$PBB,,, | Performed by: PHYSICIAN ASSISTANT

## 2022-09-08 PROCEDURE — 1159F MED LIST DOCD IN RCRD: CPT | Mod: CPTII,,, | Performed by: PHYSICIAN ASSISTANT

## 2022-09-08 PROCEDURE — 3079F PR MOST RECENT DIASTOLIC BLOOD PRESSURE 80-89 MM HG: ICD-10-PCS | Mod: CPTII,,, | Performed by: PHYSICIAN ASSISTANT

## 2022-09-08 PROCEDURE — 3074F PR MOST RECENT SYSTOLIC BLOOD PRESSURE < 130 MM HG: ICD-10-PCS | Mod: CPTII,,, | Performed by: PHYSICIAN ASSISTANT

## 2022-09-08 PROCEDURE — 1160F PR REVIEW ALL MEDS BY PRESCRIBER/CLIN PHARMACIST DOCUMENTED: ICD-10-PCS | Mod: CPTII,,, | Performed by: PHYSICIAN ASSISTANT

## 2022-09-08 PROCEDURE — 3074F SYST BP LT 130 MM HG: CPT | Mod: CPTII,,, | Performed by: PHYSICIAN ASSISTANT

## 2022-09-08 PROCEDURE — 1159F PR MEDICATION LIST DOCUMENTED IN MEDICAL RECORD: ICD-10-PCS | Mod: CPTII,,, | Performed by: PHYSICIAN ASSISTANT

## 2022-09-08 RX ORDER — DICLOFENAC SODIUM 75 MG/1
75 TABLET, DELAYED RELEASE ORAL 2 TIMES DAILY PRN
Qty: 60 TABLET | Refills: 2 | Status: SHIPPED | OUTPATIENT
Start: 2022-09-08 | End: 2022-12-22

## 2022-09-08 NOTE — TELEPHONE ENCOUNTER
Dr. Zaragoza,    Patient 6 months po left L5-S1 microdisectomy.  Still having pretty severe left low back and buttock pain.  Left leg pain gone.  You ordered PT and increased gabapentin to 900mg TID at her last visit 3 months ago.  These things have helped some but not a lot.  She had MRI lspine about 5 months ago and lspine xrays 3 months ago.    Please let me know what you would recommend next.     She is medicaid.    Thanks,  Stacey Thakur, [facility], PA-C  Neurosurgery  [facility]  09/08/2022

## 2022-09-08 NOTE — PROGRESS NOTES
Subjective:     Patient ID:  Kevin Mancini is a 37 y.o. female.    iNk    Chief Complaint: 6 month po fu    Date of surgery 03/02/2022     Preop diagnosis  1. L5-S1 disc herniation with radiculopathy     Postop diagnosis  Same     Surgery  1. Left minimally invasive L5-S1 laminotomy, medial facetectomy, microdiskectomy for decompression of left S1 traversing nerve root  2. Fluoroscopy     Surgeon  Bakari Zaragoza MD    HPI    Kevin Mancini is a 37 y.o. female who presents for follow up.  Patient has been going to physical therapy which has helped some.  She is still in physical therapy now.  She increased gabapentin to 900mg TID which has helped some.  She takes tramadol as needed.  Occasionally she get spasms in her left calf but no radiating leg pain.    Patient denies any recent accidents or trauma, no saddle anesthesias, and no bowel or bladder incontinence.      Review of Systems:  Constitution: Negative for chills, fever, night sweats and weight loss.   Musculoskeletal: Negative for falls.   Gastrointestinal: Negative for bowel incontinence, nausea and vomiting.   Genitourinary: Negative for bladder incontinence.   Neurological: Negative for disturbances in coordination and loss of balance.      Objective:      Vitals:    09/08/22 0813   BP: 127/80   Pulse: 81   Weight: 87.6 kg (193 lb 2 oz)   PainSc:   8         Physical Exam:      General:  Kevin Mancini is well-developed, well-nourished, appears stated age, in no acute distress, alert and oriented to person, place, and time.    Pulmonary/Chest:  Respiratory effort normal  Abdominal: Exhibits no distension  Psychiatric:  Normal mood and affect.  Behavior is normal.  Judgement and thought content normal      Musculoskeletal:    Patient is able to walk on heels and toes without difficulty.    Lumbar ROM:   No pain in lumbar flexion or right lateral bending.  Pain in extension and left lateral bending.    Lumbar Spine  Inspection:  Healed surgical scars with no visible rashes.    Straight Leg Raise:  Negative right SLR.  Mild pain in the left low back and buttock on left SLR.      Neurological:  Alert and oriented to person, place, and time    Muscle strength against resistance:     Right Left   Hip flexion  5 / 5 5 / 5   Hip extension 5 / 5 5 / 5   Hip abduction 5 / 5 5 / 5   Hip adduction  5 / 5 5 / 5   Knee extension  5 / 5 5 / 5   Knee flexion 5 / 5 5 / 5   Dorsiflexion  5 / 5 5 / 5   EHL  5 / 5 5 / 5   Plantar flexion  5 / 5 5 / 5   Inversion of the feet 5 / 5 5 / 5   Eversion of the feet  5 / 5 5 / 5       Reflexes:     Right Left   Patellar 2+ 2+   Achilles 2+ 2+     Clonus:  Negative bilaterally    On gross examination of the bilateral upper extremities, patient has full painfree ROM with no signs of clubbing, cyanosis, edema, or weakness.       XRAY Interpretation:     Lumbar spine xrays were personally reviewed today.  No fractures.  No movement on flexion and extension.      Assessment:          1. S/P lumbar microdiscectomy            Plan:          Orders Placed This Encounter    X-Ray Scoliosis Complete    X-Ray Lumbar Spine Ap Lateral w/Flex Ext    diclofenac (VOLTAREN) 75 MG EC tablet       Finish PT  Continue walking  Continue gabapentin and tramadol  Add diclofenac  Will review further with Dr. Zaragoza      Follow-Up:  Follow up if symptoms worsen or fail to improve. If there are any questions prior to this, the patient was instructed to contact the office.       BEKAH Swift PA-C  Neurosurgery  [facility]  09/13/2022    Addendum:    09/13/2022     I reviewed everything with Dr. Zaragoza.  He recommends she get scoliosis xrays and flexion and extension xrays and fu with him in clinic.    BEKAH Swift, SOM  Neurosurgery  [facility]

## 2022-09-09 ENCOUNTER — CLINICAL SUPPORT (OUTPATIENT)
Dept: REHABILITATION | Facility: HOSPITAL | Age: 37
End: 2022-09-09
Payer: MEDICAID

## 2022-09-09 DIAGNOSIS — G89.29 CHRONIC LEFT-SIDED LOW BACK PAIN WITH LEFT-SIDED SCIATICA: ICD-10-CM

## 2022-09-09 DIAGNOSIS — M54.42 CHRONIC LEFT-SIDED LOW BACK PAIN WITH LEFT-SIDED SCIATICA: ICD-10-CM

## 2022-09-09 DIAGNOSIS — R29.898 WEAKNESS OF LEFT LOWER EXTREMITY: Primary | ICD-10-CM

## 2022-09-09 DIAGNOSIS — R26.9 GAIT DIFFICULTY: ICD-10-CM

## 2022-09-09 PROCEDURE — 97110 THERAPEUTIC EXERCISES: CPT

## 2022-09-09 NOTE — PROGRESS NOTES
[facility]   Physical Therapy Treatment Note     Name: Kevin Méndez Live  Clinic Number: [id number]    Therapy Diagnosis:   Encounter Diagnoses   Name Primary?    Weakness of left lower extremity Yes    Chronic left-sided low back pain with left-sided sciatica     Gait difficulty          Physician: Bakari Zaragoza MD    Visit Date: 9/9/2022    Physician Orders: PT Eval and Treat 3 months s/p left L5-S1 microdiscectomy. Lumbar PT 3 times a week for 6 weeks. Sharmila exercises.   Medical Diagnosis from Referral: Z98.890 (ICD-10-CM) - S/P lumbar microdiscectomy M51.36 (ICD-10-CM) - DDD (degenerative disc disease), lumbar   Evaluation Date: 6/13/2022  Authorization Period Expiration: 9/17/2023  Plan of Care Expiration: 12/13/2022  Visit # / Visits authorized: 21/ 24     Time In: 12:00  Time Out: 13:00  Total Billable Time: 60 minutes    Precautions: Standard    SUBJECTIVE     Pt reports: increased symptoms due to prolonged standing again.  She was compliant with home exercise program.  Response to previous treatment: soreness  Functional change: increasing activity duration     Pain: 5/10  Location: bilateral low back and Left lower extremity     OBJECTIVE     Objective Measures updated at progress report unless specified.     Treatment     Kevin received the treatments listed below:      Therapeutic Exercises to develop strength, endurance, ROM and flexibility for 58 minutes including:  Forward Step Ups - 6 inches with 15# KB in RUE; Left lower extremity only; 2x10  Paloff Press with lunge - x20 bilateral; 10# - NP  Shuttle leg press Single leg - 3 bands top; 3x8  Resisted Lateral Walking - Green band; 10 steps bilateral; x4  Treadmill - backwards at 0.7 mph (3 minutes)  Standing open Books- Blue Band; bilateral x20  Farmer's Carries - unilateral 25# KB; 1 laps each extremity  Resisted Lateral Lumbar flexion - 25# KB; 2x10 bilateral    Not Today:  Sit to stands - 20# DB 2x15 -  NP  Clamshells - green band; 5 second holds 2x10  Seated Lumbar Flexion - x20      Manual Therapy Techniques: Joint mobilizations were applied to the: thoracic spine and Left lower extremity for 2 minutes, including:  Prone Left SI mobilization (grade 5) - NP  Bilateral L3-L4 rotation mobilization (grade 5)    Patient Education and Home Exercises     Home Exercises Provided and Patient Education Provided     Education provided:   Symptom management and plan of care progressions.  Home Exercise Program.    Written Home Exercises Provided: Patient instructed to cont prior HEP. Exercises were reviewed and Kevin was able to demonstrate them prior to the end of the session.  Kevin demonstrated good  understanding of the education provided. See EMR under Patient Instructions for exercises provided during therapy sessions    ASSESSMENT     Increased SI symptoms due to prolonged standing. Continues to need cuing to stand with upright posture and to engage core / glutes. Decreased symptoms following proper cuing and form. Minimal carryover form previous visit.    From Initial Evaluation on 6/13/2022 - Kevin is a 37 y.o. female referred to outpatient Physical Therapy with a medical diagnosis of low back pain with radiculopathy. Pt presents with limited lumbar ROM, poor transitional movement, ,gait abnormality, LLE weakness, and adverse neural tension on the left.     Kevin Is progressing well towards her goals.   Pt prognosis is Good.     Pt will continue to benefit from skilled outpatient physical therapy to address the deficits listed in the problem list box on initial evaluation, provide pt/family education and to maximize pt's level of independence in the home and community environment. Pt's spiritual, cultural and educational needs considered and pt agreeable to plan of care and goals.     Anticipated barriers to physical therapy: previous physical therapy with limited benefit.    Goals:  Short Term Goals:  4  weeks  1.Report decreased LLE pain  < / =  5/10  to increase tolerance for walking and standing. (Met - 7/27/2022)   2. Increase ROM by 25% where limited in order to perform ADLs without difficulty. (Met - 7/27/2022)  3. Increase strength by 1/3 MMT grade in LLE  to increase tolerance for ADL and work activities. (Met - 7/27/2022)  4. Pt to tolerate HEP to improve ROM and independence with activities of daily living's. (Met - 7/27/2022)     Long Term Goals: 6 months  1.Report decreased low back pain < / = 2/10  to increase tolerance for prolonged sitting at work. (Not Met - Progressing)  2.Patient goal: walk 2 miles without taking breaks. (Not Met - Progressing)  3.Increase strength to 5/5 in  LLE  to increase tolerance for ADL and work activities. (Not Met - Progressing)    PLAN     Continue with extension based treatment. Progress core strengthening as symptoms improve.    Plan of care Certification: 6/13/2022 to 12/13/2022.  Outpatient Physical Therapy 2-3 times weekly for 6 months     Faraz Doe, PT , DPT, OCS

## 2022-09-14 ENCOUNTER — CLINICAL SUPPORT (OUTPATIENT)
Dept: REHABILITATION | Facility: HOSPITAL | Age: 37
End: 2022-09-14
Payer: MEDICAID

## 2022-09-14 DIAGNOSIS — R26.9 GAIT DIFFICULTY: ICD-10-CM

## 2022-09-14 DIAGNOSIS — M54.42 CHRONIC LEFT-SIDED LOW BACK PAIN WITH LEFT-SIDED SCIATICA: ICD-10-CM

## 2022-09-14 DIAGNOSIS — G89.29 CHRONIC LEFT-SIDED LOW BACK PAIN WITH LEFT-SIDED SCIATICA: ICD-10-CM

## 2022-09-14 DIAGNOSIS — R29.898 WEAKNESS OF LEFT LOWER EXTREMITY: Primary | ICD-10-CM

## 2022-09-14 PROCEDURE — 97110 THERAPEUTIC EXERCISES: CPT

## 2022-09-14 NOTE — TELEPHONE ENCOUNTER
Please schedule scoliosis xrays and lumbar xrays and fu with Dr. Zaragoza and let the patient know I reviewed with him.    Thanks,  Stacey Thakur, [facility], PA-C  Neurosurgery  [facility]  09/13/2022

## 2022-09-14 NOTE — PROGRESS NOTES
[facility]   Physical Therapy Treatment Note     Name: Kevin Méndez [facility] Number: [id number]    Therapy Diagnosis:   Encounter Diagnoses   Name Primary?    Weakness of left lower extremity Yes    Chronic left-sided low back pain with left-sided sciatica     Gait difficulty          Physician: Bakari Zaragoza MD    Visit Date: 9/14/2022    Physician Orders: PT Eval and Treat 3 months s/p left L5-S1 microdiscectomy. Lumbar PT 3 times a week for 6 weeks. Sharmila exercises.   Medical Diagnosis from Referral: Z98.890 (ICD-10-CM) - S/P lumbar microdiscectomy M51.36 (ICD-10-CM) - DDD (degenerative disc disease), lumbar   Evaluation Date: 6/13/2022  Authorization Period Expiration: 9/17/2023  Plan of Care Expiration: 12/13/2022  Visit # / Visits authorized: 22/ 24     Time In: 11:00  Time Out: 11:55  Total Billable Time: 55 minutes    Precautions: Standard    SUBJECTIVE     Pt reports: feeling okay. She has some left buttock and posterior thigh pain today  She was compliant with home exercise program.  Response to previous treatment: soreness  Functional change: increasing activity duration     Pain: 5/10  Location: Left lower extremity     OBJECTIVE     Objective Measures updated at progress report unless specified.     Treatment     Kevin received the treatments listed below:      Therapeutic Exercises to develop strength, endurance, ROM and flexibility for 53 minutes including:  Supine nerve glides (leg on bolster SAQ then leg on bolster ankle DF/PF) x 20 each - at beginning and middle of session  Step out + pallof press green band x 10 each  Bilateral pull downs green band 2x10  Pilates reformer double leg press 1 spring x 20 (with and without diaphragmatic breathing)  Geller's carry 5# bilaterally @ mirror 4 x 40 ft  Farmer's carry 5# unilaterally @ mirror 2 x 40 ft  Education on strategy to lift items from floor      Patient Education and Home Exercises     Home Exercises  Provided and Patient Education Provided     Education provided:   Symptom management and plan of care progressions.  Home Exercise Program.    Written Home Exercises Provided: Patient instructed to cont prior HEP. Exercises were reviewed and Kevin was able to demonstrate them prior to the end of the session.  Kevin demonstrated good  understanding of the education provided. See EMR under Patient Instructions for exercises provided during therapy sessions    ASSESSMENT     Patient demo adverse neural tension on the left during slump testing and SLR testing today. Able to reduce symptoms with nerve glides in supine. Noticed lateral shift during farmer carries. Had patient perform in front of mirror to correct this.     Kevin Is progressing well towards her goals.   Pt prognosis is Good.     Pt will continue to benefit from skilled outpatient physical therapy to address the deficits listed in the problem list box on initial evaluation, provide pt/family education and to maximize pt's level of independence in the home and community environment. Pt's spiritual, cultural and educational needs considered and pt agreeable to plan of care and goals.     Anticipated barriers to physical therapy: previous physical therapy with limited benefit.    Goals:  Short Term Goals:  4 weeks  1.Report decreased LLE pain  < / =  5/10  to increase tolerance for walking and standing. (Met - 7/27/2022)   2. Increase ROM by 25% where limited in order to perform ADLs without difficulty. (Met - 7/27/2022)  3. Increase strength by 1/3 MMT grade in LLE  to increase tolerance for ADL and work activities. (Met - 7/27/2022)  4. Pt to tolerate HEP to improve ROM and independence with activities of daily living's. (Met - 7/27/2022)     Long Term Goals: 6 months  1.Report decreased low back pain < / = 2/10  to increase tolerance for prolonged sitting at work. (Not Met - Progressing)  2.Patient goal: walk 2 miles without taking breaks. (Not Met  - Progressing)  3.Increase strength to 5/5 in  LLE  to increase tolerance for ADL and work activities. (Not Met - Progressing)    PLAN     Continue with extension based treatment. Progress core and LE strengthening as symptoms improve.    Plan of care Certification: 6/13/2022 to 12/13/2022.  Outpatient Physical Therapy 2-3 times weekly for 6 months     Nila Rivera, PT , DPT, OCS

## 2022-09-15 ENCOUNTER — APPOINTMENT (OUTPATIENT)
Dept: RADIOLOGY | Facility: HOSPITAL | Age: 37
End: 2022-09-15
Attending: PHYSICIAN ASSISTANT
Payer: MEDICAID

## 2022-09-15 DIAGNOSIS — Z98.890 S/P LUMBAR MICRODISCECTOMY: ICD-10-CM

## 2022-09-15 PROCEDURE — 72082 X-RAY EXAM ENTIRE SPI 2/3 VW: CPT | Mod: 26,,, | Performed by: INTERNAL MEDICINE

## 2022-09-15 PROCEDURE — 72082 X-RAY EXAM ENTIRE SPI 2/3 VW: CPT | Mod: TC,FY,PN

## 2022-09-15 PROCEDURE — 72082 XR SCOLIOSIS COMPLETE: ICD-10-PCS | Mod: 26,,, | Performed by: INTERNAL MEDICINE

## 2022-09-16 ENCOUNTER — TELEPHONE (OUTPATIENT)
Dept: NEUROSURGERY | Facility: CLINIC | Age: 37
End: 2022-09-16
Payer: MEDICAID

## 2022-09-16 NOTE — TELEPHONE ENCOUNTER
Patient needs fu with Dr. Zaragoza within the month scheduled.    Thanks,  Stacey Thakur, [facility], PA-C  Neurosurgery  [facility]  09/16/2022

## 2022-09-21 ENCOUNTER — CLINICAL SUPPORT (OUTPATIENT)
Dept: REHABILITATION | Facility: HOSPITAL | Age: 37
End: 2022-09-21
Payer: MEDICAID

## 2022-09-21 DIAGNOSIS — R26.9 GAIT DIFFICULTY: ICD-10-CM

## 2022-09-21 DIAGNOSIS — R29.898 WEAKNESS OF LEFT LOWER EXTREMITY: Primary | ICD-10-CM

## 2022-09-21 DIAGNOSIS — G89.29 CHRONIC LEFT-SIDED LOW BACK PAIN WITH LEFT-SIDED SCIATICA: ICD-10-CM

## 2022-09-21 DIAGNOSIS — M54.42 CHRONIC LEFT-SIDED LOW BACK PAIN WITH LEFT-SIDED SCIATICA: ICD-10-CM

## 2022-09-21 PROCEDURE — 97110 THERAPEUTIC EXERCISES: CPT

## 2022-09-21 NOTE — PROGRESS NOTES
[facility] OUTPATIENT THERAPY AND WELLNESS   Physical Therapy Treatment Note     Name: Kevin Mancini  [facility] Number: [id number]    Therapy Diagnosis:   Encounter Diagnoses   Name Primary?    Weakness of left lower extremity Yes    Chronic left-sided low back pain with left-sided sciatica     Gait difficulty          Physician: No ref. provider found    Visit Date: 9/21/2022    Physician Orders: PT Eval and Treat 3 months s/p left L5-S1 microdiscectomy. Lumbar PT 3 times a week for 6 weeks. Sharmila exercises.   Medical Diagnosis from Referral: Z98.890 (ICD-10-CM) - S/P lumbar microdiscectomy M51.36 (ICD-10-CM) - DDD (degenerative disc disease), lumbar   Evaluation Date: 6/13/2022  Authorization Period Expiration: 9/17/2023  Plan of Care Expiration: 12/13/2022  Visit # / Visits authorized: 24     Time In: 11:04  Time Out: 11:55  Total Billable Time: 51 minutes    Precautions: Standard    SUBJECTIVE     Pt reports: reports buttock pain today; however no pain distal to that. Experienced relief with nerve glides at home  She was compliant with home exercise program.  Response to previous treatment: soreness  Functional change: increasing activity duration     Pain: 3/10  Location: Left buttock    OBJECTIVE     Objective Measures updated at progress report unless specified.     Treatment     Kevin received the treatments listed below:      Therapeutic Exercises to develop strength, endurance, ROM and flexibility for 46 minutes including:  Supine nerve glides (leg on bolster SAQ then leg on bolster ankle DF/PF) x 20 each - at end of session  Step out (2 steps) + pallof press green band 20 each  Bilateral pull downs green band 2x10  Pilates reformer double leg press (feet hip distance, feet wide toes out) 2 springs x 20 (with and without diaphragmatic breathing)  Farmer's carry 10# unilaterally @ mirror 2 x 40 ft  Single leg hip hinge for lifting items from floor   Double leg hip hinge with 10# KB lift      Patient  Education and Home Exercises     Home Exercises Provided and Patient Education Provided     Education provided:   Symptom management and plan of care progressions.  Home Exercise Program.    Written Home Exercises Provided: Patient instructed to cont prior HEP. Exercises were reviewed and Kevin was able to demonstrate them prior to the end of the session.  Kevin demonstrated good  understanding of the education provided. See EMR under Patient Instructions for exercises provided during therapy sessions    ASSESSMENT   Negative slump testing today, but positive SLR test on the left. Had patient perform nerve glides in supine which reduced buttock pain. No leg symptoms distal to buttock today. This was a large improvement from last session where we were limited with exercises due to symptoms.     Kevin Is progressing well towards her goals.   Pt prognosis is Good.     Pt will continue to benefit from skilled outpatient physical therapy to address the deficits listed in the problem list box on initial evaluation, provide pt/family education and to maximize pt's level of independence in the home and community environment. Pt's spiritual, cultural and educational needs considered and pt agreeable to plan of care and goals.     Anticipated barriers to physical therapy: previous physical therapy with limited benefit.    Goals:  Short Term Goals:  4 weeks  1.Report decreased LLE pain  < / =  5/10  to increase tolerance for walking and standing. (Met - 7/27/2022)   2. Increase ROM by 25% where limited in order to perform ADLs without difficulty. (Met - 7/27/2022)  3. Increase strength by 1/3 MMT grade in LLE  to increase tolerance for ADL and work activities. (Met - 7/27/2022)  4. Pt to tolerate HEP to improve ROM and independence with activities of daily living's. (Met - 7/27/2022)     Long Term Goals: 6 months  1.Report decreased low back pain < / = 2/10  to increase tolerance for prolonged sitting at work. (Not  Met - Progressing)  2.Patient goal: walk 2 miles without taking breaks. (Not Met - Progressing)  3.Increase strength to 5/5 in  LLE  to increase tolerance for ADL and work activities. (Not Met - Progressing)    PLAN     Continue with extension based treatment. Progress core and LE strengthening as symptoms improve.    Plan of care Certification: 6/13/2022 to 12/13/2022.  Outpatient Physical Therapy 2-3 times weekly for 6 months     Nila Rivera, PT , DPT, OCS

## 2022-09-23 ENCOUNTER — PATIENT MESSAGE (OUTPATIENT)
Dept: NEUROSURGERY | Facility: CLINIC | Age: 37
End: 2022-09-23
Payer: MEDICAID

## 2022-09-25 RX ORDER — HYDROCODONE BITARTRATE AND ACETAMINOPHEN 10; 325 MG/1; MG/1
1 TABLET ORAL 3 TIMES DAILY PRN
Qty: 90 TABLET | Refills: 0 | Status: SHIPPED | OUTPATIENT
Start: 2022-10-26 | End: 2022-10-27

## 2022-09-25 RX ORDER — HYDROCODONE BITARTRATE AND ACETAMINOPHEN 10; 325 MG/1; MG/1
1 TABLET ORAL 3 TIMES DAILY PRN
Qty: 90 TABLET | Refills: 0 | Status: SHIPPED | OUTPATIENT
Start: 2022-11-25 | End: 2022-10-27

## 2022-09-25 RX ORDER — HYDROCODONE BITARTRATE AND ACETAMINOPHEN 10; 325 MG/1; MG/1
1 TABLET ORAL 3 TIMES DAILY PRN
Qty: 90 TABLET | Refills: 0 | Status: SHIPPED | OUTPATIENT
Start: 2022-09-25 | End: 2022-10-26 | Stop reason: SDUPTHER

## 2022-09-28 ENCOUNTER — CLINICAL SUPPORT (OUTPATIENT)
Dept: REHABILITATION | Facility: HOSPITAL | Age: 37
End: 2022-09-28
Payer: MEDICAID

## 2022-09-28 DIAGNOSIS — R29.898 WEAKNESS OF LEFT LOWER EXTREMITY: Primary | ICD-10-CM

## 2022-09-28 DIAGNOSIS — G89.29 CHRONIC LEFT-SIDED LOW BACK PAIN WITH LEFT-SIDED SCIATICA: ICD-10-CM

## 2022-09-28 DIAGNOSIS — R26.9 GAIT DIFFICULTY: ICD-10-CM

## 2022-09-28 DIAGNOSIS — M54.42 CHRONIC LEFT-SIDED LOW BACK PAIN WITH LEFT-SIDED SCIATICA: ICD-10-CM

## 2022-09-28 PROCEDURE — 97110 THERAPEUTIC EXERCISES: CPT

## 2022-09-28 NOTE — PROGRESS NOTES
[facility]   Physical Therapy Treatment Note     Name: Kevin Mancini  Clinic Number: [id number]    Therapy Diagnosis:   Encounter Diagnoses   Name Primary?    Weakness of left lower extremity Yes    Chronic left-sided low back pain with left-sided sciatica     Gait difficulty          Physician: Bakari Zaragoza MD    Visit Date: 9/28/2022    Physician Orders: PT Eval and Treat 3 months s/p left L5-S1 microdiscectomy. Lumbar PT 3 times a week for 6 weeks. Sharmila exercises.   Medical Diagnosis from Referral: Z98.890 (ICD-10-CM) - S/P lumbar microdiscectomy M51.36 (ICD-10-CM) - DDD (degenerative disc disease), lumbar   Evaluation Date: 6/13/2022  Authorization Period Expiration: 9/17/2023  Plan of Care Expiration: 12/13/2022  Visit # / Visits authorized: 24     Time In: 11:35  Time Out: 12:30  Total Billable Time: 55 minutes    Precautions: Standard    SUBJECTIVE     Pt reports: having some LLE spasms today.   She was compliant with home exercise program.  Response to previous treatment: soreness  Functional change: increasing activity duration     Pain: 4/10  Location: Left buttock    OBJECTIVE       Kevin received the treatments listed below:      Therapeutic Exercises to develop strength, endurance, ROM and flexibility for 53 minutes including:  Recumbent Bike x 5min  Supine nerve glides (leg on bolster SAQ then leg on bolster ankle DF/PF) x 20 each - at end of session  Step out (2 steps) + pallof press blue band 25 each  Bilateral pull downs blue band 2x10  Pilates reformer double leg press (feet hip distance, feet wide toes out) 2 springs x 20 (with and without diaphragmatic breathing)  Pilates reformer single leg press 2 springs 3x5 on left  Pilates reformer doule leg calf raises 4x5 2 springs  Single leg hip hinge for lifting items from floor   Double leg hip hinge with 10# KB lift      Not today:  Farmer's carry 10# unilaterally @ mirror 2 x 40 ft      Patient  Education and Home Exercises     Home Exercises Provided and Patient Education Provided     Education provided:   Symptom management and plan of care progressions.  Home Exercise Program.    Written Home Exercises Provided: Patient instructed to cont prior HEP. Exercises were reviewed and Kevin was able to demonstrate them prior to the end of the session.  Kevin demonstrated good  understanding of the education provided. See EMR under Patient Instructions for exercises provided during therapy sessions    ASSESSMENT   Positive slump and SLR testing today. Able to reduce with nerve glides. In addition, patient was able to perform exercises with increased reps and progression of movements.     Kevin Is progressing well towards her goals.   Pt prognosis is Good.     Pt will continue to benefit from skilled outpatient physical therapy to address the deficits listed in the problem list box on initial evaluation, provide pt/family education and to maximize pt's level of independence in the home and community environment. Pt's spiritual, cultural and educational needs considered and pt agreeable to plan of care and goals.     Anticipated barriers to physical therapy: previous physical therapy with limited benefit.    Goals:  Short Term Goals:  4 weeks  1.Report decreased LLE pain  < / =  5/10  to increase tolerance for walking and standing. (Met - 7/27/2022)   2. Increase ROM by 25% where limited in order to perform ADLs without difficulty. (Met - 7/27/2022)  3. Increase strength by 1/3 MMT grade in LLE  to increase tolerance for ADL and work activities. (Met - 7/27/2022)  4. Pt to tolerate HEP to improve ROM and independence with activities of daily living's. (Met - 7/27/2022)     Long Term Goals: 6 months  1.Report decreased low back pain < / = 2/10  to increase tolerance for prolonged sitting at work. (Not Met - Progressing)  2.Patient goal: walk 2 miles without taking breaks. (Not Met - Progressing)  3.Increase  strength to 5/5 in  LLE  to increase tolerance for ADL and work activities. (Not Met - Progressing)    PLAN     Continue with extension based treatment. Progress core and LE strengthening as symptoms improve.    Plan of care Certification: 6/13/2022 to 12/13/2022.  Outpatient Physical Therapy 2 times weekly for 6 months     Nila Rivera, PT , DPT, OCS

## 2022-10-05 ENCOUNTER — CLINICAL SUPPORT (OUTPATIENT)
Dept: REHABILITATION | Facility: HOSPITAL | Age: 37
End: 2022-10-05
Payer: MEDICAID

## 2022-10-05 DIAGNOSIS — M54.42 CHRONIC LEFT-SIDED LOW BACK PAIN WITH LEFT-SIDED SCIATICA: ICD-10-CM

## 2022-10-05 DIAGNOSIS — R26.9 GAIT DIFFICULTY: ICD-10-CM

## 2022-10-05 DIAGNOSIS — G89.29 CHRONIC LEFT-SIDED LOW BACK PAIN WITH LEFT-SIDED SCIATICA: ICD-10-CM

## 2022-10-05 DIAGNOSIS — R29.898 WEAKNESS OF LEFT LOWER EXTREMITY: Primary | ICD-10-CM

## 2022-10-05 PROCEDURE — 97110 THERAPEUTIC EXERCISES: CPT

## 2022-10-05 NOTE — PROGRESS NOTES
[facility]   Physical Therapy Treatment Note     Name: Kevin Mancini  Clinic Number: [id number]    Therapy Diagnosis:   Encounter Diagnoses   Name Primary?    Weakness of left lower extremity Yes    Chronic left-sided low back pain with left-sided sciatica     Gait difficulty          Physician: Bakari Zaragoza MD    Visit Date: 10/5/2022    Physician Orders: PT Eval and Treat 3 months s/p left L5-S1 microdiscectomy. Lumbar PT 3 times a week for 6 weeks. Sharmila exercises.   Medical Diagnosis from Referral: Z98.890 (ICD-10-CM) - S/P lumbar microdiscectomy M51.36 (ICD-10-CM) - DDD (degenerative disc disease), lumbar   Evaluation Date: 6/13/2022  Authorization Period Expiration: 9/17/2023  Plan of Care Expiration: 12/13/2022  Visit # / Visits authorized: 26     Time In: 11:00  Time Out: 11:55  Total Billable Time: 55 minutes    Precautions: Standard    SUBJECTIVE     Pt reports: doing okay today.  She was compliant with home exercise program.  Response to previous treatment: soreness  Functional change: increasing activity duration     Pain: 4/10  Location: Left buttock    OBJECTIVE       Kevin received the treatments listed below:      Therapeutic Exercises to develop strength, endurance, ROM and flexibility for 53 minutes including:  Recumbent Bike x 5min  Supine nerve glides (leg on bolster SAQ then leg on bolster ankle DF/PF) x 20 each - at end of session  Step out (2 steps) + pallof press blue band 25 each  Bilateral pull downs blue band 2x10  Heel slides supine x 20  Quadruped diaphragmatic breathing  Quadruped rocking    Not today:  Pilates reformer double leg press (feet hip distance, feet wide toes out) 2 springs x 20 (with and without diaphragmatic breathing)  Pilates reformer single leg press 2 springs 3x5 on left  Pilates reformer doule leg calf raises 4x5 2 springs  Single leg hip hinge for lifting items from floor   Double leg hip hinge with 10# KB  lift  Yimi's carry 10# unilaterally @ mirror 2 x 40 ft      Patient Education and Home Exercises     Home Exercises Provided and Patient Education Provided     Education provided:   Symptom management and plan of care progressions.  Home Exercise Program.    Written Home Exercises Provided: Patient instructed to cont prior HEP. Exercises were reviewed and Kevin was able to demonstrate them prior to the end of the session.  Kevin demonstrated good  understanding of the education provided. See EMR under Patient Instructions for exercises provided during therapy sessions    ASSESSMENT   Worked on quadruped exercises. Had patient perform on the floor to work on getting up from the floor. Patient demo good control throughout. She was apprehensive to do this but felt much better after.    Kevin Is progressing well towards her goals.    Pt prognosis is Good.     Pt will continue to benefit from skilled outpatient physical therapy to address the deficits listed in the problem list box on initial evaluation, provide pt/family education and to maximize pt's level of independence in the home and community environment. Pt's spiritual, cultural and educational needs considered and pt agreeable to plan of care and goals.     Anticipated barriers to physical therapy: previous physical therapy with limited benefit.    Goals:  Short Term Goals:  4 weeks  1.Report decreased LLE pain  < / =  5/10  to increase tolerance for walking and standing. (Met - 7/27/2022)   2. Increase ROM by 25% where limited in order to perform ADLs without difficulty. (Met - 7/27/2022)  3. Increase strength by 1/3 MMT grade in LLE  to increase tolerance for ADL and work activities. (Met - 7/27/2022)  4. Pt to tolerate HEP to improve ROM and independence with activities of daily living's. (Met - 7/27/2022)     Long Term Goals: 6 months  1.Report decreased low back pain < / = 2/10  to increase tolerance for prolonged sitting at work. (Not Met -  Progressing)  2.Patient goal: walk 2 miles without taking breaks. (Not Met - Progressing)  3.Increase strength to 5/5 in  LLE  to increase tolerance for ADL and work activities. (Not Met - Progressing)    PLAN     Continue with extension based treatment. Progress core and LE strengthening as symptoms improve.    Plan of care Certification: 6/13/2022 to 12/13/2022.  Outpatient Physical Therapy 2 times weekly for 6 months     Nila Rivera, PT , DPT, OCS

## 2022-10-12 ENCOUNTER — CLINICAL SUPPORT (OUTPATIENT)
Dept: REHABILITATION | Facility: HOSPITAL | Age: 37
End: 2022-10-12
Payer: MEDICAID

## 2022-10-12 DIAGNOSIS — R26.9 GAIT DIFFICULTY: ICD-10-CM

## 2022-10-12 DIAGNOSIS — M54.42 CHRONIC LEFT-SIDED LOW BACK PAIN WITH LEFT-SIDED SCIATICA: ICD-10-CM

## 2022-10-12 DIAGNOSIS — R29.898 WEAKNESS OF LEFT LOWER EXTREMITY: Primary | ICD-10-CM

## 2022-10-12 DIAGNOSIS — G89.29 CHRONIC LEFT-SIDED LOW BACK PAIN WITH LEFT-SIDED SCIATICA: ICD-10-CM

## 2022-10-12 PROCEDURE — 97110 THERAPEUTIC EXERCISES: CPT

## 2022-10-12 NOTE — PROGRESS NOTES
[facility]   Physical Therapy Treatment Note     Name: Kevin Mancini  Clinic Number: [id number]    Therapy Diagnosis:   Encounter Diagnoses   Name Primary?    Weakness of left lower extremity Yes    Chronic left-sided low back pain with left-sided sciatica     Gait difficulty          Physician: Bakari Zaragoza MD    Visit Date: 10/12/2022    Physician Orders: PT Eval and Treat 3 months s/p left L5-S1 microdiscectomy. Lumbar PT 3 times a week for 6 weeks. Sharmila exercises.   Medical Diagnosis from Referral: Z98.890 (ICD-10-CM) - S/P lumbar microdiscectomy M51.36 (ICD-10-CM) - DDD (degenerative disc disease), lumbar   Evaluation Date: 6/13/2022  Authorization Period Expiration: 11/12/2022  Plan of Care Expiration: 12/13/2022  Visit # / Visits authorized: 26 / 36    Time In: 11:00  Time Out: 11:48  Total Billable Time: 48 minutes    Precautions: Standard    SUBJECTIVE     Pt reports: felt better after last time.trying to do more at home.   She was compliant with home exercise program.  Response to previous treatment: reduced pain  Functional change: improved household duties    Pain: 2/10  Location: Left buttock    OBJECTIVE       Kevin received the treatments listed below:      Therapeutic Exercises to develop strength, endurance, ROM and flexibility for 48 minutes including:  Recumbent Bike x 8 min level 2  Supine nerve glides (leg on bolster SAQ then leg on bolster ankle DF/PF) x 20 each - at end of session  Pallof press 4 sets of 5  Bilateral pull downs blue band 2x10  Quadruped diaphragmatic breathing x15  Quadruped rocking x 10  Pilates reformer double leg press (feet hip distance, feet wide toes out) 2 springs x 20 (with and without diaphragmatic breathing)  Pilates reformer single leg press 2 springs 3x5 on left  Pilates reformer doule leg calf raises 4x5 2 springs  Single leg hip hinge for lifting items from floor   Double leg hip hinge with 10# plate lift  bilaterally      Patient Education and Home Exercises     Home Exercises Provided and Patient Education Provided     Education provided:   Symptom management and plan of care progressions.  Home Exercise Program.    Written Home Exercises Provided: Patient instructed to cont prior HEP. Exercises were reviewed and Kevin was able to demonstrate them prior to the end of the session.  Kevin demonstrated good  understanding of the education provided. See EMR under Patient Instructions for exercises provided during therapy sessions    ASSESSMENT   Demo much better transitional movement. Excellent performance of hip hinge activities today with less compensatory knee hyperextension noticed. Cont to have adverse neural tension on the left. To continue nerve glides at home.     Kevin Is progressing well towards her goals.    Pt prognosis is Good.     Pt will continue to benefit from skilled outpatient physical therapy to address the deficits listed in the problem list box on initial evaluation, provide pt/family education and to maximize pt's level of independence in the home and community environment. Pt's spiritual, cultural and educational needs considered and pt agreeable to plan of care and goals.     Anticipated barriers to physical therapy: previous physical therapy with limited benefit.    Goals:  Short Term Goals:  4 weeks  1.Report decreased LLE pain  < / =  5/10  to increase tolerance for walking and standing. (Met - 7/27/2022)   2. Increase ROM by 25% where limited in order to perform ADLs without difficulty. (Met - 7/27/2022)  3. Increase strength by 1/3 MMT grade in LLE  to increase tolerance for ADL and work activities. (Met - 7/27/2022)  4. Pt to tolerate HEP to improve ROM and independence with activities of daily living's. (Met - 7/27/2022)     Long Term Goals: 6 months  1.Report decreased low back pain < / = 2/10  to increase tolerance for prolonged sitting at work. (Not Met -  Progressing)  2.Patient goal: walk 2 miles without taking breaks. (Not Met - Progressing)  3.Increase strength to 5/5 in  LLE  to increase tolerance for ADL and work activities. (Not Met - Progressing)    PLAN     Continue with extension based treatment. Progress core and LE strengthening as symptoms improve.    Plan of care Certification: 6/13/2022 to 12/13/2022.  Outpatient Physical Therapy 2 times weekly for 6 months     Nila Rivera, PT , DPT, OCS

## 2022-10-24 ENCOUNTER — OFFICE VISIT (OUTPATIENT)
Dept: NEUROSURGERY | Facility: CLINIC | Age: 37
End: 2022-10-24
Payer: MEDICAID

## 2022-10-24 VITALS — HEIGHT: 64 IN | TEMPERATURE: 98 F | WEIGHT: 191.81 LBS | BODY MASS INDEX: 32.74 KG/M2

## 2022-10-24 DIAGNOSIS — M54.17 LUMBOSACRAL RADICULOPATHY AT L5: ICD-10-CM

## 2022-10-24 DIAGNOSIS — Z98.890 S/P LUMBAR MICRODISCECTOMY: ICD-10-CM

## 2022-10-24 DIAGNOSIS — M48.07 FORAMINAL STENOSIS OF LUMBOSACRAL REGION: Primary | ICD-10-CM

## 2022-10-24 PROCEDURE — 1159F MED LIST DOCD IN RCRD: CPT | Mod: CPTII,,, | Performed by: NEUROLOGICAL SURGERY

## 2022-10-24 PROCEDURE — 99999 PR PBB SHADOW E&M-EST. PATIENT-LVL III: CPT | Mod: PBBFAC,,, | Performed by: NEUROLOGICAL SURGERY

## 2022-10-24 PROCEDURE — 99999 PR PBB SHADOW E&M-EST. PATIENT-LVL III: ICD-10-PCS | Mod: PBBFAC,,, | Performed by: NEUROLOGICAL SURGERY

## 2022-10-24 PROCEDURE — 99214 PR OFFICE/OUTPT VISIT, EST, LEVL IV, 30-39 MIN: ICD-10-PCS | Mod: S$PBB,,, | Performed by: NEUROLOGICAL SURGERY

## 2022-10-24 PROCEDURE — 99213 OFFICE O/P EST LOW 20 MIN: CPT | Mod: PBBFAC,PN | Performed by: NEUROLOGICAL SURGERY

## 2022-10-24 PROCEDURE — 1159F PR MEDICATION LIST DOCUMENTED IN MEDICAL RECORD: ICD-10-PCS | Mod: CPTII,,, | Performed by: NEUROLOGICAL SURGERY

## 2022-10-24 PROCEDURE — 99214 OFFICE O/P EST MOD 30 MIN: CPT | Mod: S$PBB,,, | Performed by: NEUROLOGICAL SURGERY

## 2022-10-24 RX ORDER — GABAPENTIN 600 MG/1
900 TABLET ORAL 3 TIMES DAILY
Qty: 135 TABLET | Refills: 11 | Status: SHIPPED | OUTPATIENT
Start: 2022-10-24 | End: 2023-08-24 | Stop reason: SDUPTHER

## 2022-10-24 NOTE — PROGRESS NOTES
NEUROSURGICAL PROGRESS NOTE    DATE OF SERVICE:  10/24/2022    ATTENDING PHYSICIAN:  Bakari Zaragoza MD    SUBJECTIVE:    INTERIM HISTORY:    This is a very pleasant 37 y.o. female, who is status post left L5-S1 microdiskectomy in March 2022.  After the surgery she has recovered some sensation in her left leg.  The numbness improved but she still has significant pain in the S1 distribution this time but in the L5 distribution.  The pain radiates towards the left big toe.  Pain is worse in the morning, if she lays down for longer of time or if she sits down for a long period of time.  Pain interferes with walking, lifting.  She has a tendency to lean forward to avoid having too much pain.  Pain is somewhat worse with back extension and left lateral bending.  Sometimes she has to remove her shoe because of the pain affecting her left foot.  She takes gabapentin 900 mg 3 times daily and was recently switched from oxycodone to Norco pain, she is doing physical therapy.              PAST MEDICAL HISTORY:  Active Ambulatory Problems     Diagnosis Date Noted    Chronic left-sided low back pain with left-sided sciatica 11/18/2021    Decreased range of motion of lumbar spine 11/18/2021    Weakness of left lower extremity 11/18/2021    Gait difficulty 11/18/2021    Tobacco user 01/02/2019    Chronic asthmatic bronchitis with acute exacerbation 08/01/2019    Exercise-induced asthma 01/02/2019    Lumbar disc herniation with radiculopathy 03/02/2022     Resolved Ambulatory Problems     Diagnosis Date Noted    No Resolved Ambulatory Problems     Past Medical History:   Diagnosis Date    Asthma        PAST SURGICAL HISTORY:  Past Surgical History:   Procedure Laterality Date    MICRODISCECTOMY OF SPINE N/A 3/2/2022    Procedure: MICRODISCECTOMY, SPINE Left L5-S1 Microdiscectomy;  Surgeon: Bakari Zaragoza MD;  Location: [facility];  Service: Neurosurgery;  Laterality: N/A;  Procedure: Left L5-S1 Microdiscectomy  Surgery Time:  1.5hr  LOS: 0-1  Anesthesia: General  Blood: Type & Screen  Radiology: C-arm  Microscope: Metrx  Bed: [place] 4 Poster  Position: Prone       SOCIAL HISTORY:   Social History     Socioeconomic History    Marital status: Single   Tobacco Use    Smoking status: Some Days     Types: Cigars     Passive exposure: Never    Smokeless tobacco: Never    Tobacco comments:     social   Substance and Sexual Activity    Alcohol use: Yes     Alcohol/week: 1.0 standard drink     Types: 1 Glasses of wine per week     Comment: social    Drug use: Not Currently       FAMILY HISTORY:  History reviewed. No pertinent family history.    CURRENTS MEDICATIONS:  Current Outpatient Medications on File Prior to Visit   Medication Sig Dispense Refill    diclofenac (VOLTAREN) 75 MG EC tablet Take 1 tablet (75 mg total) by mouth 2 (two) times daily as needed (pain). 60 tablet 2    ferrous sulfate (FEOSOL) 325 mg (65 mg iron) Tab tablet Take 325 mg by mouth daily with breakfast.      HYDROcodone-acetaminophen (NORCO)  mg per tablet Take 1 tablet by mouth 3 (three) times daily as needed for Pain (pain). 90 tablet 0    [START ON 11/25/2022] HYDROcodone-acetaminophen (NORCO)  mg per tablet Take 1 tablet by mouth 3 (three) times daily as needed for Pain (pain). 90 tablet 0    [START ON 10/26/2022] HYDROcodone-acetaminophen (NORCO)  mg per tablet Take 1 tablet by mouth 3 (three) times daily as needed for Pain (pain). 90 tablet 0    methocarbamoL (ROBAXIN) 750 MG Tab Take 1 tablet (750 mg total) by mouth 3 (three) times daily. 90 tablet 6    methylPREDNISolone (MEDROL DOSEPACK) 4 mg tablet use as directed 1 each 0    traMADoL (ULTRAM) 50 mg tablet Take 1 tablet (50 mg total) by mouth every 8 (eight) hours as needed for Pain. 90 tablet 3    [DISCONTINUED] gabapentin (NEURONTIN) 600 MG tablet Take 1.5 tablets (900 mg total) by mouth 3 (three) times daily. 135 tablet 11     No current facility-administered medications on file prior to  visit.       ALLERGIES:  Review of patient's allergies indicates:  No Known Allergies    REVIEW OF SYSTEMS:  Review of Systems   Constitutional:  Negative for diaphoresis, fever and weight loss.   Respiratory:  Negative for shortness of breath.    Cardiovascular:  Negative for chest pain.   Gastrointestinal:  Negative for blood in stool.   Genitourinary:  Negative for hematuria.   Endo/Heme/Allergies:  Does not bruise/bleed easily.   All other systems reviewed and are negative.      OBJECTIVE:    PHYSICAL EXAMINATION:   Vitals:    10/24/22 1527   Temp: 98 °F (36.7 °C)       Physical Exam:  Vitals reviewed.    Constitutional: She appears well-developed and well-nourished.     Eyes: Pupils are equal, round, and reactive to light. Conjunctivae and EOM are normal.     Cardiovascular: Normal distal pulses and no edema.     Abdominal: Soft.     Skin: Skin displays no rash on trunk and no rash on extremities. Skin displays no lesions on trunk and no lesions on extremities.     Psych/Behavior: She is alert. She is oriented to person, place, and time. She has a normal mood and affect.     Musculoskeletal:        Neck: Range of motion is full.     Neurological:        DTRs: Tricep reflexes are 2+ on the right side and 2+ on the left side. Bicep reflexes are 2+ on the right side and 2+ on the left side. Brachioradialis reflexes are 2+ on the right side and 2+ on the left side. Patellar reflexes are 2+ on the right side and 2+ on the left side. Achilles reflexes are 2+ on the right side and 2+ on the left side.     Back Exam     Muscle Strength   Right Quadriceps:  5/5   Left Quadriceps:  5/5   Right Hamstrings:  5/5   Left Hamstrings:  5/5           SI joint:   Palpation at the right and left SI joints not painful  RABIA test is negative bilaterally  Gaenslen test is negative bilaterally  Thigh thrust test is negative bilaterally    Neurologic Exam     Mental Status   Oriented to person, place, and time.   Speech: speech is  normal   Level of consciousness: alert    Cranial Nerves   Cranial nerves II through XII intact.     CN III, IV, VI   Pupils are equal, round, and reactive to light.  Extraocular motions are normal.     Motor Exam   Muscle bulk: normal  Overall muscle tone: normal    Strength   Right deltoid: 5/5  Left deltoid: 5/5  Right biceps: 5/5  Left biceps: 5/5  Right triceps: 5/5  Left triceps: 5/5  Right wrist flexion: 5/5  Left wrist flexion: 5/5  Right wrist extension: 5/5  Left wrist extension: 5/5  Right interossei: 5/5  Left interossei: 5/5  Right iliopsoas: 5/5  Left iliopsoas: 5/5  Right quadriceps: 5/5  Left quadriceps: 5/5  Right hamstrin/5  Left hamstrin/5  Right anterior tibial: 5/5  Left anterior tibial: 5/5  Right posterior tibial: 5/5  Left posterior tibial: 5/5  Right peroneal: 5/5  Left peroneal: 5/5  Right gastroc: 5/5  Left gastroc: 5/5    Sensory Exam   Light touch normal.   Pinprick normal.     Gait, Coordination, and Reflexes     Gait  Gait: normal    Coordination   Finger to nose coordination: normal  Tandem walking coordination: normal    Reflexes   Right brachioradialis: 2+  Left brachioradialis: 2+  Right biceps: 2+  Left biceps: 2+  Right triceps: 2+  Left triceps: 2+  Right patellar: 2+  Left patellar: 2+  Right achilles: 2+  Left achilles: 2+  Right plantar: normal  Left plantar: normal  Right Villasenor: absent  Left Villasenor: absent  Right ankle clonus: absent  Left ankle clonus: absent      DIAGNOSTIC DATA:  I personally interpreted the following imaging:   Lumbar spine MRI post lumbar microdiskectomy in 2022 was showing severe bilateral L5-S1 foraminal stenosis, L4-5 degenerative disc disease with disc bulge causing mild stenosis  ASSESMENT:  This is a 37 y.o. female with     Problem List Items Addressed This Visit    None  Visit Diagnoses       Foraminal stenosis of lumbosacral region    -  Primary    Relevant Orders    IR Epidural Transforaminal Inj 1st Vert Lumbar Uni     Ambulatory referral/consult to Physical/Occupational Therapy    S/P lumbar microdiscectomy        Lumbosacral radiculopathy at L5                  PLAN:  Diagnostic left L5-S1 nerve block and steroid injection  If she responds well from the left L5-S1 transforaminal epidural steroid injection the pain comes back she might benefit from a contralateral L5-S1 micro foraminotomy  Continue gabapentin 900 mg 3 times daily, continue physical therapy  Weight loss, core strengthening exercises, all questions answered          Bakari Zaragoza MD  Cell:[phone]

## 2022-10-27 ENCOUNTER — PATIENT MESSAGE (OUTPATIENT)
Dept: NEUROSURGERY | Facility: CLINIC | Age: 37
End: 2022-10-27
Payer: MEDICAID

## 2022-10-27 RX ORDER — HYDROCODONE BITARTRATE AND ACETAMINOPHEN 10; 325 MG/1; MG/1
1 TABLET ORAL 3 TIMES DAILY PRN
Qty: 90 TABLET | Refills: 0 | Status: SHIPPED | OUTPATIENT
Start: 2022-11-25 | End: 2022-12-12 | Stop reason: SDUPTHER

## 2022-10-27 NOTE — TELEPHONE ENCOUNTER
I sent the [facility] to [facility].    Stacey Thakur [facility], PA-C  Neurosurgery  [facility]  10/27/2022

## 2022-11-16 ENCOUNTER — CLINICAL SUPPORT (OUTPATIENT)
Dept: REHABILITATION | Facility: HOSPITAL | Age: 37
End: 2022-11-16
Attending: NEUROLOGICAL SURGERY
Payer: MEDICAID

## 2022-11-16 DIAGNOSIS — M54.42 CHRONIC LEFT-SIDED LOW BACK PAIN WITH LEFT-SIDED SCIATICA: ICD-10-CM

## 2022-11-16 DIAGNOSIS — G89.29 CHRONIC LEFT-SIDED LOW BACK PAIN WITH LEFT-SIDED SCIATICA: ICD-10-CM

## 2022-11-16 DIAGNOSIS — R29.898 WEAKNESS OF LEFT LOWER EXTREMITY: Primary | ICD-10-CM

## 2022-11-16 DIAGNOSIS — R26.9 GAIT DIFFICULTY: ICD-10-CM

## 2022-11-16 DIAGNOSIS — M48.07 FORAMINAL STENOSIS OF LUMBOSACRAL REGION: ICD-10-CM

## 2022-11-16 PROCEDURE — 97110 THERAPEUTIC EXERCISES: CPT

## 2022-11-16 NOTE — PROGRESS NOTES
[facility]   Physical Therapy Treatment Note     Name: Kevin Mancini  [employer] Number: [id number]    Therapy Diagnosis:   Encounter Diagnoses   Name Primary?    Foraminal stenosis of lumbosacral region     Weakness of left lower extremity Yes    Chronic left-sided low back pain with left-sided sciatica     Gait difficulty          Physician: Bakari Zaragoza MD    Visit Date: 11/16/2022    Physician Orders: PT Eval and Treat 3 months s/p left L5-S1 microdiscectomy. Lumbar PT 3 times a week for 6 weeks. Sharmila exercises.   Medical Diagnosis from Referral: Z98.890 (ICD-10-CM) - S/P lumbar microdiscectomy M51.36 (ICD-10-CM) - DDD (degenerative disc disease), lumbar   Evaluation Date: 6/13/2022  Authorization Period Expiration: 11/12/2022  Plan of Care Expiration: 12/13/2022  Visit # / Visits authorized: 27 / 36    Time In: 09:15  Time Out: 10:00  Total Billable Time: 45 minutes    Precautions: Standard    SUBJECTIVE     Pt reports: has been out of PT due to insurance. States that she has been feeling really bad these last 2 weeks. Pain is in her left buttock, posterior thigh, posterior lower leg, and dorsum of foot.  She was compliant with home exercise program.  Response to previous treatment: reduced pain  Functional change: improved household duties    Pain: 2/10  Location: Left buttock    OBJECTIVE       Kevin received the treatments listed below:      Therapeutic Exercises to develop strength, endurance, ROM and flexibility for 45 minutes including:  supine nerve glides (leg on bolster SAQ then leg on bolster ankle DF/PF) x 20 each   Supine abdominal brace (with bolster under knees)  Supine ankle PF red band (with bolster under knees)  Hooklying abdominal brace with heel slide 5x3 each  Posture correct in sitting  Posture education - sitting with lumbar support  Pilmickey reformer double leg press (feet hip distance, legs together) 1 spring x 20 each        Patient Education  and Home Exercises     Home Exercises Provided and Patient Education Provided     Education provided:   Symptom management and plan of care progressions.  Home Exercise Program.    Written Home Exercises Provided: Patient instructed to cont prior HEP. Exercises were reviewed and Kevin was able to demonstrate them prior to the end of the session.  Kevin demonstrated good  understanding of the education provided. See EMR under Patient Instructions for exercises provided during therapy sessions    ASSESSMENT   Noticed much worse adverse neural tension today with symptoms traveling from low back to foot. Able to centralize symptoms out of food with supine nerve glides. In addition, improved tolerance to sitting when we added lumbar support. Patient to perform supine nerve glides, posture correct, and use lumbar roll during sitting.    Kevin Is progressing well towards her goals.    Pt prognosis is Good.     Pt will continue to benefit from skilled outpatient physical therapy to address the deficits listed in the problem list box on initial evaluation, provide pt/family education and to maximize pt's level of independence in the home and community environment. Pt's spiritual, cultural and educational needs considered and pt agreeable to plan of care and goals.     Anticipated barriers to physical therapy: previous physical therapy with limited benefit.    Goals:  Short Term Goals:  4 weeks  1.Report decreased LLE pain  < / =  5/10  to increase tolerance for walking and standing. (Met - 7/27/2022)   2. Increase ROM by 25% where limited in order to perform ADLs without difficulty. (Met - 7/27/2022)  3. Increase strength by 1/3 MMT grade in LLE  to increase tolerance for ADL and work activities. (Met - 7/27/2022)  4. Pt to tolerate HEP to improve ROM and independence with activities of daily living's. (Met - 7/27/2022)     Long Term Goals: 6 months  1.Report decreased low back pain < / = 2/10  to increase tolerance  for prolonged sitting at work. (Not Met - Progressing)  2.Patient goal: walk 2 miles without taking breaks. (Not Met - Progressing)  3.Increase strength to 5/5 in  LLE  to increase tolerance for ADL and work activities. (Not Met - Progressing)    PLAN     Continue with extension based treatment. Progress core and LE strengthening as symptoms improve.    Plan of care Certification: 6/13/2022 to 12/13/2022.  Outpatient Physical Therapy 2 times weekly for 6 months     Nila Rivera, PT , DPT, OCS

## 2022-11-21 ENCOUNTER — CLINICAL SUPPORT (OUTPATIENT)
Dept: REHABILITATION | Facility: HOSPITAL | Age: 37
End: 2022-11-21
Payer: MEDICAID

## 2022-11-21 DIAGNOSIS — G89.29 CHRONIC LEFT-SIDED LOW BACK PAIN WITH LEFT-SIDED SCIATICA: ICD-10-CM

## 2022-11-21 DIAGNOSIS — R26.9 GAIT DIFFICULTY: ICD-10-CM

## 2022-11-21 DIAGNOSIS — R29.898 WEAKNESS OF LEFT LOWER EXTREMITY: Primary | ICD-10-CM

## 2022-11-21 DIAGNOSIS — M54.42 CHRONIC LEFT-SIDED LOW BACK PAIN WITH LEFT-SIDED SCIATICA: ICD-10-CM

## 2022-11-21 PROCEDURE — 97110 THERAPEUTIC EXERCISES: CPT

## 2022-11-21 NOTE — PROGRESS NOTES
[facility]   Physical Therapy Treatment Note     Name: Kevin Mancini  Clinic Number: [id number]    Therapy Diagnosis:   Encounter Diagnoses   Name Primary?    Weakness of left lower extremity Yes    Chronic left-sided low back pain with left-sided sciatica     Gait difficulty          Physician: Bakari Zaragoza MD    Visit Date: 11/21/2022    Physician Orders: PT Eval and Treat 3 months s/p left L5-S1 microdiscectomy. Lumbar PT 3 times a week for 6 weeks. Sharmila exercises.   Medical Diagnosis from Referral: Z98.890 (ICD-10-CM) - S/P lumbar microdiscectomy M51.36 (ICD-10-CM) - DDD (degenerative disc disease), lumbar   Evaluation Date: 6/13/2022  Authorization Period Expiration: 11/12/2022  Plan of Care Expiration: 12/13/2022  Visit # / Visits authorized: 1 / 20    Time In: 10:05  Time Out: 11:00  Total Billable Time: 55 minutes    Precautions: Standard    SUBJECTIVE     Pt reports: doing okay today. Has been using heat at home which helps.  She was compliant with home exercise program.  Response to previous treatment: reduced pain  Functional change: improved household duties    Pain: 4/10  Location: Left buttock    OBJECTIVE       Kevin received the treatments listed below:      Therapeutic Exercises to develop strength, endurance, ROM and flexibility for 55 minutes including:  supine nerve glides (leg on bolster SAQ then leg on bolster ankle DF/PF) x 20 each   Supine abdominal brace (with bolster under knees)  Supine ankle PF red band (with bolster under knees)  Hooklying abdominal brace with heel slide 5x3 each  Pilates reformer double leg press (feet hip distance, legs together) 2 springs x 20 each  Pilates reformer single leg press 1 spring 3x8  Pilates reformer DL calf raises 1 spring 3x8    MHP to left buttock/low back during supine exercises - skin intact post.       Patient Education and Home Exercises     Home Exercises Provided and Patient Education Provided      Education provided:   Symptom management and plan of care progressions.  Home Exercise Program.    Written Home Exercises Provided: Patient instructed to cont prior HEP. Exercises were reviewed and Kevin was able to demonstrate them prior to the end of the session.  Kevin demonstrated good  understanding of the education provided. See EMR under Patient Instructions for exercises provided during therapy sessions    ASSESSMENT   Cont to demo adverse neural tension but this improved after nerve glides and stabilization exercises. Patient demo improved tolerance to resistive exercises.    Kevin Is progressing well towards her goals.    Pt prognosis is Good.     Pt will continue to benefit from skilled outpatient physical therapy to address the deficits listed in the problem list box on initial evaluation, provide pt/family education and to maximize pt's level of independence in the home and community environment. Pt's spiritual, cultural and educational needs considered and pt agreeable to plan of care and goals.     Anticipated barriers to physical therapy: previous physical therapy with limited benefit.    Goals:  Short Term Goals:  4 weeks  1.Report decreased LLE pain  < / =  5/10  to increase tolerance for walking and standing. (Met - 7/27/2022)   2. Increase ROM by 25% where limited in order to perform ADLs without difficulty. (Met - 7/27/2022)  3. Increase strength by 1/3 MMT grade in LLE  to increase tolerance for ADL and work activities. (Met - 7/27/2022)  4. Pt to tolerate HEP to improve ROM and independence with activities of daily living's. (Met - 7/27/2022)     Long Term Goals: 6 months  1.Report decreased low back pain < / = 2/10  to increase tolerance for prolonged sitting at work. (Not Met - Progressing)  2.Patient goal: walk 2 miles without taking breaks. (Not Met - Progressing)  3.Increase strength to 5/5 in  LLE  to increase tolerance for ADL and work activities. (Not Met -  Progressing)    PLAN     Continue with extension based treatment. Progress core and LE strengthening as symptoms improve.    Plan of care Certification: 6/13/2022 to 12/13/2022.  Outpatient Physical Therapy 2 times weekly for 6 months     Nila Rivera, PT , DPT, OCS

## 2022-11-23 ENCOUNTER — CLINICAL SUPPORT (OUTPATIENT)
Dept: REHABILITATION | Facility: HOSPITAL | Age: 37
End: 2022-11-23
Payer: MEDICAID

## 2022-11-23 DIAGNOSIS — M54.42 CHRONIC LEFT-SIDED LOW BACK PAIN WITH LEFT-SIDED SCIATICA: ICD-10-CM

## 2022-11-23 DIAGNOSIS — G89.29 CHRONIC LEFT-SIDED LOW BACK PAIN WITH LEFT-SIDED SCIATICA: ICD-10-CM

## 2022-11-23 DIAGNOSIS — R26.9 GAIT DIFFICULTY: ICD-10-CM

## 2022-11-23 DIAGNOSIS — R29.898 WEAKNESS OF LEFT LOWER EXTREMITY: Primary | ICD-10-CM

## 2022-11-23 PROCEDURE — 97110 THERAPEUTIC EXERCISES: CPT

## 2022-11-28 ENCOUNTER — CLINICAL SUPPORT (OUTPATIENT)
Dept: REHABILITATION | Facility: HOSPITAL | Age: 37
End: 2022-11-28
Payer: MEDICAID

## 2022-11-28 DIAGNOSIS — R26.9 GAIT DIFFICULTY: ICD-10-CM

## 2022-11-28 DIAGNOSIS — G89.29 CHRONIC LEFT-SIDED LOW BACK PAIN WITH LEFT-SIDED SCIATICA: ICD-10-CM

## 2022-11-28 DIAGNOSIS — R29.898 WEAKNESS OF LEFT LOWER EXTREMITY: Primary | ICD-10-CM

## 2022-11-28 DIAGNOSIS — M54.42 CHRONIC LEFT-SIDED LOW BACK PAIN WITH LEFT-SIDED SCIATICA: ICD-10-CM

## 2022-11-28 PROCEDURE — 97110 THERAPEUTIC EXERCISES: CPT

## 2022-11-28 NOTE — PROGRESS NOTES
[facility]   Physical Therapy Treatment Note     Name: Kevin Mancini  Clinic Number: [id number]    Therapy Diagnosis:   Encounter Diagnoses   Name Primary?    Weakness of left lower extremity Yes    Chronic left-sided low back pain with left-sided sciatica     Gait difficulty          Physician: Bakari Zaragoza MD    Visit Date: 11/23/2022    Physician Orders: PT Eval and Treat 3 months s/p left L5-S1 microdiscectomy. Lumbar PT 3 times a week for 6 weeks. Sharmila exercises.   Medical Diagnosis from Referral: Z98.890 (ICD-10-CM) - S/P lumbar microdiscectomy M51.36 (ICD-10-CM) - DDD (degenerative disc disease), lumbar   Evaluation Date: 6/13/2022  Authorization Period Expiration: 11/12/2022  Plan of Care Expiration: 12/13/2022  Visit # / Visits authorized: 2 / 20    Time In: 11:00  Time Out: 11:50  Total Billable Time: 50 minutes    Precautions: Standard    SUBJECTIVE     Pt reports: has been able to stand and cook for 2 hours periods before needing to take a break.   She was compliant with home exercise program.  Response to previous treatment: reduced pain  Functional change: improved household duties    Pain: 4/10  Location: Left buttock    OBJECTIVE       Kevin received the treatments listed below:      Therapeutic Exercises to develop strength, endurance, ROM and flexibility for 50 minutes including:  supine nerve glides (leg on bolster SAQ then leg on bolster ankle DF/PF) x 20 each   Supine abdominal brace (with bolster under knees)  Supine ankle PF red band (with bolster under knees)  Hooklying abdominal brace with heel slide 5x3 each  Pilates reformer double leg press (feet hip distance, legs together) 2 springs x 20 each  Pilates reformer single leg press 1 spring 3x8  Pilates reformer DL calf raises 1 spring 3x8        Patient Education and Home Exercises     Home Exercises Provided and Patient Education Provided     Education provided:   Symptom management and plan  of care progressions.  Home Exercise Program.    Written Home Exercises Provided: Patient instructed to cont prior HEP. Exercises were reviewed and Kevin was able to demonstrate them prior to the end of the session.  Kevin demonstrated good  understanding of the education provided. See EMR under Patient Instructions for exercises provided during therapy sessions    ASSESSMENT   Improved performance of exercise today with pain reduction post and ability to perform transitional movement with less grimacing. Hope to progress exercises more next session.     Kevin Is progressing well towards her goals.    Pt prognosis is Good.     Pt will continue to benefit from skilled outpatient physical therapy to address the deficits listed in the problem list box on initial evaluation, provide pt/family education and to maximize pt's level of independence in the home and community environment. Pt's spiritual, cultural and educational needs considered and pt agreeable to plan of care and goals.     Anticipated barriers to physical therapy: previous physical therapy with limited benefit.    Goals:  Short Term Goals:  4 weeks  1.Report decreased LLE pain  < / =  5/10  to increase tolerance for walking and standing. (Met - 7/27/2022)   2. Increase ROM by 25% where limited in order to perform ADLs without difficulty. (Met - 7/27/2022)  3. Increase strength by 1/3 MMT grade in LLE  to increase tolerance for ADL and work activities. (Met - 7/27/2022)  4. Pt to tolerate HEP to improve ROM and independence with activities of daily living's. (Met - 7/27/2022)     Long Term Goals: 6 months  1.Report decreased low back pain < / = 2/10  to increase tolerance for prolonged sitting at work. (Not Met - Progressing)  2.Patient goal: walk 2 miles without taking breaks. (Not Met - Progressing)  3.Increase strength to 5/5 in  LLE  to increase tolerance for ADL and work activities. (Not Met - Progressing)    PLAN     Continue with extension  based treatment. Progress core and LE strengthening as symptoms improve.    Plan of care Certification: 6/13/2022 to 12/13/2022.  Outpatient Physical Therapy 2 times weekly for 6 months     Nila Rivera, PT , DPT, OCS

## 2022-11-30 NOTE — PROGRESS NOTES
[facility]   Physical Therapy Treatment Note     Name: Kevin Mancini  Clinic Number: [id number]    Therapy Diagnosis:   Encounter Diagnoses   Name Primary?    Weakness of left lower extremity Yes    Chronic left-sided low back pain with left-sided sciatica     Gait difficulty          Physician: Bakari Zaragoza MD    Visit Date: 11/28/2022    Physician Orders: PT Eval and Treat 3 months s/p left L5-S1 microdiscectomy. Lumbar PT 3 times a week for 6 weeks. Sharmila exercises.   Medical Diagnosis from Referral: Z98.890 (ICD-10-CM) - S/P lumbar microdiscectomy M51.36 (ICD-10-CM) - DDD (degenerative disc disease), lumbar   Evaluation Date: 6/13/2022  Authorization Period Expiration: 11/12/2022  Plan of Care Expiration: 12/13/2022  Visit # / Visits authorized: 3 / 20    Time In: 10:07  Time Out: 11:00  Total Billable Time: 53 minutes    Precautions: Standard    SUBJECTIVE     Pt reports: doing okay. Still having pain into her left buttock and calf.  She was compliant with home exercise program.  Response to previous treatment: reduced pain  Functional change: improved household duties    Pain: 4/10  Location: Left buttock    OBJECTIVE       Kevin received the treatments listed below:      Therapeutic Exercises to develop strength, endurance, ROM and flexibility for 53 minutes including:  supine nerve glides (leg on bolster SAQ then leg on bolster ankle DF/PF) x 20 each   Supine abdominal brace (with bolster under knees)  Supine ankle PF red band (with bolster under knees)  Hooklying abdominal brace with heel slide 5x3 each  Pilates reformer double leg press (feet hip distance, legs together) 3 springs x 20 each  Pilates reformer single leg press 2 spring 3x8  Pilates reformer DL calf raises 2 spring 3x8  Pallof press normal stance 2x10  Pallof press staggered stance 2x10        ASSESSMENT   Able to progress resistance with reformer exercises and add in more advanced core  strengthening. Patient is progressing and pain has reduced from 2 weeks prior.    Kevin Is progressing well towards her goals.    Pt prognosis is Good.     Pt will continue to benefit from skilled outpatient physical therapy to address the deficits listed in the problem list box on initial evaluation, provide pt/family education and to maximize pt's level of independence in the home and community environment. Pt's spiritual, cultural and educational needs considered and pt agreeable to plan of care and goals.     Anticipated barriers to physical therapy: previous physical therapy with limited benefit.    Goals:  Short Term Goals:  4 weeks  1.Report decreased LLE pain  < / =  5/10  to increase tolerance for walking and standing. (Met - 7/27/2022)   2. Increase ROM by 25% where limited in order to perform ADLs without difficulty. (Met - 7/27/2022)  3. Increase strength by 1/3 MMT grade in LLE  to increase tolerance for ADL and work activities. (Met - 7/27/2022)  4. Pt to tolerate HEP to improve ROM and independence with activities of daily living's. (Met - 7/27/2022)     Long Term Goals: 6 months  1.Report decreased low back pain < / = 2/10  to increase tolerance for prolonged sitting at work. (Not Met - Progressing)  2.Patient goal: walk 2 miles without taking breaks. (Not Met - Progressing)  3.Increase strength to 5/5 in  LLE  to increase tolerance for ADL and work activities. (Not Met - Progressing)    PLAN     Continue with extension based treatment. Progress core and LE strengthening as symptoms improve.    Plan of care Certification: 6/13/2022 to 12/13/2022.  Outpatient Physical Therapy 2 times weekly for 6 months     Nila Rivera, PT , DPT, OCS

## 2022-12-01 ENCOUNTER — CLINICAL SUPPORT (OUTPATIENT)
Dept: REHABILITATION | Facility: HOSPITAL | Age: 37
End: 2022-12-01
Payer: MEDICAID

## 2022-12-01 DIAGNOSIS — G89.29 CHRONIC LEFT-SIDED LOW BACK PAIN WITH LEFT-SIDED SCIATICA: ICD-10-CM

## 2022-12-01 DIAGNOSIS — R26.9 GAIT DIFFICULTY: ICD-10-CM

## 2022-12-01 DIAGNOSIS — M54.42 CHRONIC LEFT-SIDED LOW BACK PAIN WITH LEFT-SIDED SCIATICA: ICD-10-CM

## 2022-12-01 DIAGNOSIS — R29.898 WEAKNESS OF LEFT LOWER EXTREMITY: Primary | ICD-10-CM

## 2022-12-01 PROCEDURE — 97110 THERAPEUTIC EXERCISES: CPT

## 2022-12-01 NOTE — PROGRESS NOTES
[facility]   Physical Therapy Treatment Note     Name: Kevin Mancini  Clinic Number: [id number]    Therapy Diagnosis:   Encounter Diagnoses   Name Primary?    Weakness of left lower extremity Yes    Chronic left-sided low back pain with left-sided sciatica     Gait difficulty        Physician: Bakari Zaragoza MD    Visit Date: 12/1/2022    Physician Orders: PT Eval and Treat 3 months s/p left L5-S1 microdiscectomy. Lumbar PT 3 times a week for 6 weeks. Sharmila exercises.   Medical Diagnosis from Referral: Z98.890 (ICD-10-CM) - S/P lumbar microdiscectomy M51.36 (ICD-10-CM) - DDD (degenerative disc disease), lumbar   Evaluation Date: 6/13/2022  Authorization Period Expiration: 11/12/2022  Plan of Care Expiration: 12/13/2022  Visit # / Visits authorized: 4 / 20    Time In: 10:12  Time Out: 11:00  Total Billable Time: 48 minutes    Precautions: Standard    SUBJECTIVE     Pt reports: dealt with a lot of calf and upper leg spasms last night and into this morning.  She was compliant with home exercise program.  Response to previous treatment: reduced pain  Functional change: improved household duties    Pain: 4/10  Location: Left buttock    OBJECTIVE       Kevin received the treatments listed below:      Therapeutic Exercises to develop strength, endurance, ROM and flexibility for 45 minutes including:  Bike 5 min  supine nerve glides (leg on bolster SAQ then leg on bolster ankle DF/PF) x 20 each   Supine glut sets 2x10  Supine bridges 2x2  Supine abdominal brace (with bolster under knees) with blue band clamshells 20  Pilates reformer double leg press (feet hip distance, legs together, toes out) 3 springs x 20 each  Pilates reformer single leg press 2 spring 3x8  Pilates reformer single leg press sidelying 2 spring 3x8          ASSESSMENT   Reduced adverse neural tension after posture correct and ankle pump sequence. Able to begin more challenging single leg loading exercises.      Kevin Is progressing well towards her goals.    Pt prognosis is Good.     Pt will continue to benefit from skilled outpatient physical therapy to address the deficits listed in the problem list box on initial evaluation, provide pt/family education and to maximize pt's level of independence in the home and community environment. Pt's spiritual, cultural and educational needs considered and pt agreeable to plan of care and goals.     Anticipated barriers to physical therapy: previous physical therapy with limited benefit.    Goals:  Short Term Goals:  4 weeks  1.Report decreased LLE pain  < / =  5/10  to increase tolerance for walking and standing. (Met - 7/27/2022)   2. Increase ROM by 25% where limited in order to perform ADLs without difficulty. (Met - 7/27/2022)  3. Increase strength by 1/3 MMT grade in LLE  to increase tolerance for ADL and work activities. (Met - 7/27/2022)  4. Pt to tolerate HEP to improve ROM and independence with activities of daily living's. (Met - 7/27/2022)     Long Term Goals: 6 months  1.Report decreased low back pain < / = 2/10  to increase tolerance for prolonged sitting at work. (Not Met - Progressing)  2.Patient goal: walk 2 miles without taking breaks. (Not Met - Progressing)  3.Increase strength to 5/5 in  LLE  to increase tolerance for ADL and work activities. (Not Met - Progressing)    PLAN     Continue with extension based treatment. Progress core and LE strengthening as symptoms improve.    Plan of care Certification: 6/13/2022 to 12/13/2022.  Outpatient Physical Therapy 2 times weekly for 6 months     Nila Rivera, PT , DPT, OCS

## 2022-12-02 ENCOUNTER — TELEPHONE (OUTPATIENT)
Dept: NEUROSURGERY | Facility: CLINIC | Age: 37
End: 2022-12-02
Payer: MEDICAID

## 2022-12-02 ENCOUNTER — PATIENT MESSAGE (OUTPATIENT)
Dept: NEUROSURGERY | Facility: CLINIC | Age: 37
End: 2022-12-02
Payer: MEDICAID

## 2022-12-05 ENCOUNTER — CLINICAL SUPPORT (OUTPATIENT)
Dept: REHABILITATION | Facility: HOSPITAL | Age: 37
End: 2022-12-05
Payer: MEDICAID

## 2022-12-05 DIAGNOSIS — R26.9 GAIT DIFFICULTY: ICD-10-CM

## 2022-12-05 DIAGNOSIS — R29.898 WEAKNESS OF LEFT LOWER EXTREMITY: Primary | ICD-10-CM

## 2022-12-05 DIAGNOSIS — G89.29 CHRONIC LEFT-SIDED LOW BACK PAIN WITH LEFT-SIDED SCIATICA: ICD-10-CM

## 2022-12-05 DIAGNOSIS — M54.42 CHRONIC LEFT-SIDED LOW BACK PAIN WITH LEFT-SIDED SCIATICA: ICD-10-CM

## 2022-12-05 PROCEDURE — 97110 THERAPEUTIC EXERCISES: CPT

## 2022-12-08 ENCOUNTER — CLINICAL SUPPORT (OUTPATIENT)
Dept: REHABILITATION | Facility: HOSPITAL | Age: 37
End: 2022-12-08
Payer: MEDICAID

## 2022-12-08 ENCOUNTER — TELEPHONE (OUTPATIENT)
Dept: NEUROSURGERY | Facility: CLINIC | Age: 37
End: 2022-12-08
Payer: MEDICAID

## 2022-12-08 DIAGNOSIS — R26.9 GAIT DIFFICULTY: ICD-10-CM

## 2022-12-08 DIAGNOSIS — G89.29 CHRONIC LEFT-SIDED LOW BACK PAIN WITH LEFT-SIDED SCIATICA: ICD-10-CM

## 2022-12-08 DIAGNOSIS — M54.42 CHRONIC LEFT-SIDED LOW BACK PAIN WITH LEFT-SIDED SCIATICA: ICD-10-CM

## 2022-12-08 DIAGNOSIS — R29.898 WEAKNESS OF LEFT LOWER EXTREMITY: Primary | ICD-10-CM

## 2022-12-08 DIAGNOSIS — Z98.890 S/P LUMBAR MICRODISCECTOMY: Primary | ICD-10-CM

## 2022-12-08 PROCEDURE — 97110 THERAPEUTIC EXERCISES: CPT

## 2022-12-08 NOTE — PROGRESS NOTES
[facility]   Physical Therapy Treatment Note     Name: Kevin Mancini  Clinic Number: [id number]    Therapy Diagnosis:   Encounter Diagnoses   Name Primary?    Weakness of left lower extremity Yes    Chronic left-sided low back pain with left-sided sciatica     Gait difficulty        Physician: Bakari Zaragoza MD    Visit Date: 12/5/2022    Physician Orders: PT Eval and Treat 3 months s/p left L5-S1 microdiscectomy. Lumbar PT 3 times a week for 6 weeks. Sharmila exercises.   Medical Diagnosis from Referral: Z98.890 (ICD-10-CM) - S/P lumbar microdiscectomy M51.36 (ICD-10-CM) - DDD (degenerative disc disease), lumbar   Evaluation Date: 6/13/2022  Authorization Period Expiration: 11/12/2022  Plan of Care Expiration: 12/13/2022  Visit # / Visits authorized: 6 / 20    Time In: 10:12  Time Out: 11:00  Total Billable Time: 48 minutes    Precautions: Standard    SUBJECTIVE     Pt reports: dealt with a lot of calf and upper leg spasms last night and into this morning.  She was compliant with home exercise program.  Response to previous treatment: reduced pain  Functional change: improved household duties    Pain: 4/10  Location: Left buttock    OBJECTIVE       Kevin received the treatments listed below:      Therapeutic Exercises to develop strength, endurance, ROM and flexibility for 45 minutes including:  Bike 5 min  supine nerve glides (leg on bolster SAQ then leg on bolster ankle DF/PF) x 20 each   Supine glut sets 2x10  Supine bridges 2x2  Supine abdominal brace (with bolster under knees) with blue band clamshells 20  Pilates reformer double leg press (feet hip distance, legs together, toes out) 3 springs x 20 each  Pilates reformer single leg press 2 spring 3x8  Pilates reformer single leg press sidelying 2 spring 3x8          ASSESSMENT   Cont to demo pain reduction with nerve glides. Had patient perform more reformer strengthening today.     Kevin Is progressing well  towards her goals.    Pt prognosis is Good.     Pt will continue to benefit from skilled outpatient physical therapy to address the deficits listed in the problem list box on initial evaluation, provide pt/family education and to maximize pt's level of independence in the home and community environment. Pt's spiritual, cultural and educational needs considered and pt agreeable to plan of care and goals.     Anticipated barriers to physical therapy: previous physical therapy with limited benefit.    Goals:  Short Term Goals:  4 weeks  1.Report decreased LLE pain  < / =  5/10  to increase tolerance for walking and standing. (Met - 7/27/2022)   2. Increase ROM by 25% where limited in order to perform ADLs without difficulty. (Met - 7/27/2022)  3. Increase strength by 1/3 MMT grade in LLE  to increase tolerance for ADL and work activities. (Met - 7/27/2022)  4. Pt to tolerate HEP to improve ROM and independence with activities of daily living's. (Met - 7/27/2022)     Long Term Goals: 6 months  1.Report decreased low back pain < / = 2/10  to increase tolerance for prolonged sitting at work. (Not Met - Progressing)  2.Patient goal: walk 2 miles without taking breaks. (Not Met - Progressing)  3.Increase strength to 5/5 in  LLE  to increase tolerance for ADL and work activities. (Not Met - Progressing)    PLAN     Continue with extension based treatment. Progress core and LE strengthening as symptoms improve.    Plan of care Certification: 6/13/2022 to 12/13/2022.  Outpatient Physical Therapy 2 times weekly for 6 months     Nila Rivera, PT , DPT, OCS

## 2022-12-09 NOTE — PROGRESS NOTES
[facility]   Physical Therapy Treatment Note     Name: Kevin Mancini  Clinic Number: [id number]    Therapy Diagnosis:   Encounter Diagnoses   Name Primary?    Weakness of left lower extremity Yes    Chronic left-sided low back pain with left-sided sciatica     Gait difficulty        Physician: Bakari Zaragoza MD    Visit Date: 12/8/2022    Physician Orders: PT Eval and Treat 3 months s/p left L5-S1 microdiscectomy. Lumbar PT 3 times a week for 6 weeks. Sharmila exercises.   Medical Diagnosis from Referral: Z98.890 (ICD-10-CM) - S/P lumbar microdiscectomy M51.36 (ICD-10-CM) - DDD (degenerative disc disease), lumbar   Evaluation Date: 6/13/2022  Authorization Period Expiration: 11/12/2022  Plan of Care Expiration: 12/13/2022  Visit # / Visits authorized: 6 / 20    Time In: 10:12  Time Out: 11:00  Total Billable Time: 48 minutes    Precautions: Standard    SUBJECTIVE     Pt reports: that she had spasms starting at 3 am. They're really bothering her.   She was compliant with home exercise program.  Response to previous treatment: increased pain  Functional change: limitations with household    Pain: 6/10  Location: Left buttock    OBJECTIVE       Kevin received the treatments listed below:      Therapeutic Exercises to develop strength, endurance, ROM and flexibility for 30 minutes including:  Bike 5 min  MHP during supine exercises to left lower leg and left posterior hip:  supine nerve glides (leg on bolster SAQ then leg on bolster ankle DF/PF) x 20 each   Supine glut sets 2x10  Supine bridges 2x2  Supine abdominal brace (with bolster under knees) with blue band clamshells 20    NOT TODAY:  Pilates reformer double leg press (feet hip distance, legs together, toes out) 3 springs x 20 each  Pilates reformer single leg press 2 spring 3x8  Pilates reformer single leg press sidelying 2 spring 3x8          ASSESSMENT     Patient had increased pain today. Used MHP to help with distal  symptoms. Able to reduce symptoms with mat exercises and MHP.     Kevin Is progressing well towards her goals.    Pt prognosis is Good.     Pt will continue to benefit from skilled outpatient physical therapy to address the deficits listed in the problem list box on initial evaluation, provide pt/family education and to maximize pt's level of independence in the home and community environment. Pt's spiritual, cultural and educational needs considered and pt agreeable to plan of care and goals.     Anticipated barriers to physical therapy: previous physical therapy with limited benefit.    Goals:  Short Term Goals:  4 weeks  1.Report decreased LLE pain  < / =  5/10  to increase tolerance for walking and standing. (Met - 7/27/2022)   2. Increase ROM by 25% where limited in order to perform ADLs without difficulty. (Met - 7/27/2022)  3. Increase strength by 1/3 MMT grade in LLE  to increase tolerance for ADL and work activities. (Met - 7/27/2022)  4. Pt to tolerate HEP to improve ROM and independence with activities of daily living's. (Met - 7/27/2022)     Long Term Goals: 6 months  1.Report decreased low back pain < / = 2/10  to increase tolerance for prolonged sitting at work. (Not Met - Progressing)  2.Patient goal: walk 2 miles without taking breaks. (Not Met - Progressing)  3.Increase strength to 5/5 in  LLE  to increase tolerance for ADL and work activities. (Not Met - Progressing)    PLAN     Continue with extension based treatment. Progress core and LE strengthening as symptoms improve.    Plan of care Certification: 6/13/2022 to 12/13/2022.  Outpatient Physical Therapy 2 times weekly for 6 months     Nila Rivera, PT , DPT, OCS

## 2022-12-12 ENCOUNTER — HOSPITAL ENCOUNTER (OUTPATIENT)
Dept: RADIOLOGY | Facility: HOSPITAL | Age: 37
Discharge: HOME | End: 2022-12-12
Attending: NEUROLOGICAL SURGERY
Payer: MEDICAID

## 2022-12-12 ENCOUNTER — OFFICE VISIT (OUTPATIENT)
Dept: NEUROSURGERY | Facility: CLINIC | Age: 37
End: 2022-12-12
Payer: MEDICAID

## 2022-12-12 VITALS — BODY MASS INDEX: 32.74 KG/M2 | WEIGHT: 191.81 LBS | HEIGHT: 64 IN | TEMPERATURE: 98 F

## 2022-12-12 DIAGNOSIS — Z98.890 STATUS POST LUMBAR MICRODISCECTOMY: Primary | ICD-10-CM

## 2022-12-12 DIAGNOSIS — Z98.890 S/P LUMBAR MICRODISCECTOMY: ICD-10-CM

## 2022-12-12 DIAGNOSIS — M79.2 NEUROPATHIC PAIN: ICD-10-CM

## 2022-12-12 DIAGNOSIS — M96.1 POST LAMINECTOMY SYNDROME: ICD-10-CM

## 2022-12-12 DIAGNOSIS — G89.4 CHRONIC PAIN SYNDROME: ICD-10-CM

## 2022-12-12 PROCEDURE — 72110 XR LUMBAR SPINE AP AND LAT WITH FLEX/EXT: ICD-10-PCS | Mod: 26,,, | Performed by: STUDENT IN AN ORGANIZED HEALTH CARE EDUCATION/TRAINING PROGRAM

## 2022-12-12 PROCEDURE — 99999 PR PBB SHADOW E&M-EST. PATIENT-LVL III: ICD-10-PCS | Mod: PBBFAC,,, | Performed by: NEUROLOGICAL SURGERY

## 2022-12-12 PROCEDURE — 1159F MED LIST DOCD IN RCRD: CPT | Mod: CPTII,,, | Performed by: NEUROLOGICAL SURGERY

## 2022-12-12 PROCEDURE — 99999 PR PBB SHADOW E&M-EST. PATIENT-LVL III: CPT | Mod: PBBFAC,,, | Performed by: NEUROLOGICAL SURGERY

## 2022-12-12 PROCEDURE — 72110 X-RAY EXAM L-2 SPINE 4/>VWS: CPT | Mod: TC,FY

## 2022-12-12 PROCEDURE — 3008F BODY MASS INDEX DOCD: CPT | Mod: CPTII,,, | Performed by: NEUROLOGICAL SURGERY

## 2022-12-12 PROCEDURE — 72110 X-RAY EXAM L-2 SPINE 4/>VWS: CPT | Mod: 26,,, | Performed by: STUDENT IN AN ORGANIZED HEALTH CARE EDUCATION/TRAINING PROGRAM

## 2022-12-12 PROCEDURE — 1159F PR MEDICATION LIST DOCUMENTED IN MEDICAL RECORD: ICD-10-PCS | Mod: CPTII,,, | Performed by: NEUROLOGICAL SURGERY

## 2022-12-12 PROCEDURE — 99213 OFFICE O/P EST LOW 20 MIN: CPT | Mod: PBBFAC,PN | Performed by: NEUROLOGICAL SURGERY

## 2022-12-12 PROCEDURE — 3008F PR BODY MASS INDEX (BMI) DOCUMENTED: ICD-10-PCS | Mod: CPTII,,, | Performed by: NEUROLOGICAL SURGERY

## 2022-12-12 PROCEDURE — 99213 OFFICE O/P EST LOW 20 MIN: CPT | Mod: S$PBB,,, | Performed by: NEUROLOGICAL SURGERY

## 2022-12-12 PROCEDURE — 72082 XR SCOLIOSIS COMPLETE: ICD-10-PCS | Mod: 26,,, | Performed by: RADIOLOGY

## 2022-12-12 PROCEDURE — 72082 X-RAY EXAM ENTIRE SPI 2/3 VW: CPT | Mod: 26,,, | Performed by: RADIOLOGY

## 2022-12-12 PROCEDURE — 72082 X-RAY EXAM ENTIRE SPI 2/3 VW: CPT | Mod: TC,FY

## 2022-12-12 PROCEDURE — 99213 PR OFFICE/OUTPT VISIT, EST, LEVL III, 20-29 MIN: ICD-10-PCS | Mod: S$PBB,,, | Performed by: NEUROLOGICAL SURGERY

## 2022-12-12 RX ORDER — DEXAMETHASONE 4 MG/1
4 TABLET ORAL EVERY 8 HOURS
Qty: 9 TABLET | Refills: 0 | Status: SHIPPED | OUTPATIENT
Start: 2022-12-12 | End: 2022-12-15

## 2022-12-12 RX ORDER — HYDROCODONE BITARTRATE AND ACETAMINOPHEN 10; 325 MG/1; MG/1
1 TABLET ORAL 3 TIMES DAILY PRN
Qty: 90 TABLET | Refills: 0 | Status: SHIPPED | OUTPATIENT
Start: 2022-12-12 | End: 2023-01-23 | Stop reason: SDUPTHER

## 2022-12-12 RX ORDER — BACLOFEN 10 MG/1
10 TABLET ORAL 3 TIMES DAILY
Qty: 90 TABLET | Refills: 11 | Status: SHIPPED | OUTPATIENT
Start: 2022-12-12 | End: 2023-08-24 | Stop reason: SDUPTHER

## 2022-12-12 NOTE — PROGRESS NOTES
NEUROSURGICAL PROGRESS NOTE    DATE OF SERVICE:  12/12/2022    ATTENDING PHYSICIAN:  Bakari Zaragoza MD    SUBJECTIVE:    INTERIM HISTORY:    This is a very pleasant 37 y.o. female, who is status post left L5-S1 microdiskectomy in March 2022 for large disc herniation with severe radiculopathy.  Following the surgery the patient left leg pain and numbness improved and then eventually become worse.  Lumbar spine MRI showed that she had good decompression of the traversing S1 nerve root however she has some foraminal stenosis at L5-S1.  She also has a L4-5 central disc bulge.  She has 6/10 of back pain but her main complaint is the left leg pain that is more than 7/10, she has returned to work and is doing physical therapy.  She noticed some improvement with physical therapy.  She takes hydrocodone 10 mg for pain as needed.  She also takes the gabapentin 1200 mg twice daily.  She tried lidocaine cream in the past without significant pain relief.  Pain is affecting her ability to sit or stand for long period of time.  At work she has to change position frequently due to the pain.  We ordered last time a left L5-S1 transforaminal epidural steroid injection with Interventional Radiology and she is finally scheduled for mid January together injection done.              PAST MEDICAL HISTORY:  Active Ambulatory Problems     Diagnosis Date Noted    Chronic left-sided low back pain with left-sided sciatica 11/18/2021    Decreased range of motion of lumbar spine 11/18/2021    Weakness of left lower extremity 11/18/2021    Gait difficulty 11/18/2021    Tobacco user 01/02/2019    Chronic asthmatic bronchitis with acute exacerbation 08/01/2019    Exercise-induced asthma 01/02/2019    Lumbar disc herniation with radiculopathy 03/02/2022     Resolved Ambulatory Problems     Diagnosis Date Noted    No Resolved Ambulatory Problems     Past Medical History:   Diagnosis Date    Asthma        PAST SURGICAL HISTORY:  Past Surgical  History:   Procedure Laterality Date    MICRODISCECTOMY OF SPINE N/A 3/2/2022    Procedure: MICRODISCECTOMY, SPINE Left L5-S1 Microdiscectomy;  Surgeon: Bakari Zaragoza MD;  Location: [facility];  Service: Neurosurgery;  Laterality: N/A;  Procedure: Left L5-S1 Microdiscectomy  Surgery Time: 1.5hr  LOS: 0-1  Anesthesia: General  Blood: Type & Screen  Radiology: C-arm  Microscope: Metrx  Bed: [place] [id number] Poster  Position: Prone       SOCIAL HISTORY:   Social History     Socioeconomic History    Marital status: Single   Tobacco Use    Smoking status: Some Days     Types: Cigars     Passive exposure: Never    Smokeless tobacco: Never    Tobacco comments:     social   Substance and Sexual Activity    Alcohol use: Yes     Alcohol/week: 1.0 standard drink     Types: 1 Glasses of wine per week     Comment: social    Drug use: Not Currently       FAMILY HISTORY:  No family history on file.    CURRENTS MEDICATIONS:  Current Outpatient Medications on File Prior to Visit   Medication Sig Dispense Refill    diclofenac (VOLTAREN) 75 MG EC tablet Take 1 tablet (75 mg total) by mouth 2 (two) times daily as needed (pain). 60 tablet 2    ferrous sulfate (FEOSOL) 325 mg (65 mg iron) Tab tablet Take 325 mg by mouth daily with breakfast.      gabapentin (NEURONTIN) 600 MG tablet Take 1.5 tablets (900 mg total) by mouth 3 (three) times daily. 135 tablet 11    HYDROcodone-acetaminophen (NORCO)  mg per tablet Take 1 tablet by mouth 3 (three) times daily as needed for Pain (pain). 90 tablet 0    traMADoL (ULTRAM) 50 mg tablet Take 1 tablet (50 mg total) by mouth every 8 (eight) hours as needed for Pain. 90 tablet 3    [DISCONTINUED] methocarbamoL (ROBAXIN) 750 MG Tab TAKE 1 TABLET BY MOUTH THREE TIMES DAILY AS NEEDED FOR MUSCLE SPASMS 90 tablet 11    [DISCONTINUED] methylPREDNISolone (MEDROL DOSEPACK) 4 mg tablet use as directed 1 each 0     No current facility-administered medications on file prior to visit.       ALLERGIES:  Review of  patient's allergies indicates:  No Known Allergies    REVIEW OF SYSTEMS:  Review of Systems   Constitutional:  Negative for diaphoresis, fever and weight loss.   Respiratory:  Negative for shortness of breath.    Cardiovascular:  Negative for chest pain.   Gastrointestinal:  Negative for blood in stool.   Genitourinary:  Negative for hematuria.   Endo/Heme/Allergies:  Does not bruise/bleed easily.   All other systems reviewed and are negative.      OBJECTIVE:    PHYSICAL EXAMINATION:   Vitals:    12/12/22 1433   Temp: 98 °F (36.7 °C)       Physical Exam:  Vitals reviewed.    Constitutional: She appears well-developed and well-nourished.     Eyes: Pupils are equal, round, and reactive to light. Conjunctivae and EOM are normal.     Cardiovascular: Normal distal pulses and no edema.     Abdominal: Soft.     Skin: Skin displays no rash on trunk and no rash on extremities. Skin displays no lesions on trunk and no lesions on extremities.     Psych/Behavior: She is alert. She is oriented to person, place, and time. She has a normal mood and affect.     Musculoskeletal:        Neck: Range of motion is full.     Neurological:        DTRs: Tricep reflexes are 2+ on the right side and 2+ on the left side. Bicep reflexes are 2+ on the right side and 2+ on the left side. Brachioradialis reflexes are 2+ on the right side and 2+ on the left side. Patellar reflexes are 2+ on the right side and 2+ on the left side. Achilles reflexes are 2+ on the right side and 0 on the left side.     Back Exam     Muscle Strength   Right Quadriceps:  5/5   Left Quadriceps:  5/5   Right Hamstrings:  5/5   Left Hamstrings:  5/5             Neurologic Exam     Mental Status   Oriented to person, place, and time.   Speech: speech is normal   Level of consciousness: alert    Cranial Nerves   Cranial nerves II through XII intact.     CN III, IV, VI   Pupils are equal, round, and reactive to light.  Extraocular motions are normal.     Motor Exam   Muscle  bulk: normal  Overall muscle tone: normal    Strength   Right deltoid: 5/5  Left deltoid: 5/5  Right biceps: 5/5  Left biceps: 5/5  Right triceps: 5/5  Left triceps: 5/5  Right wrist flexion: 5/5  Left wrist flexion: 5/5  Right wrist extension: 5/5  Left wrist extension: 5/5  Right interossei: 5/5  Left interossei: 5/5  Right iliopsoas: 5/5  Left iliopsoas: 5/5  Right quadriceps: 5/5  Left quadriceps: 5/5  Right hamstrin/5  Left hamstrin/5  Right anterior tibial: 5/5  Left anterior tibial: 5/5  Right posterior tibial: 5/5  Left posterior tibial: 5/5  Right peroneal: 5/5  Left peroneal: 5/5  Right gastroc: 5/5  Left gastroc: 5/5    Sensory Exam   Light touch normal.   Pinprick normal.     Gait, Coordination, and Reflexes     Gait  Gait: (Antalgic)    Coordination   Finger to nose coordination: normal  Tandem walking coordination: normal    Reflexes   Right brachioradialis: 2+  Left brachioradialis: 2+  Right biceps: 2+  Left biceps: 2+  Right triceps: 2+  Left triceps: 2+  Right patellar: 2+  Left patellar: 2+  Right achilles: 2+  Left achilles: 0  Right plantar: normal  Left plantar: normal  Right Villasenor: absent  Left Villasenor: absent  Right ankle clonus: absent  Left ankle clonus: absent      DIAGNOSTIC DATA:  I personally interpreted the following imaging:   Scoliosis film today shows good alignment, no spondylolisthesis    ASSESMENT:  This is a 37 y.o. female with     Problem List Items Addressed This Visit    None  Visit Diagnoses       Status post lumbar microdiscectomy    -  Primary    Neuropathic pain        Chronic pain syndrome        Post laminectomy syndrome                  PLAN:  She is scheduled for a left L5-S1 transforaminal epidural steroid injection in mid January  Continue gabapentin and hydrocodone  I will add baclofen 10 mg 3 times daily for pain control  Dexamethasone 4 mg q.6 hours for 3 days to help with pain in the meantime  Follow-up in February  All questions answered      Bakari MCCULLOUGH  Nik BOLDEN  Cell:[phone]

## 2023-01-10 ENCOUNTER — PATIENT MESSAGE (OUTPATIENT)
Dept: NEUROSURGERY | Facility: CLINIC | Age: 38
End: 2023-01-10
Payer: MEDICAID

## 2023-01-13 ENCOUNTER — HOSPITAL ENCOUNTER (OUTPATIENT)
Dept: INTERVENTIONAL RADIOLOGY/VASCULAR | Facility: HOSPITAL | Age: 38
Discharge: HOME | End: 2023-01-13
Attending: NEUROLOGICAL SURGERY
Payer: MEDICAID

## 2023-01-13 VITALS
SYSTOLIC BLOOD PRESSURE: 131 MMHG | HEART RATE: 91 BPM | RESPIRATION RATE: 18 BRPM | DIASTOLIC BLOOD PRESSURE: 76 MMHG | OXYGEN SATURATION: 100 %

## 2023-01-13 DIAGNOSIS — M48.07 FORAMINAL STENOSIS OF LUMBOSACRAL REGION: ICD-10-CM

## 2023-01-13 LAB
B-HCG UR QL: NEGATIVE
CTP QC/QA: YES

## 2023-01-13 PROCEDURE — 64483 NJX AA&/STRD TFRM EPI L/S 1: CPT | Mod: LT

## 2023-01-13 PROCEDURE — 25500020 PHARM REV CODE 255: Performed by: RADIOLOGY

## 2023-01-13 PROCEDURE — A4215 STERILE NEEDLE: HCPCS

## 2023-01-13 PROCEDURE — 81025 URINE PREGNANCY TEST: CPT | Performed by: RADIOLOGY

## 2023-01-13 PROCEDURE — 64483 IR EPIDURAL TRANSFORAMINAL INJ 1ST VERT LUMBAR UNI: ICD-10-PCS | Mod: LT,,, | Performed by: RADIOLOGY

## 2023-01-13 PROCEDURE — 64483 NJX AA&/STRD TFRM EPI L/S 1: CPT | Mod: LT,,, | Performed by: RADIOLOGY

## 2023-01-13 PROCEDURE — 25000003 PHARM REV CODE 250: Performed by: RADIOLOGY

## 2023-01-13 PROCEDURE — A4550 SURGICAL TRAYS: HCPCS

## 2023-01-13 PROCEDURE — 63600175 PHARM REV CODE 636 W HCPCS: Performed by: RADIOLOGY

## 2023-01-13 RX ORDER — METHYLPREDNISOLONE ACETATE 40 MG/ML
INJECTION, SUSPENSION INTRA-ARTICULAR; INTRALESIONAL; INTRAMUSCULAR; SOFT TISSUE
Status: COMPLETED | OUTPATIENT
Start: 2023-01-13 | End: 2023-01-13

## 2023-01-13 RX ORDER — LIDOCAINE HYDROCHLORIDE 10 MG/ML
INJECTION INFILTRATION; PERINEURAL
Status: COMPLETED | OUTPATIENT
Start: 2023-01-13 | End: 2023-01-13

## 2023-01-13 RX ADMIN — IOHEXOL 3 ML: 180 INJECTION INTRAVENOUS at 10:01

## 2023-01-13 RX ADMIN — LIDOCAINE HYDROCHLORIDE 2 MG: 10 INJECTION, SOLUTION INFILTRATION; PERINEURAL at 09:01

## 2023-01-13 RX ADMIN — METHYLPREDNISOLONE ACETATE 40 MG: 40 INJECTION, SUSPENSION INTRA-ARTICULAR; INTRALESIONAL; INTRAMUSCULAR; SOFT TISSUE at 09:01

## 2023-01-13 NOTE — H&P
Vascular and Interventional Radiology History & Physical    Date:  1/13/2023    Chief Complaint:   Low back pain    History of Present Illness:  Kevin Mancini is a 37 y.o. female who presents for epidural steroid injection for refractory low back pain.     Past Medical History:  Past Medical History:   Diagnosis Date    Asthma        Past Surgical History:  Past Surgical History:   Procedure Laterality Date    MICRODISCECTOMY OF SPINE N/A 3/2/2022    Procedure: MICRODISCECTOMY, SPINE Left L5-S1 Microdiscectomy;  Surgeon: Bakari Zaragoza MD;  Location: [facility];  Service: Neurosurgery;  Laterality: N/A;  Procedure: Left L5-S1 Microdiscectomy  Surgery Time: 1.5hr  LOS: 0-1  Anesthesia: General  Blood: Type & Screen  Radiology: C-arm  Microscope: Metrx  Bed: [place] [id number] Poster  Position: Prone        Social History:  Social History     Tobacco Use    Smoking status: Some Days     Types: Cigars     Passive exposure: Never    Smokeless tobacco: Never    Tobacco comments:     social   Substance Use Topics    Alcohol use: Yes     Alcohol/week: 1.0 standard drink     Types: 1 Glasses of wine per week     Comment: social    Drug use: Not Currently        Home Medications:   Prior to Admission medications    Medication Sig Start Date End Date Taking? Authorizing Provider   baclofen (LIORESAL) 10 MG tablet Take 1 tablet (10 mg total) by mouth 3 (three) times daily. 12/12/22 12/12/23  Bakari Zaragoza MD   diclofenac (VOLTAREN) 75 MG EC tablet TAKE 1 TABLET(75 MG) BY MOUTH TWICE DAILY AS NEEDED FOR PAIN 12/22/22   Stacey Thakur PA-C   ferrous sulfate (FEOSOL) 325 mg (65 mg iron) Tab tablet Take 325 mg by mouth daily with breakfast.    Historical Provider   gabapentin (NEURONTIN) 600 MG tablet Take 1.5 tablets (900 mg total) by mouth 3 (three) times daily. 10/24/22 10/24/23  Bakari Zaragoza MD   traMADoL (ULTRAM) 50 mg tablet Take 1 tablet (50 mg total) by mouth every 8 (eight) hours as needed for Pain. 9/3/22    Bakari Zaragoza MD       Inpatient Medications:    Current Outpatient Medications:     baclofen (LIORESAL) 10 MG tablet, Take 1 tablet (10 mg total) by mouth 3 (three) times daily., Disp: 90 tablet, Rfl: 11    diclofenac (VOLTAREN) 75 MG EC tablet, TAKE 1 TABLET(75 MG) BY MOUTH TWICE DAILY AS NEEDED FOR PAIN, Disp: 60 tablet, Rfl: 2    ferrous sulfate (FEOSOL) 325 mg (65 mg iron) Tab tablet, Take 325 mg by mouth daily with breakfast., Disp: , Rfl:     gabapentin (NEURONTIN) 600 MG tablet, Take 1.5 tablets (900 mg total) by mouth 3 (three) times daily., Disp: 135 tablet, Rfl: 11    traMADoL (ULTRAM) 50 mg tablet, Take 1 tablet (50 mg total) by mouth every 8 (eight) hours as needed for Pain., Disp: 90 tablet, Rfl: 3     Anticoagulants/Antiplatelets:   no anticoagulation    Allergies:   Review of patient's allergies indicates:  No Known Allergies    Review of Systems:   As documented in primary provider H&P.    Vital Signs (Most Recent):       Physical Exam:  No acute distress, leaning uncomfortably on bed, pleasant and cooperative  Regular rate and rhythm  Breathing unlabored  Abdomen benign  Extremities warm and well perfused    Laboratory:  No results found for: INR    Lab Results   Component Value Date    WBC 11.91 (H) 03/24/2005    HGB 10.5 (L) 03/24/2005    HCT 32.1 (L) 03/24/2005    MCV 93.3 03/24/2005     (H) 03/24/2005      Lab Results   Component Value Date    GLU 81 03/24/2005     03/24/2005    K 3.9 03/24/2005     03/24/2005    CO2 22 (L) 03/24/2005    BUN 12 03/24/2005    CREATININE 0.9 03/24/2005    CALCIUM 9.2 03/24/2005    ALT 10 03/24/2005    AST 17 03/24/2005    ALBUMIN 4.8 03/24/2005    BILITOT 0.3 03/24/2005       Imaging:  Reviewed.      ASSESSMENT/PLAN:   37F with low back pain, here for epidural steroid injection.     Procedure discussed in detail with patient. Informed consent obtained.     Will plan for left L5-S1 TERRENCE with local anesthesia.     Nerissa Betancourt MD  Fellow, Dept.  Of Interventional Radiology  [facility]

## 2023-01-13 NOTE — DISCHARGE INSTRUCTIONS
[facility] [phone] (MON-FRI 8 AM- 5PM). Radiology Resident on call [phone].    
2.18

## 2023-01-13 NOTE — PLAN OF CARE
Left L5-S1 TFESI completed, pt tolerated well. No apparent distress noted. Mepore applied CDI. Discharge instructions reviewed and acknowledged. Pt discharged via wheelchair and private vehicle.

## 2023-01-13 NOTE — PLAN OF CARE
Pt arrived to IR room 4 for L L5-S1 TFESI, no acute distress noted. Orders and labs reviewed on chart. Awaiting consent.

## 2023-01-13 NOTE — PROCEDURES
Radiology Post-Procedure Note    Pre Op Diagnosis: LBP    Post Op Diagnosis: Same    Procedure: Lumbar Transforaminal TERRENCE    Procedure performed by: Harsha Tamayo MD    Written Informed Consent Obtained: Yes    Specimen Removed: NO    Estimated Blood Loss: Minimal    Findings:     Level injected: Left L5-S1  Needle used: 22 gauge  Dose:  40 mg Depo-methylprednisolone   2 mL Lidocaine 1% MPF    Patient tolerated procedure well.        Madalyn Jain MD     Neuro Endovascular Surgery Fellow   [facility]

## 2023-01-23 ENCOUNTER — PATIENT MESSAGE (OUTPATIENT)
Dept: NEUROSURGERY | Facility: CLINIC | Age: 38
End: 2023-01-23
Payer: MEDICAID

## 2023-02-16 ENCOUNTER — OFFICE VISIT (OUTPATIENT)
Dept: NEUROSURGERY | Facility: CLINIC | Age: 38
End: 2023-02-16
Payer: MEDICAID

## 2023-02-16 ENCOUNTER — TELEPHONE (OUTPATIENT)
Dept: NEUROSURGERY | Facility: CLINIC | Age: 38
End: 2023-02-16

## 2023-02-16 VITALS
HEIGHT: 64 IN | HEART RATE: 91 BPM | BODY MASS INDEX: 32.74 KG/M2 | SYSTOLIC BLOOD PRESSURE: 131 MMHG | DIASTOLIC BLOOD PRESSURE: 76 MMHG | WEIGHT: 191.81 LBS

## 2023-02-16 DIAGNOSIS — M96.1 POST LAMINECTOMY SYNDROME: ICD-10-CM

## 2023-02-16 DIAGNOSIS — Z98.890 S/P LUMBAR MICRODISCECTOMY: ICD-10-CM

## 2023-02-16 DIAGNOSIS — M79.2 NEUROPATHIC PAIN: Primary | ICD-10-CM

## 2023-02-16 DIAGNOSIS — G89.4 CHRONIC PAIN SYNDROME: ICD-10-CM

## 2023-02-16 PROCEDURE — 3078F DIAST BP <80 MM HG: CPT | Mod: CPTII,,, | Performed by: PHYSICIAN ASSISTANT

## 2023-02-16 PROCEDURE — 1159F MED LIST DOCD IN RCRD: CPT | Mod: CPTII,,, | Performed by: PHYSICIAN ASSISTANT

## 2023-02-16 PROCEDURE — 99999 PR PBB SHADOW E&M-EST. PATIENT-LVL III: ICD-10-PCS | Mod: PBBFAC,,, | Performed by: PHYSICIAN ASSISTANT

## 2023-02-16 PROCEDURE — 1160F RVW MEDS BY RX/DR IN RCRD: CPT | Mod: CPTII,,, | Performed by: PHYSICIAN ASSISTANT

## 2023-02-16 PROCEDURE — 3078F PR MOST RECENT DIASTOLIC BLOOD PRESSURE < 80 MM HG: ICD-10-PCS | Mod: CPTII,,, | Performed by: PHYSICIAN ASSISTANT

## 2023-02-16 PROCEDURE — 99214 PR OFFICE/OUTPT VISIT, EST, LEVL IV, 30-39 MIN: ICD-10-PCS | Mod: S$PBB,,, | Performed by: PHYSICIAN ASSISTANT

## 2023-02-16 PROCEDURE — 3008F PR BODY MASS INDEX (BMI) DOCUMENTED: ICD-10-PCS | Mod: CPTII,,, | Performed by: PHYSICIAN ASSISTANT

## 2023-02-16 PROCEDURE — 1159F PR MEDICATION LIST DOCUMENTED IN MEDICAL RECORD: ICD-10-PCS | Mod: CPTII,,, | Performed by: PHYSICIAN ASSISTANT

## 2023-02-16 PROCEDURE — 99999 PR PBB SHADOW E&M-EST. PATIENT-LVL III: CPT | Mod: PBBFAC,,, | Performed by: PHYSICIAN ASSISTANT

## 2023-02-16 PROCEDURE — 3075F PR MOST RECENT SYSTOLIC BLOOD PRESS GE 130-139MM HG: ICD-10-PCS | Mod: CPTII,,, | Performed by: PHYSICIAN ASSISTANT

## 2023-02-16 PROCEDURE — 99214 OFFICE O/P EST MOD 30 MIN: CPT | Mod: S$PBB,,, | Performed by: PHYSICIAN ASSISTANT

## 2023-02-16 PROCEDURE — 99213 OFFICE O/P EST LOW 20 MIN: CPT | Mod: PBBFAC,PN | Performed by: PHYSICIAN ASSISTANT

## 2023-02-16 PROCEDURE — 3008F BODY MASS INDEX DOCD: CPT | Mod: CPTII,,, | Performed by: PHYSICIAN ASSISTANT

## 2023-02-16 PROCEDURE — 3075F SYST BP GE 130 - 139MM HG: CPT | Mod: CPTII,,, | Performed by: PHYSICIAN ASSISTANT

## 2023-02-16 PROCEDURE — 1160F PR REVIEW ALL MEDS BY PRESCRIBER/CLIN PHARMACIST DOCUMENTED: ICD-10-PCS | Mod: CPTII,,, | Performed by: PHYSICIAN ASSISTANT

## 2023-02-16 NOTE — TELEPHONE ENCOUNTER
Dr. Zaragoza,    I saw this patient for fu.  She had left L5-S1 TF TERRENCE with IR with no relief of her left leg pain.  You mentioned if it helped then potentially further spine surgery.  Do you recommend EMG or possible neuromodulation options?    Thanks,  Stacey Thakur, [facility], PA-C  Neurosurgery  [facility]  02/16/2023

## 2023-02-16 NOTE — PROGRESS NOTES
"Subjective:     Patient ID:  Kevin Mancini is a 37 y.o. female.    Nik    Chief Complaint: Left leg pain    HPI    Kevin Mancini is a 37 y.o. female who presents for follow up.  Patient had the left L5-S1 transforaminal epidural steroid injection with Interventional Radiology and states that she got no relief of her back or leg pain.  She states that the leg pain has gotten worse.  She describes pain in the posterior lateral leg to the bottom of the foot.  She also gets spasms in the calf.  She denies any right leg pain or paresthesias.      She increase the gabapentin 900 3 times a day without relief.  She takes then combination with the baclofen and Norco.  She does get some relief of this but has to take it every 8 hours.    Patient denies any recent accidents or trauma, no saddle anesthesias, and no bowel or bladder incontinence.      Review of Systems:  Constitution: Negative for chills, fever, night sweats and weight loss.   Musculoskeletal: Negative for falls.   Gastrointestinal: Negative for bowel incontinence, nausea and vomiting.   Genitourinary: Negative for bladder incontinence.   Neurological: Negative for disturbances in coordination and loss of balance.      Objective:      Vitals:    02/16/23 1315   BP: 131/76   Pulse: 91   Weight: 87 kg (191 lb 12.8 oz)   Height: 5' 4" (1.626 m)   PainSc:   8         Physical Exam: Exam done in the chair      General:  Kevin Mancini is well-developed, well-nourished, appears stated age, in no acute distress, alert and oriented to person, place, and time.    Pulmonary/Chest:  Respiratory effort normal  Abdominal: Exhibits no distension  Psychiatric:  Normal mood and affect.  Behavior is normal.  Judgement and thought content normal      Musculoskeletal:    Lumbar ROM:   Mild pain in lumbar flexion, extension, left lateral bending, and right lateral bending.      Neurological:  Alert and oriented to person, place, and time    Muscle " strength against resistance:     Right Left   Hip flexion  5 / 5 4 / 5   Hip extension 5 / 5 4 / 5   Hip abduction 5 / 5 5 / 5   Hip adduction  5 / 5 5 / 5   Knee extension  5 / 5 4 / 5   Knee flexion 5 / 5 4 / 5   Dorsiflexion  5 / 5 4 / 5   EHL  5 / 5 4 / 5   Plantar flexion  5 / 5 5 / 5   Inversion of the feet 5 / 5 5 / 5   Eversion of the feet  5 / 5 4 / 5       Reflexes:     Right Left   Patellar 3+ 3+   Achilles 2+ 2+     Clonus:  Negative bilaterally    On gross examination of the bilateral upper extremities, patient has full painfree ROM with no signs of clubbing, cyanosis, edema, or weakness.       MRI  Interpretation:     Lumbar spine MRI post lumbar microdiskectomy in April 2022 was showing severe bilateral L5-S1 foraminal stenosis, L4-5 degenerative disc disease with disc bulge causing mild stenosis      Assessment:          1. Neuropathic pain    2. Post laminectomy syndrome    3. S/P lumbar microdiscectomy    4. Chronic pain syndrome            Plan:          Orders Placed This Encounter    MRI Lumbar Spine W WO Contrast    Creatinine, Serum    EMG W/ ULTRASOUND AND NERVE CONDUCTION TEST 1 Extremity       Will review the above with Dr. Zaragoza for further recommendations going forward.    Follow-Up:  Follow up if symptoms worsen or fail to improve. If there are any questions prior to this, the patient was instructed to contact the office.       BEKAH Swift, PAPEGGY  Neurosurgery  [facility]  02/17/2023    Addendum:    02/17/2023     I reviewed everything with Dr. Zaragoza.  He recommends MRI lumbar spine and left leg EMG/NCS and fu with him after. Creatinine before MRI lspine.    BEKAH Swift, SOM  Neurosurgery  [facility]

## 2023-02-17 ENCOUNTER — TELEPHONE (OUTPATIENT)
Dept: NEUROSURGERY | Facility: CLINIC | Age: 38
End: 2023-02-17
Payer: MEDICAID

## 2023-02-17 NOTE — TELEPHONE ENCOUNTER
Informed pt, per Stacey      That  recommends that she gets a MRI done of her back and a EMG done. But she will need labs done before her MRI .    PT stated that she will like for her MRI to be done at the main [facility] and she will like her labs to be done in [place]. I stated to pt that someone will call her to set her up with an appointment for the EMG.Scheduled pt for labs and MRI to be done. Pt voiced understanding

## 2023-02-17 NOTE — TELEPHONE ENCOUNTER
Schedule MRI lspine with and without contrast and left leg EMG/NCS per Dr. Zaragoza and fu with him after.    Needs creatinine lab done before MRI lspine.    Let patient know.    Stacey Thakur [facility], PA-C  Neurosurgery  [facility]  02/17/2023

## 2023-02-23 ENCOUNTER — PATIENT MESSAGE (OUTPATIENT)
Dept: NEUROSURGERY | Facility: CLINIC | Age: 38
End: 2023-02-23
Payer: MEDICAID

## 2023-03-01 ENCOUNTER — LAB VISIT (OUTPATIENT)
Dept: LAB | Facility: HOSPITAL | Age: 38
End: 2023-03-01
Attending: PHYSICIAN ASSISTANT
Payer: MEDICAID

## 2023-03-01 DIAGNOSIS — M79.2 NEUROPATHIC PAIN: ICD-10-CM

## 2023-03-01 DIAGNOSIS — G89.4 CHRONIC PAIN SYNDROME: ICD-10-CM

## 2023-03-01 DIAGNOSIS — M96.1 POST LAMINECTOMY SYNDROME: ICD-10-CM

## 2023-03-01 DIAGNOSIS — Z98.890 S/P LUMBAR MICRODISCECTOMY: ICD-10-CM

## 2023-03-01 LAB
CREAT SERPL-MCNC: 0.8 MG/DL (ref 0.5–1.4)
EST. GFR  (NO RACE VARIABLE): >60 ML/MIN/1.73 M^2

## 2023-03-01 PROCEDURE — 82565 ASSAY OF CREATININE: CPT | Performed by: PHYSICIAN ASSISTANT

## 2023-03-01 PROCEDURE — 36415 COLL VENOUS BLD VENIPUNCTURE: CPT | Mod: PN | Performed by: PHYSICIAN ASSISTANT

## 2023-03-03 ENCOUNTER — HOSPITAL ENCOUNTER (OUTPATIENT)
Dept: RADIOLOGY | Facility: HOSPITAL | Age: 38
Discharge: HOME | End: 2023-03-03
Attending: PHYSICIAN ASSISTANT
Payer: MEDICAID

## 2023-03-03 DIAGNOSIS — G89.4 CHRONIC PAIN SYNDROME: ICD-10-CM

## 2023-03-03 DIAGNOSIS — M79.2 NEUROPATHIC PAIN: ICD-10-CM

## 2023-03-03 DIAGNOSIS — Z98.890 S/P LUMBAR MICRODISCECTOMY: ICD-10-CM

## 2023-03-03 DIAGNOSIS — M96.1 POST LAMINECTOMY SYNDROME: ICD-10-CM

## 2023-03-03 PROCEDURE — 72158 MRI LUMBAR SPINE W/O & W/DYE: CPT | Mod: 26,,, | Performed by: RADIOLOGY

## 2023-03-03 PROCEDURE — 72158 MRI LUMBAR SPINE W/O & W/DYE: CPT | Mod: TC

## 2023-03-03 PROCEDURE — 72158 MRI LUMBAR SPINE W WO CONTRAST: ICD-10-PCS | Mod: 26,,, | Performed by: RADIOLOGY

## 2023-03-03 PROCEDURE — A9585 GADOBUTROL INJECTION: HCPCS | Performed by: PHYSICIAN ASSISTANT

## 2023-03-03 PROCEDURE — 25500020 PHARM REV CODE 255: Performed by: PHYSICIAN ASSISTANT

## 2023-03-03 RX ORDER — GADOBUTROL 604.72 MG/ML
9 INJECTION INTRAVENOUS
Status: COMPLETED | OUTPATIENT
Start: 2023-03-03 | End: 2023-03-03

## 2023-03-03 RX ADMIN — GADOBUTROL 9 ML: 604.72 INJECTION INTRAVENOUS at 08:03

## 2023-03-07 ENCOUNTER — TELEPHONE (OUTPATIENT)
Dept: NEUROSURGERY | Facility: CLINIC | Age: 38
End: 2023-03-07
Payer: MEDICAID

## 2023-03-07 NOTE — TELEPHONE ENCOUNTER
Patient needs EMG/NCS scheduled and fu with Dr. Zaragoza after.    I sent a previous message on this.    Thanks,  Stacey Thakur, [facility], PA-C  Neurosurgery  [facility]  03/07/2023

## 2023-05-10 ENCOUNTER — OFFICE VISIT (OUTPATIENT)
Dept: NEUROLOGY | Facility: CLINIC | Age: 38
End: 2023-05-10
Payer: MEDICAID

## 2023-05-10 ENCOUNTER — PROCEDURE VISIT (OUTPATIENT)
Dept: NEUROLOGY | Facility: CLINIC | Age: 38
End: 2023-05-10
Payer: MEDICAID

## 2023-05-10 VITALS
DIASTOLIC BLOOD PRESSURE: 76 MMHG | SYSTOLIC BLOOD PRESSURE: 126 MMHG | HEIGHT: 64 IN | WEIGHT: 180.75 LBS | BODY MASS INDEX: 30.86 KG/M2 | HEART RATE: 122 BPM

## 2023-05-10 DIAGNOSIS — Z98.890 S/P LUMBAR MICRODISCECTOMY: ICD-10-CM

## 2023-05-10 DIAGNOSIS — M79.2 NEUROPATHIC PAIN: ICD-10-CM

## 2023-05-10 DIAGNOSIS — G89.4 CHRONIC PAIN SYNDROME: ICD-10-CM

## 2023-05-10 DIAGNOSIS — M79.605 PAIN OF LEFT LOWER EXTREMITY: Primary | ICD-10-CM

## 2023-05-10 DIAGNOSIS — M96.1 POST LAMINECTOMY SYNDROME: ICD-10-CM

## 2023-05-10 DIAGNOSIS — R20.0 NUMBNESS OF LOWER LIMB: ICD-10-CM

## 2023-05-10 PROCEDURE — 1160F RVW MEDS BY RX/DR IN RCRD: CPT | Mod: CPTII,,, | Performed by: PSYCHIATRY & NEUROLOGY

## 2023-05-10 PROCEDURE — 95909 NRV CNDJ TST 5-6 STUDIES: CPT | Mod: 26,S$PBB,, | Performed by: PSYCHIATRY & NEUROLOGY

## 2023-05-10 PROCEDURE — 1160F PR REVIEW ALL MEDS BY PRESCRIBER/CLIN PHARMACIST DOCUMENTED: ICD-10-PCS | Mod: CPTII,,, | Performed by: PSYCHIATRY & NEUROLOGY

## 2023-05-10 PROCEDURE — 99999 PR PBB SHADOW E&M-EST. PATIENT-LVL I: ICD-10-PCS | Mod: PBBFAC,,, | Performed by: PSYCHIATRY & NEUROLOGY

## 2023-05-10 PROCEDURE — 95886 MUSC TEST DONE W/N TEST COMP: CPT | Mod: PBBFAC,PO | Performed by: PSYCHIATRY & NEUROLOGY

## 2023-05-10 PROCEDURE — 95909 PR NERVE CONDUCTION STUDY; 5-6 STUDIES: ICD-10-PCS | Mod: 26,S$PBB,, | Performed by: PSYCHIATRY & NEUROLOGY

## 2023-05-10 PROCEDURE — 1159F MED LIST DOCD IN RCRD: CPT | Mod: CPTII,,, | Performed by: PSYCHIATRY & NEUROLOGY

## 2023-05-10 PROCEDURE — 1159F PR MEDICATION LIST DOCUMENTED IN MEDICAL RECORD: ICD-10-PCS | Mod: CPTII,,, | Performed by: PSYCHIATRY & NEUROLOGY

## 2023-05-10 PROCEDURE — 99999 PR PBB SHADOW E&M-EST. PATIENT-LVL I: CPT | Mod: PBBFAC,,, | Performed by: PSYCHIATRY & NEUROLOGY

## 2023-05-10 PROCEDURE — 99211 OFF/OP EST MAY X REQ PHY/QHP: CPT | Mod: PBBFAC,PO,25 | Performed by: PSYCHIATRY & NEUROLOGY

## 2023-05-10 PROCEDURE — 95886 PR EMG COMPLETE, W/ NERVE CONDUCTION STUDIES, 5+ MUSCLES: ICD-10-PCS | Mod: 26,S$PBB,, | Performed by: PSYCHIATRY & NEUROLOGY

## 2023-05-10 PROCEDURE — 95910 NRV CNDJ TEST 7-8 STUDIES: CPT | Mod: PBBFAC,PO | Performed by: PSYCHIATRY & NEUROLOGY

## 2023-05-10 PROCEDURE — 95909 NRV CNDJ TST 5-6 STUDIES: CPT | Mod: PBBFAC,PO | Performed by: PSYCHIATRY & NEUROLOGY

## 2023-05-10 PROCEDURE — 95886 MUSC TEST DONE W/N TEST COMP: CPT | Mod: 26,S$PBB,, | Performed by: PSYCHIATRY & NEUROLOGY

## 2023-05-10 NOTE — PROGRESS NOTES
[facility] Neurology [address]     Subjective:       Patient ID: Kevin Mancini is a 38 y.o. female here for a EMG focused evaluation for back and left leg pain. Previous visits and diagnostic evaluation has been reviewed.  Patient has a history of back surgery.  States symptoms of back pain as well as left leg pain and numbness have persisted despite surgery.  She describes a patch of numbness involving her left lateral leg with intermittent weakness.  Symptoms have been progressively worsening and severe at times.   HPI  Review of patient's allergies indicates:  No Known Allergies   There were no vitals filed for this visit.   Chief Complaint: No chief complaint on file.    Past Medical History:   Diagnosis Date    Asthma       Social History     Socioeconomic History    Marital status: Single   Tobacco Use    Smoking status: Some Days     Types: Cigars     Passive exposure: Never    Smokeless tobacco: Never    Tobacco comments:     social   Substance and Sexual Activity    Alcohol use: Yes     Alcohol/week: 1.0 standard drink     Types: 1 Glasses of wine per week     Comment: social    Drug use: Not Currently      Review of Systems:   No Fever  No SOB  No vomiting  No visual disturbance      Objective:      Physical Exam    Constitutional: Patient appears well-nourished.   Head: Normocephalic and atraumatic.   Mouth/Throat: Oropharynx is clear and moist.   Pulmonary/Chest: Effort normal.   Abdominal: Soft.   Skin: Skin is warm and dry.      General:  Patient is alert and cooperative.  Affect:  Patient is appropriate to surroundings and environment.  Language:  Speech is fluent.  HEENT:  There are no outward signs of trauma to head or face.  Cranial Nerves:  Pupils are equal round and reactive to light. Extra-ocular movements are intact. Face, tongue, and palate are  symmetrical.  Motor:  Patient exhibits normal strength testing in bilateral proximal  and distal lower extremities.  Reflexes:  Symmetrical in bilateral lower extremities.  Gait:  Ambulation is independent without use of cane or walker without signs of ataxia or circumduction.  Cerebellar:  Normal finger to nose testing without dysmetria.  Sensory:   Decreased light touch on the lateral aspect of the left lower leg  Musculoskeletal:  There is no severe tenderness to palpation and manipulation of lumbar spine region.   Assessment:       We reviewed and discussed at length results of EMG of the left lower extremity performed today which was normal. These findings are available via media section of chart review.   1. Pain of left lower extremity    2. Numbness of lower limb              Plan:       We discussed treatment options at length. Recommend patient keep appointment with referring provider.         Moiz Avila MD, FAAN   05/10/2023   10:46 AM       A dictation device was used to produce this document. Use of such devices sometimes results in grammatical errors or replacement of words that sound similarly.

## 2023-05-10 NOTE — PROGRESS NOTES
[facility] Neurology [address]       Patient received an EMG and nerve conduction test today.  Please refer to the full procedure report which is found in the media section of chart review.    Moiz Avila MD, FAAN  Neurology

## 2023-05-28 RX ORDER — HYDROCODONE BITARTRATE AND ACETAMINOPHEN 10; 325 MG/1; MG/1
1 TABLET ORAL 3 TIMES DAILY PRN
Qty: 90 TABLET | Refills: 0 | Status: SHIPPED | OUTPATIENT
Start: 2023-05-28 | End: 2023-06-26 | Stop reason: SDUPTHER

## 2023-06-14 ENCOUNTER — OFFICE VISIT (OUTPATIENT)
Dept: NEUROSURGERY | Facility: CLINIC | Age: 38
End: 2023-06-14
Payer: MEDICAID

## 2023-06-14 VITALS — BODY MASS INDEX: 30.73 KG/M2 | HEIGHT: 64 IN | WEIGHT: 180 LBS

## 2023-06-14 DIAGNOSIS — M41.57 OTHER SECONDARY SCOLIOSIS, LUMBOSACRAL REGION: Primary | ICD-10-CM

## 2023-06-14 DIAGNOSIS — M51.16 LUMBAR DISC HERNIATION WITH RADICULOPATHY: ICD-10-CM

## 2023-06-14 DIAGNOSIS — M48.07 FORAMINAL STENOSIS OF LUMBOSACRAL REGION: ICD-10-CM

## 2023-06-14 DIAGNOSIS — M54.17 LUMBOSACRAL RADICULOPATHY AT L5: ICD-10-CM

## 2023-06-14 PROCEDURE — 99214 OFFICE O/P EST MOD 30 MIN: CPT | Mod: S$PBB,,, | Performed by: NEUROLOGICAL SURGERY

## 2023-06-14 PROCEDURE — 99212 OFFICE O/P EST SF 10 MIN: CPT | Mod: PBBFAC,PN | Performed by: NEUROLOGICAL SURGERY

## 2023-06-14 PROCEDURE — 3008F BODY MASS INDEX DOCD: CPT | Mod: CPTII,,, | Performed by: NEUROLOGICAL SURGERY

## 2023-06-14 PROCEDURE — 99999 PR PBB SHADOW E&M-EST. PATIENT-LVL II: CPT | Mod: PBBFAC,,, | Performed by: NEUROLOGICAL SURGERY

## 2023-06-14 PROCEDURE — 99999 PR PBB SHADOW E&M-EST. PATIENT-LVL II: ICD-10-PCS | Mod: PBBFAC,,, | Performed by: NEUROLOGICAL SURGERY

## 2023-06-14 PROCEDURE — 3008F PR BODY MASS INDEX (BMI) DOCUMENTED: ICD-10-PCS | Mod: CPTII,,, | Performed by: NEUROLOGICAL SURGERY

## 2023-06-14 PROCEDURE — 99214 PR OFFICE/OUTPT VISIT, EST, LEVL IV, 30-39 MIN: ICD-10-PCS | Mod: S$PBB,,, | Performed by: NEUROLOGICAL SURGERY

## 2023-06-14 PROCEDURE — 1159F MED LIST DOCD IN RCRD: CPT | Mod: CPTII,,, | Performed by: NEUROLOGICAL SURGERY

## 2023-06-14 PROCEDURE — 1159F PR MEDICATION LIST DOCUMENTED IN MEDICAL RECORD: ICD-10-PCS | Mod: CPTII,,, | Performed by: NEUROLOGICAL SURGERY

## 2023-06-14 RX ORDER — METHOCARBAMOL 750 MG/1
750 TABLET, FILM COATED ORAL
COMMUNITY
Start: 2023-05-24 | End: 2023-12-07

## 2023-06-14 RX ORDER — KETOROLAC TROMETHAMINE 10 MG/1
10 TABLET, FILM COATED ORAL EVERY 6 HOURS
Qty: 20 TABLET | Refills: 0 | Status: SHIPPED | OUTPATIENT
Start: 2023-06-14 | End: 2023-06-29 | Stop reason: SDUPTHER

## 2023-06-14 RX ORDER — FLUTICASONE PROPIONATE AND SALMETEROL 100; 50 UG/1; UG/1
POWDER RESPIRATORY (INHALATION)
Status: ON HOLD | COMMUNITY
End: 2024-03-01 | Stop reason: HOSPADM

## 2023-06-14 RX ORDER — ALBUTEROL SULFATE 90 UG/1
AEROSOL, METERED RESPIRATORY (INHALATION)
Status: ON HOLD | COMMUNITY
End: 2024-03-01 | Stop reason: HOSPADM

## 2023-06-14 NOTE — PROGRESS NOTES
Oswestry Low Back Pain Disability Questionnaire    DATE OF SERVICE:  06/14/2023    ATTENDING PHYSICIAN:  Bakari Zaragoza MD    Instructions    This questionnaire has been designed to give us information as to how your back or leg pain is affecting your ability to manage in everyday life. Please answer by checking ONE box in each section for the statement which best applies to you. We realize you may consider that two or more statements in any one section apply but please just shade out the spot that indicates the statement which most clearly describes your problem.    Section 1 - Pain intensity    * I have no pain at the moment.  * The pain is very mild at the moment.  * The pain is moderate at the moment.  * The pain is fairly severe at the moment.  * The pain is very severe at the moment.  * The pain is the worst imaginable at the moment.    Score : 5    Section 2 - Personal care (washing, dressing etc)    * I can look after myself normally without causing extra pain.  * I can look after myself normally but it causes extra pain.  * It is painful to look after myself and I am slow and careful.  * I need some help but manage most of my personal care.  * I need help every day in most aspects of self-care.  * I do not get dressed, I wash with difficulty and stay in bed.    Score : 2    Section 3 - Lifting    * I can lift heavy weights without extra pain.  * I can lift heavy weights but it gives extra pain.  * Pain prevents me from lifting heavy weights off the floor, but I can manage if they are conveniently placed eg. on a table.  * Pain prevents me from lifting heavy weights, but I can manage light to medium weights if they are conveniently positioned.  * I can lift very light weights.  * I cannot lift or carry anything at all.    Score : 5    Section 4 - Walking    * Pain does not prevent me walking any distance.  * Pain prevents me from walking more than 1 mile.         * Pain prevents me from walking more than  1/2 mile.         * Pain prevents me from walking more than 100 yards.            * I can only walk using a stick or crutches.  * I am in bed most of the time.    Score : 2    Section 5 - Sitting    * I can sit in any chair as long as I like.  * I can only sit in my favourite chair as long as I like.  * Pain prevents me sitting more than one hour.  * Pain prevents me from sitting more than 30 minutes.  * Pain prevents me from sitting more than 10 minutes.  * Pain prevents me from sitting at all.    Score : 3    Section 6 - Standing    * I can stand as long as I want without extra pain.  * I can stand as long as I want but it gives me extra pain.  * Pain prevents me from standing for more than 1 hour  * Pain prevents me from standing for more than 30 minutes.  * Pain prevents me from standing for more than 10 minutes.  * Pain prevents me from standing at all.     Score : 3    Section 7 - Sleeping    * My sleep is never disturbed by pain.  * My sleep is occasionally disturbed by pain.  * Because of pain I have less than 6 hours sleep.   * Because of pain I have less than 4 hours sleep.   * Because of pain I have less than 2 hours sleep.  * Pain prevents me from sleeping at all.    Score : 2    Section 8 - Sex life (if applicable)    * My sex life is normal and causes no extra pain.  * My sex life is normal but causes some extra pain.  * My sex life is nearly normal but is very painful.   * My sex life is severely restricted by pain.  * My sex life is nearly absent because of pain.   * Pain prevents any sex life at all.    Score : 4    Section 9 - Social life    * My social life is normal and gives me no extra pain.  * My social life is normal but increases the degree of pain.  * Pain has no significant effect on my social life apart from limiting my more energetic interests eg, sport.  * Pain has restricted my social life and I do not go out as often.  * Pain has restricted my social life to my home.   * I have no  social life because of pain.     Score : 3    Section 10 - Travelling    * I can travel anywhere without pain.  * I can travel anywhere but it gives me extra pain.  * Pain is bad but I manage journeys over two hours.  * Pain restricts me to journeys of less than one hour.  * Pain restricts me to short necessary journeys under 30 minutes.  * Pain prevents me from travelling except to receive treatment.    Score : 3      TOTAL SCORE :  32 / 50 x 2 = 64 %      References  1. Patten JONATHON, Dani PB. The Oswestry Disability Index. Spine 2000 Nov 15;25(22):2947-52; discussion 52.  from standing for more than from standing for more than from standing for more than from standing at all

## 2023-06-14 NOTE — PROGRESS NOTES
NEUROSURGICAL OUTPATIENT CONSULTATION NOTE    DATE OF SERVICE:  2023    ATTENDING PHYSICIAN:  Bakari Zaragoza MD      MEMO:64%    NRS low back:8/10    NRS right le/10    NRS left le/10    Opioid use daily:Hydrocodone 10 mg tid    Neuropathic pain meds daily:gabapentin 900 TID    NSAIDs daily:NA    Muscle relaxant daily:baclofen    Smoking:no    SUBJECTIVE:    HISTORY:  This is a 38 y.o. female who status post left L5-S1 microdiskectomy.  Following the microdiskectomy her severe left leg pain improved but did not resolve completely.  She has completed a full conservative management including more than 6 weeks of physical therapy, L5-S1 transforaminal epidural steroid injection, pain medication but her pain has progressively become worse.  She can not tolerate sitting for long period of time.  She has difficulty standing from a sitting position due to the severe low back pain that radiates towards the left buttock area.  The pain also radiates down the leg.  The pain feels like a pulling sensation, throbbing.  Pain is affecting her quality of life functional status and ability to perform her ADLs and work.  No new onset of motor weakness.  She has persistent left S1 distribution numbness.      PAST MEDICAL HISTORY:  Active Ambulatory Problems     Diagnosis Date Noted    Chronic left-sided low back pain with left-sided sciatica 2021    Decreased range of motion of lumbar spine 2021    Weakness of left lower extremity 2021    Gait difficulty 2021    Tobacco user 2019    Chronic asthmatic bronchitis with acute exacerbation 2019    Exercise-induced asthma 2019    Lumbar disc herniation with radiculopathy 2022     Resolved Ambulatory Problems     Diagnosis Date Noted    No Resolved Ambulatory Problems     Past Medical History:   Diagnosis Date    Asthma        PAST SURGICAL HISTORY:  Past Surgical History:   Procedure Laterality Date    MICRODISCECTOMY OF SPINE  N/A 3/2/2022    Procedure: MICRODISCECTOMY, SPINE Left L5-S1 Microdiscectomy;  Surgeon: Bakari Zaragoza MD;  Location: [facility];  Service: Neurosurgery;  Laterality: N/A;  Procedure: Left L5-S1 Microdiscectomy  Surgery Time: 1.5hr  LOS: 0-1  Anesthesia: General  Blood: Type & Screen  Radiology: C-arm  Microscope: Metrx  Bed: [place] [id number] Poster  Position: Prone       SOCIAL HISTORY:   Social History     Socioeconomic History    Marital status: Single   Tobacco Use    Smoking status: Some Days     Types: Cigars     Passive exposure: Never    Smokeless tobacco: Never    Tobacco comments:     social   Substance and Sexual Activity    Alcohol use: Yes     Alcohol/week: 1.0 standard drink     Types: 1 Glasses of wine per week     Comment: social    Drug use: Not Currently       FAMILY HISTORY:  No family history on file.    CURRENTS MEDICATIONS:  Current Outpatient Medications on File Prior to Visit   Medication Sig Dispense Refill    albuterol (PROVENTIL/VENTOLIN HFA) 90 mcg/actuation inhaler albuterol sulfate HFA 90 mcg/actuation aerosol inhaler   Inhale 2 puffs every 4-6 hours by inhalation route as needed.      baclofen (LIORESAL) 10 MG tablet Take 1 tablet (10 mg total) by mouth 3 (three) times daily. 90 tablet 11    diclofenac (VOLTAREN) 75 MG EC tablet TAKE 1 TABLET(75 MG) BY MOUTH TWICE DAILY AS NEEDED FOR PAIN 60 tablet 2    ferrous sulfate (FEOSOL) 325 mg (65 mg iron) Tab tablet Take 325 mg by mouth daily with breakfast.      fluticasone-salmeterol diskus inhaler 100-50 mcg Advair Diskus 100 mcg-50 mcg/dose powder for inhalation   Inhale 1 puff twice a day by inhalation route.      gabapentin (NEURONTIN) 600 MG tablet Take 1.5 tablets (900 mg total) by mouth 3 (three) times daily. 135 tablet 11    HYDROcodone-acetaminophen (NORCO)  mg per tablet Take 1 tablet by mouth 3 (three) times daily as needed for Pain (pain). 90 tablet 0    methocarbamoL (ROBAXIN) 750 MG Tab Take 750 mg by mouth.      traMADoL  (ULTRAM) 50 mg tablet Take 1 tablet (50 mg total) by mouth every 8 (eight) hours as needed for Pain. 90 tablet 3     No current facility-administered medications on file prior to visit.       ALLERGIES:  Review of patient's allergies indicates:  No Known Allergies    REVIEW OF SYSTEMS:  Review of Systems   Constitutional:  Negative for diaphoresis, fever and weight loss.   Respiratory:  Negative for shortness of breath.    Cardiovascular:  Negative for chest pain.   Gastrointestinal:  Negative for blood in stool.   Genitourinary:  Negative for hematuria.   Endo/Heme/Allergies:  Does not bruise/bleed easily.   All other systems reviewed and are negative.    OBJECTIVE:    PHYSICAL EXAMINATION:   There were no vitals filed for this visit.    Physical Exam:  Vitals reviewed.    Constitutional: She appears well-developed and well-nourished.     Eyes: Pupils are equal, round, and reactive to light. Conjunctivae and EOM are normal.     Cardiovascular: Normal distal pulses and no edema.     Abdominal: Soft.     Skin: Skin displays no rash on trunk and no rash on extremities. Skin displays no lesions on trunk and no lesions on extremities.     Psych/Behavior: She is alert. She is oriented to person, place, and time. She has a normal mood and affect.     Musculoskeletal:        Neck: Range of motion is full.     Neurological:        DTRs: Tricep reflexes are 2+ on the right side and 2+ on the left side. Bicep reflexes are 2+ on the right side and 2+ on the left side. Brachioradialis reflexes are 2+ on the right side and 2+ on the left side. Patellar reflexes are 2+ on the right side and 2+ on the left side. Achilles reflexes are 2+ on the right side and 2+ on the left side.     Back Exam     Tenderness   The patient is experiencing tenderness in the lumbar.    Range of Motion   Extension:  abnormal   Flexion:  abnormal   Lateral bend right:  abnormal   Lateral bend left:  abnormal   Rotation right:  abnormal   Rotation left:   abnormal     Muscle Strength   Right Quadriceps:  5/5   Left Quadriceps:  5/5   Right Hamstrings:  5/5   Left Hamstrings:  5/5     Tests   Straight leg raise right: negative  Straight leg raise left: negative    Other   Toe walk: normal  Heel walk: normal            Neurologic Exam     Mental Status   Oriented to person, place, and time.   Speech: speech is normal   Level of consciousness: alert    Cranial Nerves   Cranial nerves II through XII intact.     CN III, IV, VI   Pupils are equal, round, and reactive to light.  Extraocular motions are normal.     Motor Exam   Muscle bulk: normal  Overall muscle tone: normal    Strength   Right deltoid: 5/5  Left deltoid: 5/5  Right biceps: 5/5  Left biceps: 5/5  Right triceps: 5/5  Left triceps: 5/5  Right wrist flexion: 5/5  Left wrist flexion: 5/5  Right wrist extension: 5/5  Left wrist extension: 5/5  Right interossei: 5/5  Left interossei: 5/5  Right iliopsoas: 5/5  Left iliopsoas: 5/5  Right quadriceps: 5/5  Left quadriceps: 5/5  Right hamstrin/5  Left hamstrin/5  Right anterior tibial: 5/5  Left anterior tibial: 5/5  Right posterior tibial: 5/5  Left posterior tibial: 5/5  Right peroneal: 5/5  Left peroneal: 5/5  Right gastroc: 5/5  Left gastroc: 5/5    Sensory Exam   Light touch normal.   Pinprick normal.     Gait, Coordination, and Reflexes     Gait  Gait: normal    Coordination   Finger to nose coordination: normal  Tandem walking coordination: normal    Reflexes   Right brachioradialis: 2+  Left brachioradialis: 2+  Right biceps: 2+  Left biceps: 2+  Right triceps: 2+  Left triceps: 2+  Right patellar: 2+  Left patellar: 2+  Right achilles: 2+  Left achilles: 2+  Right plantar: normal  Left plantar: normal  Right Villasenor: absent  Left Villasenor: absent  Right ankle clonus: absent  Left ankle clonus: absent      DIAGNOSTIC DATA:  I personally interpreted the following imaging:   She had an EMG in May 2023 that was normal  Repeat spine MRI in March 3, 2023  shows status post left L5-S1 microdiskectomy, left L5-S1 foraminal stenosis with compression of the left L5 nerve root, L4-5 central disc herniation and annular tear causing bilateral lateral recess stenosis    Scoliosis parameters    Left lumbar convexity scoliosis causing left L5-S1 foraminal stenosis        ASSESMENT:  This is a 38 y.o. female with     Problem List Items Addressed This Visit          Neuro    Lumbar disc herniation with radiculopathy     Other Visit Diagnoses       Other secondary scoliosis, lumbosacral region    -  Primary    Foraminal stenosis of lumbosacral region        Lumbosacral radiculopathy at L5                PLAN:    She has completed a full conservative management.  She has L4-5 and L5-S1 degenerative disc disease and worsening low back and left leg pain.  She has persistent left L5 nerve compression caused by the L4-5 disc herniation and the left L5-S1 foraminal stenosis.  I do not think that a repeat decompression surgery will be successful.  I think a L4-5 and L5-S1 fusion to indirectly decompress the L5 nerve root is most likely to help this patient long-term.    I explained the natural history of the disease and all treatment options. I recommended a left L4-5 oblique and L5-S1 anterior interbody fusion with placement of interbody spacer, Conduit DePuy cages filled with allograft BMP, L4-S1 posterior segmental instrumentation using Viper Prime Depuy system.     We have discussed the risks of surgery including death, coma, bleeding, infection, failure of surgery, CSF leak, nerve root injury, spinal cord injury, ureter injury, weakness, paralysis, peripheral neuropathy, malplaced hardware, migration of hardware, non-union, need for reoperation. Patient understands the risks and would like to proceed with surgery.            Bakari Zaragoza MD  Cell:[phone]

## 2023-06-16 ENCOUNTER — PATIENT MESSAGE (OUTPATIENT)
Dept: PODIATRY | Facility: CLINIC | Age: 38
End: 2023-06-16
Payer: MEDICAID

## 2023-06-26 RX ORDER — HYDROCODONE BITARTRATE AND ACETAMINOPHEN 10; 325 MG/1; MG/1
1 TABLET ORAL 3 TIMES DAILY PRN
Qty: 90 TABLET | Refills: 0 | Status: SHIPPED | OUTPATIENT
Start: 2023-06-26 | End: 2023-07-24 | Stop reason: SDUPTHER

## 2023-06-30 RX ORDER — KETOROLAC TROMETHAMINE 10 MG/1
10 TABLET, FILM COATED ORAL EVERY 6 HOURS
Qty: 20 TABLET | Refills: 0 | Status: SHIPPED | OUTPATIENT
Start: 2023-06-30 | End: 2023-07-05

## 2023-07-24 RX ORDER — HYDROCODONE BITARTRATE AND ACETAMINOPHEN 10; 325 MG/1; MG/1
1 TABLET ORAL 3 TIMES DAILY PRN
Qty: 90 TABLET | Refills: 0 | Status: SHIPPED | OUTPATIENT
Start: 2023-07-24 | End: 2023-08-25 | Stop reason: SDUPTHER

## 2023-07-31 ENCOUNTER — PATIENT MESSAGE (OUTPATIENT)
Dept: RESEARCH | Facility: HOSPITAL | Age: 38
End: 2023-07-31
Payer: MEDICAID

## 2023-08-17 ENCOUNTER — PATIENT MESSAGE (OUTPATIENT)
Dept: PAIN MEDICINE | Facility: CLINIC | Age: 38
End: 2023-08-17
Payer: MEDICAID

## 2023-08-17 ENCOUNTER — TELEPHONE (OUTPATIENT)
Dept: NEUROSURGERY | Facility: CLINIC | Age: 38
End: 2023-08-17
Payer: MEDICAID

## 2023-08-17 DIAGNOSIS — M48.07 FORAMINAL STENOSIS OF LUMBOSACRAL REGION: Primary | ICD-10-CM

## 2023-08-25 RX ORDER — HYDROCODONE BITARTRATE AND ACETAMINOPHEN 10; 325 MG/1; MG/1
1 TABLET ORAL 3 TIMES DAILY PRN
Qty: 90 TABLET | Refills: 0 | Status: SHIPPED | OUTPATIENT
Start: 2023-08-25 | End: 2023-09-25 | Stop reason: SDUPTHER

## 2023-08-25 RX ORDER — BACLOFEN 10 MG/1
10 TABLET ORAL 3 TIMES DAILY
Qty: 90 TABLET | Refills: 2 | Status: SHIPPED | OUTPATIENT
Start: 2023-08-25 | End: 2024-01-18 | Stop reason: SDUPTHER

## 2023-08-25 RX ORDER — GABAPENTIN 600 MG/1
900 TABLET ORAL 3 TIMES DAILY
Qty: 135 TABLET | Refills: 2 | Status: SHIPPED | OUTPATIENT
Start: 2023-08-25 | End: 2023-11-29

## 2023-08-28 ENCOUNTER — PATIENT MESSAGE (OUTPATIENT)
Dept: SURGERY | Facility: HOSPITAL | Age: 38
End: 2023-08-28
Payer: MEDICAID

## 2023-08-28 DIAGNOSIS — M41.57 OTHER SECONDARY SCOLIOSIS, LUMBOSACRAL REGION: Primary | ICD-10-CM

## 2023-09-07 NOTE — TELEPHONE ENCOUNTER
Prescription done for patient for the office chair.    Please give to her.    I printed it out.    Thanks,  Stacey Thakur, [facility], PA-C  Neurosurgery  [facility]  09/07/2023

## 2023-09-08 ENCOUNTER — TELEPHONE (OUTPATIENT)
Dept: NEUROSURGERY | Facility: CLINIC | Age: 38
End: 2023-09-08
Payer: MEDICAID

## 2023-09-08 NOTE — TELEPHONE ENCOUNTER
Returned call to pt, pt stated that she went to get her pre-op xrays done at [facility] but they are trying to charge her $80 for her xray. Pt is requesting to know if she can come to any [facility] facility to get her xrays done. I stated to pt that she can bring the orders to any [facility] facility and they will have to run it through her insurance first. Pt voiced understanding

## 2023-09-11 ENCOUNTER — HOSPITAL ENCOUNTER (OUTPATIENT)
Dept: RADIOLOGY | Facility: HOSPITAL | Age: 38
Discharge: HOME | End: 2023-09-11
Payer: MEDICAID

## 2023-09-11 DIAGNOSIS — Z01.818 OTHER SPECIFIED PRE-OPERATIVE EXAMINATION: Primary | ICD-10-CM

## 2023-09-11 DIAGNOSIS — Z01.818 OTHER SPECIFIED PRE-OPERATIVE EXAMINATION: ICD-10-CM

## 2023-09-11 PROCEDURE — 71046 X-RAY EXAM CHEST 2 VIEWS: CPT | Mod: 26,,, | Performed by: INTERNAL MEDICINE

## 2023-09-11 PROCEDURE — 71046 XR CHEST PA AND LATERAL: ICD-10-PCS | Mod: 26,,, | Performed by: INTERNAL MEDICINE

## 2023-09-11 PROCEDURE — 71046 X-RAY EXAM CHEST 2 VIEWS: CPT | Mod: TC,FY

## 2023-09-26 ENCOUNTER — PATIENT MESSAGE (OUTPATIENT)
Dept: NEUROSURGERY | Facility: CLINIC | Age: 38
End: 2023-09-26
Payer: MEDICAID

## 2023-09-27 ENCOUNTER — TELEPHONE (OUTPATIENT)
Dept: NEUROSURGERY | Facility: CLINIC | Age: 38
End: 2023-09-27
Payer: MEDICAID

## 2023-09-28 RX ORDER — HYDROCODONE BITARTRATE AND ACETAMINOPHEN 10; 325 MG/1; MG/1
1 TABLET ORAL 3 TIMES DAILY PRN
Qty: 90 TABLET | Refills: 0 | Status: SHIPPED | OUTPATIENT
Start: 2023-09-28 | End: 2023-09-29 | Stop reason: SDUPTHER

## 2023-09-29 ENCOUNTER — TELEPHONE (OUTPATIENT)
Dept: NEUROLOGY | Facility: CLINIC | Age: 38
End: 2023-09-29
Payer: MEDICAID

## 2023-09-29 RX ORDER — HYDROCODONE BITARTRATE AND ACETAMINOPHEN 10; 325 MG/1; MG/1
1 TABLET ORAL 3 TIMES DAILY PRN
Qty: 90 TABLET | Refills: 0 | Status: SHIPPED | OUTPATIENT
Start: 2023-09-29 | End: 2023-10-23 | Stop reason: SDUPTHER

## 2023-09-29 NOTE — TELEPHONE ENCOUNTER
Please call [id number] and cancel the hyrdocodone prescription Dr. Zaragoza just sent in and I will send a new one to [facility].    Thanks,  Stacey Thakur [facility], PA-C  Neurosurgery  [facility]  09/29/2023

## 2023-10-02 ENCOUNTER — ANESTHESIA EVENT (OUTPATIENT)
Dept: SURGERY | Facility: HOSPITAL | Age: 38
DRG: 460 | End: 2023-10-02
Payer: MEDICAID

## 2023-10-02 ENCOUNTER — TELEPHONE (OUTPATIENT)
Dept: NEUROSURGERY | Facility: CLINIC | Age: 38
End: 2023-10-02
Payer: MEDICAID

## 2023-10-02 ENCOUNTER — PATIENT MESSAGE (OUTPATIENT)
Dept: SURGERY | Facility: HOSPITAL | Age: 38
End: 2023-10-02
Payer: MEDICAID

## 2023-10-02 NOTE — TELEPHONE ENCOUNTER
Returned pt's call, pt stated that the doctor that stated that her blood count was low is not her original doctor. I stated to pt that we will still have to reschedule her surgery because we can't over rule what the doctor says. I stated to pt that our surgery scheduler will call her tomorrow. Pt voiced understanding

## 2023-10-03 ENCOUNTER — TELEPHONE (OUTPATIENT)
Dept: NEUROSURGERY | Facility: CLINIC | Age: 38
End: 2023-10-03
Payer: MEDICAID

## 2023-10-03 ENCOUNTER — ANESTHESIA (OUTPATIENT)
Dept: SURGERY | Facility: HOSPITAL | Age: 38
DRG: 460 | End: 2023-10-03
Payer: MEDICAID

## 2023-10-25 ENCOUNTER — PATIENT MESSAGE (OUTPATIENT)
Dept: NEUROSURGERY | Facility: CLINIC | Age: 38
End: 2023-10-25
Payer: MEDICAID

## 2023-10-25 RX ORDER — HYDROCODONE BITARTRATE AND ACETAMINOPHEN 10; 325 MG/1; MG/1
1 TABLET ORAL 3 TIMES DAILY PRN
Qty: 90 TABLET | Refills: 0 | OUTPATIENT
Start: 2023-10-25 | End: 2023-11-23

## 2023-10-27 RX ORDER — HYDROCODONE BITARTRATE AND ACETAMINOPHEN 10; 325 MG/1; MG/1
1 TABLET ORAL 3 TIMES DAILY PRN
Qty: 90 TABLET | Refills: 0 | Status: SHIPPED | OUTPATIENT
Start: 2023-10-27 | End: 2023-11-25

## 2023-10-27 RX ORDER — HYDROCODONE BITARTRATE AND ACETAMINOPHEN 10; 325 MG/1; MG/1
1 TABLET ORAL 3 TIMES DAILY PRN
Qty: 90 TABLET | Refills: 0 | Status: SHIPPED | OUTPATIENT
Start: 2023-10-27 | End: 2023-11-20 | Stop reason: SDUPTHER

## 2023-11-06 ENCOUNTER — TELEPHONE (OUTPATIENT)
Dept: NEUROSURGERY | Facility: CLINIC | Age: 38
End: 2023-11-06
Payer: MEDICAID

## 2023-11-20 ENCOUNTER — PATIENT MESSAGE (OUTPATIENT)
Dept: NEUROSURGERY | Facility: CLINIC | Age: 38
End: 2023-11-20
Payer: MEDICAID

## 2023-11-20 ENCOUNTER — TELEPHONE (OUTPATIENT)
Dept: PREADMISSION TESTING | Facility: HOSPITAL | Age: 38
End: 2023-11-20
Payer: MEDICAID

## 2023-11-20 ENCOUNTER — HOSPITAL ENCOUNTER (OUTPATIENT)
Dept: PREADMISSION TESTING | Facility: HOSPITAL | Age: 38
Discharge: HOME | End: 2023-11-20
Attending: NURSE PRACTITIONER
Payer: MEDICAID

## 2023-11-20 NOTE — DISCHARGE INSTRUCTIONS
Your surgery is scheduled for 12/5/23.    Please report to [facility] on the 1st Floor at 0730 a.m.    THIS TIME IS SUBJECT TO CHANGE.  YOU WILL RECEIVE A PHONE CALL THE DAY BEFORE SURGERY BY 3:30 PM TO CONFIRM YOUR TIME OF ARRIVAL.  IF YOU HAVE NOT RECEIVED A PHONE CALL BY 3:30 PM THE DAY BEFORE YOUR SURGERY PLEASE CALL [phone]     INSTRUCTIONS IMPORTANT!!!  ¨ Do not eat or drink after 12 midnight-including water, candy, gum, & mints. OK to brush teeth.      ____  Proceed to [facility] on *** for additional testing.        ____  Do not wear makeup, including mascara.  ____  No powder, lotions or creams to surgical area.  ____  Please remove all jewelry, including piercings and leave at home.  ____  No money or valuables needed. Please leave at home.  ____  Please bring any documents given by your doctor.  ____  If going home the same day, arrange for a ride home. You will not be able to             drive if Anesthesia was used.  ____  Children under 18 years require a parent / guardian present the entire time             they are in surgery / recovery.  ____  Wear loose fitting clothing. Allow for dressings, bandages.  ____  Stop Aspirin, Ibuprofen, Motrin, Aleve, Goody's/BC powders, Excedrine and Naproxen (NSAIDS) at least 3-5 days before surgery, unless otherwise instructed by your doctor, or the nurse.   You MAY use Tylenol/acetaminophen until day of surgery.  ____  Wash the surgical area with Hibiclens or Dial Antibacterial bar soap the night before surgery, and again the             morning of surgery.  Be sure to rinse hibiclens or Dial Antibacterial bar soap off completely (if instructed by   nurse).  ____  If you take diabetic medication including Metformin, Glimepiride, Glipizide, Glyburide, Byetta, Januvia, Actos, do not take am of surgery unless instructed by Doctor.  ____ Hold Invokana, Farxiga, and Jardiance for 3 days prior to surgery.   ____  Call MD for temperature  above 101 degrees or any other signs of infection such as Urinary (bladder) infection, Upper respiratory infection, skin boils, etc.  ____ Stop taking any Fish Oil supplement or any Vitamins that contain Vitamin E at least 5 days prior to surgery.  ____ Do Not wear your contact lenses the day of your procedure.  You may wear your glasses.      ____Do not shave surgical site for 3 days prior to surgery.  ____ Practice Good hand washing before, during, and after procedure.      I have read or had read and explained to me, and understand the above information.  Additional comments or instructions:  For additional questions call [phone]      ANESTHESIA SIDE EFFECTS  -For the first 24 hours after surgery:  Do not drive, use heavy equipment, make important decisions, or drink alcohol  -It is normal to feel sleepy for several hours.  Rest until you are more awake.  -Have someone stay with you, if needed.  They can watch for problems and help keep you safe.  -Some possible post anesthesia side effects include: nausea and vomiting, sore throat and hoarseness, sleepiness, and dizziness.        Pre-Op Bathing Instructions    Before surgery, you can play an important role in your own health.    Because skin is not sterile, we need to be sure that your skin is as free of germs as possible. By following the instructions below, you can reduce the number of germs on your skin before surgery.    IMPORTANT: You will need to shower with a special soap called Hibiclens*, available at any pharmacy.  If you are allergic to Chlorhexidine (the antiseptic in Hibiclens), use an antibacterial soap such as Dial Soap for your preoperative shower.  You will shower with Hibiclens both the night before your surgery and the morning of your surgery.  Do not use Hibiclens on the head, face or genitals to avoid injury to those areas.    STEP #1: THE NIGHT BEFORE YOUR SURGERY     Do not shave the area of your body where your surgery will be  performed.  Shower and wash your hair and body as usual with your normal soap and shampoo.  Rinse your hair and body thoroughly after you shower to remove all soap residue.  With your hand, apply one packet of Hibiclens soap to the surgical site.   Wash the site gently for five (5) minutes. Do not scrub your skin too hard.   Do not wash with your regular soap after Hibiclens is used.  Rinse your body thoroughly.  Pat yourself dry with a clean, soft towel.  Do not use lotion, cream, or powder.  Wear clean clothes.    STEP #2: THE MORNING OF YOUR SURGERY     Repeat Step #1.    * Not to be used by people allergic to Chlorhexidine.

## 2023-11-20 NOTE — TELEPHONE ENCOUNTER
Good morning, her surgery was rescheduled form 10/13/23 to 12/5/23 due to abnormal labs. Has she been cleared for surgery?

## 2023-11-20 NOTE — TELEPHONE ENCOUNTER
Spoke to the patient and her PCP did not clear her for surgery. Can you please reach out to her to reschedule. Thanks

## 2023-11-22 RX ORDER — HYDROCODONE BITARTRATE AND ACETAMINOPHEN 10; 325 MG/1; MG/1
1 TABLET ORAL 3 TIMES DAILY PRN
Qty: 90 TABLET | Refills: 0 | Status: SHIPPED | OUTPATIENT
Start: 2023-11-22 | End: 2023-12-22 | Stop reason: SDUPTHER

## 2023-11-29 RX ORDER — GABAPENTIN 600 MG/1
TABLET ORAL
Qty: 135 TABLET | Refills: 2 | Status: ON HOLD | OUTPATIENT
Start: 2023-11-29 | End: 2024-03-01 | Stop reason: HOSPADM

## 2023-12-07 RX ORDER — METHOCARBAMOL 750 MG/1
750 TABLET, FILM COATED ORAL
Qty: 90 TABLET | Refills: 11 | Status: ON HOLD | OUTPATIENT
Start: 2023-12-07 | End: 2024-03-01 | Stop reason: HOSPADM

## 2023-12-22 RX ORDER — HYDROCODONE BITARTRATE AND ACETAMINOPHEN 10; 325 MG/1; MG/1
1 TABLET ORAL 3 TIMES DAILY PRN
Qty: 90 TABLET | Refills: 0 | Status: SHIPPED | OUTPATIENT
Start: 2023-12-22 | End: 2023-12-27 | Stop reason: SDUPTHER

## 2023-12-26 ENCOUNTER — PATIENT MESSAGE (OUTPATIENT)
Dept: NEUROSURGERY | Facility: CLINIC | Age: 38
End: 2023-12-26
Payer: MEDICAID

## 2023-12-27 ENCOUNTER — TELEPHONE (OUTPATIENT)
Dept: NEUROSURGERY | Facility: CLINIC | Age: 38
End: 2023-12-27
Payer: MEDICAID

## 2023-12-27 RX ORDER — HYDROCODONE BITARTRATE AND ACETAMINOPHEN 10; 325 MG/1; MG/1
1 TABLET ORAL 3 TIMES DAILY PRN
Qty: 90 TABLET | Refills: 0 | Status: SHIPPED | OUTPATIENT
Start: 2023-12-27 | End: 2024-01-02 | Stop reason: SDUPTHER

## 2023-12-27 NOTE — TELEPHONE ENCOUNTER
Returned call to Wai, Wai stated that the insurance company is requiring a ICD code. I provided the code and Wai stated that the insurance will only pay for for a 7 day supply. I stated to Wai that is fine and voiced understanding

## 2023-12-28 ENCOUNTER — TELEPHONE (OUTPATIENT)
Dept: NEUROSURGERY | Facility: CLINIC | Age: 38
End: 2023-12-28
Payer: MEDICAID

## 2024-01-02 ENCOUNTER — PATIENT MESSAGE (OUTPATIENT)
Dept: NEUROSURGERY | Facility: CLINIC | Age: 39
End: 2024-01-02
Payer: MEDICAID

## 2024-01-03 ENCOUNTER — PATIENT MESSAGE (OUTPATIENT)
Dept: NEUROSURGERY | Facility: CLINIC | Age: 39
End: 2024-01-03
Payer: MEDICAID

## 2024-01-03 RX ORDER — HYDROCODONE BITARTRATE AND ACETAMINOPHEN 10; 325 MG/1; MG/1
1 TABLET ORAL 3 TIMES DAILY PRN
Qty: 90 TABLET | Refills: 0 | Status: SHIPPED | OUTPATIENT
Start: 2024-01-03 | End: 2024-02-01 | Stop reason: SDUPTHER

## 2024-01-09 ENCOUNTER — OFFICE VISIT (OUTPATIENT)
Dept: CARDIOLOGY | Facility: CLINIC | Age: 39
End: 2024-01-09
Payer: MEDICAID

## 2024-01-09 VITALS
BODY MASS INDEX: 29.38 KG/M2 | HEART RATE: 80 BPM | OXYGEN SATURATION: 98 % | SYSTOLIC BLOOD PRESSURE: 118 MMHG | DIASTOLIC BLOOD PRESSURE: 88 MMHG | WEIGHT: 171.31 LBS

## 2024-01-09 DIAGNOSIS — R94.31 NONSPECIFIC ABNORMAL ELECTROCARDIOGRAM (ECG) (EKG): Primary | ICD-10-CM

## 2024-01-09 DIAGNOSIS — Z01.810 PREOP CARDIOVASCULAR EXAM: ICD-10-CM

## 2024-01-09 PROCEDURE — 93005 ELECTROCARDIOGRAM TRACING: CPT | Mod: PBBFAC,PN | Performed by: INTERNAL MEDICINE

## 2024-01-09 PROCEDURE — 1159F MED LIST DOCD IN RCRD: CPT | Mod: CPTII,,, | Performed by: INTERNAL MEDICINE

## 2024-01-09 PROCEDURE — 99204 OFFICE O/P NEW MOD 45 MIN: CPT | Mod: S$PBB,,, | Performed by: INTERNAL MEDICINE

## 2024-01-09 PROCEDURE — 3008F BODY MASS INDEX DOCD: CPT | Mod: CPTII,,, | Performed by: INTERNAL MEDICINE

## 2024-01-09 PROCEDURE — 99999 PR PBB SHADOW E&M-EST. PATIENT-LVL III: CPT | Mod: PBBFAC,,, | Performed by: INTERNAL MEDICINE

## 2024-01-09 PROCEDURE — 99213 OFFICE O/P EST LOW 20 MIN: CPT | Mod: PBBFAC,PN | Performed by: INTERNAL MEDICINE

## 2024-01-09 PROCEDURE — 93010 ELECTROCARDIOGRAM REPORT: CPT | Mod: S$PBB,,, | Performed by: INTERNAL MEDICINE

## 2024-01-09 PROCEDURE — 3074F SYST BP LT 130 MM HG: CPT | Mod: CPTII,,, | Performed by: INTERNAL MEDICINE

## 2024-01-09 PROCEDURE — 3079F DIAST BP 80-89 MM HG: CPT | Mod: CPTII,,, | Performed by: INTERNAL MEDICINE

## 2024-01-09 NOTE — PROGRESS NOTES
Cardiology    1/9/2024  1:26 PM    Problem list  Patient Active Problem List   Diagnosis    Chronic left-sided low back pain with left-sided sciatica    Decreased range of motion of lumbar spine    Weakness of left lower extremity    Gait difficulty    Tobacco user    Chronic asthmatic bronchitis with acute exacerbation    Exercise-induced asthma    Lumbar disc herniation with radiculopathy    Nonspecific abnormal electrocardiogram (ECG) (EKG)    Preop cardiovascular exam       CC:  Abnormal EKG    HPI:  Patient was referred for abnormal EKG.  Her EKG was done by PCP at [facility] which are not available for review.  Patient is a 38-year-old woman with past medical history of asthma and back injury she sustained from a fall in December of 2020.  She underwent microdiskectomy but has gotten worse.  She also underwent physical therapy.  She is scheduled to have fusion done next month.  Her preop EKG was reportedly abnormal.  Patient denies any prior cardiac problems.  She denies any angina, dyspnea on exertion, palpitation or syncope.  She denies any family history for premature coronary disease.  She smokes cigars 2-3 per week.  She does not abuse alcohol.    Medications  Current Outpatient Medications   Medication Sig Dispense Refill    albuterol (PROVENTIL/VENTOLIN HFA) 90 mcg/actuation inhaler albuterol sulfate HFA 90 mcg/actuation aerosol inhaler   Inhale 2 puffs every 4-6 hours by inhalation route as needed.      baclofen (LIORESAL) 10 MG tablet Take 1 tablet (10 mg total) by mouth 3 (three) times daily. 90 tablet 2    ferrous sulfate (FEOSOL) 325 mg (65 mg iron) Tab tablet Take 325 mg by mouth daily with breakfast.      fluticasone-salmeterol diskus inhaler 100-50 mcg Advair Diskus 100 mcg-50 mcg/dose powder for inhalation   Inhale 1 puff twice a day by inhalation route.      gabapentin (NEURONTIN) 600 MG tablet TAKE 1 AND 1/2 TABLETS(900 MG) BY MOUTH THREE TIMES DAILY 135 tablet 2     HYDROcodone-acetaminophen (NORCO)  mg per tablet Take 1 tablet by mouth 3 (three) times daily as needed for Pain (pain). 90 tablet 0    methocarbamoL (ROBAXIN) 750 MG Tab TAKE 1 TABLET BY MOUTH THREE TIMES DAILY AS NEEDED FOR MUSCLE SPASMS 90 tablet 11     No current facility-administered medications for this visit.      Prior to Admission medications    Medication Sig Start Date End Date Taking? Authorizing Provider   albuterol (PROVENTIL/VENTOLIN HFA) 90 mcg/actuation inhaler albuterol sulfate HFA 90 mcg/actuation aerosol inhaler   Inhale 2 puffs every 4-6 hours by inhalation route as needed.   Yes Provider, Historical   baclofen (LIORESAL) 10 MG tablet Take 1 tablet (10 mg total) by mouth 3 (three) times daily. 8/25/23 8/24/24 Yes Stacey Thakur PA-C   ferrous sulfate (FEOSOL) 325 mg (65 mg iron) Tab tablet Take 325 mg by mouth daily with breakfast.   Yes Provider, Historical   fluticasone-salmeterol diskus inhaler 100-50 mcg Advair Diskus 100 mcg-50 mcg/dose powder for inhalation   Inhale 1 puff twice a day by inhalation route.   Yes Provider, Historical   gabapentin (NEURONTIN) 600 MG tablet TAKE 1 AND 1/2 TABLETS(900 MG) BY MOUTH THREE TIMES DAILY 11/29/23  Yes Stacey Thakur PA-C   HYDROcodone-acetaminophen (NORCO)  mg per tablet Take 1 tablet by mouth 3 (three) times daily as needed for Pain (pain). 1/3/24 2/1/24 Yes Bakari Zaragoza MD   methocarbamoL (ROBAXIN) 750 MG Tab TAKE 1 TABLET BY MOUTH THREE TIMES DAILY AS NEEDED FOR MUSCLE SPASMS 12/7/23  Yes Bakari Zaragoza MD   traMADoL (ULTRAM) 50 mg tablet Take 1 tablet (50 mg total) by mouth every 8 (eight) hours as needed for Pain. 9/3/22 1/9/24  Bakari Zaragoza MD         History  Past Medical History:   Diagnosis Date    Asthma      Past Surgical History:   Procedure Laterality Date    MICRODISCECTOMY OF SPINE N/A 3/2/2022    Procedure: MICRODISCECTOMY, SPINE Left L5-S1 Microdiscectomy;  Surgeon: Bakari Zaragoza MD;   Location: [facility] OR;  Service: Neurosurgery;  Laterality: N/A;  Procedure: Left L5-S1 Microdiscectomy  Surgery Time: 1.5hr  LOS: 0-1  Anesthesia: General  Blood: Type & Screen  Radiology: C-arm  Microscope: Metrx  Bed: [place] [id number] Poster  Position: Prone     Social History     Socioeconomic History    Marital status: Single   Tobacco Use    Smoking status: Some Days     Types: Cigars     Passive exposure: Never    Smokeless tobacco: Never    Tobacco comments:     social   Substance and Sexual Activity    Alcohol use: Yes     Alcohol/week: 1.0 standard drink of alcohol     Types: 1 Glasses of wine per week     Comment: social    Drug use: Not Currently     Social Determinants of Health     Financial Resource Strain: Medium Risk (1/9/2024)    Overall Financial Resource Strain (CARDIA)     Difficulty of Paying Living Expenses: Somewhat hard   Food Insecurity: Food Insecurity Present (1/9/2024)    Hunger Vital Sign     Worried About Running Out of Food in the Last Year: Sometimes true     Ran Out of Food in the Last Year: Sometimes true   Transportation Needs: No Transportation Needs (1/9/2024)    PRAPARE - Transportation     Lack of Transportation (Medical): No     Lack of Transportation (Non-Medical): No   Physical Activity: Inactive (1/9/2024)    Exercise Vital Sign     Days of Exercise per Week: 0 days     Minutes of Exercise per Session: 0 min   Stress: No Stress Concern Present (1/9/2024)    [employer] [place] [employer] - Occupational Stress Questionnaire     Feeling of Stress : Not at all   Social Connections: Unknown (1/9/2024)    Social Connection and Isolation Panel [NHANES]     Frequency of Communication with Friends and Family: Twice a week     Frequency of Social Gatherings with Friends and Family: Once a week     Active Member of Clubs or Organizations: Yes     Attends Club or Organization Meetings: More than 4 times per year     Marital Status: Never    Housing Stability: High Risk  (1/9/2024)    Housing Stability Vital Sign     Unable to Pay for Housing in the Last Year: Yes     Number of Places Lived in the Last Year: 0     Unstable Housing in the Last Year: No         Allergies  Review of patient's allergies indicates:  No Known Allergies      Review of Systems   Review of Systems   Cardiovascular: Negative.    Respiratory: Negative.     Musculoskeletal:  Positive for back pain.         Physical Exam  Wt Readings from Last 1 Encounters:   01/09/24 77.7 kg (171 lb 4.8 oz)     BP Readings from Last 3 Encounters:   01/09/24 118/88   05/10/23 126/76   02/16/23 131/76     Pulse Readings from Last 1 Encounters:   01/09/24 80     Body mass index is 29.38 kg/m².    Physical Exam  Vitals reviewed.   Constitutional:       Appearance: She is well-developed.   HENT:      Head: Atraumatic.   Eyes:      General: No scleral icterus.  Neck:      Vascular: Normal carotid pulses. No carotid bruit, hepatojugular reflux or JVD.   Cardiovascular:      Rate and Rhythm: Normal rate and regular rhythm.      Chest Wall: PMI is not displaced.      Pulses: Intact distal pulses.           Carotid pulses are 2+ on the right side and 2+ on the left side.       Radial pulses are 2+ on the right side and 2+ on the left side.        Dorsalis pedis pulses are 2+ on the right side and 2+ on the left side.      Heart sounds: Normal heart sounds, S1 normal and S2 normal. No murmur heard.     No friction rub.   Pulmonary:      Effort: Pulmonary effort is normal. No respiratory distress.      Breath sounds: Normal breath sounds. No stridor. No wheezing or rales.   Chest:      Chest wall: No tenderness.   Abdominal:      General: Bowel sounds are normal.      Palpations: Abdomen is soft.   Musculoskeletal:      Cervical back: Neck supple. No edema.   Skin:     General: Skin is warm and dry.      Nails: There is no clubbing.   Neurological:      Mental Status: She is alert and oriented to person, place, and time.   Psychiatric:          Behavior: Behavior normal.         Thought Content: Thought content normal.             Assessment  1. Nonspecific abnormal electrocardiogram (ECG) (EKG)  Being evaluated  - EKG 12-lead  - Echo; Future    2. Preop cardiovascular exam  Being evaluated  - Echo; Future        Plan and Discussion  Discussed her EKG today which shows sinus rhythm rate of 85 with possible left atrial enlargement.  She has no unstable cardiac symptoms.  Will get an echocardiogram to assess her abnormal EKG then proceed with scheduled back fusion surgery next month.    Follow Up  TBD      Bruce Shaw MD, F.A.C.C, F.S.C.A.I.        30 minutes were spent in chart review, documentation and review of results, and evaluation, treatment, and counseling of patient on the same day of service.    Disclaimer: This document was created using voice recognition software ([employer]). Although it may be edited, this document may contain errors related to incorrect recognition of the spoken word. Please call the physician if clarification is needed.

## 2024-01-16 ENCOUNTER — HOSPITAL ENCOUNTER (OUTPATIENT)
Dept: CARDIOLOGY | Facility: OTHER | Age: 39
Discharge: HOME | End: 2024-01-16
Attending: INTERNAL MEDICINE
Payer: MEDICAID

## 2024-01-16 VITALS
HEIGHT: 64 IN | WEIGHT: 171 LBS | BODY MASS INDEX: 29.19 KG/M2 | SYSTOLIC BLOOD PRESSURE: 118 MMHG | DIASTOLIC BLOOD PRESSURE: 88 MMHG

## 2024-01-16 DIAGNOSIS — Z01.810 PREOP CARDIOVASCULAR EXAM: ICD-10-CM

## 2024-01-16 DIAGNOSIS — R94.31 NONSPECIFIC ABNORMAL ELECTROCARDIOGRAM (ECG) (EKG): ICD-10-CM

## 2024-01-16 LAB
ASCENDING AORTA: 2.32 CM
AV INDEX (PROSTH): 0.79
AV MEAN GRADIENT: 4 MMHG
AV PEAK GRADIENT: 8 MMHG
AV VALVE AREA BY VELOCITY RATIO: 2.36 CM²
AV VALVE AREA: 2.39 CM²
AV VELOCITY RATIO: 0.78
BSA FOR ECHO PROCEDURE: 1.87 M2
CV ECHO LV RWT: 0.4 CM
DOP CALC AO PEAK VEL: 1.39 M/S
DOP CALC AO VTI: 34 CM
DOP CALC LVOT AREA: 3 CM2
DOP CALC LVOT DIAMETER: 1.96 CM
DOP CALC LVOT PEAK VEL: 1.09 M/S
DOP CALC LVOT STROKE VOLUME: 81.12 CM3
DOP CALCLVOT PEAK VEL VTI: 26.9 CM
E WAVE DECELERATION TIME: 240.83 MSEC
E/A RATIO: 1.28
E/E' RATIO: 5.31 M/S
ECHO LV POSTERIOR WALL: 0.89 CM (ref 0.6–1.1)
FRACTIONAL SHORTENING: 43 % (ref 28–44)
INTERVENTRICULAR SEPTUM: 0.89 CM (ref 0.6–1.1)
IVC DIAMETER: 1.37 CM
IVRT: 87.54 MSEC
LA MAJOR: 4.59 CM
LA MINOR: 5.17 CM
LA WIDTH: 3.9 CM
LEFT ATRIUM SIZE: 3.87 CM
LEFT ATRIUM VOLUME INDEX MOD: 23.2 ML/M2
LEFT ATRIUM VOLUME INDEX: 34.1 ML/M2
LEFT ATRIUM VOLUME MOD: 42.43 CM3
LEFT ATRIUM VOLUME: 62.38 CM3
LEFT INTERNAL DIMENSION IN SYSTOLE: 2.57 CM (ref 2.1–4)
LEFT VENTRICLE DIASTOLIC VOLUME INDEX: 49.89 ML/M2
LEFT VENTRICLE DIASTOLIC VOLUME: 91.29 ML
LEFT VENTRICLE MASS INDEX: 71 G/M2
LEFT VENTRICLE SYSTOLIC VOLUME INDEX: 13 ML/M2
LEFT VENTRICLE SYSTOLIC VOLUME: 23.88 ML
LEFT VENTRICULAR INTERNAL DIMENSION IN DIASTOLE: 4.48 CM (ref 3.5–6)
LEFT VENTRICULAR MASS: 129.89 G
LV LATERAL E/E' RATIO: 4.6 M/S
LV SEPTAL E/E' RATIO: 6.27 M/S
LVOT MG: 2.56 MMHG
LVOT MV: 0.76 CM/S
MV PEAK A VEL: 0.54 M/S
MV PEAK E VEL: 0.69 M/S
MV STENOSIS PRESSURE HALF TIME: 69.84 MS
MV VALVE AREA P 1/2 METHOD: 3.15 CM2
PISA MRMAX VEL: 2.71 M/S
PISA TR MAX VEL: 2.11 M/S
PULM VEIN S/D RATIO: 0.96
PV MV: 0.71 M/S
PV PEAK D VEL: 0.47 M/S
PV PEAK GRADIENT: 4 MMHG
PV PEAK S VEL: 0.45 M/S
PV PEAK VELOCITY: 0.96 M/S
RA MAJOR: 4.8 CM
RA PRESSURE ESTIMATED: 3 MMHG
RA WIDTH: 3.1 CM
RIGHT VENTRICULAR END-DIASTOLIC DIMENSION: 3.38 CM
RV TB RVSP: 5 MMHG
SINUS: 2.6 CM
STJ: 2.43 CM
TDI LATERAL: 0.15 M/S
TDI SEPTAL: 0.11 M/S
TDI: 0.13 M/S
TR MAX PG: 18 MMHG
TRICUSPID ANNULAR PLANE SYSTOLIC EXCURSION: 2.1 CM
TV REST PULMONARY ARTERY PRESSURE: 21 MMHG
Z-SCORE OF LEFT VENTRICULAR DIMENSION IN END DIASTOLE: -1.24
Z-SCORE OF LEFT VENTRICULAR DIMENSION IN END SYSTOLE: -1.56

## 2024-01-16 PROCEDURE — 93306 TTE W/DOPPLER COMPLETE: CPT | Mod: 26,,, | Performed by: INTERNAL MEDICINE

## 2024-01-16 PROCEDURE — 93306 TTE W/DOPPLER COMPLETE: CPT

## 2024-01-18 ENCOUNTER — PATIENT MESSAGE (OUTPATIENT)
Dept: CARDIOLOGY | Facility: CLINIC | Age: 39
End: 2024-01-18
Payer: MEDICAID

## 2024-01-18 RX ORDER — BACLOFEN 10 MG/1
10 TABLET ORAL 3 TIMES DAILY
Qty: 90 TABLET | Refills: 2 | Status: SHIPPED | OUTPATIENT
Start: 2024-01-18 | End: 2024-04-03 | Stop reason: SDUPTHER

## 2024-01-24 ENCOUNTER — PATIENT MESSAGE (OUTPATIENT)
Dept: CARDIOLOGY | Facility: CLINIC | Age: 39
End: 2024-01-24
Payer: MEDICAID

## 2024-01-31 ENCOUNTER — PATIENT MESSAGE (OUTPATIENT)
Dept: NEUROSURGERY | Facility: CLINIC | Age: 39
End: 2024-01-31
Payer: MEDICAID

## 2024-02-02 RX ORDER — HYDROCODONE BITARTRATE AND ACETAMINOPHEN 10; 325 MG/1; MG/1
1 TABLET ORAL 3 TIMES DAILY PRN
Qty: 90 TABLET | Refills: 0 | Status: ON HOLD | OUTPATIENT
Start: 2024-02-02 | End: 2024-03-01 | Stop reason: HOSPADM

## 2024-02-12 ENCOUNTER — TELEPHONE (OUTPATIENT)
Dept: PREADMISSION TESTING | Facility: HOSPITAL | Age: 39
End: 2024-02-12
Payer: MEDICAID

## 2024-02-12 ENCOUNTER — HOSPITAL ENCOUNTER (OUTPATIENT)
Dept: PREADMISSION TESTING | Facility: HOSPITAL | Age: 39
Discharge: HOME | End: 2024-02-12
Attending: NURSE PRACTITIONER
Payer: MEDICAID

## 2024-02-12 PROBLEM — M71.22 SYNOVIAL CYST OF LEFT POPLITEAL SPACE: Status: ACTIVE | Noted: 2021-05-11

## 2024-02-12 PROBLEM — R94.31 ELECTROCARDIOGRAM SHOWING T WAVE ABNORMALITIES: Status: ACTIVE | Noted: 2023-11-08

## 2024-02-12 NOTE — PRE-PROCEDURE INSTRUCTIONS
Admit.    Allergies, medical, surgical, family and psychosocial histories reviewed with patient. Periop plan of care reviewed. Preop instructions given, including medications to take and to hold. Hibiclens soap and instructions on use given. Time allotted for questions to be addressed.  Patient verbalized understanding.    Arrival time 0530.

## 2024-02-12 NOTE — DISCHARGE INSTRUCTIONS
Your surgery is scheduled for 2/27/24.    Please report to [facility] on the 1st Floor at 530 a.m.    THIS TIME IS SUBJECT TO CHANGE.  YOU WILL RECEIVE A PHONE CALL THE DAY BEFORE SURGERY BY 3:30 PM TO CONFIRM YOUR TIME OF ARRIVAL.  IF YOU HAVE NOT RECEIVED A PHONE CALL BY 3:30 PM THE DAY BEFORE YOUR SURGERY PLEASE CALL [phone]     INSTRUCTIONS IMPORTANT!!!  ¨ Do not eat or drink after 12 midnight-including water, candy, gum, & mints. OK to brush teeth.      ____  Proceed to [facility] on *** for additional testing.        ____  Do not wear makeup, including mascara.  ____  No powder, lotions or creams to surgical area.  ____  Please remove all jewelry, including piercings and leave at home.  ____  No money or valuables needed. Please leave at home.  ____  Please bring any documents given by your doctor.  ____  If going home the same day, arrange for a ride home. You will not be able to             drive if Anesthesia was used.  ____  Children under 18 years require a parent / guardian present the entire time             they are in surgery / recovery.  ____  Wear loose fitting clothing. Allow for dressings, bandages.  ____  Stop Aspirin, Ibuprofen, Motrin, Aleve, Goody's/BC powders, Excedrine and Naproxen (NSAIDS) at least 3-5 days before surgery, unless otherwise instructed by your doctor, or the nurse.   You MAY use Tylenol/acetaminophen until day of surgery.  ____  Wash the surgical area with Hibiclens or Dial Antibacterial bar soap the night before surgery, and again the             morning of surgery.  Be sure to rinse hibiclens or Dial Antibacterial bar soap off completely (if instructed by   nurse).  ____  If you take diabetic medication including Metformin, Glimepiride, Glipizide, Glyburide, Byetta, Januvia, Actos, do not take am of surgery unless instructed by Doctor.  ____ Hold Invokana, Farxiga, and Jardiance for 3 days prior to surgery.   ____  Call MD for temperature  above 101 degrees or any other signs of infection such as Urinary (bladder) infection, Upper respiratory infection, skin boils, etc.  ____ Stop taking any Fish Oil supplement or any Vitamins that contain Vitamin E at least 5 days prior to surgery.  ____ Do Not wear your contact lenses the day of your procedure.  You may wear your glasses.      ____Do not shave surgical site for 3 days prior to surgery.  ____ Practice Good hand washing before, during, and after procedure.      I have read or had read and explained to me, and understand the above information.  Additional comments or instructions:  For additional questions call [phone]      ANESTHESIA SIDE EFFECTS  -For the first 24 hours after surgery:  Do not drive, use heavy equipment, make important decisions, or drink alcohol  -It is normal to feel sleepy for several hours.  Rest until you are more awake.  -Have someone stay with you, if needed.  They can watch for problems and help keep you safe.  -Some possible post anesthesia side effects include: nausea and vomiting, sore throat and hoarseness, sleepiness, and dizziness.        Pre-Op Bathing Instructions    Before surgery, you can play an important role in your own health.    Because skin is not sterile, we need to be sure that your skin is as free of germs as possible. By following the instructions below, you can reduce the number of germs on your skin before surgery.    IMPORTANT: You will need to shower with a special soap called Hibiclens*, available at any pharmacy.  If you are allergic to Chlorhexidine (the antiseptic in Hibiclens), use an antibacterial soap such as Dial Soap for your preoperative shower.  You will shower with Hibiclens both the night before your surgery and the morning of your surgery.  Do not use Hibiclens on the head, face or genitals to avoid injury to those areas.    STEP #1: THE NIGHT BEFORE YOUR SURGERY     Do not shave the area of your body where your surgery will be  performed.  Shower and wash your hair and body as usual with your normal soap and shampoo.  Rinse your hair and body thoroughly after you shower to remove all soap residue.  With your hand, apply one packet of Hibiclens soap to the surgical site.   Wash the site gently for five (5) minutes. Do not scrub your skin too hard.   Do not wash with your regular soap after Hibiclens is used.  Rinse your body thoroughly.  Pat yourself dry with a clean, soft towel.  Do not use lotion, cream, or powder.  Wear clean clothes.    STEP #2: THE MORNING OF YOUR SURGERY     Repeat Step #1.    * Not to be used by people allergic to Chlorhexidine.

## 2024-02-12 NOTE — ANESTHESIA PREPROCEDURE EVALUATION
"                                                                                                             02/12/2024  Kevin Mancini is a 38 y.o., female scheduled for  FUSION, SPINE, WITH INSTRUMENTATION (Left: Spine Lumbar) 2/27/24.    Per cardiology Dr. Shaw, "Kevin Mancini has been cleared from a cardiac standpoint with a minimal increase in risk for perioperative cardiac events with general anesthesia".     Past Medical History:   Diagnosis Date    Asthma      Past Surgical History:   Procedure Laterality Date    MICRODISCECTOMY OF SPINE N/A 3/2/2022    Procedure: MICRODISCECTOMY, SPINE Left L5-S1 Microdiscectomy;  Surgeon: Bakari Zaragoza MD;  Location: [facility];  Service: Neurosurgery;  Laterality: N/A;  Procedure: Left L5-S1 Microdiscectomy  Surgery Time: 1.5hr  LOS: 0-1  Anesthesia: General  Blood: Type & Screen  Radiology: C-arm  Microscope: Metrx  Bed: [place] [id number] Poster  Position: Prone     Review of patient's allergies indicates:  No Known Allergies    Current Outpatient Medications   Medication Instructions    albuterol (PROVENTIL/VENTOLIN HFA) 90 mcg/actuation inhaler albuterol sulfate HFA 90 mcg/actuation aerosol inhaler   Inhale 2 puffs every 4-6 hours by inhalation route as needed.    baclofen (LIORESAL) 10 mg, Oral, 3 times daily    ferrous sulfate (FEOSOL) 325 mg, Oral, With breakfast    fluticasone-salmeterol diskus inhaler 100-50 mcg Advair Diskus 100 mcg-50 mcg/dose powder for inhalation   Inhale 1 puff twice a day by inhalation route.    gabapentin (NEURONTIN) 600 MG tablet TAKE 1 AND 1/2 TABLETS(900 MG) BY MOUTH THREE TIMES DAILY    HYDROcodone-acetaminophen (NORCO)  mg per tablet 1 tablet, Oral, 3 times daily PRN    methocarbamoL (ROBAXIN) 750 mg, Oral       Pre-op Assessment    I have reviewed the Patient Summary Reports.    I have reviewed the NPO Status.   I have reviewed the Medications.     Review of Systems  Anesthesia Hx:              Personal Hx of " Anesthesia complications (large swings in BP and HR during surgery with 5 second tetanus without fade/5 second head lift during emergence 3/2022)          Slow To Awaken/Delayed Emergence          Social:  Social Alcohol Use Smokes black & mild      Cardiovascular:  Cardiovascular Normal Exercise tolerance: good          Denies Angina.                                  Pulmonary:   COPD, mild Asthma mild  Denies Shortness of breath.                  Hepatic/GI:  Hepatic/GI Normal                 Musculoskeletal:         Spine Disorders: lumbar Chronic Pain           Neurological:  Denies TIA.  Denies CVA. Neuromuscular Disease,   Denies Seizures.                                Endocrine:        Obesity / BMI > 30      Physical Exam  General: Oriented, Well nourished, Alert and Cooperative    Airway:  Mallampati: II / II  Mouth Opening: Normal  TM Distance: Normal  Tongue: Normal  Neck ROM: Normal ROM    Dental:  Intact      Lab Results   Component Value Date    WBC 11.91 (H) 03/24/2005    HGB 10.5 (L) 03/24/2005    HCT 32.1 (L) 03/24/2005     (H) 03/24/2005    ALT 10 03/24/2005    AST 17 03/24/2005     03/24/2005    K 3.9 03/24/2005     03/24/2005    CREATININE 0.8 03/01/2023    BUN 12 03/24/2005    CO2 22 (L) 03/24/2005     Results for orders placed or performed in visit on 01/09/24   EKG 12-lead    Collection Time: 01/09/24  1:13 PM    Narrative    Test Reason : R94.31,    Vent. Rate : 085 BPM     Atrial Rate : 085 BPM     P-R Int : 132 ms          QRS Dur : 074 ms      QT Int : 342 ms       P-R-T Axes : 056 078 034 degrees     QTc Int : 406 ms    Normal sinus rhythm with sinus arrhythmia  Possible Left atrial enlargement  Borderline Abnormal ECG  When compared with ECG of 02-MAR-2022 12:34,  QT has shortened  Confirmed by Valeria BOLDEN, Bruce CARLSON ([id number]) on 1/18/2024 11:19:12 AM    Referred By: PARUL BARROW           Confirmed By:Bruce Shaw MD         Anesthesia Plan  Type of Anesthesia, risks &  benefits discussed:    Anesthesia Type: Gen ETT  Intra-op Monitoring Plan: Standard ASA Monitors  Post Op Pain Control Plan: multimodal analgesia  Induction:  IV  ASA Score: 2  Anesthesia Plan Notes: Spoke with patient regarding delayed emergence. Did not see any medications administered that would explain this. Possible 250mcg fentanyl used throughout the case could have led to this especially in the presence of an opioid naive patient. Explained we would do our best to keep the patient at an adequate depth of anesthesia while not giving too many sedative medications that could delay her awakening at the end of surgery.       Ready For Surgery From Anesthesia Perspective.     .

## 2024-02-27 ENCOUNTER — HOSPITAL ENCOUNTER (INPATIENT)
Facility: HOSPITAL | Age: 39
LOS: 3 days | Discharge: NURSING, REHAB OR LONG-TERM CARE | DRG: 460 | End: 2024-03-01
Attending: NEUROLOGICAL SURGERY | Admitting: NEUROLOGICAL SURGERY
Payer: MEDICAID

## 2024-02-27 DIAGNOSIS — F11.20 NARCOTIC DEPENDENCE: ICD-10-CM

## 2024-02-27 DIAGNOSIS — M51.26 RECURRENT HERNIATION OF LUMBAR DISC: ICD-10-CM

## 2024-02-27 DIAGNOSIS — R22.42 LOCALIZED SWELLING OF LEFT FOOT: ICD-10-CM

## 2024-02-27 DIAGNOSIS — M48.061 FORAMINAL STENOSIS OF LUMBAR REGION: Primary | ICD-10-CM

## 2024-02-27 LAB
ABO + RH BLD: NORMAL
ANION GAP SERPL CALC-SCNC: 10 MMOL/L (ref 8–16)
B-HCG UR QL: NEGATIVE
BASOPHILS # BLD AUTO: 0.06 K/UL (ref 0–0.2)
BASOPHILS NFR BLD: 0.5 % (ref 0–1.9)
BLD GP AB SCN CELLS X3 SERPL QL: NORMAL
BUN SERPL-MCNC: 10 MG/DL (ref 6–20)
CALCIUM SERPL-MCNC: 9.2 MG/DL (ref 8.7–10.5)
CHLORIDE SERPL-SCNC: 107 MMOL/L (ref 95–110)
CO2 SERPL-SCNC: 23 MMOL/L (ref 23–29)
CREAT SERPL-MCNC: 0.8 MG/DL (ref 0.5–1.4)
CTP QC/QA: YES
DIFFERENTIAL METHOD BLD: ABNORMAL
EOSINOPHIL # BLD AUTO: 0.3 K/UL (ref 0–0.5)
EOSINOPHIL NFR BLD: 2.2 % (ref 0–8)
ERYTHROCYTE [DISTWIDTH] IN BLOOD BY AUTOMATED COUNT: 14.6 % (ref 11.5–14.5)
EST. GFR  (NO RACE VARIABLE): >60 ML/MIN/1.73 M^2
GLUCOSE SERPL-MCNC: 89 MG/DL (ref 70–110)
HCT VFR BLD AUTO: 42.4 % (ref 37–48.5)
HGB BLD-MCNC: 13.7 G/DL (ref 12–16)
IMM GRANULOCYTES # BLD AUTO: 0.15 K/UL (ref 0–0.04)
IMM GRANULOCYTES NFR BLD AUTO: 1.2 % (ref 0–0.5)
LYMPHOCYTES # BLD AUTO: 2.3 K/UL (ref 1–4.8)
LYMPHOCYTES NFR BLD: 18.1 % (ref 18–48)
MCH RBC QN AUTO: 31.4 PG (ref 27–31)
MCHC RBC AUTO-ENTMCNC: 32.3 G/DL (ref 32–36)
MCV RBC AUTO: 97 FL (ref 82–98)
MONOCYTES # BLD AUTO: 0.8 K/UL (ref 0.3–1)
MONOCYTES NFR BLD: 6.6 % (ref 4–15)
NEUTROPHILS # BLD AUTO: 9 K/UL (ref 1.8–7.7)
NEUTROPHILS NFR BLD: 71.4 % (ref 38–73)
NRBC BLD-RTO: 0 /100 WBC
PLATELET # BLD AUTO: 324 K/UL (ref 150–450)
PMV BLD AUTO: 9.1 FL (ref 9.2–12.9)
POTASSIUM SERPL-SCNC: 4 MMOL/L (ref 3.5–5.1)
RBC # BLD AUTO: 4.36 M/UL (ref 4–5.4)
SODIUM SERPL-SCNC: 140 MMOL/L (ref 136–145)
SPECIMEN OUTDATE: NORMAL
WBC # BLD AUTO: 12.59 K/UL (ref 3.9–12.7)

## 2024-02-27 PROCEDURE — 97530 THERAPEUTIC ACTIVITIES: CPT

## 2024-02-27 PROCEDURE — 25000003 PHARM REV CODE 250: Performed by: NEUROLOGICAL SURGERY

## 2024-02-27 PROCEDURE — C1713 ANCHOR/SCREW BN/BN,TIS/BN: HCPCS | Performed by: NEUROLOGICAL SURGERY

## 2024-02-27 PROCEDURE — 99900035 HC TECH TIME PER 15 MIN (STAT)

## 2024-02-27 PROCEDURE — 20930 SP BONE ALGRFT MORSEL ADD-ON: CPT | Mod: ,,, | Performed by: NEUROLOGICAL SURGERY

## 2024-02-27 PROCEDURE — 71000039 HC RECOVERY, EACH ADD'L HOUR: Performed by: NEUROLOGICAL SURGERY

## 2024-02-27 PROCEDURE — 3E0U0GB INTRODUCTION OF RECOMBINANT BONE MORPHOGENETIC PROTEIN INTO JOINTS, OPEN APPROACH: ICD-10-PCS | Performed by: NEUROLOGICAL SURGERY

## 2024-02-27 PROCEDURE — 80048 BASIC METABOLIC PNL TOTAL CA: CPT | Performed by: NEUROLOGICAL SURGERY

## 2024-02-27 PROCEDURE — 81025 URINE PREGNANCY TEST: CPT | Performed by: NEUROLOGICAL SURGERY

## 2024-02-27 PROCEDURE — 97535 SELF CARE MNGMENT TRAINING: CPT

## 2024-02-27 PROCEDURE — 63600175 PHARM REV CODE 636 W HCPCS: Performed by: ANESTHESIOLOGY

## 2024-02-27 PROCEDURE — 97161 PT EVAL LOW COMPLEX 20 MIN: CPT

## 2024-02-27 PROCEDURE — 61783 SCAN PROC SPINAL: CPT | Mod: ,,, | Performed by: NEUROLOGICAL SURGERY

## 2024-02-27 PROCEDURE — 22558 ARTHRD ANT NTRBD MIN DSC LUM: CPT | Mod: ,,, | Performed by: NEUROLOGICAL SURGERY

## 2024-02-27 PROCEDURE — 37000009 HC ANESTHESIA EA ADD 15 MINS: Performed by: NEUROLOGICAL SURGERY

## 2024-02-27 PROCEDURE — 22585 ARTHRD ANT NTRBD MIN DSC EA: CPT | Mod: ,,, | Performed by: NEUROLOGICAL SURGERY

## 2024-02-27 PROCEDURE — 63600175 PHARM REV CODE 636 W HCPCS: Performed by: NURSE ANESTHETIST, CERTIFIED REGISTERED

## 2024-02-27 PROCEDURE — 85025 COMPLETE CBC W/AUTO DIFF WBC: CPT | Performed by: NEUROLOGICAL SURGERY

## 2024-02-27 PROCEDURE — 94799 UNLISTED PULMONARY SVC/PX: CPT | Mod: XB

## 2024-02-27 PROCEDURE — 27800903 OPTIME MED/SURG SUP & DEVICES OTHER IMPLANTS: Performed by: NEUROLOGICAL SURGERY

## 2024-02-27 PROCEDURE — 71000033 HC RECOVERY, INTIAL HOUR: Performed by: NEUROLOGICAL SURGERY

## 2024-02-27 PROCEDURE — 37000008 HC ANESTHESIA 1ST 15 MINUTES: Performed by: NEUROLOGICAL SURGERY

## 2024-02-27 PROCEDURE — 97165 OT EVAL LOW COMPLEX 30 MIN: CPT

## 2024-02-27 PROCEDURE — 27201423 OPTIME MED/SURG SUP & DEVICES STERILE SUPPLY: Performed by: NEUROLOGICAL SURGERY

## 2024-02-27 PROCEDURE — 51798 US URINE CAPACITY MEASURE: CPT

## 2024-02-27 PROCEDURE — 0SG00A0 FUSION OF LUMBAR VERTEBRAL JOINT WITH INTERBODY FUSION DEVICE, ANTERIOR APPROACH, ANTERIOR COLUMN, OPEN APPROACH: ICD-10-PCS | Performed by: NEUROLOGICAL SURGERY

## 2024-02-27 PROCEDURE — 63600175 PHARM REV CODE 636 W HCPCS: Performed by: NEUROLOGICAL SURGERY

## 2024-02-27 PROCEDURE — 22853 INSJ BIOMECHANICAL DEVICE: CPT | Mod: ,,, | Performed by: NEUROLOGICAL SURGERY

## 2024-02-27 PROCEDURE — D9220A PRA ANESTHESIA: Mod: CRNA,,, | Performed by: NURSE ANESTHETIST, CERTIFIED REGISTERED

## 2024-02-27 PROCEDURE — 22842 INSERT SPINE FIXATION DEVICE: CPT | Mod: ,,, | Performed by: NEUROLOGICAL SURGERY

## 2024-02-27 PROCEDURE — 25000003 PHARM REV CODE 250: Performed by: ANESTHESIOLOGY

## 2024-02-27 PROCEDURE — 36415 COLL VENOUS BLD VENIPUNCTURE: CPT | Performed by: NEUROLOGICAL SURGERY

## 2024-02-27 PROCEDURE — 86850 RBC ANTIBODY SCREEN: CPT | Performed by: NEUROLOGICAL SURGERY

## 2024-02-27 PROCEDURE — 25000003 PHARM REV CODE 250: Performed by: NURSE ANESTHETIST, CERTIFIED REGISTERED

## 2024-02-27 PROCEDURE — 36000710: Performed by: NEUROLOGICAL SURGERY

## 2024-02-27 PROCEDURE — 0SG30A0 FUSION OF LUMBOSACRAL JOINT WITH INTERBODY FUSION DEVICE, ANTERIOR APPROACH, ANTERIOR COLUMN, OPEN APPROACH: ICD-10-PCS | Performed by: NEUROLOGICAL SURGERY

## 2024-02-27 PROCEDURE — 11000001 HC ACUTE MED/SURG PRIVATE ROOM

## 2024-02-27 PROCEDURE — 36000711: Performed by: NEUROLOGICAL SURGERY

## 2024-02-27 PROCEDURE — D9220A PRA ANESTHESIA: Mod: ANES,,, | Performed by: ANESTHESIOLOGY

## 2024-02-27 PROCEDURE — 8E0WXBF COMPUTER ASSISTED PROCEDURE OF TRUNK REGION, WITH FLUOROSCOPY: ICD-10-PCS | Performed by: NEUROLOGICAL SURGERY

## 2024-02-27 PROCEDURE — 63600175 PHARM REV CODE 636 W HCPCS: Performed by: NURSE PRACTITIONER

## 2024-02-27 DEVICE — EIT LLIF, H 16MM, 8°, 55/22
Type: IMPLANTABLE DEVICE | Site: SPINE LUMBAR | Status: FUNCTIONAL
Brand: EIT LLIF CAGE

## 2024-02-27 DEVICE — SCREW SET SPINAL SINGLE INNER: Type: IMPLANTABLE DEVICE | Site: SPINE LUMBAR | Status: FUNCTIONAL

## 2024-02-27 DEVICE — KIT BONE GRFT BMP XS: Type: IMPLANTABLE DEVICE | Site: SPINE LUMBAR | Status: FUNCTIONAL

## 2024-02-27 DEVICE — ROD VIPER 2 LORDOSED TI 70MM: Type: IMPLANTABLE DEVICE | Site: SPINE LUMBAR | Status: FUNCTIONAL

## 2024-02-27 DEVICE — PUTTY DBX 2.5CC: Type: IMPLANTABLE DEVICE | Site: SPINE LUMBAR | Status: FUNCTIONAL

## 2024-02-27 DEVICE — GRAFT PRIME DBM HD BONE 1.0CC: Type: IMPLANTABLE DEVICE | Site: SPINE LUMBAR | Status: FUNCTIONAL

## 2024-02-27 DEVICE — GUIDEWIRE SPINAL BLUNT 1.45MM: Type: IMPLANTABLE DEVICE | Site: SPINE LUMBAR | Status: FUNCTIONAL

## 2024-02-27 RX ORDER — MUPIROCIN 20 MG/G
OINTMENT TOPICAL 2 TIMES DAILY
Status: DISCONTINUED | OUTPATIENT
Start: 2024-02-27 | End: 2024-02-27

## 2024-02-27 RX ORDER — MIDAZOLAM HYDROCHLORIDE 1 MG/ML
INJECTION, SOLUTION INTRAMUSCULAR; INTRAVENOUS
Status: DISCONTINUED | OUTPATIENT
Start: 2024-02-27 | End: 2024-02-27

## 2024-02-27 RX ORDER — BACLOFEN 10 MG/1
10 TABLET ORAL 3 TIMES DAILY
Status: DISCONTINUED | OUTPATIENT
Start: 2024-02-27 | End: 2024-03-01 | Stop reason: HOSPADM

## 2024-02-27 RX ORDER — SODIUM CHLORIDE, SODIUM LACTATE, POTASSIUM CHLORIDE, CALCIUM CHLORIDE 600; 310; 30; 20 MG/100ML; MG/100ML; MG/100ML; MG/100ML
INJECTION, SOLUTION INTRAVENOUS CONTINUOUS
Status: DISCONTINUED | OUTPATIENT
Start: 2024-02-27 | End: 2024-02-27

## 2024-02-27 RX ORDER — LIDOCAINE HYDROCHLORIDE 20 MG/ML
INJECTION, SOLUTION EPIDURAL; INFILTRATION; INTRACAUDAL; PERINEURAL
Status: DISCONTINUED | OUTPATIENT
Start: 2024-02-27 | End: 2024-02-27

## 2024-02-27 RX ORDER — CELECOXIB 100 MG/1
200 CAPSULE ORAL
Status: COMPLETED | OUTPATIENT
Start: 2024-02-27 | End: 2024-02-27

## 2024-02-27 RX ORDER — SODIUM CHLORIDE 0.9 % (FLUSH) 0.9 %
10 SYRINGE (ML) INJECTION
Status: DISCONTINUED | OUTPATIENT
Start: 2024-02-27 | End: 2024-02-27 | Stop reason: HOSPADM

## 2024-02-27 RX ORDER — AMOXICILLIN 250 MG
2 CAPSULE ORAL NIGHTLY PRN
Status: DISCONTINUED | OUTPATIENT
Start: 2024-02-27 | End: 2024-03-01 | Stop reason: HOSPADM

## 2024-02-27 RX ORDER — OXYCODONE HYDROCHLORIDE 5 MG/1
10 TABLET ORAL EVERY 6 HOURS PRN
Status: DISCONTINUED | OUTPATIENT
Start: 2024-02-27 | End: 2024-03-01 | Stop reason: HOSPADM

## 2024-02-27 RX ORDER — HYDROMORPHONE HYDROCHLORIDE 2 MG/ML
2 INJECTION, SOLUTION INTRAMUSCULAR; INTRAVENOUS; SUBCUTANEOUS
Status: DISCONTINUED | OUTPATIENT
Start: 2024-02-27 | End: 2024-02-29

## 2024-02-27 RX ORDER — HEPARIN SODIUM 5000 [USP'U]/ML
5000 INJECTION, SOLUTION INTRAVENOUS; SUBCUTANEOUS EVERY 12 HOURS
Status: DISCONTINUED | OUTPATIENT
Start: 2024-02-27 | End: 2024-03-01 | Stop reason: HOSPADM

## 2024-02-27 RX ORDER — SODIUM CHLORIDE, SODIUM LACTATE, POTASSIUM CHLORIDE, CALCIUM CHLORIDE 600; 310; 30; 20 MG/100ML; MG/100ML; MG/100ML; MG/100ML
INJECTION, SOLUTION INTRAVENOUS CONTINUOUS
Status: DISCONTINUED | OUTPATIENT
Start: 2024-02-27 | End: 2024-02-28

## 2024-02-27 RX ORDER — DEXAMETHASONE SODIUM PHOSPHATE 4 MG/ML
INJECTION, SOLUTION INTRA-ARTICULAR; INTRALESIONAL; INTRAMUSCULAR; INTRAVENOUS; SOFT TISSUE
Status: DISCONTINUED | OUTPATIENT
Start: 2024-02-27 | End: 2024-02-27

## 2024-02-27 RX ORDER — ALBUTEROL SULFATE 90 UG/1
1 AEROSOL, METERED RESPIRATORY (INHALATION) EVERY 4 HOURS PRN
Status: DISCONTINUED | OUTPATIENT
Start: 2024-02-27 | End: 2024-03-01 | Stop reason: HOSPADM

## 2024-02-27 RX ORDER — FENTANYL CITRATE 50 UG/ML
INJECTION, SOLUTION INTRAMUSCULAR; INTRAVENOUS
Status: DISCONTINUED | OUTPATIENT
Start: 2024-02-27 | End: 2024-02-27

## 2024-02-27 RX ORDER — TRAMADOL HYDROCHLORIDE 50 MG/1
50 TABLET ORAL EVERY 6 HOURS PRN
Status: DISCONTINUED | OUTPATIENT
Start: 2024-02-27 | End: 2024-03-01 | Stop reason: HOSPADM

## 2024-02-27 RX ORDER — PROPOFOL 10 MG/ML
VIAL (ML) INTRAVENOUS
Status: DISCONTINUED | OUTPATIENT
Start: 2024-02-27 | End: 2024-02-27

## 2024-02-27 RX ORDER — ACETAMINOPHEN 325 MG/1
650 TABLET ORAL
Status: COMPLETED | OUTPATIENT
Start: 2024-02-27 | End: 2024-02-27

## 2024-02-27 RX ORDER — ACETAMINOPHEN 325 MG/1
650 TABLET ORAL EVERY 6 HOURS
Status: DISCONTINUED | OUTPATIENT
Start: 2024-02-27 | End: 2024-03-01 | Stop reason: HOSPADM

## 2024-02-27 RX ORDER — ONDANSETRON 8 MG/1
8 TABLET, ORALLY DISINTEGRATING ORAL EVERY 6 HOURS PRN
Status: DISCONTINUED | OUTPATIENT
Start: 2024-02-27 | End: 2024-03-01 | Stop reason: HOSPADM

## 2024-02-27 RX ORDER — DEXMEDETOMIDINE HYDROCHLORIDE 100 UG/ML
INJECTION, SOLUTION INTRAVENOUS
Status: DISCONTINUED | OUTPATIENT
Start: 2024-02-27 | End: 2024-02-27

## 2024-02-27 RX ORDER — ONDANSETRON HYDROCHLORIDE 2 MG/ML
INJECTION, SOLUTION INTRAVENOUS
Status: DISCONTINUED | OUTPATIENT
Start: 2024-02-27 | End: 2024-02-27

## 2024-02-27 RX ORDER — OXYCODONE HCL 10 MG/1
10 TABLET, FILM COATED, EXTENDED RELEASE ORAL
Status: COMPLETED | OUTPATIENT
Start: 2024-02-27 | End: 2024-02-27

## 2024-02-27 RX ORDER — HYDROMORPHONE HYDROCHLORIDE 2 MG/ML
0.2 INJECTION, SOLUTION INTRAMUSCULAR; INTRAVENOUS; SUBCUTANEOUS EVERY 5 MIN PRN
Status: DISCONTINUED | OUTPATIENT
Start: 2024-02-27 | End: 2024-02-27 | Stop reason: HOSPADM

## 2024-02-27 RX ORDER — ALUMINUM HYDROXIDE, MAGNESIUM HYDROXIDE, AND SIMETHICONE 1200; 120; 1200 MG/30ML; MG/30ML; MG/30ML
30 SUSPENSION ORAL EVERY 4 HOURS PRN
Status: DISCONTINUED | OUTPATIENT
Start: 2024-02-27 | End: 2024-03-01 | Stop reason: HOSPADM

## 2024-02-27 RX ORDER — OXYCODONE HYDROCHLORIDE 5 MG/1
5 TABLET ORAL
Status: DISCONTINUED | OUTPATIENT
Start: 2024-02-27 | End: 2024-02-27 | Stop reason: HOSPADM

## 2024-02-27 RX ORDER — KETOROLAC TROMETHAMINE 30 MG/ML
15 INJECTION, SOLUTION INTRAMUSCULAR; INTRAVENOUS EVERY 6 HOURS
Status: COMPLETED | OUTPATIENT
Start: 2024-02-27 | End: 2024-02-28

## 2024-02-27 RX ORDER — PREGABALIN 75 MG/1
75 CAPSULE ORAL
Status: COMPLETED | OUTPATIENT
Start: 2024-02-27 | End: 2024-02-27

## 2024-02-27 RX ORDER — PHENYLEPHRINE HYDROCHLORIDE 10 MG/ML
INJECTION INTRAVENOUS
Status: DISCONTINUED | OUTPATIENT
Start: 2024-02-27 | End: 2024-02-27

## 2024-02-27 RX ORDER — CEFAZOLIN SODIUM 2 G/50ML
2 SOLUTION INTRAVENOUS ONCE
Status: COMPLETED | OUTPATIENT
Start: 2024-02-27 | End: 2024-02-27

## 2024-02-27 RX ORDER — BISACODYL 10 MG/1
10 SUPPOSITORY RECTAL DAILY
Status: DISCONTINUED | OUTPATIENT
Start: 2024-02-27 | End: 2024-03-01 | Stop reason: HOSPADM

## 2024-02-27 RX ORDER — LIDOCAINE HYDROCHLORIDE 10 MG/ML
1 INJECTION, SOLUTION EPIDURAL; INFILTRATION; INTRACAUDAL; PERINEURAL ONCE
Status: DISCONTINUED | OUTPATIENT
Start: 2024-02-27 | End: 2024-02-27 | Stop reason: HOSPADM

## 2024-02-27 RX ORDER — PROCHLORPERAZINE EDISYLATE 5 MG/ML
5 INJECTION INTRAMUSCULAR; INTRAVENOUS EVERY 6 HOURS PRN
Status: DISCONTINUED | OUTPATIENT
Start: 2024-02-27 | End: 2024-03-01 | Stop reason: HOSPADM

## 2024-02-27 RX ORDER — MUPIROCIN 20 MG/G
OINTMENT TOPICAL 2 TIMES DAILY
Status: COMPLETED | OUTPATIENT
Start: 2024-02-27 | End: 2024-02-29

## 2024-02-27 RX ORDER — GABAPENTIN 300 MG/1
600 CAPSULE ORAL 3 TIMES DAILY
Status: DISCONTINUED | OUTPATIENT
Start: 2024-02-27 | End: 2024-03-01 | Stop reason: HOSPADM

## 2024-02-27 RX ORDER — PROCHLORPERAZINE EDISYLATE 5 MG/ML
5 INJECTION INTRAMUSCULAR; INTRAVENOUS EVERY 30 MIN PRN
Status: DISCONTINUED | OUTPATIENT
Start: 2024-02-27 | End: 2024-02-27 | Stop reason: HOSPADM

## 2024-02-27 RX ORDER — ROCURONIUM BROMIDE 10 MG/ML
INJECTION, SOLUTION INTRAVENOUS
Status: DISCONTINUED | OUTPATIENT
Start: 2024-02-27 | End: 2024-02-27

## 2024-02-27 RX ORDER — CYCLOBENZAPRINE HCL 10 MG
10 TABLET ORAL
Status: COMPLETED | OUTPATIENT
Start: 2024-02-27 | End: 2024-02-27

## 2024-02-27 RX ADMIN — ACETAMINOPHEN 650 MG: 325 TABLET ORAL at 06:02

## 2024-02-27 RX ADMIN — SODIUM CHLORIDE, SODIUM LACTATE, POTASSIUM CHLORIDE, AND CALCIUM CHLORIDE: .6; .31; .03; .02 INJECTION, SOLUTION INTRAVENOUS at 06:02

## 2024-02-27 RX ADMIN — ROCURONIUM BROMIDE 25 MG: 10 INJECTION, SOLUTION INTRAVENOUS at 07:02

## 2024-02-27 RX ADMIN — PROPOFOL 180 MG: 10 INJECTION, EMULSION INTRAVENOUS at 07:02

## 2024-02-27 RX ADMIN — SODIUM CHLORIDE, SODIUM LACTATE, POTASSIUM CHLORIDE, AND CALCIUM CHLORIDE: .6; .31; .03; .02 INJECTION, SOLUTION INTRAVENOUS at 11:02

## 2024-02-27 RX ADMIN — PHENYLEPHRINE HYDROCHLORIDE 50 MCG: 10 INJECTION INTRAVENOUS at 10:02

## 2024-02-27 RX ADMIN — SUGAMMADEX 200 MG: 100 INJECTION, SOLUTION INTRAVENOUS at 11:02

## 2024-02-27 RX ADMIN — HYDROMORPHONE HYDROCHLORIDE 0.2 MG: 2 INJECTION, SOLUTION INTRAMUSCULAR; INTRAVENOUS; SUBCUTANEOUS at 11:02

## 2024-02-27 RX ADMIN — MUPIROCIN: 20 OINTMENT TOPICAL at 08:02

## 2024-02-27 RX ADMIN — CEFAZOLIN SODIUM 2 G: 2 SOLUTION INTRAVENOUS at 07:02

## 2024-02-27 RX ADMIN — OXYCODONE 5 MG: 5 TABLET ORAL at 11:02

## 2024-02-27 RX ADMIN — FENTANYL CITRATE 100 MCG: 50 INJECTION INTRAMUSCULAR; INTRAVENOUS at 07:02

## 2024-02-27 RX ADMIN — PREGABALIN 75 MG: 75 CAPSULE ORAL at 06:02

## 2024-02-27 RX ADMIN — HYDROMORPHONE HYDROCHLORIDE 2 MG: 2 INJECTION, SOLUTION INTRAMUSCULAR; INTRAVENOUS; SUBCUTANEOUS at 11:02

## 2024-02-27 RX ADMIN — MIDAZOLAM 2 MG: 1 INJECTION INTRAMUSCULAR; INTRAVENOUS at 07:02

## 2024-02-27 RX ADMIN — ACETAMINOPHEN 650 MG: 325 TABLET ORAL at 05:02

## 2024-02-27 RX ADMIN — HYDROMORPHONE HYDROCHLORIDE 2 MG: 2 INJECTION, SOLUTION INTRAMUSCULAR; INTRAVENOUS; SUBCUTANEOUS at 03:02

## 2024-02-27 RX ADMIN — LIDOCAINE HYDROCHLORIDE 100 MG: 20 INJECTION, SOLUTION EPIDURAL; INFILTRATION; INTRACAUDAL; PERINEURAL at 07:02

## 2024-02-27 RX ADMIN — ROCURONIUM BROMIDE 50 MG: 10 INJECTION, SOLUTION INTRAVENOUS at 07:02

## 2024-02-27 RX ADMIN — PROPOFOL 10 MG: 10 INJECTION, EMULSION INTRAVENOUS at 10:02

## 2024-02-27 RX ADMIN — PROCHLORPERAZINE EDISYLATE 5 MG: 5 INJECTION INTRAMUSCULAR; INTRAVENOUS at 11:02

## 2024-02-27 RX ADMIN — KETOROLAC TROMETHAMINE 15 MG: 30 INJECTION, SOLUTION INTRAMUSCULAR; INTRAVENOUS at 05:02

## 2024-02-27 RX ADMIN — PROPOFOL 20 MG: 10 INJECTION, EMULSION INTRAVENOUS at 08:02

## 2024-02-27 RX ADMIN — TRAMADOL HYDROCHLORIDE 50 MG: 50 TABLET, COATED ORAL at 01:02

## 2024-02-27 RX ADMIN — SODIUM CHLORIDE, POTASSIUM CHLORIDE, SODIUM LACTATE AND CALCIUM CHLORIDE: 600; 310; 30; 20 INJECTION, SOLUTION INTRAVENOUS at 01:02

## 2024-02-27 RX ADMIN — ROCURONIUM BROMIDE 25 MG: 10 INJECTION, SOLUTION INTRAVENOUS at 08:02

## 2024-02-27 RX ADMIN — BACLOFEN 10 MG: 10 TABLET ORAL at 08:02

## 2024-02-27 RX ADMIN — GABAPENTIN 600 MG: 300 CAPSULE ORAL at 08:02

## 2024-02-27 RX ADMIN — PHENYLEPHRINE HYDROCHLORIDE 50 MCG: 10 INJECTION INTRAVENOUS at 07:02

## 2024-02-27 RX ADMIN — OXYCODONE 10 MG: 5 TABLET ORAL at 07:02

## 2024-02-27 RX ADMIN — CELECOXIB 200 MG: 100 CAPSULE ORAL at 06:02

## 2024-02-27 RX ADMIN — BACLOFEN 10 MG: 10 TABLET ORAL at 02:02

## 2024-02-27 RX ADMIN — ONDANSETRON 4 MG: 2 INJECTION, SOLUTION INTRAMUSCULAR; INTRAVENOUS at 10:02

## 2024-02-27 RX ADMIN — DEXMEDETOMIDINE HCL 4 MCG: 100 INJECTION INTRAVENOUS at 11:02

## 2024-02-27 RX ADMIN — ONDANSETRON 4 MG: 2 INJECTION, SOLUTION INTRAMUSCULAR; INTRAVENOUS at 07:02

## 2024-02-27 RX ADMIN — HEPARIN SODIUM 5000 UNITS: 5000 INJECTION INTRAVENOUS; SUBCUTANEOUS at 08:02

## 2024-02-27 RX ADMIN — DEXMEDETOMIDINE HCL 4 MCG: 100 INJECTION INTRAVENOUS at 09:02

## 2024-02-27 RX ADMIN — CYCLOBENZAPRINE 10 MG: 10 TABLET, FILM COATED ORAL at 06:02

## 2024-02-27 RX ADMIN — PHENYLEPHRINE HYDROCHLORIDE 25 MCG: 10 INJECTION INTRAVENOUS at 10:02

## 2024-02-27 RX ADMIN — DEXAMETHASONE SODIUM PHOSPHATE 8 MG: 4 INJECTION, SOLUTION INTRA-ARTICULAR; INTRALESIONAL; INTRAMUSCULAR; INTRAVENOUS; SOFT TISSUE at 07:02

## 2024-02-27 RX ADMIN — FENTANYL CITRATE 50 MCG: 50 INJECTION INTRAMUSCULAR; INTRAVENOUS at 08:02

## 2024-02-27 RX ADMIN — DEXMEDETOMIDINE HCL 4 MCG: 100 INJECTION INTRAVENOUS at 10:02

## 2024-02-27 RX ADMIN — PHENYLEPHRINE HYDROCHLORIDE 75 MCG: 10 INJECTION INTRAVENOUS at 10:02

## 2024-02-27 RX ADMIN — HYDROMORPHONE HYDROCHLORIDE 0.2 MG: 2 INJECTION, SOLUTION INTRAMUSCULAR; INTRAVENOUS; SUBCUTANEOUS at 12:02

## 2024-02-27 RX ADMIN — OXYCODONE HYDROCHLORIDE 10 MG: 10 TABLET, FILM COATED, EXTENDED RELEASE ORAL at 06:02

## 2024-02-27 RX ADMIN — HYDROMORPHONE HYDROCHLORIDE 2 MG: 2 INJECTION, SOLUTION INTRAMUSCULAR; INTRAVENOUS; SUBCUTANEOUS at 06:02

## 2024-02-27 NOTE — TRANSFER OF CARE
"Anesthesia Transfer of Care Note    Patient: Kevin Mancini    Procedure(s) Performed: Procedure(s) (LRB):  FUSION, SPINE, WITH INSTRUMENTATION (Left)  FUSION, SPINE, WITH INSTRUMENTATION (N/A)    Patient location: PACU    Anesthesia Type: general    Transport from OR: Transported from OR on 6-10 L/min O2 by face mask with adequate spontaneous ventilation    Post pain: adequate analgesia    Post assessment: no apparent anesthetic complications    Post vital signs: stable    Level of consciousness: sedated and responds to stimulation    Nausea/Vomiting: no nausea/vomiting    Complications: none    Transfer of care protocol was followed      Last vitals: Visit Vitals  /78 (BP Location: Right arm, Patient Position: Lying)   Pulse 70   Temp 37.1 °C (98.8 °F) (Skin)   Resp 18   Ht 5' 4" (1.626 m)   Wt 77.1 kg (170 lb)   LMP 02/01/2024 (Exact Date)   SpO2 98%   Breastfeeding No   BMI 29.18 kg/m²     "

## 2024-02-27 NOTE — PLAN OF CARE
Occupational therapy evaluation completed, co-evaluation with physical therapy 2/2 first attempt out of bed s/p fusion   by Dr. Zaragoza post op day 0. At baseline patient is usually functioning at primarily independent level including working, mobilizing without assistive device, but having to rely on spouse/ furniture at times and PRN assist for LB dressing and steps to enter home. Initial education/ instruction on spinal precautions, positioning education of LSO brace, initial adaptive ADL techniques, and safe functional mobility strategies provided. On eval this date, patient able to perform bed mobility with moderate assist x2, side steps with CGA and rolling walker, and requiring increased assist for ADL. Recommend high intensity theraoy; patient has 16 steps to enter home which is a barrier at this time. Anticipated DME recommendations for rolling walker and shower chair.     Problem: Occupational Therapy  Goal: Occupational Therapy Goal  Description: Goals to be met by: 3/19/2024     Patient will increase functional independence with ADLs by performing:    Rolling B via log roll with independence while maintaining spinal precautions  UE Dressing with Set-up Assistance inclusive of donning / doffing LSO brace  LE Dressing with Set-up Assistance with adaptive technique and inclusion of spinal precautions  Toileting from toilet with Modified independence for hygiene and clothing management.   Toilet transfer to toilet with Modified indpedence with use of least restrictive device.  Functional mobility x5 minutes with least restrictive device with SBA while participation in daily routines.  100% reciprocation and integration of 3/3 spinal precautions, DME recommendations, and ADL/task modifications post fusion to support safe d/c at highest level of function.        Outcome: Ongoing, Progressing

## 2024-02-27 NOTE — PLAN OF CARE
VN cued into pt's room for introduction with pt's permission.  VN role explained and informed pt that VN would be working with bedside nurse and the rest of the care team.  Fall risk and bed alarm protocol education provided.  Instructed pt to call for assistance and agreeable.  Allowed time for questions.  Will cont to be available as needed.      02/27/24 1506   Patient Request   Patient Requested pain medication   Nurse Notification   Bedside Nurse Notified? Yes   Name of Bedside Nurse emelyn   Nurse Notfication Method Secure Chat   Nurse Notified Of Patient Request   Admission   Initial VN Admission Questions Complete   Communication Issues? None   Shift   Virtual Nurse - Patient Verbalized Approval Of Camera Use;VN Rounding   Safety/Activity   Patient Rounds call light in patient/parent reach;visualized patient;clutter free environment maintained   Safety Promotion/Fall Prevention Fall Risk reviewed with patient/family;instructed to call staff for mobility   Pain/Comfort/Sleep   Preferred Pain Scale number (Numeric Rating Pain Scale)   Pain Body Location - Orientation lower   Pain Body Location back   Pain Rating (0-10): Rest 8

## 2024-02-27 NOTE — H&P
NEUROSURGICAL OUTPATIENT CONSULTATION NOTE     DATE OF SERVICE:  24     ATTENDING PHYSICIAN:  Bakari Zaragoza MD        MEMO:64%     NRS low back:8/10     NRS right le/10     NRS left le/10     Opioid use daily:Hydrocodone 10 mg tid     Neuropathic pain meds daily:gabapentin 900 TID     NSAIDs daily:NA     Muscle relaxant daily:baclofen     Smoking:no     SUBJECTIVE:     HISTORY:  This is a 38 y.o. female who status post left L5-S1 microdiskectomy.  Following the microdiskectomy her severe left leg pain improved but did not resolve completely.  She has completed a full conservative management including more than 6 weeks of physical therapy, L5-S1 transforaminal epidural steroid injection, pain medication but her pain has progressively become worse.  She can not tolerate sitting for long period of time.  She has difficulty standing from a sitting position due to the severe low back pain that radiates towards the left buttock area.  The pain also radiates down the leg.  The pain feels like a pulling sensation, throbbing.  Pain is affecting her quality of life functional status and ability to perform her ADLs and work.  No new onset of motor weakness.  She has persistent left S1 distribution numbness.        PAST MEDICAL HISTORY:       Active Ambulatory Problems     Diagnosis Date Noted    Chronic left-sided low back pain with left-sided sciatica 2021    Decreased range of motion of lumbar spine 2021    Weakness of left lower extremity 2021    Gait difficulty 2021    Tobacco user 2019    Chronic asthmatic bronchitis with acute exacerbation 2019    Exercise-induced asthma 2019    Lumbar disc herniation with radiculopathy 2022           Resolved Ambulatory Problems     Diagnosis Date Noted    No Resolved Ambulatory Problems           Past Medical History:   Diagnosis Date    Asthma           PAST SURGICAL HISTORY:        Past Surgical History:   Procedure  Laterality Date    MICRODISCECTOMY OF SPINE N/A 3/2/2022     Procedure: MICRODISCECTOMY, SPINE Left L5-S1 Microdiscectomy;  Surgeon: Bakari Zaragoza MD;  Location: [facility];  Service: Neurosurgery;  Laterality: N/A;  Procedure: Left L5-S1 Microdiscectomy  Surgery Time: 1.5hr  LOS: 0-1  Anesthesia: General  Blood: Type & Screen  Radiology: C-arm  Microscope: Metrx  Bed: [place] [id number] Poster  Position: Prone         SOCIAL HISTORY:   Social History               Socioeconomic History    Marital status: Single   Tobacco Use    Smoking status: Some Days       Types: Cigars       Passive exposure: Never    Smokeless tobacco: Never    Tobacco comments:       social   Substance and Sexual Activity    Alcohol use: Yes       Alcohol/week: 1.0 standard drink       Types: 1 Glasses of wine per week       Comment: social    Drug use: Not Currently            FAMILY HISTORY:  No family history on file.     CURRENTS MEDICATIONS:         Current Outpatient Medications on File Prior to Visit   Medication Sig Dispense Refill    albuterol (PROVENTIL/VENTOLIN HFA) 90 mcg/actuation inhaler albuterol sulfate HFA 90 mcg/actuation aerosol inhaler   Inhale 2 puffs every 4-6 hours by inhalation route as needed.        baclofen (LIORESAL) 10 MG tablet Take 1 tablet (10 mg total) by mouth 3 (three) times daily. 90 tablet 11    diclofenac (VOLTAREN) 75 MG EC tablet TAKE 1 TABLET(75 MG) BY MOUTH TWICE DAILY AS NEEDED FOR PAIN 60 tablet 2    ferrous sulfate (FEOSOL) 325 mg (65 mg iron) Tab tablet Take 325 mg by mouth daily with breakfast.        fluticasone-salmeterol diskus inhaler 100-50 mcg Advair Diskus 100 mcg-50 mcg/dose powder for inhalation   Inhale 1 puff twice a day by inhalation route.        gabapentin (NEURONTIN) 600 MG tablet Take 1.5 tablets (900 mg total) by mouth 3 (three) times daily. 135 tablet 11    HYDROcodone-acetaminophen (NORCO)  mg per tablet Take 1 tablet by mouth 3 (three) times daily as needed for Pain (pain). 90  tablet 0    methocarbamoL (ROBAXIN) 750 MG Tab Take 750 mg by mouth.        traMADoL (ULTRAM) 50 mg tablet Take 1 tablet (50 mg total) by mouth every 8 (eight) hours as needed for Pain. 90 tablet 3      No current facility-administered medications on file prior to visit.         ALLERGIES:  Review of patient's allergies indicates:  No Known Allergies     REVIEW OF SYSTEMS:  Review of Systems   Constitutional:  Negative for diaphoresis, fever and weight loss.   Respiratory:  Negative for shortness of breath.    Cardiovascular:  Negative for chest pain.   Gastrointestinal:  Negative for blood in stool.   Genitourinary:  Negative for hematuria.   Endo/Heme/Allergies:  Does not bruise/bleed easily.   All other systems reviewed and are negative.     OBJECTIVE:     PHYSICAL EXAMINATION:   There were no vitals filed for this visit.     Physical Exam:  Vitals reviewed.     Constitutional: She appears well-developed and well-nourished.      Eyes: Pupils are equal, round, and reactive to light. Conjunctivae and EOM are normal.      Cardiovascular: Normal distal pulses and no edema.      Abdominal: Soft.      Skin: Skin displays no rash on trunk and no rash on extremities. Skin displays no lesions on trunk and no lesions on extremities.      Psych/Behavior: She is alert. She is oriented to person, place, and time. She has a normal mood and affect.      Musculoskeletal:        Neck: Range of motion is full.      Neurological:        DTRs: Tricep reflexes are 2+ on the right side and 2+ on the left side. Bicep reflexes are 2+ on the right side and 2+ on the left side. Brachioradialis reflexes are 2+ on the right side and 2+ on the left side. Patellar reflexes are 2+ on the right side and 2+ on the left side. Achilles reflexes are 2+ on the right side and 2+ on the left side.      Back Exam      Tenderness   The patient is experiencing tenderness in the lumbar.     Range of Motion   Extension:  abnormal   Flexion:  abnormal    Lateral bend right:  abnormal   Lateral bend left:  abnormal   Rotation right:  abnormal   Rotation left:  abnormal      Muscle Strength   Right Quadriceps:  5/5   Left Quadriceps:  5/5   Right Hamstrings:  5/5   Left Hamstrings:  5/5      Tests   Straight leg raise right: negative  Straight leg raise left: negative     Other   Toe walk: normal  Heel walk: normal                 Neurologic Exam      Mental Status   Oriented to person, place, and time.   Speech: speech is normal   Level of consciousness: alert     Cranial Nerves   Cranial nerves II through XII intact.      CN III, IV, VI   Pupils are equal, round, and reactive to light.  Extraocular motions are normal.      Motor Exam   Muscle bulk: normal  Overall muscle tone: normal     Strength   Right deltoid: 5/5  Left deltoid: 5/5  Right biceps: 5/5  Left biceps: 5/5  Right triceps: 5/5  Left triceps: 5/5  Right wrist flexion: 5/5  Left wrist flexion: 5/5  Right wrist extension: 5/5  Left wrist extension: 5/5  Right interossei: 5/5  Left interossei: 5/5  Right iliopsoas: 5/5  Left iliopsoas: 5/5  Right quadriceps: 5/5  Left quadriceps: 5/5  Right hamstrin/5  Left hamstrin/5  Right anterior tibial: 5/5  Left anterior tibial: 5/5  Right posterior tibial: 5/5  Left posterior tibial: 5/5  Right peroneal: 5/5  Left peroneal: 5/5  Right gastroc: 5/5  Left gastroc: 5/5     Sensory Exam   Light touch normal.   Pinprick normal.      Gait, Coordination, and Reflexes      Gait  Gait: normal     Coordination   Finger to nose coordination: normal  Tandem walking coordination: normal     Reflexes   Right brachioradialis: 2+  Left brachioradialis: 2+  Right biceps: 2+  Left biceps: 2+  Right triceps: 2+  Left triceps: 2+  Right patellar: 2+  Left patellar: 2+  Right achilles: 2+  Left achilles: 2+  Right plantar: normal  Left plantar: normal  Right Villasenor: absent  Left Villasenor: absent  Right ankle clonus: absent  Left ankle clonus: absent        DIAGNOSTIC DATA:  I  personally interpreted the following imaging:   She had an EMG in May 2023 that was normal  Repeat spine MRI in March 3, 2023 shows status post left L5-S1 microdiskectomy, left L5-S1 foraminal stenosis with compression of the left L5 nerve root, L4-5 central disc herniation and annular tear causing bilateral lateral recess stenosis     Scoliosis parameters     Left lumbar convexity scoliosis causing left L5-S1 foraminal stenosis           ASSESMENT:  This is a 38 y.o. female with      Problem List Items Addressed This Visit                  Neuro     Lumbar disc herniation with radiculopathy      Other Visit Diagnoses         Other secondary scoliosis, lumbosacral region    -  Primary     Foraminal stenosis of lumbosacral region         Lumbosacral radiculopathy at L5                    PLAN:     She has completed a full conservative management.  She has L4-5 and L5-S1 degenerative disc disease and worsening low back and left leg pain.  She has persistent left L5 nerve compression caused by the L4-5 disc herniation and the left L5-S1 foraminal stenosis.  I do not think that a repeat decompression surgery will be successful.  I think a L4-5 and L5-S1 fusion to indirectly decompress the L5 nerve root is most likely to help this patient long-term.     I explained the natural history of the disease and all treatment options. I recommended a left L4-5 oblique and L5-S1 anterior interbody fusion with placement of interbody spacer, Conduit DePuy cages filled with allograft BMP, L4-S1 posterior segmental instrumentation using Viper Prime Depuy system.      We have discussed the risks of surgery including death, coma, bleeding, infection, failure of surgery, CSF leak, nerve root injury, spinal cord injury, ureter injury, weakness, paralysis, peripheral neuropathy, malplaced hardware, migration of hardware, non-union, need for reoperation. Patient understands the risks and would like to proceed with surgery.                 Bakari Zaragoza MD  Cell:[phone]

## 2024-02-27 NOTE — PLAN OF CARE
POC reviewed with pt and pt's family. Pt and pt's family verbalize understanding. Safety precautions maintained. Call bell in reach.  Bed locked and in lowest position. Instructed pt to call for assistance. Pt verbalizes understanding.

## 2024-02-27 NOTE — PLAN OF CARE
Problem: Physical Therapy  Goal: Physical Therapy Goal  Description: Goals to be met by: 3/27/2024     Patient will increase functional independence with mobility by performin. Supine to sit with Modified Cook  2. Sit to supine with Modified Cook  3. Rolling with Modified Cook.  4. Sit to stand transfer with Modified Cook using Rolling Walker  5. Bed to chair transfer with Modified Cook using Rolling Walker  6. Gait  x 150 feet with Modified Cook using Rolling Walker.   7. Ascend/descend 16 stair with bilateral Handrails Modified Cook     Outcome: Ongoing, Progressing   PT/OT co-eval performed; at this time; pt performed bed mobility with modA x 2; Annabelle for sit <>stand using RW; side stepped with CGA using RW and assist with RW management; recommend high intensity therapy; pt has barrier of 16 steps to enter home; anticipated DME: RW, shower chair; will cont with POC.

## 2024-02-27 NOTE — PT/OT/SLP EVAL
Occupational Therapy   Evaluation and treatment    Name: Kevin Mancini  MRN: [id number]  Admitting Diagnosis: Foraminal stenosis of lumbar region  Recent Surgery: Procedure(s) (LRB):  FUSION, SPINE, WITH INSTRUMENTATION (Left)  FUSION, SPINE, WITH INSTRUMENTATION (N/A) Day of Surgery    Recommendations:     Discharge Recommendations: High Intensity Therapy  Discharge Equipment Recommendations:  to be determined by next level of care (anticipate need for rolling walker, shower chair)  Barriers to discharge:  Inaccessible home environment, Other (Comment) (pain)    Assessment:     Kevin Mancini is a 39 y.o. female with a medical diagnosis of Foraminal stenosis of lumbar region.  She presents with ..The primary encounter diagnosis was Foraminal stenosis of lumbar region. Diagnoses of Narcotic dependence and Recurrent herniation of lumbar disc were also pertinent to this visit.  . Performance deficits affecting function: weakness, impaired endurance, impaired self care skills, impaired functional mobility, decreased lower extremity function, gait instability, impaired balance, pain, decreased safety awareness.      Occupational therapy evaluation completed, co-evaluation with physical therapy 2/2 first attempt out of bed s/p fusion   by Dr. Zaragoza post op day 0. At baseline patient is usually functioning at primarily independent level including working, mobilizing without assistive device, but having to rely on spouse/ furniture at times and PRN assist for LB dressing and steps to enter home. Initial education/ instruction on spinal precautions, positioning education of LSO brace, initial adaptive ADL techniques, and safe functional mobility strategies provided. On eval this date, patient able to perform bed mobility with moderate assist x2, side steps with CGA and rolling walker, and requiring increased assist for ADL. Recommend high intensity theraoy; patient has 16 steps to enter home which is a  barrier at this time. Anticipated DME recommendations for rolling walker and shower chair.         Rehab Prognosis: Good; patient would benefit from acute skilled OT services to address these deficits and reach maximum level of function.       Plan:     Patient to be seen 5 x/week to address the above listed problems via self-care/home management, therapeutic activities, therapeutic exercises  Plan of Care Expires: 03/19/24  Plan of Care Reviewed with: patient, significant other    Subjective     Chief Complaint: pain  Patient/Family Comments/goals: hopeful and desires to stand; indicates having prior difficulty presurgery with LLE weakness    Occupational Profile:  Living Environment: Patient resides with spouse in an elevated home with 16 steps to enter with B handrails and tub shower combo.  Previous level of function:  At baseline patient is usually functioning at primarily independent level including working, mobilizing without assistive device, but having to rely on spouse/ furniture at times and PRN assist for LB dressing and steps to enter home.    Equipment Used at Home: none  Assistance upon Discharge: spouse; however, spouse does also work    Pain/Comfort:  Pain Rating 1: 8/10 (low back (moreso on L side))  Pain Addressed 1: Reposition, Cessation of Activity, Nurse notified  Pain Rating Post-Intervention 1: other (see comments) (increased with activity; setlled at end of session after repositioned)      Objective:     Communicated with: nursing prior to session.  Patient found HOB elevated in left sidelying with bed alarm, peripheral IV (wedge) upon OT entry to room.    General Precautions: Standard, fall  Orthopedic Precautions: spinal precautions  Braces: LSO  Respiratory Status: Room air    Occupational Performance:    Bed Mobility:    Patient completed supine to sit from left sidelying with moderate assist x2, increased time via log roll with UE/ LE management assist, followed by scooting to eob with  "minimal assist  Patient completed sit to supine with moderate assist x2, to preferred left sidelying with wedge repositioned    Functional Mobility/Transfers:  Patient completed Sit <> Stand Transfer with minimal assist  with  rolling walker with 2nd therapist present due to first attempt standing post op day 0 and mild apprehension   Functional Mobility: Minimal side steps alongside eob with CGA/ rolling walker management, verbal cues for pushing through BUE on walker and 2nd therapist present for safety; deferred additional mobility 2/2 pain/ day 0 post op    Activities of Daily Living:  Feeding:    independence  Upper Body Dressing: moderate assistance ; LSO brace   Lower Body Dressing: limited performance;  infer maximal assist; initiated ed on adaptive technique  Toileting: dependence/unable to void; indicates no urge post sx    Cognitive/Visual Perceptual:  A&O x4; WNL; mood: cooperative; follows multilevel commands but occasionally closes eyes 2/2 grogginess /needs re-cue    Physical Exam:  Skin integrity: incision low back / intact  Sensation: "40% on the left" (lower extremity); intact on RLE; intact BUE  Upper Extremity Range of Motion:     -       Right Upper Extremity: WFL  -       Left Upper Extremity: WFL  Upper Extremity Strength:    -       Right Upper Extremity: WFL  -       Left Upper Extremity: WFL    AMPAC 6 Click ADL:  AMPAC Total Score: 15    Treatment & Education:  Patient / spouse educated on role of OT/ POC development.   Co-evaluation with physical therapy in consideration of first attempt out of bed s/p fusion surgery this date.   Patient educated on spinal precautions, log rolling technique, and positioning and wearing schedule of LSO brace; fair reciprocation noted with further teaching needed. Handout provided on spinal precautions to patient/ family.  Occupational profile developed via interview.  ADL / functional mobility training performed as above with introduction to log roll, " spinal precautions implementation, education on initiating LB dressing with LLE first (2/2 slightly weaker), breathing with activity, and rolling walker management. Educated on purpose of / recommendation for ongoing therapy and anticipated need for future DME training.   Answered questions in scope.      Patient left HOB elevated in left sidelying with wedge with all lines intact, call button in reach, bed alarm on, nursing notified, and spouse present    GOALS:   Multidisciplinary Problems       Occupational Therapy Goals          Problem: Occupational Therapy    Goal Priority Disciplines Outcome Interventions   Occupational Therapy Goal     OT, PT/OT Ongoing, Progressing    Description: Goals to be met by: 3/19/2024     Patient will increase functional independence with ADLs by performing:    Rolling B via log roll with independence while maintaining spinal precautions  UE Dressing with Set-up Assistance inclusive of donning / doffing LSO brace  LE Dressing with Set-up Assistance with adaptive technique and inclusion of spinal precautions  Toileting from toilet with Modified independence for hygiene and clothing management.   Toilet transfer to toilet with Modified indpedence with use of least restrictive device.  Functional mobility x5 minutes with least restrictive device with SBA while participation in daily routines.  100% reciprocation and integration of 3/3 spinal precautions, DME recommendations, and ADL/task modifications post fusion to support safe d/c at highest level of function.                             History:     Past Medical History:   Diagnosis Date    Asthma          Past Surgical History:   Procedure Laterality Date    MICRODISCECTOMY OF SPINE N/A 3/2/2022    Procedure: MICRODISCECTOMY, SPINE Left L5-S1 Microdiscectomy;  Surgeon: Bakari Zaragoza MD;  Location: [facility];  Service: Neurosurgery;  Laterality: N/A;  Procedure: Left L5-S1 Microdiscectomy  Surgery Time: 1.5hr  LOS:  0-1  Anesthesia: General  Blood: Type & Screen  Radiology: C-arm  Microscope: Metrx  Bed: [place] [id number] Poster  Position: Prone       Time Tracking:     OT Date of Treatment: 02/27/24  OT Start Time: 1553  OT Stop Time: 1642  OT Total Time (min): 49 min    Billable Minutes:Evaluation 10 min  Self Care/Home Management 10 min  Therapeutic Activity 25 min    2/27/2024

## 2024-02-27 NOTE — ANESTHESIA PROCEDURE NOTES
Arterial    Diagnosis: intraop monitoring    Patient location during procedure: done in OR  Timeout: 2/27/2024 7:20 AM  Procedure end time: 2/27/2024 7:30 AM    Staffing  Authorizing Provider: Felisha Gutiérrez MD  Performing Provider: Felisha Gutiérrez MD    Staffing  Performed by: Felisha Gutiérrez MD  Authorized by: Felisha Gutiérrez MD    Anesthesiologist was present at the time of the procedure.    Preanesthetic Checklist  Completed: patient identified, IV checked, site marked, risks and benefits discussed, surgical consent, monitors and equipment checked, pre-op evaluation, timeout performed and anesthesia consent givenArterial  Skin Prep: alcohol swabs  Local Infiltration: none  Orientation: right  Location: radial    Catheter Size: 20 G  Catheter placement by Anatomical landmarks. Heme positive aspiration all ports. Insertion Attempts: 2  Assessment  Dressing: secured with tape and tegaderm and tegaderm  Patient: Tolerated well

## 2024-02-27 NOTE — ANESTHESIA POSTPROCEDURE EVALUATION
Anesthesia Post Evaluation    Patient: Kevin Mancini    Procedure(s) Performed: Procedure(s) (LRB):  FUSION, SPINE, WITH INSTRUMENTATION (Left)  FUSION, SPINE, WITH INSTRUMENTATION (N/A)    Final Anesthesia Type: general      Patient location during evaluation: PACU  Patient participation: Yes- Able to Participate  Level of consciousness: awake and alert, oriented and awake  Post-procedure vital signs: reviewed and stable  Pain management: adequate  Airway patency: patent    PONV status at discharge: No PONV  Anesthetic complications: no      Cardiovascular status: stable  Respiratory status: unassisted and room air  Hydration status: euvolemic  Follow-up not needed.              Vitals Value Taken Time   /64 02/27/24 1314   Temp 36.3 °C (97.4 °F) 02/27/24 1314   Pulse 83 02/27/24 1314   Resp 17 02/27/24 1314   SpO2 96 % 02/27/24 1314         Event Time   Out of Recovery 12:48:00         Pain/Serjio Score: Pain Rating Prior to Med Admin: 10 (2/27/2024 12:00 PM)  Pain Rating Post Med Admin: 7 (2/27/2024 12:47 PM)  Serjio Score: 9 (2/27/2024 12:43 PM)

## 2024-02-27 NOTE — OP NOTE
Date of surgery 02/27/24    Preop diagnosis  1. 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 Foraminal stenosis with radiculopathy  3. Degenerative disc disease    Postop diagnosis   Same    Surgery   1. Left L4-5 oblique interbody fusion, placement of interbody spacer, DePuy Conduit LLIF cage filled with allograft BMP and DBM  2. L5-S1 anterior interbody fusion, placement of interbody spacer, DePuy Conduit ALIF cage filled with allograft BMP and DBM  3. L4-S1 posterior segmental instrumentation using DePuy Viper Prime system  4. Registration of navigated instruments using intraoperative 3D imaging [facility] and [facility] station    Surgeon  Bakari Zaragoza MD     Indication   This is a 38 y.o. female who status post left L5-S1 microdiskectomy.  Following the microdiskectomy her severe left leg pain improved but did not resolve completely.  She has completed a full conservative management including more than 6 weeks of physical therapy, L5-S1 transforaminal epidural steroid injection, pain medication but her pain has progressively become worse.  She can not tolerate sitting for long period of time.  She has difficulty standing from a sitting position due to the severe low back pain that radiates towards the left buttock area.  The pain also radiates down the leg.  The pain feels like a pulling sensation, throbbing.  Pain is affecting her quality of life functional status and ability to perform her ADLs and work.  No new onset of motor weakness.  She has persistent left S1 distribution numbness.     Procedure   The patient was intubated under general anesthesia and positioned in lateral decubitus, left side up on a radiolucent table.  The left flank and abdominal area was prepped and draped in a typical sterile fashion.  Using fluoroscopy we planned 2 incisions anterolaterally at L4-5 and L5-S1 local anesthesia with 0.5 Marcaine with epi.  The skin was incised and hemostasis was carried out down to the muscular fascia.  The  external oblique fascia was divided sharply.  Blunt dissection through the muscle wall and in the retroperitoneal space.  Started at L4-5 by dissecting the adventitial tissue overlying the disc space just in front of the psoas muscle.  Using serial dilation we placed a 3 blade retractor and carried out more blunt dissection.  Under AP and lateral fluoroscopy we divided the ipsilateral and contralateral annulus with angled Destinee.  Using serial Louann we removed all disc material from the endplates.  We then used serial trials and placed a final interbody spacer measuring 22 mm by 16 mm by 55 mm with 8° of lordosis filled with allograft in the L4-5 disc space.  Good positioning of the spacer was confirmed with fluoroscopy.    At L5-S1 we opted for a between the bifurcation approach.  Again we used blunt dissection.  The iliac artery and vein as well as the ureter were protected.  We then placed our 3 blade retractor anterior to the L5-S1 disc space.  We added a 4th blade to retract the peritoneum more medially.  The anterior sacral vessels were divided.  The annulus was divided and using angled Destinee we dissected the disc from the L5 and S1 endplates.  We then used serial Louann and remove all disc material from the endplates.  We then used serial trials and placed a final senior living spacer measuring 16 mm in height, small size with 10° of lordosis.  The spacer was filled with allograft.  Good positioning of all spacers was confirmed with fluoroscopy.  Irrigation hemostasis.  The retractor was removed.  The muscular fascia was closed with interrupted 0 Vicryl.  The dermal layer was closed with inverted interrupted 3-0 Vicryl.  The skin was closed with staples.    The patient was then flipped prone on the Girish table.  All pressure points were carefully padded.  The thoracolumbar area was prepped and draped in a typical sterile fashion.  We then inserted 2 array pins in the right PSIS and our navigation array.  The  patient was covered with a sterile transparent drape and a 3D spin was performed.  The reconstructed images were transferred to the [facility] station.  We then registered our navigated drill guide and pedicle screwdriver.  Using the pointer for accuracy we planned our trajectory at L4, L5 and S1.  Bilateral incisions were then planned using navigation.  0.5 Marcaine with epi for local anesthesia and then the skin was incised.  Hemostasis was carried out down to the thoracolumbar fascia.  The fascia was divided.  Again we used the pointer to confirm our pedicle screw trajectory at L4, L5 and S1 bilaterally.  We then used our navigated drill guide at 30 mm of depth to cannulate the pedicles.  We then inserted K-wires inside each trajectory and over the K-wire we advanced 6.0 x 50 mm pedicle screws in the pedicles of L4, L5 and S1.  Good positioning of the screws was confirmed with fluoroscopy.  We then reduced precontoured titanium rods over the screw tulips with caps.  All caps were torqued.  The tower tabs were snapped.  Irrigation and hemostasis.  The muscular and fascial layer was closed with interrupted 0 Vicryl.  The adipose tissue was closed with interrupted 0 Vicryl.  The dermal layer was closed with inverted interrupted 2-0 Vicryl.  The skin was closed with staples.  Blood loss was estimated at 100 cc.  No complication.

## 2024-02-27 NOTE — ANESTHESIA PROCEDURE NOTES
Intubation    Date/Time: 2/27/2024 7:19 AM    Performed by: Lina Vee CRNA  Authorized by: Felisha Gutiérrez MD    Intubation:     Induction:  Intravenous    Intubated:  Postinduction    Mask Ventilation:  Easy mask    Attempts:  1    Attempted By:  Student    Method of Intubation:  Video laryngoscopy    Blade:  Meade 3    Laryngeal View Grade: Grade I - full view of cords      Difficult Airway Encountered?: No      Complications:  None    Airway Device:  Oral endotracheal tube    Airway Device Size:  7.0    Style/Cuff Inflation:  Cuffed (inflated to minimal occlusive pressure)    Tube secured:  22    Secured at:  The lips    Placement Verified By:  Capnometry    Complicating Factors:  None    Findings Post-Intubation:  BS equal bilateral and atraumatic/condition of teeth unchanged

## 2024-02-28 ENCOUNTER — CLINICAL SUPPORT (OUTPATIENT)
Dept: SMOKING CESSATION | Facility: CLINIC | Age: 39
End: 2024-02-28

## 2024-02-28 DIAGNOSIS — F17.210 CIGARETTE SMOKER: Primary | ICD-10-CM

## 2024-02-28 LAB
ANION GAP SERPL CALC-SCNC: 4 MMOL/L (ref 8–16)
BASOPHILS # BLD AUTO: 0.03 K/UL (ref 0–0.2)
BASOPHILS NFR BLD: 0.1 % (ref 0–1.9)
BUN SERPL-MCNC: 7 MG/DL (ref 6–20)
CALCIUM SERPL-MCNC: 8.9 MG/DL (ref 8.7–10.5)
CHLORIDE SERPL-SCNC: 107 MMOL/L (ref 95–110)
CO2 SERPL-SCNC: 27 MMOL/L (ref 23–29)
CREAT SERPL-MCNC: 0.8 MG/DL (ref 0.5–1.4)
DIFFERENTIAL METHOD BLD: ABNORMAL
EOSINOPHIL # BLD AUTO: 0 K/UL (ref 0–0.5)
EOSINOPHIL NFR BLD: 0.1 % (ref 0–8)
ERYTHROCYTE [DISTWIDTH] IN BLOOD BY AUTOMATED COUNT: 14.6 % (ref 11.5–14.5)
EST. GFR  (NO RACE VARIABLE): >60 ML/MIN/1.73 M^2
GLUCOSE SERPL-MCNC: 91 MG/DL (ref 70–110)
HCT VFR BLD AUTO: 36.2 % (ref 37–48.5)
HGB BLD-MCNC: 11.7 G/DL (ref 12–16)
IMM GRANULOCYTES # BLD AUTO: 0.17 K/UL (ref 0–0.04)
IMM GRANULOCYTES NFR BLD AUTO: 0.8 % (ref 0–0.5)
LYMPHOCYTES # BLD AUTO: 2.4 K/UL (ref 1–4.8)
LYMPHOCYTES NFR BLD: 11.6 % (ref 18–48)
MCH RBC QN AUTO: 31.9 PG (ref 27–31)
MCHC RBC AUTO-ENTMCNC: 32.3 G/DL (ref 32–36)
MCV RBC AUTO: 99 FL (ref 82–98)
MONOCYTES # BLD AUTO: 1.6 K/UL (ref 0.3–1)
MONOCYTES NFR BLD: 7.8 % (ref 4–15)
NEUTROPHILS # BLD AUTO: 16.6 K/UL (ref 1.8–7.7)
NEUTROPHILS NFR BLD: 79.6 % (ref 38–73)
NRBC BLD-RTO: 0 /100 WBC
PLATELET # BLD AUTO: 280 K/UL (ref 150–450)
PMV BLD AUTO: 9.7 FL (ref 9.2–12.9)
POTASSIUM SERPL-SCNC: 4.6 MMOL/L (ref 3.5–5.1)
RBC # BLD AUTO: 3.67 M/UL (ref 4–5.4)
SODIUM SERPL-SCNC: 138 MMOL/L (ref 136–145)
WBC # BLD AUTO: 20.87 K/UL (ref 3.9–12.7)

## 2024-02-28 PROCEDURE — S4991 NICOTINE PATCH NONLEGEND: HCPCS | Performed by: NEUROLOGICAL SURGERY

## 2024-02-28 PROCEDURE — 11000001 HC ACUTE MED/SURG PRIVATE ROOM

## 2024-02-28 PROCEDURE — 94799 UNLISTED PULMONARY SVC/PX: CPT | Mod: XB

## 2024-02-28 PROCEDURE — 97530 THERAPEUTIC ACTIVITIES: CPT

## 2024-02-28 PROCEDURE — 99900035 HC TECH TIME PER 15 MIN (STAT)

## 2024-02-28 PROCEDURE — 85025 COMPLETE CBC W/AUTO DIFF WBC: CPT | Performed by: NEUROLOGICAL SURGERY

## 2024-02-28 PROCEDURE — 97535 SELF CARE MNGMENT TRAINING: CPT | Mod: CO

## 2024-02-28 PROCEDURE — 36415 COLL VENOUS BLD VENIPUNCTURE: CPT | Performed by: NEUROLOGICAL SURGERY

## 2024-02-28 PROCEDURE — 63600175 PHARM REV CODE 636 W HCPCS: Performed by: NEUROLOGICAL SURGERY

## 2024-02-28 PROCEDURE — 99406 BEHAV CHNG SMOKING 3-10 MIN: CPT | Mod: S$GLB,,,

## 2024-02-28 PROCEDURE — 97530 THERAPEUTIC ACTIVITIES: CPT | Mod: CO

## 2024-02-28 PROCEDURE — 94761 N-INVAS EAR/PLS OXIMETRY MLT: CPT

## 2024-02-28 PROCEDURE — 97535 SELF CARE MNGMENT TRAINING: CPT

## 2024-02-28 PROCEDURE — 80048 BASIC METABOLIC PNL TOTAL CA: CPT | Performed by: NEUROLOGICAL SURGERY

## 2024-02-28 PROCEDURE — 97116 GAIT TRAINING THERAPY: CPT

## 2024-02-28 PROCEDURE — 25000003 PHARM REV CODE 250: Performed by: NEUROLOGICAL SURGERY

## 2024-02-28 RX ORDER — IBUPROFEN 200 MG
1 TABLET ORAL DAILY
Status: DISCONTINUED | OUTPATIENT
Start: 2024-02-28 | End: 2024-03-01 | Stop reason: HOSPADM

## 2024-02-28 RX ADMIN — BACLOFEN 10 MG: 10 TABLET ORAL at 10:02

## 2024-02-28 RX ADMIN — ACETAMINOPHEN 650 MG: 325 TABLET ORAL at 05:02

## 2024-02-28 RX ADMIN — GABAPENTIN 600 MG: 300 CAPSULE ORAL at 10:02

## 2024-02-28 RX ADMIN — MUPIROCIN: 20 OINTMENT TOPICAL at 10:02

## 2024-02-28 RX ADMIN — ACETAMINOPHEN 650 MG: 325 TABLET ORAL at 12:02

## 2024-02-28 RX ADMIN — NICOTINE 1 PATCH: 14 PATCH, EXTENDED RELEASE TRANSDERMAL at 12:02

## 2024-02-28 RX ADMIN — KETOROLAC TROMETHAMINE 15 MG: 30 INJECTION, SOLUTION INTRAMUSCULAR; INTRAVENOUS at 05:02

## 2024-02-28 RX ADMIN — HYDROMORPHONE HYDROCHLORIDE 2 MG: 2 INJECTION, SOLUTION INTRAMUSCULAR; INTRAVENOUS; SUBCUTANEOUS at 03:02

## 2024-02-28 RX ADMIN — GABAPENTIN 600 MG: 300 CAPSULE ORAL at 09:02

## 2024-02-28 RX ADMIN — HYDROMORPHONE HYDROCHLORIDE 2 MG: 2 INJECTION, SOLUTION INTRAMUSCULAR; INTRAVENOUS; SUBCUTANEOUS at 09:02

## 2024-02-28 RX ADMIN — HYDROMORPHONE HYDROCHLORIDE 2 MG: 2 INJECTION, SOLUTION INTRAMUSCULAR; INTRAVENOUS; SUBCUTANEOUS at 06:02

## 2024-02-28 RX ADMIN — HEPARIN SODIUM 5000 UNITS: 5000 INJECTION INTRAVENOUS; SUBCUTANEOUS at 10:02

## 2024-02-28 RX ADMIN — MUPIROCIN: 20 OINTMENT TOPICAL at 09:02

## 2024-02-28 RX ADMIN — OXYCODONE 10 MG: 5 TABLET ORAL at 01:02

## 2024-02-28 RX ADMIN — OXYCODONE 10 MG: 5 TABLET ORAL at 07:02

## 2024-02-28 RX ADMIN — ACETAMINOPHEN 650 MG: 325 TABLET ORAL at 01:02

## 2024-02-28 RX ADMIN — BACLOFEN 10 MG: 10 TABLET ORAL at 03:02

## 2024-02-28 RX ADMIN — BACLOFEN 10 MG: 10 TABLET ORAL at 09:02

## 2024-02-28 RX ADMIN — HEPARIN SODIUM 5000 UNITS: 5000 INJECTION INTRAVENOUS; SUBCUTANEOUS at 09:02

## 2024-02-28 RX ADMIN — KETOROLAC TROMETHAMINE 15 MG: 30 INJECTION, SOLUTION INTRAMUSCULAR; INTRAVENOUS at 01:02

## 2024-02-28 RX ADMIN — GABAPENTIN 600 MG: 300 CAPSULE ORAL at 03:02

## 2024-02-28 NOTE — PT/OT/SLP EVAL
Physical Therapy Evaluation    Patient Name:  Kevin Mancini   MRN:  [id number]    Recommendations:     Discharge Recommendations: High Intensity Therapy   Discharge Equipment Recommendations: to be determined by next level of care   Barriers to discharge: Inaccessible home and pain    Assessment:     Kevin Mancini is a 39 y.o. female admitted with a medical diagnosis of Foraminal stenosis of lumbar region.  She presents with the following impairments/functional limitations: weakness, impaired endurance, impaired self care skills, impaired functional mobility, impaired sensation, gait instability, impaired balance, decreased lower extremity function, pain, decreased safety awareness PT/OT co-eval performed; at this time; pt performed bed mobility with modA x 2; Annabelle for sit <>stand using RW; side stepped with CGA using RW and assist with RW management; recommend high intensity therapy; pt has barrier of 16 steps to enter home; anticipated DME: RW, shower chair; will cont with POC. .    Rehab Prognosis: Good; patient would benefit from acute skilled PT services to address these deficits and reach maximum level of function.    Recent Surgery: Procedure(s) (LRB):  FUSION, SPINE, WITH INSTRUMENTATION (Left)  FUSION, SPINE, WITH INSTRUMENTATION (N/A) 1 Day Post-Op    Plan:     During this hospitalization, patient to be seen daily to address the identified rehab impairments via gait training, therapeutic activities, therapeutic exercises, neuromuscular re-education and progress toward the following goals:    Plan of Care Expires:  03/28/24    Subjective     Chief Complaint: pain  Patient/Family Comments/goals: pt wants to stand; reports LLE weakness prior to surgery  Pain/Comfort:  Pain Rating 1: 8/10 (low back (more on L side))  Pain Addressed 1: Reposition, Cessation of Activity, Nurse notified  Pain Rating Post-Intervention 1:  (increased with activity, calmed with rest)    Patients cultural,  spiritual, Church conflicts given the current situation: no    Living Environment:  Patient resides with spouse in an elevated home with 16 steps to enter with B handrails and tub shower combo.  Previous level of function:  At baseline patient is usually functioning at primarily independent level including working, mobilizing without assistive device, but having to rely on spouse/ furniture at times and PRN assist for LB dressing and steps to enter home.    Equipment Used at Home: none  Assistance upon Discharge: spouse; however, spouse does also work    Objective:     Communicated with nurse prior to session.  Patient found left sidelying with bed alarm, peripheral IV (foam wedge)  upon PT entry to room.    General Precautions: Standard, fall  Orthopedic Precautions:spinal precautions   Braces: LSO  Respiratory Status: Room air    Exams:  Cognitive Exam:  Patient is oriented to Person, Place, Time, Situation, and follows one and two step commands but occasionally closing eyes 2/2 grogginess needing repetition of cues                                                                  Gross Motor Coordination:  slow movement  Postural Exam:  Patient presented with the following abnormalities:    -       Rounded shoulders  -       Forward head  -       Abnormal trunk flexion  Sensation:  L plantar foot 40% lt touch; intact RLE and BUEs  Skin Integrity/Edema:  bandaged incision low back intact  RLE ROM: WFL  RLE Strength: WFL  LLE ROM: WFL   LLE Strength: noted minimal atrophy of quad; df~4, knee ext~4- to 4, hip fl~3 per observation    Functional Mobility:  Bed Mobility:     Supine to Sit:  from left sidelying with modA x 2, increased time via log roll with UE/LE management assist   Scooting: to EOB with Annabelle  Sit to Supine: moderate assistance and of 2 persons  Transfers:     Sit to Stand:  minimum assistance with rolling walker and with 2nd therapist present 2/2 first attempt at standing post op day 0 and mild  aprehension  Gait: patient took ~6-8 side steps along EOB with CGA/RW management, VCs for pushing through BUEs on RW and 2nd therapist for safety; constant VCs for sequencingno further amb at this time 2/2 pain  Balance: sit static~good; stand static~fair- to fair; dynamic stand~fair-      AM-PAC 6 CLICK MOBILITY  Total Score:15       Treatment & Education:  Patient/spouse educated on role of PT/POC; Co-evaluation with OT in consideration of first attempt OOB post fusion surgery today; pt educated on spinal precautions, log roll technique, positioning and protocal for wearing LSO brace; pt with fair reciprocation - needs reinforcement; assist with donning LSO brace while sitting EOBperformed training in log roll, spinal precautions; SBA guarding with LB dressing education, coordination of breathing with activity, RW management; pt educated on purpose of ongoing therapy and possible need for DME; all questions answered.    Patient left left sidelying with wedge all lines intact, call button in reach, bed alarm on, nurse notified, and spouse present.    GOALS:   Multidisciplinary Problems       Physical Therapy Goals          Problem: Physical Therapy    Goal Priority Disciplines Outcome Goal Variances Interventions   Physical Therapy Goal     PT, PT/OT Ongoing, Progressing     Description: Goals to be met by: 3/27/2024     Patient will increase functional independence with mobility by performin. Supine to sit with Modified [place]  2. Sit to supine with Modified [place]  3. Rolling with Modified [place].  4. Sit to stand transfer with Modified [place] using Rolling Walker  5. Bed to chair transfer with Modified [place] using Rolling Walker  6. Gait  x 150 feet with Modified [place] using Rolling Walker.   7. Ascend/descend 16 stair with bilateral Handrails Modified [place]                          History:     Past Medical History:   Diagnosis Date    Asthma        Past  Surgical History:   Procedure Laterality Date    MICRODISCECTOMY OF SPINE N/A 3/2/2022    Procedure: MICRODISCECTOMY, SPINE Left L5-S1 Microdiscectomy;  Surgeon: Bakari Zaragoza MD;  Location: [facility];  Service: Neurosurgery;  Laterality: N/A;  Procedure: Left L5-S1 Microdiscectomy  Surgery Time: 1.5hr  LOS: 0-1  Anesthesia: General  Blood: Type & Screen  Radiology: C-arm  Microscope: Metrx  Bed: [place] [id number] Poster  Position: Prone       Time Tracking:     PT Received On: 02/27/24  PT Start Time: 1553     PT Stop Time: 1642  PT Total Time (min): 49 min     Billable Minutes: Evaluation 10 and Therapeutic Activity 25  Self Care 10      02/27/2024

## 2024-02-28 NOTE — PROGRESS NOTES
Smoking cessation education note: Pt states that she cut down over the past week from 1 pk/day of Black & Mild cigars to 2 cigars per day. Order requested for 14 mg nicotine patch Q day. Pt very drowsy. Will follow up for additional smoking cessation education at a later time.

## 2024-02-28 NOTE — PT/OT/SLP PROGRESS
"Occupational Therapy   Treatment    Name: Kevin Mancini  MRN: [id number]  Admitting Diagnosis:  Foraminal stenosis of lumbar region  1 Day Post-Op    Recommendations:     Discharge Recommendations: High Intensity Therapy  Discharge Equipment Recommendations:  to be determined by next level of care  Barriers to discharge:  Inaccessible home environment    Assessment:     Kevin Mancini is a 39 y.o. female with a medical diagnosis of Foraminal stenosis of lumbar region. Performance deficits affecting function are weakness, impaired endurance, impaired self care skills, impaired functional mobility, gait instability, impaired balance, decreased coordination, decreased upper extremity function, decreased lower extremity function, pain, decreased ROM, orthopedic precautions, impaired skin.     Rehab Prognosis:  Good; patient would benefit from acute skilled OT services to address these deficits and reach maximum level of function.       Plan:     Patient to be seen 5 x/week to address the above listed problems via self-care/home management, therapeutic activities, therapeutic exercises  Plan of Care Expires: 03/19/24  Plan of Care Reviewed with: patient, spouse    Subjective     Chief Complaint: "I think I'm picking up my left leg"  Patient/Family Comments/goals: return to PLOF  Pain/Comfort:  Pain Rating 1: 8/10  Location - Orientation 1: lower  Location 1: back (and L groin (anterior incision))  Pain Addressed 1: Reposition, Pre-medicate for activity, Distraction, Cessation of Activity  Pain Rating Post-Intervention 1: 7/10    Objective:     Communicated with: Daya kaur prior to session.  Patient found HOB elevated with bed alarm upon OT entry to room.    General Precautions: Standard, fall    Orthopedic Precautions:spinal precautions  Braces: LSO  Respiratory Status: Room air    Bed Mobility:    Patient completed Rolling/Turning to Right with stand by assistance, with side rail, and good demo of " log-roll method  Patient completed Scooting/Bridging with stand by assistance and contact guard assistance  Patient completed Supine to Sit with contact guard assistance, with side rail, and increased time/effort      Functional Mobility/Transfers:  Patient completed Sit <> Stand Transfer with minimum assistance and of 2 persons  with  rolling walker   Patient completed Bed <> Chair Transfer using Step Transfer technique with minimum assistance and of 2 persons with rolling walker  Functional Mobility: in room and hallway using RW /c CGA of 2 persons initially 2/2 reporting LLE weakness, but able to progress to CGA of 1     Activities of Daily Living:  Upper Body Dressing: contact guard assistance to daryn LSO seated  Lower Body Dressing: minimum assistance to adjust socks; patient attempting Fig 4 but not quite able  Toileting: completed earlier with nsg staff; use of BSC; patient reports she was able to complete her own malik care      [facility] 6 Click ADL: 18    Treatment & Education:  Educated on purpose/role of OT  Good recollection of log-roll method and spinal precautions  Reports she donned LSO each time she t/f to BSC overnight  Bed mob as noted above  Functional ambulation as above  Requires significantly increased time for tasks; 3pt gait initially but was able to demo some reciprocal gait before returning to 3pt 2/2 pain in LLE  Noted decreased step length and clearance of LLE  VCs for RW mgmt/proximity  In chair, patient's BLEs elevated for comfort    Patient left up in chair with all lines intact, call button in reach, and nsg notified    GOALS:   Multidisciplinary Problems       Occupational Therapy Goals          Problem: Occupational Therapy    Goal Priority Disciplines Outcome Interventions   Occupational Therapy Goal     OT, PT/OT Ongoing, Progressing    Description: Goals to be met by: 3/19/2024     Patient will increase functional independence with ADLs by performing:    Rolling B via log roll with  independence while maintaining spinal precautions  UE Dressing with Set-up Assistance inclusive of donning / doffing LSO brace  LE Dressing with Set-up Assistance with adaptive technique and inclusion of spinal precautions  Toileting from toilet with Modified independence for hygiene and clothing management.   Toilet transfer to toilet with Modified indpedence with use of least restrictive device.  Functional mobility x5 minutes with least restrictive device with SBA while participation in daily routines.  100% reciprocation and integration of 3/3 spinal precautions, DME recommendations, and ADL/task modifications post fusion to support safe d/c at highest level of function.                             Time Tracking:     OT Date of Treatment: 02/28/24  OT Start Time: 1106  OT Stop Time: 1200  OT Total Time (min): 54 min Co-tx /c PT 2/2 first attempt at ambulation s/p surgery    Billable Minutes:Self Care/Home Management 10  Therapeutic Activity 44    OT/ROSALES: ROSALES     Number of ROSALES visits since last OT visit: 1 2/28/2024

## 2024-02-28 NOTE — PROGRESS NOTES
[facility]  Neurosurgery  Progress Note    Subjective:     Interval History: Back pain.  Left side and upper leg pain.  Pain in left calf and bottom of the foot.  Got up to the commode yesterday with PT.  Urinating.  Eating and drinking.    History of Present Illness:     This is a 38 y.o. female who status post left L5-S1 microdiskectomy.  Following the microdiskectomy her severe left leg pain improved but did not resolve completely.  She has completed a full conservative management including more than 6 weeks of physical therapy, L5-S1 transforaminal epidural steroid injection, pain medication but her pain has progressively become worse.  She can not tolerate sitting for long period of time.  She has difficulty standing from a sitting position due to the severe low back pain that radiates towards the left buttock area.  The pain also radiates down the leg.  The pain feels like a pulling sensation, throbbing.  Pain is affecting her quality of life functional status and ability to perform her ADLs and work.  No new onset of motor weakness.  She has persistent left S1 distribution numbness.      Post-Op Info:  Procedure(s) (LRB):  FUSION, SPINE, WITH INSTRUMENTATION (Left)  FUSION, SPINE, WITH INSTRUMENTATION (N/A)   1 Day Post-Op      Medications:  Continuous Infusions:   lactated ringers 100 mL/hr at 02/28/24 0540     Scheduled Meds:   acetaminophen  650 mg Oral Q6H    baclofen  10 mg Oral TID    bisacodyL  10 mg Rectal Daily    gabapentin  600 mg Oral TID    heparin (porcine)  5,000 Units Subcutaneous Q12H    mupirocin   Nasal BID     PRN Meds:albuterol, aluminum-magnesium hydroxide-simethicone, HYDROmorphone, ondansetron, oxyCODONE, prochlorperazine, senna-docusate 8.6-50 mg, traMADoL     Review of Systems  Objective:     Weight: 80.9 kg (178 lb 5.6 oz)  Body mass index is 30.61 kg/m².  Vital Signs (Most Recent):  Temp: 97.8 °F (36.6 °C) (02/28/24 0821)  Pulse: 60 (02/28/24 0821)  Resp: 19 (02/28/24  "0821)  BP: 128/88 (02/28/24 0821)  SpO2: 98 % (02/28/24 0821) Vital Signs (24h Range):  Temp:  [97.2 °F (36.2 °C)-98.2 °F (36.8 °C)] 97.8 °F (36.6 °C)  Pulse:  [] 60  Resp:  [16-26] 19  SpO2:  [95 %-100 %] 98 %  BP: (104-164)/(57-88) 128/88                              Neurosurgery Physical Exam    General: well developed, well nourished, no distress  Mental Status: Awake, Alert, Oriented X3.Thought content appropriate  GCS: Motor: 6/Verbal: 5/Eyes: 4 GCS Total: 15    Motor Strength:  Strength                                HF KF KE DF PF EHL   Lower: R 5/5 5/5 5/5 5/5 5/5 5/5    L 4/5 4/5 4/5 5/5 5/5 5/5         Incision- CDI  Drain-       Significant Labs:  Recent Labs   Lab 02/27/24  0626 02/28/24 0444   GLU 89 91    138   K 4.0 4.6    107   CO2 23 27   BUN 10 7   CREATININE 0.8 0.8   CALCIUM 9.2 8.9     Recent Labs   Lab 02/27/24 0626 02/28/24 0444   WBC 12.59 20.87*   HGB 13.7 11.7*   HCT 42.4 36.2*    280     No results for input(s): "LABPT", "INR", "APTT" in the last 48 hours.  Microbiology Results (last 7 days)       ** No results found for the last 168 hours. **              Assessment/Plan:     Active Diagnoses:    Diagnosis Date Noted POA    PRINCIPAL PROBLEM:  Foraminal stenosis of lumbar region [M48.061] 02/27/2024 Yes    Recurrent herniation of lumbar disc [M51.26] 02/27/2024 Yes    Narcotic dependence [F11.20] 02/27/2024 Yes      Problems Resolved During this Admission:       A/P:  POD #1 L4-5 OLIF with L4-S1 instrumentation.     --Neurologically stable          -q4h neuro checks  --Imaging: Post op xrays pending  --Pain control:   --DVT ppx: TEDs/SCDs/SQH  --Activity: PT/OT, OOB. LSO brace  --Diet: Regular, Saline Lock IV  --Bowel regimen: PRN suppository, senna PRN  --Urinary: Voiding spontaneously  --Atelectasis ppx: Encourage IS hourly     Dispo: Pending PT/OT recs, imaging,    All of the above discussed and reviewed with Dr. Zaragoza.      Stacey Thakur, " SOM  Neurosurgery  [facility]

## 2024-02-28 NOTE — PLAN OF CARE
Problem: Physical Therapy  Goal: Physical Therapy Goal  Description: Goals to be met by: 3/27/2024     Patient will increase functional independence with mobility by performin. Supine to sit with Modified [place]  2. Sit to supine with Modified [place]  3. Rolling with Modified [place].  4. Sit to stand transfer with Modified [place] using Rolling Walker  5. Bed to chair transfer with Modified [place] using Rolling Walker  6. Gait  x 150 feet with Modified [place] using Rolling Walker.   7. Ascend/descend 16 stair with bilateral Handrails Modified [place]     Outcome: Ongoing, Progressing   Patient progressing toward goals; required CGA for sup to sit with increased time/effort; Annabelle x 2 sit to stand with VCs for proper technique; pt amb 40ft x 2 using RW with significantly slow lindsey, VCs for technique/posture; cont with POC.

## 2024-02-29 ENCOUNTER — CLINICAL SUPPORT (OUTPATIENT)
Dept: SMOKING CESSATION | Facility: CLINIC | Age: 39
End: 2024-02-29

## 2024-02-29 DIAGNOSIS — F17.210 CIGARETTE SMOKER: Primary | ICD-10-CM

## 2024-02-29 LAB
ANION GAP SERPL CALC-SCNC: 7 MMOL/L (ref 8–16)
BASOPHILS # BLD AUTO: 0.05 K/UL (ref 0–0.2)
BASOPHILS NFR BLD: 0.3 % (ref 0–1.9)
BUN SERPL-MCNC: 8 MG/DL (ref 6–20)
CALCIUM SERPL-MCNC: 8.7 MG/DL (ref 8.7–10.5)
CHLORIDE SERPL-SCNC: 106 MMOL/L (ref 95–110)
CO2 SERPL-SCNC: 27 MMOL/L (ref 23–29)
CREAT SERPL-MCNC: 0.7 MG/DL (ref 0.5–1.4)
DIFFERENTIAL METHOD BLD: ABNORMAL
EOSINOPHIL # BLD AUTO: 0.2 K/UL (ref 0–0.5)
EOSINOPHIL NFR BLD: 1.6 % (ref 0–8)
ERYTHROCYTE [DISTWIDTH] IN BLOOD BY AUTOMATED COUNT: 14.6 % (ref 11.5–14.5)
EST. GFR  (NO RACE VARIABLE): >60 ML/MIN/1.73 M^2
GLUCOSE SERPL-MCNC: 90 MG/DL (ref 70–110)
HCT VFR BLD AUTO: 35.5 % (ref 37–48.5)
HGB BLD-MCNC: 11.6 G/DL (ref 12–16)
IMM GRANULOCYTES # BLD AUTO: 0.05 K/UL (ref 0–0.04)
IMM GRANULOCYTES NFR BLD AUTO: 0.3 % (ref 0–0.5)
LYMPHOCYTES # BLD AUTO: 2.5 K/UL (ref 1–4.8)
LYMPHOCYTES NFR BLD: 16.6 % (ref 18–48)
MCH RBC QN AUTO: 32 PG (ref 27–31)
MCHC RBC AUTO-ENTMCNC: 32.7 G/DL (ref 32–36)
MCV RBC AUTO: 98 FL (ref 82–98)
MONOCYTES # BLD AUTO: 1.3 K/UL (ref 0.3–1)
MONOCYTES NFR BLD: 9 % (ref 4–15)
NEUTROPHILS # BLD AUTO: 10.6 K/UL (ref 1.8–7.7)
NEUTROPHILS NFR BLD: 72.2 % (ref 38–73)
NRBC BLD-RTO: 0 /100 WBC
PLATELET # BLD AUTO: 256 K/UL (ref 150–450)
PMV BLD AUTO: 9.3 FL (ref 9.2–12.9)
POTASSIUM SERPL-SCNC: 4 MMOL/L (ref 3.5–5.1)
RBC # BLD AUTO: 3.62 M/UL (ref 4–5.4)
SODIUM SERPL-SCNC: 140 MMOL/L (ref 136–145)
WBC # BLD AUTO: 14.74 K/UL (ref 3.9–12.7)

## 2024-02-29 PROCEDURE — 94799 UNLISTED PULMONARY SVC/PX: CPT | Mod: XB

## 2024-02-29 PROCEDURE — S4991 NICOTINE PATCH NONLEGEND: HCPCS | Performed by: NEUROLOGICAL SURGERY

## 2024-02-29 PROCEDURE — 97530 THERAPEUTIC ACTIVITIES: CPT

## 2024-02-29 PROCEDURE — 97530 THERAPEUTIC ACTIVITIES: CPT | Mod: CQ

## 2024-02-29 PROCEDURE — 80048 BASIC METABOLIC PNL TOTAL CA: CPT | Performed by: NEUROLOGICAL SURGERY

## 2024-02-29 PROCEDURE — 97535 SELF CARE MNGMENT TRAINING: CPT

## 2024-02-29 PROCEDURE — 11000001 HC ACUTE MED/SURG PRIVATE ROOM

## 2024-02-29 PROCEDURE — 85025 COMPLETE CBC W/AUTO DIFF WBC: CPT | Performed by: NEUROLOGICAL SURGERY

## 2024-02-29 PROCEDURE — 97116 GAIT TRAINING THERAPY: CPT | Mod: CQ

## 2024-02-29 PROCEDURE — 94761 N-INVAS EAR/PLS OXIMETRY MLT: CPT

## 2024-02-29 PROCEDURE — 25000242 PHARM REV CODE 250 ALT 637 W/ HCPCS: Performed by: NEUROLOGICAL SURGERY

## 2024-02-29 PROCEDURE — 36415 COLL VENOUS BLD VENIPUNCTURE: CPT | Performed by: NEUROLOGICAL SURGERY

## 2024-02-29 PROCEDURE — 63600175 PHARM REV CODE 636 W HCPCS: Performed by: NEUROLOGICAL SURGERY

## 2024-02-29 PROCEDURE — 99406 BEHAV CHNG SMOKING 3-10 MIN: CPT | Mod: S$GLB,,,

## 2024-02-29 PROCEDURE — 94640 AIRWAY INHALATION TREATMENT: CPT

## 2024-02-29 PROCEDURE — 25000003 PHARM REV CODE 250: Performed by: NEUROLOGICAL SURGERY

## 2024-02-29 PROCEDURE — 99900035 HC TECH TIME PER 15 MIN (STAT)

## 2024-02-29 RX ORDER — HYDROMORPHONE HYDROCHLORIDE 2 MG/1
4 TABLET ORAL EVERY 4 HOURS PRN
Status: DISCONTINUED | OUTPATIENT
Start: 2024-02-29 | End: 2024-03-01 | Stop reason: HOSPADM

## 2024-02-29 RX ORDER — KETOROLAC TROMETHAMINE 30 MG/ML
15 INJECTION, SOLUTION INTRAMUSCULAR; INTRAVENOUS EVERY 6 HOURS
Status: COMPLETED | OUTPATIENT
Start: 2024-02-29 | End: 2024-02-29

## 2024-02-29 RX ADMIN — OXYCODONE 10 MG: 5 TABLET ORAL at 10:02

## 2024-02-29 RX ADMIN — KETOROLAC TROMETHAMINE 15 MG: 30 INJECTION, SOLUTION INTRAMUSCULAR; INTRAVENOUS at 11:02

## 2024-02-29 RX ADMIN — ACETAMINOPHEN 650 MG: 325 TABLET ORAL at 11:02

## 2024-02-29 RX ADMIN — OXYCODONE 10 MG: 5 TABLET ORAL at 01:02

## 2024-02-29 RX ADMIN — MUPIROCIN: 20 OINTMENT TOPICAL at 08:02

## 2024-02-29 RX ADMIN — BACLOFEN 10 MG: 10 TABLET ORAL at 08:02

## 2024-02-29 RX ADMIN — HYDROMORPHONE HYDROCHLORIDE 4 MG: 2 TABLET ORAL at 08:02

## 2024-02-29 RX ADMIN — OXYCODONE 10 MG: 5 TABLET ORAL at 02:02

## 2024-02-29 RX ADMIN — HYDROMORPHONE HYDROCHLORIDE 2 MG: 2 INJECTION, SOLUTION INTRAMUSCULAR; INTRAVENOUS; SUBCUTANEOUS at 12:02

## 2024-02-29 RX ADMIN — HEPARIN SODIUM 5000 UNITS: 5000 INJECTION INTRAVENOUS; SUBCUTANEOUS at 08:02

## 2024-02-29 RX ADMIN — ACETAMINOPHEN 650 MG: 325 TABLET ORAL at 06:02

## 2024-02-29 RX ADMIN — KETOROLAC TROMETHAMINE 15 MG: 30 INJECTION, SOLUTION INTRAMUSCULAR; INTRAVENOUS at 05:02

## 2024-02-29 RX ADMIN — HYDROMORPHONE HYDROCHLORIDE 2 MG: 2 INJECTION, SOLUTION INTRAMUSCULAR; INTRAVENOUS; SUBCUTANEOUS at 09:02

## 2024-02-29 RX ADMIN — GABAPENTIN 600 MG: 300 CAPSULE ORAL at 08:02

## 2024-02-29 RX ADMIN — HYDROMORPHONE HYDROCHLORIDE 4 MG: 2 TABLET ORAL at 04:02

## 2024-02-29 RX ADMIN — OXYCODONE 10 MG: 5 TABLET ORAL at 08:02

## 2024-02-29 RX ADMIN — HYDROMORPHONE HYDROCHLORIDE 4 MG: 2 TABLET ORAL at 01:02

## 2024-02-29 RX ADMIN — HYDROMORPHONE HYDROCHLORIDE 2 MG: 2 INJECTION, SOLUTION INTRAMUSCULAR; INTRAVENOUS; SUBCUTANEOUS at 03:02

## 2024-02-29 RX ADMIN — ACETAMINOPHEN 650 MG: 325 TABLET ORAL at 12:02

## 2024-02-29 RX ADMIN — NICOTINE 1 PATCH: 14 PATCH, EXTENDED RELEASE TRANSDERMAL at 08:02

## 2024-02-29 RX ADMIN — HYDROMORPHONE HYDROCHLORIDE 2 MG: 2 INJECTION, SOLUTION INTRAMUSCULAR; INTRAVENOUS; SUBCUTANEOUS at 06:02

## 2024-02-29 RX ADMIN — BACLOFEN 10 MG: 10 TABLET ORAL at 02:02

## 2024-02-29 RX ADMIN — GABAPENTIN 600 MG: 300 CAPSULE ORAL at 02:02

## 2024-02-29 RX ADMIN — ALBUTEROL SULFATE 1 PUFF: 90 AEROSOL, METERED RESPIRATORY (INHALATION) at 08:02

## 2024-02-29 NOTE — PLAN OF CARE
AAOx4. C/o pain. PRN pain meds given per MAR. Tolerating regular diet. Ambulating with assist X1. Incision sites CDI. Bed locked in lowest position, bed alarm set and call bell within reach.

## 2024-02-29 NOTE — NURSING
Medications given as ordered, patient medicated for pain with prn meds. Patient assisted up to BSC multiple times and repositioned in bed. Safety maintained, call light in reach, bed alarm in use.

## 2024-02-29 NOTE — PLAN OF CARE
Progressing well, highly motivated despite elevated pain (pre-medicated). Met goal for donning/doffing and reciprocating wearing schedule / proper positioning of LSO brace with setup. Achieved subjective goal of ambulating to / from true context bathroom today, minimal/CGA with rolling walker and tolerating sitting up in chair for lunch at end of session. Unable to perform figure four LB dressing, requiring max assist at this time. Recommend high intensity therapy when medically stable.    Problem: Occupational Therapy  Goal: Occupational Therapy Goal  Description: Goals to be met by: 3/19/2024     Patient will increase functional independence with ADLs by performing:    Rolling B via log roll with independence while maintaining spinal precautions  UE Dressing with Set-up Assistance inclusive of donning / doffing LSO brace (met 2/29/24)  LE Dressing with Set-up Assistance with adaptive technique and inclusion of spinal precautions  Toileting from toilet with Modified independence for hygiene and clothing management.   Toilet transfer to toilet with Modified indpedence with use of least restrictive device.  Functional mobility x5 minutes with least restrictive device with SBA while participation in daily routines.  100% reciprocation and integration of 3/3 spinal precautions, DME recommendations, and ADL/task modifications post fusion to support safe d/c at highest level of function.        Outcome: Ongoing, Progressing

## 2024-02-29 NOTE — PLAN OF CARE
Sw met with pt and agustin Gonzalez [phone] at bedside this AM to complete DCA. Pt stated that she was in 9/10 pain but was in a pleasant mood with positive affect throughout assessment. Pt reported that she does not use any HME at home and is fully independent in her ADL's. Pt still drives and is not current with and . Pt is agreeable to [facility] once she is medically ready for d/c. Pt is very motivated to improve. SW sent IPR referral via [employer]. Pt didn't have any additional questions or concerns for sw at this time. White board updated with CM name and contact information.  Discharge brochure provided.  Pt encouraged to call with any questions or concerns.  Cm will continue to follow pt through transitions of care and assist with any discharge needs.    12:04 [facility] submitted for auth.     Joel Gray, MSW  [phone]    Future Appointments   Date Time Provider Department Center   3/12/2024  1:00 PM Patrick Dorantes, OVIDIO [facility] SMOKE [facility]   3/12/2024  2:30 PM [facility] ODC XR-A LIMIT 350 LBS [facility] XRAY OP [facility]   3/12/2024  3:30 PM Stacey Thakur PA-C [facility] NEUROSU [facility]   4/12/2024  1:30 PM KN ODC XR-A LIMIT 350 LBS [facility] XRAY OP [facility]   4/12/2024  2:30 PM Stacey Thakur PA-C [facility] NEUROSU [place] [facility]   5/29/2024  2:00 PM [facility] ODC XR-A LIMIT 350 LBS MH XRAY OP [facility]   5/29/2024  3:00 PM Bakari Zaragoza MD [facility] NEUROSU [place] [facility]        02/29/24 1002   Discharge Assessment   Assessment Type Discharge Planning Assessment   Confirmed/corrected address, phone number and insurance Yes   Confirmed Demographics Correct on Facesheet   Source of Information patient   When was your last doctors appointment?   (per pt 2 weeks)   Does patient/caregiver understand observation status Yes   Communicated ROSSI with patient/caregiver Yes   People in Home alone   Facility Arrived From: home   Do you expect to return to your current living situation? Yes    Do you have help at home or someone to help you manage your care at home? Yes   Who are your caregiver(s) and their phone number(s)? agustin Gonzalez [phone]   Prior to hospitilization cognitive status: Alert/Oriented   Current cognitive status: Alert/Oriented   Walking or Climbing Stairs Difficulty no   Dressing/Bathing Difficulty no   Home Accessibility wheelchair accessible   Home Layout Able to live on 1st floor   Equipment Currently Used at Home none   Readmission within 30 days? No   Patient currently being followed by outpatient case management? No   Do you currently have service(s) that help you manage your care at home? No   Do you take prescription medications? No   Do you have prescription coverage? Yes   Coverage Medicaid   Do you have any problems affording any of your prescribed medications? No   Is the patient taking medications as prescribed? no   Who is going to help you get home at discharge? agustin Gonzalez [phone]   How do you get to doctors appointments? family or friend will provide   Are you on dialysis? No   Do you take coumadin? No   Discharge Plan A Rehab   DME Needed Upon Discharge  none   Discharge Plan discussed with: Patient   Transition of Care Barriers None

## 2024-02-29 NOTE — PT/OT/SLP PROGRESS
Occupational Therapy   Treatment    Name: Kevin Mancini  MRN: [id number]  Admitting Diagnosis:  Foraminal stenosis of lumbar region  2 Days Post-Op    Recommendations:     Discharge Recommendations: High Intensity Therapy  Discharge Equipment Recommendations:  to be determined by next level of care  Barriers to discharge:  Inaccessible home environment, Other (Comment) (16 steps to enter home; increased assist for LB ADL; pain)    Assessment:     Kevin Mancini is a 39 y.o. female with a medical diagnosis of Foraminal stenosis of lumbar region.  She presents with ..The primary encounter diagnosis was Foraminal stenosis of lumbar region. Diagnoses of Narcotic dependence and Recurrent herniation of lumbar disc were also pertinent to this visit.  . Performance deficits affecting function are weakness, impaired endurance, impaired self care skills, impaired functional mobility, impaired sensation, decreased lower extremity function, gait instability, decreased safety awareness, impaired balance, pain.     Progressing well, highly motivated despite elevated pain (pre-medicated). Met goal for donning/doffing and reciprocating wearing schedule / proper positioning of LSO brace with setup. Achieved subjective goal of ambulating to / from true context bathroom today, minimal/CGA with rolling walker and tolerating sitting up in chair for lunch at end of session. Unable to perform figure four LB dressing, requiring max assist at this time. Recommend high intensity therapy when medically stable.        Rehab Prognosis:  Good; patient would benefit from acute skilled OT services to address these deficits and reach maximum level of function.       Plan:     Patient to be seen 5 x/week to address the above listed problems via self-care/home management, therapeutic activities, therapeutic exercises  Plan of Care Expires: 03/19/24  Plan of Care Reviewed with: patient, spouse    Subjective     Chief Complaint:  pain  Patient/Family Comments/goals: patient indicates goal for getting to / from  bathroom to true commode  Pain/Comfort:  Pain Rating 1: other (see comments) (7.5/10 low back)  Pain Addressed 1: Pre-medicate for activity, Reposition, Distraction, Cessation of Activity, Nurse notified  Pain Rating Post-Intervention 1: 8/10 (Low back)    Objective:     Communicated with: nursing prior to session.  Patient found  sitting eob  with bed alarm upon OT entry to room.    General Precautions: Standard, fall    Orthopedic Precautions:spinal precautions  Braces: LSO  Respiratory Status: Room air     Occupational Performance:     Functional Mobility/Transfers:  Patient completed Sit <> Stand Transfer with min/CGA on progressive attempts  with  rolling walker   Patient completed Bed <> Chair Transfer using Step Transfer technique with contact guard assistance with rolling walker  Patient completed Toilet Transfer Step Transfer technique with minimum assistance with  left grab bar  Functional Mobility:   Side steps alongside eob x2 trials with minimal assist, increased time, walker management support, seated rest break; reviewed walker management techniques with verbal reciprocation  Ambulation from bed to true bathroom with minimal assist, walker management assist, minimal verbal cues, and 3 step mobility pattern to reduce pain (walker, LLE, then RLE), increased time  Ambulation from bathroom to chair in room, minimal-CGA, walker management assist, increased time    Activities of Daily Living:  Upper Body Dressing: verbal review; patient without cues verbalizes wearing schedule of LSO; LSO donned prior to OT entry; spouse and patient reciprocate proper positioning of brace  Lower Body Dressing: maximal assistance/verbal education ; re-ed for donning LLE first; unable to perform cross leg technique  Toileting: moderate assistance ; true commode; adaptive technique      [facility] 6 Click ADL: 18    Treatment & Education:  Oriented  x4. Sitting eob with LSO brace.   Patient with 2/3 reciprocation of spinal precautions, improved 3/3 at end of session.  Reviewed ADL adaptive performance techniques with verbal reciprocation from patient/ spouse.  Re-educated on hand placement techniques and exhalation strategies during sit to stand transitions.   Functional mobility/ADL training as above, with seated rest breaks throughout as above.  Needs in reach, up in chair for lunch.  Answered questions in scope.  Positive praise/affirmation provided throughout.      Patient left up in chair with all lines intact, call button in reach, chair alarm on, nursing notified, and spouse present    GOALS:   Multidisciplinary Problems       Occupational Therapy Goals          Problem: Occupational Therapy    Goal Priority Disciplines Outcome Interventions   Occupational Therapy Goal     OT, PT/OT Ongoing, Progressing    Description: Goals to be met by: 3/19/2024     Patient will increase functional independence with ADLs by performing:    Rolling B via log roll with independence while maintaining spinal precautions  UE Dressing with Set-up Assistance inclusive of donning / doffing LSO brace (met 2/29/24)  LE Dressing with Set-up Assistance with adaptive technique and inclusion of spinal precautions  Toileting from toilet with Modified independence for hygiene and clothing management.   Toilet transfer to toilet with Modified indpedence with use of least restrictive device.  Functional mobility x5 minutes with least restrictive device with SBA while participation in daily routines.  100% reciprocation and integration of 3/3 spinal precautions, DME recommendations, and ADL/task modifications post fusion to support safe d/c at highest level of function.                             Time Tracking:     OT Date of Treatment: 02/29/24  OT Start Time: 1131  OT Stop Time: 1206  OT Total Time (min): 35 min    Billable Minutes:Self Care/Home Management 10 min  Therapeutic  Activity 25 min    OT/ROSALES: OT     Number of ROSALES visits since last OT visit: 1    2/29/2024

## 2024-02-29 NOTE — PT/OT/SLP PROGRESS
"Physical Therapy Treatment    Patient Name:  Kevin Mancini   MRN:  [id number]    Recommendations:     Discharge Recommendations: High Intensity Therapy  Discharge Equipment Recommendations: to be determined by next level of care  Barriers to discharge: Inaccessible home    Assessment:     Kevin Mancini is a 39 y.o. female admitted with a medical diagnosis of Foraminal stenosis of lumbar region.  She presents with the following impairments/functional limitations: impaired endurance, impaired self care skills, impaired functional mobility, weakness, gait instability, impaired balance, impaired sensation, decreased upper extremity function, decreased lower extremity function, pain, decreased ROM, impaired skin, orthopedic precautions Patient progressing toward goals; required CGA for sup to sit with increased time/effort; Annabelle x 2 sit to stand with VCs for proper technique; pt amb 40ft x 2 using RW with significantly slow lindsey, VCs for technique/posture; cont with POC. .    Rehab Prognosis: Good; patient would benefit from acute skilled PT services to address these deficits and reach maximum level of function.    Recent Surgery: Procedure(s) (LRB):  FUSION, SPINE, WITH INSTRUMENTATION (Left)  FUSION, SPINE, WITH INSTRUMENTATION (N/A) 2 Days Post-Op    Plan:     During this hospitalization, patient to be seen daily to address the identified rehab impairments via gait training, therapeutic activities, therapeutic exercises, neuromuscular re-education and progress toward the following goals:    Plan of Care Expires:  03/28/24    Subjective     Chief Complaint: "I don't want to slide my foot."  Patient/Family Comments/goals: return to PLOF  Pain/Comfort:  Pain Rating 1: 8/10  Location - Orientation 1: lower  Location 1: back (and L groin (anterior incision))  Pain Addressed 1: Reposition, Pre-medicate for activity, Distraction, Cessation of Activity  Pain Rating Post-Intervention 1: " 7/10      Objective:     Communicated with nurse prior to session.  Patient found HOB elevated with bed alarm upon PT entry to room.     General Precautions: Standard, fall  Orthopedic Precautions: spinal precautions  Braces: LSO  Respiratory Status: Room air     Functional Mobility:  Bed Mobility:     Rolling Right: stand by assistance and use of side rail, log roll method with good technique  Scooting: stand by assistance and contact guard assistance  Supine to Sit: contact guard assistance and use of side rail and significantly increased time/effort  Transfers:     Sit to Stand:  minimum assistance and of 2 persons with rolling walker  Bed to Chair: minimum assistance and of 2 persons with  rolling walker  using  Step Transfer  Gait: Patient amb in room and hallway 40ft x 2 trials with seated rest break between trials; pt used RW with CGA of 2 initially 2/2 to LLE weakness, but able to progress to CGA of 1 person; pt with decreased step length and floor clearance; intermittent VCs for RW management, erect posture and advancement/placement of LLE; pt amb with significantly increased time      AM-PAC 6 CLICK MOBILITY  Total Score: 15      Treatment & Education:  Patient able to recall spinal precautions and log roll method in preparation for sup to sit EOB; pt reporrts that she donned LSO before transferring to [facility] during the night before; pt performed bed mobility as noted above; pt scooted to EOB with SBA and increased time; pt demonstrated ability to perform heel/toe rises, FAQ, marches while seated EOB; sit to stand with instruction for hand and foot placement, demonstration by therapist with use of RW for transfers and amb; pt performed 3 pt gait initially 2/2 weakness LLE but then was able to progress to reciprocal gait; returned to using 3 pt gait to decrease pain LLE described as above; elevated pt's LEs in bedside chair and instructed on APs, QS to be done intermittently while sitting in chair.    Patient  left up in chair with all lines intact, call button in reach, and nurse notified..    GOALS:   Multidisciplinary Problems       Physical Therapy Goals          Problem: Physical Therapy    Goal Priority Disciplines Outcome Goal Variances Interventions   Physical Therapy Goal     PT, PT/OT Ongoing, Progressing     Description: Goals to be met by: 3/27/2024     Patient will increase functional independence with mobility by performin. Supine to sit with Modified Edgar  2. Sit to supine with Modified Edgar  3. Rolling with Modified Edgar.  4. Sit to stand transfer with Modified Edgar using Rolling Walker  5. Bed to chair transfer with Modified Edgar using Rolling Walker  6. Gait  x 150 feet with Modified Edgar using Rolling Walker.   7. Ascend/descend 16 stair with bilateral Handrails Modified Edgar                          Time Tracking:     PT Received On: 24  PT Start Time: 1106     PT Stop Time: 1200  PT Total Time (min): 54 min Co-tx with OT 2/2 first attempt at amb s/p surgery    Billable Minutes: Gait Training 24 and Therapeutic Activity 20 Self Care 10       PT/PTA: PT     Number of PTA visits since last PT visit: 0     2024

## 2024-02-29 NOTE — PROGRESS NOTES
Smoking cessation education follow-up: Pt denies nicotine withdrawal symptoms with patch in use. Order requested upon discharge for 14 mg nicotine patch Q day. Reviewed details of pt's initial (virtual) Ambulatory Smoking Cessation clinic appointment scheduled on 3/12/24 at 1300, pending hospital discharge.

## 2024-02-29 NOTE — PLAN OF CARE
Problem: Adult Inpatient Plan of Care  Goal: Plan of Care Review  Outcome: Ongoing, Not Progressing   Chart and care plan reviewed

## 2024-02-29 NOTE — PROGRESS NOTES
NEUROSURGICAL POST-OPERATIVE PROGRESS NOTE    DATE OF SERVICE:  02/29/2024      ATTENDING PHYSICIAN:  Bakari Zaragoza MD    SUBJECTIVE:    INTERIM HISTORY:    This is a very pleasant 39 y.o. y.o. female, who is status postop day 2 L4-5 oblique interbody fusion, L5-S1 anterior interbody fusion with minimally invasive posterior instrumentation.  Patient reports increased pain this morning.  No new onset of motor weakness or numbness.  No abdominal pain.              OBJECTIVE:    PHYSICAL EXAMINATION:   Vitals:    02/29/24 0729   BP: (!) 113/59   Pulse: 83   Resp: 16   Temp: 97.5 °F (36.4 °C)       Physical Exam:  Vitals reviewed.    Constitutional: She appears well-developed and well-nourished.     Eyes: Pupils are equal, round, and reactive to light. Conjunctivae and EOM are normal.     Cardiovascular: Normal distal pulses and no edema.   Left lower extremity warmer than right lower extremity     Abdominal: Soft.     Skin: Skin displays no rash on trunk and no rash on extremities. Skin displays no lesions on trunk and no lesions on extremities.     Psych/Behavior: She is alert. She is oriented to person, place, and time. She has a normal mood and affect.     Musculoskeletal:        Neck: Range of motion is full.       Ortho Exam    Neurologic Exam     Mental Status   Oriented to person, place, and time.     Cranial Nerves     CN III, IV, VI   Pupils are equal, round, and reactive to light.  Extraocular motions are normal.       Dressing clean and dry    DIAGNOSTIC DATA:    Lumbar x-ray pending    ASSESMENT:    This is a 39 y.o. female who is s/p postop day 2 L4-S1 fusion.    Problem List Items Addressed This Visit          Neuro    * (Principal) Foraminal stenosis of lumbar region - Primary    Relevant Orders    Admit to Inpatient (Completed)    Vital signs    Turn cough deep breathe    Oral Care    Neurovascular checks    Intake and output    Place sequential compression device    Chlorohexidine Bath    Pulse  Oximetry Q4H    PT evaluate and treat    OT evaluate and treat    Transfer patient (Completed)    PT assistance with ambulation    LSO brace    Patient must wear orthosis while sitting or standing    Diet Adult Regular (IDDSI Level 7)    CBC auto differential (Completed)    Basic metabolic panel (Completed)    X-Ray Lumbar Spine Ap And Lateral    Incentive spirometry    Full code    Recurrent herniation of lumbar disc    Relevant Orders    Vital signs    Turn cough deep breathe    Oral Care    Neurovascular checks    Intake and output    Place sequential compression device    Chlorohexidine Bath    Pulse Oximetry Q4H    PT evaluate and treat    OT evaluate and treat    Transfer patient (Completed)    PT assistance with ambulation    LSO brace    Patient must wear orthosis while sitting or standing    Diet Adult Regular (IDDSI Level 7)    CBC auto differential (Completed)    Basic metabolic panel (Completed)    X-Ray Lumbar Spine Ap And Lateral    Incentive spirometry       Psychiatric    Narcotic dependence    Relevant Orders    Vital signs    Turn cough deep breathe    Oral Care    Neurovascular checks    Intake and output    Place sequential compression device    Chlorohexidine Bath    Pulse Oximetry Q4H    PT evaluate and treat    OT evaluate and treat    Transfer patient (Completed)    PT assistance with ambulation    LSO brace    Patient must wear orthosis while sitting or standing    Diet Adult Regular (IDDSI Level 7)    CBC auto differential (Completed)    Basic metabolic panel (Completed)    X-Ray Lumbar Spine Ap And Lateral    Incentive spirometry       PLAN:    We will repeat lab this morning  Toradol 50 mg IV q.6h x3 doses  Mobilize with PT  Chest x-ray today        Bakari Zaragoza MD  Pager: [phone]

## 2024-02-29 NOTE — PLAN OF CARE
Problem: Adult Inpatient Plan of Care  Goal: Plan of Care Review  Outcome: Ongoing, Progressing     Problem: Adult Inpatient Plan of Care  Goal: Absence of Hospital-Acquired Illness or Injury  Outcome: Ongoing, Progressing     Problem: Adult Inpatient Plan of Care  Goal: Optimal Comfort and Wellbeing  Outcome: Ongoing, Progressing     Problem: Infection  Goal: Absence of Infection Signs and Symptoms  Outcome: Ongoing, Progressing     Problem: Functional Ability Impaired (Spinal Surgery)  Goal: Optimal Functional Ability  Outcome: Ongoing, Progressing     Problem: Pain (Spinal Surgery)  Goal: Acceptable Pain Control  Outcome: Ongoing, Progressing     Problem: Fall Injury Risk  Goal: Absence of Fall and Fall-Related Injury  Outcome: Ongoing, Progressing

## 2024-02-29 NOTE — PT/OT/SLP PROGRESS
Physical Therapy Treatment    Patient Name:  Kevin Mancini   MRN:  [id number]    Recommendations:     Discharge Recommendations: High Intensity Therapy  Discharge Equipment Recommendations: to be determined by next level of care  Barriers to discharge: Inaccessible home (pt has 16 steps at home)    Assessment:     Kevin Mancini is a 39 y.o. female admitted with a medical diagnosis of Foraminal stenosis of lumbar region.  She presents with the following impairments/functional limitations: weakness, impaired endurance, impaired self care skills, impaired functional mobility, gait instability, impaired balance, decreased coordination, decreased lower extremity function, pain, decreased ROM, impaired skin, edema, orthopedic precautions ;pt with fair/good mobility today, ambulating very slowly, limited by pain.  Prior to admission pt was modified independent with mobility and self-care and there is expectation of returning to prior level of function to maintain independence avoiding readmission. Pt is at high risk of unplanned readmission due to fall risk and lack of 24 hour caregiver in prior setting. The lower level of care cannot provide total interdisciplinary approach needed. Pt is able to tolerate 3 hours of daily therapy. Pt is pleasant and motivated to return to prior level of function.      Rehab Prognosis: Good; patient would benefit from acute skilled PT services to address these deficits and reach maximum level of function.    Recent Surgery: Procedure(s) (LRB):  FUSION, SPINE, WITH INSTRUMENTATION (Left)  FUSION, SPINE, WITH INSTRUMENTATION (N/A) 2 Days Post-Op    Plan:     During this hospitalization, patient to be seen daily to address the identified rehab impairments via gait training, therapeutic activities, therapeutic exercises, neuromuscular re-education and progress toward the following goals:    Plan of Care Expires:  03/28/24    Subjective     Chief Complaint: pain on R side low  back, and L leg  Patient/Family Comments/goals: pt agreeable to session, recv'd pain med ~1 hour prior, sleepy, but eager to walk.   Pain/Comfort:  Pain Rating 1: 8/10  Location - Side 1: Right  Location - Orientation 1: lower  Location 1: back  Pain Addressed 1: Pre-medicate for activity, Reposition, Distraction  Pain Rating Post-Intervention 1: 8/10  Pain Rating 2: 8/10  Location - Side 2: Left  Location 2: leg  Pain Addressed 2: Reposition, Distraction, Pre-medicate for activity  Pain Rating Post-Intervention 2: 8/10      Objective:     Communicated with nurse prior to session.  Patient found right sidelying with peripheral IV upon PT entry to room.     General Precautions: Standard, fall  Orthopedic Precautions: spinal precautions  Braces: LSO  Respiratory Status: Room air     Functional Mobility:  Bed Mobility:     Supine to Sit: minimum assistance, moderate assistance, and at LE's  Sit to Supine: moderate assistance, maximal assistance, and at LE's   Transfers:     Sit to Stand:  contact guard assistance with rolling walker  Gait: amb'd ~15', 40' w/ RW and CGA, cueing for taking turns      AM-PAC 6 CLICK MOBILITY  Turning over in bed (including adjusting bedclothes, sheets and blankets)?: 3  Sitting down on and standing up from a chair with arms (e.g., wheelchair, bedside commode, etc.): 3  Moving from lying on back to sitting on the side of the bed?: 3  Moving to and from a bed to a chair (including a wheelchair)?: 3  Need to walk in hospital room?: 3  Climbing 3-5 steps with a railing?: 1  Basic Mobility Total Score: 16       Treatment & Education:  Pt inst'd in log rolling for getting in/out bed.   Donned LSO w/ mod.A for set up  Pt perf'd commode t/f's in bathroom w/ use of railing and CGA/min.A.   Perf'd hygiene w/ SBA.     Patient left HOB elevated with all lines intact, call button in reach, nurse notified, and family present..    GOALS:   Multidisciplinary Problems       Physical Therapy Goals           Problem: Physical Therapy    Goal Priority Disciplines Outcome Goal Variances Interventions   Physical Therapy Goal     PT, PT/OT Ongoing, Progressing     Description: Goals to be met by: 3/27/2024     Patient will increase functional independence with mobility by performin. Supine to sit with Modified [place]  2. Sit to supine with Modified [place]  3. Rolling with Modified [place].  4. Sit to stand transfer with Modified [place] using Rolling Walker  5. Bed to chair transfer with Modified [place] using Rolling Walker  6. Gait  x 150 feet with Modified [place] using Rolling Walker.   7. Ascend/descend 16 stair with bilateral Handrails Modified [place]                          Time Tracking:     PT Received On: 24  PT Start Time: 1508     PT Stop Time: 1538  PT Total Time (min): 30 min     Billable Minutes: Gait Training 20 and Therapeutic Activity 10    Treatment Type: Treatment  PT/PTA: PTA     Number of PTA visits since last PT visit: 2024

## 2024-03-01 VITALS
WEIGHT: 188.69 LBS | TEMPERATURE: 98 F | SYSTOLIC BLOOD PRESSURE: 113 MMHG | BODY MASS INDEX: 32.21 KG/M2 | OXYGEN SATURATION: 99 % | RESPIRATION RATE: 18 BRPM | HEART RATE: 90 BPM | HEIGHT: 64 IN | DIASTOLIC BLOOD PRESSURE: 55 MMHG

## 2024-03-01 PROCEDURE — 94761 N-INVAS EAR/PLS OXIMETRY MLT: CPT

## 2024-03-01 PROCEDURE — 99900035 HC TECH TIME PER 15 MIN (STAT)

## 2024-03-01 PROCEDURE — 63600175 PHARM REV CODE 636 W HCPCS: Performed by: NEUROLOGICAL SURGERY

## 2024-03-01 PROCEDURE — 25000003 PHARM REV CODE 250: Performed by: NEUROLOGICAL SURGERY

## 2024-03-01 PROCEDURE — 94799 UNLISTED PULMONARY SVC/PX: CPT | Mod: XB

## 2024-03-01 PROCEDURE — 25000003 PHARM REV CODE 250: Performed by: PHYSICIAN ASSISTANT

## 2024-03-01 PROCEDURE — S4991 NICOTINE PATCH NONLEGEND: HCPCS | Performed by: NEUROLOGICAL SURGERY

## 2024-03-01 PROCEDURE — 97530 THERAPEUTIC ACTIVITIES: CPT | Mod: CO

## 2024-03-01 RX ORDER — ONDANSETRON 8 MG/1
8 TABLET, ORALLY DISINTEGRATING ORAL EVERY 6 HOURS PRN
Qty: 1 TABLET | Refills: 0
Start: 2024-03-01

## 2024-03-01 RX ORDER — POLYETHYLENE GLYCOL 3350 17 G/17G
17 POWDER, FOR SOLUTION ORAL 2 TIMES DAILY
Status: DISCONTINUED | OUTPATIENT
Start: 2024-03-01 | End: 2024-03-01 | Stop reason: HOSPADM

## 2024-03-01 RX ORDER — OXYCODONE HYDROCHLORIDE 10 MG/1
10 TABLET ORAL EVERY 6 HOURS PRN
Qty: 1 TABLET | Refills: 0
Start: 2024-03-01 | End: 2024-04-03 | Stop reason: SDUPTHER

## 2024-03-01 RX ORDER — TRAMADOL HYDROCHLORIDE 50 MG/1
50 TABLET ORAL EVERY 6 HOURS PRN
Qty: 1 TABLET | Refills: 0
Start: 2024-03-01

## 2024-03-01 RX ORDER — ALBUTEROL SULFATE 90 UG/1
1 AEROSOL, METERED RESPIRATORY (INHALATION) EVERY 4 HOURS PRN
Qty: 18 G | Refills: 0
Start: 2024-03-01 | End: 2025-03-01

## 2024-03-01 RX ORDER — ALUMINUM HYDROXIDE, MAGNESIUM HYDROXIDE, AND SIMETHICONE 1200; 120; 1200 MG/30ML; MG/30ML; MG/30ML
30 SUSPENSION ORAL EVERY 4 HOURS PRN
Qty: 1 ML | Refills: 0
Start: 2024-03-01 | End: 2025-03-01

## 2024-03-01 RX ORDER — POLYETHYLENE GLYCOL 3350 17 G/17G
17 POWDER, FOR SOLUTION ORAL 2 TIMES DAILY
Qty: 1 PACKET | Refills: 0
Start: 2024-03-01

## 2024-03-01 RX ORDER — GABAPENTIN 300 MG/1
600 CAPSULE ORAL 3 TIMES DAILY
Qty: 180 CAPSULE | Refills: 11
Start: 2024-03-01 | End: 2025-03-01

## 2024-03-01 RX ORDER — POLYETHYLENE GLYCOL 3350 17 G/17G
17 POWDER, FOR SOLUTION ORAL 2 TIMES DAILY
Status: DISCONTINUED | OUTPATIENT
Start: 2024-03-01 | End: 2024-03-01

## 2024-03-01 RX ORDER — BISACODYL 10 MG/1
10 SUPPOSITORY RECTAL DAILY PRN
Qty: 1 SUPPOSITORY | Refills: 0
Start: 2024-03-01

## 2024-03-01 RX ORDER — HYDROMORPHONE HYDROCHLORIDE 4 MG/1
4 TABLET ORAL EVERY 4 HOURS PRN
Qty: 1 TABLET | Refills: 0
Start: 2024-03-01

## 2024-03-01 RX ORDER — IBUPROFEN 200 MG
1 TABLET ORAL DAILY
Qty: 1 PATCH | Refills: 0
Start: 2024-03-02

## 2024-03-01 RX ORDER — AMOXICILLIN 250 MG
2 CAPSULE ORAL NIGHTLY PRN
Qty: 1 TABLET | Refills: 0
Start: 2024-03-01

## 2024-03-01 RX ADMIN — GABAPENTIN 600 MG: 300 CAPSULE ORAL at 02:03

## 2024-03-01 RX ADMIN — HYDROMORPHONE HYDROCHLORIDE 4 MG: 2 TABLET ORAL at 02:03

## 2024-03-01 RX ADMIN — NICOTINE 1 PATCH: 14 PATCH, EXTENDED RELEASE TRANSDERMAL at 08:03

## 2024-03-01 RX ADMIN — BACLOFEN 10 MG: 10 TABLET ORAL at 02:03

## 2024-03-01 RX ADMIN — HYDROMORPHONE HYDROCHLORIDE 4 MG: 2 TABLET ORAL at 10:03

## 2024-03-01 RX ADMIN — OXYCODONE 10 MG: 5 TABLET ORAL at 05:03

## 2024-03-01 RX ADMIN — POLYETHYLENE GLYCOL 3350 17 G: 17 POWDER, FOR SOLUTION ORAL at 11:03

## 2024-03-01 RX ADMIN — ACETAMINOPHEN 650 MG: 325 TABLET ORAL at 05:03

## 2024-03-01 RX ADMIN — OXYCODONE 10 MG: 5 TABLET ORAL at 11:03

## 2024-03-01 RX ADMIN — BACLOFEN 10 MG: 10 TABLET ORAL at 08:03

## 2024-03-01 RX ADMIN — OXYCODONE 10 MG: 5 TABLET ORAL at 04:03

## 2024-03-01 RX ADMIN — HYDROMORPHONE HYDROCHLORIDE 4 MG: 2 TABLET ORAL at 05:03

## 2024-03-01 RX ADMIN — HYDROMORPHONE HYDROCHLORIDE 4 MG: 2 TABLET ORAL at 01:03

## 2024-03-01 RX ADMIN — GABAPENTIN 600 MG: 300 CAPSULE ORAL at 08:03

## 2024-03-01 RX ADMIN — HEPARIN SODIUM 5000 UNITS: 5000 INJECTION INTRAVENOUS; SUBCUTANEOUS at 08:03

## 2024-03-01 NOTE — DISCHARGE INSTRUCTIONS
Please follow ONLY the instructions that are checked below.    Activity Restrictions:  [x]  Return to work will be determined on an individual basis.  [x]  No lifting greater than 10 pounds.  [x]  Avoid bending and twisting the area of your surgery more than 45 degrees from neutral position in any direction.  [x]  No driving or operating machinery:  [x]  until cleared by your surgeon.  [x]  while taking narcotic pain medications or muscle relaxants.  []  No cervical collar, soft collar, or lumbar brace required.  []  Wear your brace at all times. You may be given an extra brace or soft collar to wear when showering.  [x]  Wear your brace at all times except when flat in bed.  []  Wear brace for comfort only.  [x]  Increase ambulation over the next 2 weeks so that you are walking 2 miles per day at 2 weeks post-operatively.  [x]  Walk on paved surfaces only. It is okay to walk up and down stairs while holding onto a side rail.  [x]  No sexual activity for 2-3 weeks.    Discharge Medication/Follow-up:  [x]  Please refer to discharge medication reconciliation form.  [x]  Do not take ANY non-steroidal anti-inflammatory drugs (NSAIDS), including the following: ibuprofen, naprosyn, Aleve, Advil, Indocin, Mobic, or Celebrex for:  []  4 weeks  []  8 weeks  [x]  6 months  [x]  Prescriptions for appropriate medication will be given upon discharge.   []  Pain control:             []  Muscle relaxer:            [x]  Take docusate (Colace 100 mg): take one capsule a day as needed for constipation. You can get this over the counter.  [x]  Follow-up appointment:  [x]  10-14 days post-op for wound check by physician assistant/nurse  [x]  4-6 weeks with MD:  [x]  with x-rays  []  without x-rays  []  An appointment will be mailed to you.    Wound Care:  []  Remove dressing or bandaid in    days.  [x]  No bandage required. Keep your incision open to the air.  [x]  You may shower on the 2nd day after your surgery. Have the force of  water hit you opposite from the incision. Pat the incision dry after your shower; do not scrub the incision.  [x]  You cannot take a bath until 8 weeks after surgery.  []  Apply bacitracin to incision twice a day for    more days.    Call your doctor or go to the Emergency Room for any signs of infection, including: increased redness, drainage, pain, or fever (temperature ?101.5 for 24 hours). Call your doctor or go to the Emergency Room if there are any localized neurological changes; problems with speech, vision, numbness, tingling, weakness, or severe headache; or for other concerns.    Special Instructions:  [x]  No use of tobacco products.  [x]  Diet: Please eat a regular diet as tolerated.  []  Other diet:              Specific physician instructions:           Physicians need 3 days' notice for pain medicine to be refilled. Pain medicine will only be refilled between 8 AM and 5 PM, Monday through Friday, due to Food and Drug Administration regulation of documentation.        Form No. 47349 (Revised 10/31/2013)

## 2024-03-01 NOTE — PT/OT/SLP PROGRESS
"Occupational Therapy   Treatment    Name: Kevin Mancini  MRN: [id number]  Admitting Diagnosis:  Foraminal stenosis of lumbar region  3 Days Post-Op    Recommendations:     Discharge Recommendations: High Intensity Therapy  Discharge Equipment Recommendations:  to be determined by next level of care  Barriers to discharge:  Inaccessible home environment (16 steps to enter home; increased assist for LB ADL; pain)    Assessment:     Kevin Mancini is a 39 y.o. female with a medical diagnosis of Foraminal stenosis of lumbar region. Performance deficits affecting function are weakness, impaired endurance, impaired self care skills, impaired functional mobility, gait instability, impaired balance, decreased coordination, decreased lower extremity function, pain, orthopedic precautions, decreased ROM.     Rehab Prognosis:  Good; patient would benefit from acute skilled OT services to address these deficits and reach maximum level of function.       Plan:     Patient to be seen 5 x/week to address the above listed problems via self-care/home management, therapeutic activities, therapeutic exercises  Plan of Care Expires: 03/19/24  Plan of Care Reviewed with: patient, spouse    Subjective     Chief Complaint: "I was waiting until 2 for meds, but we're gonna do it"  Patient/Family Comments/goals: return to PLOF  Pain/Comfort:  Pain Rating 1: 8/10  Location - Orientation 1: lower  Location 1: back  Pain Addressed 1: Reposition, Distraction, Cessation of Activity    Objective:     Communicated with: nurseNikos prior to session.  Patient found HOB elevated with peripheral IV upon OT entry to room.    General Precautions: Standard, fall    Orthopedic Precautions:spinal precautions  Braces: LSO  Respiratory Status: Room air    Bed Mobility:    Patient completed Rolling/Turning to Right with supervision and with side rail  Patient completed Scooting/Bridging with modified independence  Patient completed " Supine to Sit with stand by assistance, with side rail, and increased time      Functional Mobility/Transfers:  Patient completed Sit <> Stand Transfer with contact guard assistance  with  rolling walker     Activities of Daily Living:  Upper Body Dressing: set-up to daryn LSO seated EOB    Lower Body Dressing: minimum assistance ; able to daryn R slipper set-up, but required [place] for placement of L slipper    AMPAC 6 Click ADL: 19    Treatment & Education:  Excellent demo of log-roll technique  LSO donned as above  Declined toileting/G/H tasks, as she completed recently  Ambulated in room and hallway using RW /c CGA; slow pace  Initially utilizes 3pt gait, but progressed to reciprocal gait, albeit very slow    Patient left sitting edge of bed with all lines intact, call button in reach, and family present    GOALS:   Multidisciplinary Problems       Occupational Therapy Goals          Problem: Occupational Therapy    Goal Priority Disciplines Outcome Interventions   Occupational Therapy Goal     OT, PT/OT Ongoing, Progressing    Description: Goals to be met by: 3/19/2024     Patient will increase functional independence with ADLs by performing:    Rolling B via log roll with independence while maintaining spinal precautions  UE Dressing with Set-up Assistance inclusive of donning / doffing LSO brace (met 2/29/24)  LE Dressing with Set-up Assistance with adaptive technique and inclusion of spinal precautions  Toileting from toilet with Modified independence for hygiene and clothing management.   Toilet transfer to toilet with Modified indpedence with use of least restrictive device.  Functional mobility x5 minutes with least restrictive device with SBA while participation in daily routines.  100% reciprocation and integration of 3/3 spinal precautions, DME recommendations, and ADL/task modifications post fusion to support safe d/c at highest level of function.                             Time Tracking:     OT Date of  Treatment: 03/01/24  OT Start Time: 1328  OT Stop Time: 1352  OT Total Time (min): 24 min    Billable Minutes:Therapeutic Activity 24    OT/ROSALES: ROSALES     Number of ROSALES visits since last OT visit: 1    3/1/2024

## 2024-03-01 NOTE — PT/OT/SLP PROGRESS
Physical Therapy      Patient Name:  Kevin Mancini   MRN:  [id number]    Patient not seen today secondary to Testing/imaging (xray/CT/MRI) to R/O DVT Will follow-up appropriate.  3/1/2024

## 2024-03-01 NOTE — PROGRESS NOTES
Future Appointments   Date Time Provider Department Center   3/12/2024  1:00 PM Patrick Dorantes, RD [facility] S[facility]   3/12/2024  2:30 PM [facility] XR-A LIMIT 350 LBS [facility] XRAY [facility] [place] [facility]   3/12/2024  3:30 PM Stacey Thakur PA-C [facility]   4/12/2024  1:30 PM [facility] XR-A LIMIT 350 LBS [facility] X[facility] [place] [facility]   4/12/2024  2:30 PM Stacey Thakur PA-C [facility] [place] [facility]   5/29/2024  2:00 PM [facility] XR-A LIMIT 350 LBS [facility]   5/29/2024  3:00 PM Bakari Zaragoza MD [facility]        SCr now trending down to 1.6  - net negative 850cc  - appreciate nephrology recs: no indication for RRT at this time  - continue to hold diuretics  - bob in place  - continue to monitor BMP

## 2024-03-01 NOTE — PLAN OF CARE
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     Allergies: Review of patient's allergies indicates:  No Known Allergies    Code Status: Full    Vitals: Routine       Diet: regular diet    Activity: Activity as tolerated    Nursing:    Teds/SCDs  Up in chair with meals  LSO brace when OOB  Incisions open to air  Remove all staples on 03/12/24    Bed/Surface: Regular    Consults:   PT/OT evaluate and treat    Oxygen: room air    Dialysis: Patient is not on dialysis.       Pending Diagnostic Studies:       Procedure Component Value Units Date/Time     Lower Extremity Veins Left [[id number]]     Order Status: Sent Lab Status: No result               IV Access: Peripheral     Medications: Discontinue all previous medication orders, if any. See new list below.  Current Discharge Medication List        START taking these medications    Details   aluminum-magnesium hydroxide-simethicone (MAALOX) 200-200-20 mg/5 mL Susp Take 30 mLs by mouth every 4 (four) hours as needed (indigestion).  Qty: 1 mL, Refills: 0      bisacodyL (DULCOLAX) 10 mg Supp Place 1 suppository (10 mg total) rectally daily as needed (constipation).  Qty: 1 suppository, Refills: 0      gabapentin (NEURONTIN) 300 MG capsule Take 2 capsules (600 mg total) by mouth 3 (three) times daily.  Qty: 180 capsule, Refills: 11      HYDROmorphone (DILAUDID) 4 MG tablet Take 1 tablet (4 mg total) by mouth every 4 (four) hours as needed for Pain (severe pain 7-10/10).  Qty: 1 tablet, Refills: 0    Comments: Quantity prescribed more than 7 day supply? Yes, quantity medically necessary      nicotine (NICODERM CQ) 14 mg/24 hr Place 1 patch onto the skin once daily.  Qty: 1  patch, Refills: 0      ondansetron (ZOFRAN-ODT) 8 MG TbDL Take 1 tablet (8 mg total) by mouth every 6 (six) hours as needed (n/v).  Qty: 1 tablet, Refills: 0      oxyCODONE (ROXICODONE) 10 mg Tab immediate release tablet Take 1 tablet (10 mg total) by mouth every 6 (six) hours as needed for Pain (moderate pain 4-6/10).  Qty: 1 tablet, Refills: 0    Comments: Quantity prescribed more than 7 day supply? Yes, quantity medically necessary      polyethylene glycol (GLYCOLAX) 17 gram PwPk Take 17 g by mouth 2 (two) times daily.  Qty: 1 packet, Refills: 0      senna-docusate 8.6-50 mg (PERICOLACE) 8.6-50 mg per tablet Take 2 tablets by mouth nightly as needed for Constipation.  Qty: 1 tablet, Refills: 0      traMADoL (ULTRAM) 50 mg tablet Take 1 tablet (50 mg total) by mouth every 6 (six) hours as needed for Pain (Mild pain 1-3/10).  Qty: 1 tablet, Refills: 0    Comments: Quantity prescribed more than 7 day supply? Yes, quantity medically necessary           CONTINUE these medications which have CHANGED    Details   albuterol (PROVENTIL/VENTOLIN HFA) 90 mcg/actuation inhaler Inhale 1 puff into the lungs every 4 (four) hours as needed for Wheezing. Rescue  Qty: 18 g, Refills: 0           CONTINUE these medications which have NOT CHANGED    Details   baclofen (LIORESAL) 10 MG tablet Take 1 tablet (10 mg total) by mouth 3 (three) times daily.  Qty: 90 tablet, Refills: 2           STOP taking these medications       ferrous sulfate (FEOSOL) 325 mg (65 mg iron) Tab tablet Comments:   Reason for Stopping:         gabapentin (NEURONTIN) 600 MG tablet Comments:   Reason for Stopping:         HYDROcodone-acetaminophen (NORCO)  mg per tablet Comments:   Reason for Stopping:         methocarbamoL (ROBAXIN) 750 MG Tab Comments:   Reason for Stopping:         fluticasone-salmeterol diskus inhaler 100-50 mcg Comments:   Reason for Stopping:             Follow up:       Immunizations Administered as of 3/1/2024  Reviewed on  2/27/2024      Name Date Dose VIS Date Route Exp Date    COVID-19, vector-nr, rS-Ad26 (J&J) 4/12/2021  5:40 PM 0.5 mL 1/13/2021 Intramuscular 6/23/2021    Site: Right deltoid     Given By: Lynette Parks RN     : [employer]     Lot: [id number]             This patient has had both covid vaccinations    Some patients may experience side effects after vaccination.  These may include fever, headache, muscle or joint aches.  Most symptoms resolve with 24-48 hours and do not require urgent medical evaluation unless they persist for more than 72 hours or symptoms are concerning for an unrelated medical condition.          TANA HidalgoC

## 2024-03-01 NOTE — PLAN OF CARE
CM met with pt and her sister prior to discharge   Pt agrees to transfer today to [facility]    [facility] to transport pt - FS in room   Nurse to call report per Rita to:  [phone]     Future Appointments   Date Time Provider Department Center   3/12/2024  1:00 PM [facility] [address] [facility]   3/12/2024  2:30 PM [facility] XR-A LIMIT 350 LBS [facility] XRAY [facility] [place] [facility]   3/12/2024  3:30 PM Stacey Thakur PA-C [facility] NEUROSU [place] [facility]   4/12/2024  1:30 PM [facility] XR-A LIMIT 350 LBS [facility]   4/12/2024  2:30 PM Stacey Thakur PA-C [facility]   5/29/2024  2:00 PM [facility] XR-A LIMIT 350 LBS [facility] XRAY OP [place] [facility]   5/29/2024  3:00 PM Bakari Zaragoza MD [facility] NEUROSU [place] [facility]        03/01/24 1616   Final Note   Assessment Type Final Discharge Note   Anticipated Discharge Disposition Rehab  ([facility])   What phone number can be called within the next 1-3 days to see how you are doing after discharge? [phone]   Hospital Resources/Appts/Education Provided Appointments scheduled and added to AVS   Post-Acute Status   Post-Acute Authorization Placement   Post-Acute Placement Status Set-up Complete/Auth obtained   Discharge Delays None known at this time

## 2024-03-01 NOTE — PROGRESS NOTES
DEVIN communicated per Alexis with Rita at [facility]   She has submitted for auth  -- beds are available for today.     PA to place orders .   Per Rita - as of 1:12 - still awaiting auth.

## 2024-03-01 NOTE — PLAN OF CARE
Pt aaox4, C/o pain, PRN meds given per MAR, ambulate with walker or 1 assist, bed in lowest position, bed locked, significant other @ bedside

## 2024-03-01 NOTE — PROGRESS NOTES
[facility]  Neurosurgery  Progress Note    Subjective:     Interval History: Back pain.  Left side and upper leg pain.  Walking with PT some.    History of Present Illness:     This is a 38 y.o. female who status post left L5-S1 microdiskectomy.  Following the microdiskectomy her severe left leg pain improved but did not resolve completely.  She has completed a full conservative management including more than 6 weeks of physical therapy, L5-S1 transforaminal epidural steroid injection, pain medication but her pain has progressively become worse.  She can not tolerate sitting for long period of time.  She has difficulty standing from a sitting position due to the severe low back pain that radiates towards the left buttock area.  The pain also radiates down the leg.  The pain feels like a pulling sensation, throbbing.  Pain is affecting her quality of life functional status and ability to perform her ADLs and work.  No new onset of motor weakness.  She has persistent left S1 distribution numbness.      Post-Op Info:  Procedure(s) (LRB):  FUSION, SPINE, WITH INSTRUMENTATION (Left)  FUSION, SPINE, WITH INSTRUMENTATION (N/A)   3 Days Post-Op      Medications:  Continuous Infusions:      Scheduled Meds:   acetaminophen  650 mg Oral Q6H    baclofen  10 mg Oral TID    bisacodyL  10 mg Rectal Daily    gabapentin  600 mg Oral TID    heparin (porcine)  5,000 Units Subcutaneous Q12H    nicotine  1 patch Transdermal Daily    polyethylene glycol  17 g Oral BID     PRN Meds:albuterol, aluminum-magnesium hydroxide-simethicone, HYDROmorphone, ondansetron, oxyCODONE, prochlorperazine, senna-docusate 8.6-50 mg, traMADoL     Review of Systems  Objective:     Weight: 85.6 kg (188 lb 11.4 oz)  Body mass index is 32.39 kg/m².  Vital Signs (Most Recent):  Temp: 97.9 °F (36.6 °C) (03/01/24 1119)  Pulse: 78 (03/01/24 1119)  Resp: 18 (03/01/24 1119)  BP: (!) 144/74 (03/01/24 1119)  SpO2: 99 % (03/01/24 1119) Vital Signs (24h  "Range):  Temp:  [97.9 °F (36.6 °C)-98.1 °F (36.7 °C)] 97.9 °F (36.6 °C)  Pulse:  [61-94] 78  Resp:  [18-24] 18  SpO2:  [97 %-100 %] 99 %  BP: (117-144)/(62-79) 144/74                              Neurosurgery Physical Exam    General: well developed, well nourished, no distress  Mental Status: Awake, Alert, Oriented X3.Thought content appropriate  GCS: Motor: 6/Verbal: 5/Eyes: 4 GCS Total: 15    Motor Strength:  Strength                                HF KF KE DF PF EHL   Lower: R 5/5 5/5 5/5 5/5 5/5 5/5    L 4/5 4/5 4/5 5/5 5/5 5/5     Mild left foot swelling. No redness.  No pitting edema.  Slightly warm.  Good pulses.    Incision- CDI        Significant Labs:  Recent Labs   Lab 02/29/24  0757   GLU 90      K 4.0      CO2 27   BUN 8   CREATININE 0.7   CALCIUM 8.7       Recent Labs   Lab 02/29/24  0758   WBC 14.74*   HGB 11.6*   HCT 35.5*          No results for input(s): "LABPT", "INR", "APTT" in the last 48 hours.  Microbiology Results (last 7 days)       ** No results found for the last 168 hours. 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     --Neurologically stable          -q4h neuro checks  --Imaging: Post op xrays pending  --Pain control:   --DVT ppx: TEDs/SCDs/SQH  --Activity: PT/OT, OOB. LSO brace  --Diet: Regular, Saline Lock IV  --Bowel regimen: PRN suppository, senna PRN, added miralax  --Urinary: Voiding spontaneously  --Atelectasis ppx: Encourage IS hourly  --Will get LLE US for left foot swelling     Dispo: Pending rehab    All of the above discussed and reviewed with Dr. Zaragoza.      Stacey Thakur PA-C  Neurosurgery  [place] [facility]    "

## 2024-03-01 NOTE — NURSING
Virtual Nurse:Discharge orders noted; additional clinical references attached.  and pharmacy tech notified.  Patient's discharge instruction packet given by bedside RN.    Cued into patient's room.  Permission received per patient to turn camera to view patient.  Introduced as VN that will be instructing on discharge instructions.  Family member at bedside.  Educated patient on reason for admission; medications to hold, continue, and start, appointment to follow-up with doctor, and when to return to ED. Teach back method used. Verbalized understanding  Number given for 24/7 Nurse Line. Opportunity given for questions and questions answered.  Bedside nurse updated.

## 2024-03-05 NOTE — DISCHARGE SUMMARY
[facility] Surg  Neurosurgery  Discharge Summary      Patient Name: Kevin Mancini  MRN: [id number]  Admission Date: 2/27/2024  Hospital Length of Stay: 3 days  Discharge Date and Time: 3/1/2024  5:41 PM  Attending Physician: Bakari Zaragoza MD  Discharging Provider: Stacey Thakur PA-C  Primary Care Provider: Mya Tripp NP     HPI:   This is a 38 y.o. female who status post left L5-S1 microdiskectomy.  Following the microdiskectomy her severe left leg pain improved but did not resolve completely.  She has completed a full conservative management including more than 6 weeks of physical therapy, L5-S1 transforaminal epidural steroid injection, pain medication but her pain has progressively become worse.  She can not tolerate sitting for long period of time.  She has difficulty standing from a sitting position due to the severe low back pain that radiates towards the left buttock area.  The pain also radiates down the leg.  The pain feels like a pulling sensation, throbbing.  Pain is affecting her quality of life functional status and ability to perform her ADLs and work.  No new onset of motor weakness.  She has persistent left S1 distribution numbness.         Procedure(s) (LRB):  FUSION, SPINE, WITH INSTRUMENTATION (Left)  FUSION, SPINE, WITH INSTRUMENTATION (N/A)     Hospital Course:   Patient had the above-named procedure.  She did well postoperatively.  She ambulated on postop day 1.  She had some swelling in her left foot and ultrasound was ordered which showed no DVT.  She was having a lot of back pain and some left side groin pain.  Labs were stable.  Vitals were stable.  Xrays showed good hardware placement. She was discharged to inpatient rehab on postop day 3 in stable condition.        Pending Diagnostic Studies:       None          Final Active Diagnoses:    Diagnosis Date Noted POA    PRINCIPAL PROBLEM:  Foraminal stenosis of lumbar region [M48.061] 02/27/2024 Yes    Recurrent  herniation of lumbar disc [M51.26] 02/27/2024 Yes    Narcotic dependence [F11.20] 02/27/2024 Yes      Problems Resolved During this Admission:      Discharged Condition: good    Disposition: Rehab Facility    Follow Up:   Follow-up Information       [facility] Follow up.    Specialty: Inpatient Rehabilitation Facility  Why: Rehab  Contact information:  [address]  [place] [facility] [address]  [phone]             Stacey Thakur PA-C Follow up on 3/12/2024.    Specialties: Neurosurgery, Spine Surgery  Why: 1:30 pm xray;  2:30 pm apt with Ms. JESU Thakur  Contact information:  [address]  [address]  [place] [id number] [phone]                           Patient Instructions:   No discharge procedures on file.  Medications:  Reconciled Home Medications:      Medication List        START taking these medications      aluminum-magnesium hydroxide-simethicone 200-200-20 mg/5 mL Susp  Commonly known as: MAALOX  Take 30 mLs by mouth every 4 (four) hours as needed (indigestion).     bisacodyL 10 mg Supp  Commonly known as: DULCOLAX  Place 1 suppository (10 mg total) rectally daily as needed (constipation).     gabapentin 300 MG capsule  Commonly known as: NEURONTIN  Take 2 capsules (600 mg total) by mouth 3 (three) times daily.  Replaces: gabapentin 600 MG tablet     HYDROmorphone 4 MG tablet  Commonly known as: DILAUDID  Take 1 tablet (4 mg total) by mouth every 4 (four) hours as needed for Pain (severe pain 7-10/10).     nicotine 14 mg/24 hr  Commonly known as: NICODERM CQ  Place 1 patch onto the skin once daily.     ondansetron 8 MG Tbdl  Commonly known as: ZOFRAN-ODT  Take 1 tablet (8 mg total) by mouth every 6 (six) hours as needed (n/v).     oxyCODONE 10 mg Tab immediate release tablet  Commonly known as: ROXICODONE  Take 1 tablet (10 mg total) by mouth every 6 (six) hours as needed for Pain (moderate pain 4-6/10).     polyethylene glycol 17 gram  Pwpk  Commonly known as: GLYCOLAX  Take 17 g by mouth 2 (two) times daily.     senna-docusate 8.6-50 mg 8.6-50 mg per tablet  Commonly known as: PERICOLACE  Take 2 tablets by mouth nightly as needed for Constipation.     traMADoL 50 mg tablet  Commonly known as: ULTRAM  Take 1 tablet (50 mg total) by mouth every 6 (six) hours as needed for Pain (Mild pain 1-3/10).            CHANGE how you take these medications      albuterol 90 mcg/actuation inhaler  Commonly known as: PROVENTIL/VENTOLIN HFA  Inhale 1 puff into the lungs every 4 (four) hours as needed for Wheezing. Rescue  What changed: See the new instructions.            CONTINUE taking these medications      baclofen 10 MG tablet  Commonly known as: LIORESAL  Take 1 tablet (10 mg total) by mouth 3 (three) times daily.            STOP taking these medications      ferrous sulfate 325 mg (65 mg iron) Tab tablet  Commonly known as: FEOSOL     fluticasone-salmeterol 100-50 mcg/dose 100-50 mcg/dose diskus inhaler  Commonly known as: ADVAIR     gabapentin 600 MG tablet  Commonly known as: NEURONTIN  Replaced by: gabapentin 300 MG capsule     HYDROcodone-acetaminophen  mg per tablet  Commonly known as: NORCO     methocarbamoL 750 MG Tab  Commonly known as: ROBAXIN              Stacey Thakur PA-C  Neurosurgery  [facility] Surg

## 2024-03-12 ENCOUNTER — CLINICAL SUPPORT (OUTPATIENT)
Dept: SMOKING CESSATION | Facility: CLINIC | Age: 39
End: 2024-03-12
Payer: COMMERCIAL

## 2024-03-12 DIAGNOSIS — F17.200 NICOTINE DEPENDENCE: Primary | ICD-10-CM

## 2024-03-12 NOTE — PROGRESS NOTES
Unable to complete intake visit.    Pt joined virtual call from rehab hospital bed and had to cancel appt within 5 mins after physical therapy came into the room.  Pt asked to be rescheduled for some time in the next week.    Will reschedule intake appt with Smoking Cessation for Monday March 18, 2024 at 5:00PM.

## 2024-03-13 ENCOUNTER — PATIENT MESSAGE (OUTPATIENT)
Dept: NEUROSURGERY | Facility: CLINIC | Age: 39
End: 2024-03-13
Payer: MEDICAID

## 2024-03-13 DIAGNOSIS — M47.816 SPONDYLOSIS OF LUMBAR REGION WITHOUT MYELOPATHY OR RADICULOPATHY: Primary | ICD-10-CM

## 2024-03-13 RX ORDER — BACLOFEN 10 MG/1
10 TABLET ORAL 3 TIMES DAILY
Qty: 90 TABLET | Refills: 2 | Status: CANCELLED | OUTPATIENT
Start: 2024-03-13 | End: 2025-03-13

## 2024-03-13 RX ORDER — GABAPENTIN 300 MG/1
600 CAPSULE ORAL 3 TIMES DAILY
Qty: 180 CAPSULE | Refills: 11 | Status: CANCELLED
Start: 2024-03-13 | End: 2025-03-13

## 2024-03-18 ENCOUNTER — TELEPHONE (OUTPATIENT)
Dept: SMOKING CESSATION | Facility: CLINIC | Age: 39
End: 2024-03-18
Payer: MEDICAID

## 2024-03-18 NOTE — TELEPHONE ENCOUNTER
Smoking Cessation clinic - Called pt after 5 mins no show to VIRTUAL intake appointment. Left voicemail with CTTS contact into for pt to call back and reschedule.

## 2024-03-25 ENCOUNTER — HOSPITAL ENCOUNTER (OUTPATIENT)
Dept: RADIOLOGY | Facility: HOSPITAL | Age: 39
Discharge: HOME | End: 2024-03-25
Attending: NEUROLOGICAL SURGERY
Payer: MEDICAID

## 2024-03-25 ENCOUNTER — CLINICAL SUPPORT (OUTPATIENT)
Dept: REHABILITATION | Facility: HOSPITAL | Age: 39
End: 2024-03-25
Payer: MEDICAID

## 2024-03-25 DIAGNOSIS — R26.9 GAIT DIFFICULTY: ICD-10-CM

## 2024-03-25 DIAGNOSIS — R29.898 WEAKNESS OF LEFT LOWER EXTREMITY: Primary | ICD-10-CM

## 2024-03-25 DIAGNOSIS — M47.816 SPONDYLOSIS OF LUMBAR REGION WITHOUT MYELOPATHY OR RADICULOPATHY: ICD-10-CM

## 2024-03-25 DIAGNOSIS — M48.07 FORAMINAL STENOSIS OF LUMBOSACRAL REGION: ICD-10-CM

## 2024-03-25 DIAGNOSIS — M53.86 DECREASED RANGE OF MOTION OF LUMBAR SPINE: ICD-10-CM

## 2024-03-25 PROCEDURE — 97162 PT EVAL MOD COMPLEX 30 MIN: CPT

## 2024-03-25 PROCEDURE — 72100 X-RAY EXAM L-S SPINE 2/3 VWS: CPT | Mod: 26,,, | Performed by: RADIOLOGY

## 2024-03-25 PROCEDURE — 97110 THERAPEUTIC EXERCISES: CPT

## 2024-03-25 PROCEDURE — 72100 X-RAY EXAM L-S SPINE 2/3 VWS: CPT | Mod: TC

## 2024-03-31 NOTE — PROGRESS NOTES
[facility]   Physical Therapy Treatment Note        Name: Kevin Méndez [facility] Number: [id number]    Therapy Diagnosis:   Encounter Diagnoses   Name Primary?    Decreased range of motion of lumbar spine Yes    Weakness of left lower extremity     Gait difficulty      Physician: Order, Paper    Visit Date: 4/2/2024    Physician Orders: PT Eval and Treat  Medical Diagnosis from Referral: M47.816 (ICD-10-CM) - Spondylosis of lumbar region without myelopathy or radiculopathy   Evaluation Date: 3/25/2024  Authorization Period Expiration: 12/31/2024  Plan of Care Expiration: 7/8/2024  Visit # / Visits authorized: 1/ 20   FOTO #2: 2 / 5   FOTO: #3: 0 / 0    Time In: 1300  Time Out: 1400  Total Billable Time: 54 minutes    Precautions: Standard and Smoker    Date of Surgery: 2/27/2024  Procedure Performed:  Left L4-5 oblique interbody fusion, placement of interbody spacer, DePuy Conduit LLIF cage filled with allograft BMP and DBM  L5-S1 anterior interbody fusion, placement of interbody spacer, DePuy Conduit ALIF cage filled with allograft BMP and DBM  L4-S1 posterior segmental instrumentation using [employer] Viper Prime system  Registration of navigated instruments using intraoperative 3D imaging Ziehm and BrainLAB station    SUBJECTIVE     Pt reports: some improvements with her home exercise program and Left lower extremity movement.  She was compliant with home exercise program.  Response to previous treatment: Evaluation  Functional change: Ongoing    Pain: 8/10  Location: Lumbar    OBJECTIVE     Objective Measures updated at progress report unless specified.     TREATMENT     Kevin received the treatments listed below:      Therapeutic Exercises to develop strength, endurance, ROM, posture, and core stabilization for 54 minutes including:  Bridges - 3x10  Straight Leg Raise - 2x10  Long arc quad - x20 bilateral  DKTC with ball - x20  Sit to Stand - 2x10 (high-low table)  Jeff Step  Overs - 4 inch sylwia; x20    NEXT = shuttle leg press    Not Today:  Lulu    PATIENT EDUCATION AND HOME EXERCISES      Home Exercises Provided and Patient Education Provided     Education provided:   Anatomy and Pathology.  Symptom management and plan of care progressions.  Home Exercise Program.    Written Home Exercises Provided: Patient instructed to cont prior HEP. Exercises were reviewed and Kevin was able to demonstrate them prior to the end of the session.  Kevin demonstrated good  understanding of the education provided. See EMR under Patient Instructions for exercises provided during therapy sessions    ASSESSMENT     Kevin presents back to the clinic for her first follow-up appointment. Mild improvements in Left lower extremity motor control as noted by increased speed of performance with heel slides and no quad lad with Straight Leg Raise. Difficulties with Left lower extremity weight acceptance as noted during sylwia step over today.     From evaluation on 3/25/2024 - Kevin is a 39 y.o. female referred to outpatient Physical Therapy with a medical diagnosis of Spondylosis of lumbar region without myelopathy or radiculopathy. She is status-post L4-S1 fusion performed on 2/272024 by Dr. Bakari Zaragoza. Patient presents with decreased range of motion, decreased strength, decreased balance, and risk for falls. Signs and symptoms are consistent with the above medical procedure. She will benefit from skilled physical therapy addressing her lower extremity strength, core stabilization, and proprioceptive retraining.    Kevin Is progressing well towards her goals.   Pt prognosis is Fair.     Pt will continue to benefit from skilled outpatient physical therapy to address the deficits listed in the problem list box on initial evaluation, provide pt/family education and to maximize pt's level of independence in the home and community environment. Pt's spiritual, cultural and educational needs  considered and pt agreeable to plan of care and goals.     Anticipated barriers to physical therapy: history of low back pain; multiple surgeries.     Goals:  Short Term Goals: 6 weeks   Patient will have reduced pain complaints from 10/10 to less than or equal to 6/10. (Not Met - Progressing)  Patient will demonstrate increased AROM/PROM by approximately 25% to 50% of initial measurements. (Not Met - Progressing)  Patient will demonstrate increased muscle strength of at least one-half grade as compared to the initial measurements. (Not Met - Progressing)     Long Term Goals: 12 weeks   Patient will have reduced pain complaints from 6/10 to less than or equal to 2/10. (Not Met - Progressing)  Patient will perform the 5 time sit to stand test in under 10 seconds. (Not Met - Progressing)  Patient will demonstrate increased muscle strength of at least one grade as compared to the initial measurements. (Not Met - Progressing)  Patient will improve Lumbar Spine FOTO Intake score from 9% to greater than or equal to 33%. (Not Met - Progressing)  Patient will be independent with their home exercise program. (Not Met - Progressing)    PLAN     Core stabilization  Left lower extremity strengthening  Proprioceptive retraining.    Plan of care Certification: 3/25/2024 to 7/8/2024.  Outpatient Physical Therapy 1 times weekly for 12 weeks.    Faraz Doe, PT , DPT, OCS

## 2024-04-02 ENCOUNTER — CLINICAL SUPPORT (OUTPATIENT)
Dept: REHABILITATION | Facility: HOSPITAL | Age: 39
End: 2024-04-02
Payer: MEDICAID

## 2024-04-02 ENCOUNTER — PATIENT MESSAGE (OUTPATIENT)
Dept: NEUROSURGERY | Facility: CLINIC | Age: 39
End: 2024-04-02
Payer: MEDICAID

## 2024-04-02 DIAGNOSIS — R29.898 WEAKNESS OF LEFT LOWER EXTREMITY: ICD-10-CM

## 2024-04-02 DIAGNOSIS — M53.86 DECREASED RANGE OF MOTION OF LUMBAR SPINE: Primary | ICD-10-CM

## 2024-04-02 DIAGNOSIS — R26.9 GAIT DIFFICULTY: ICD-10-CM

## 2024-04-02 PROCEDURE — 97110 THERAPEUTIC EXERCISES: CPT

## 2024-04-04 ENCOUNTER — PATIENT MESSAGE (OUTPATIENT)
Dept: NEUROSURGERY | Facility: CLINIC | Age: 39
End: 2024-04-04
Payer: MEDICAID

## 2024-04-04 RX ORDER — OXYCODONE HYDROCHLORIDE 10 MG/1
10 TABLET ORAL EVERY 6 HOURS PRN
Qty: 1 TABLET | Refills: 0
Start: 2024-04-04 | End: 2024-04-05

## 2024-04-04 RX ORDER — OXYCODONE HYDROCHLORIDE 10 MG/1
10 TABLET ORAL EVERY 6 HOURS PRN
Qty: 60 TABLET | Refills: 0 | Status: SHIPPED | OUTPATIENT
Start: 2024-04-04 | End: 2024-04-05

## 2024-04-04 RX ORDER — BACLOFEN 10 MG/1
10 TABLET ORAL 3 TIMES DAILY
Qty: 90 TABLET | Refills: 2 | Status: SHIPPED | OUTPATIENT
Start: 2024-04-04 | End: 2025-04-04

## 2024-04-04 NOTE — PROGRESS NOTES
"[facility]   Physical Therapy Treatment Note        Name: Kevin Mancini  Clinic Number: [id number]    Therapy Diagnosis:   Encounter Diagnoses   Name Primary?    Decreased range of motion of lumbar spine Yes    Weakness of left lower extremity     Gait difficulty      Physician: Order, Paper    Visit Date: 4/5/2024    Physician Orders: PT Eval and Treat  Medical Diagnosis from Referral: M47.816 (ICD-10-CM) - Spondylosis of lumbar region without myelopathy or radiculopathy   Evaluation Date: 3/25/2024  Authorization Period Expiration: 12/31/2024  Plan of Care Expiration: 7/8/2024  Visit # / Visits authorized: 2/ 20   FOTO #2: 3 / 5   FOTO: #3: 0 / 0    Time In: 1100  Time Out: 1200  Total Billable Time: 56 minutes    Precautions: Standard and Smoker    Date of Surgery: 2/27/2024  Procedure Performed:  Left L4-5 oblique interbody fusion, placement of interbody spacer, DePuy Conduit LLIF cage filled with allograft BMP and DBM  L5-S1 anterior interbody fusion, placement of interbody spacer, DePuy Conduit ALIF cage filled with allograft BMP and DBM  L4-S1 posterior segmental instrumentation using DePTourjive Viper Prime system  Registration of navigated instruments using intraoperative 3D imaging Ziehm and BrainLAB station    SUBJECTIVE     Pt reports: "I can feel my leg waking up."  She was compliant with home exercise program.  Response to previous treatment: soreness  Functional change: Ongoing    Pain: 7/10  Location: Lumbar    OBJECTIVE     Objective Measures updated at progress report unless specified.     TREATMENT     Kevin received the treatments listed below:      Therapeutic Exercises to develop strength, endurance, ROM, posture, and core stabilization for 56 minutes including:  Recumbent Bike - 8 minutes  Shuttle Leg Press - 2 bands + brace; 4x10  Bridges with pulldown - 3x10  Straight Leg Raise with pulldown - 2x10  DKTC with ball and pulldowns - x20  Sit to Stand - 2x10 " (high-low table)  Sylwia Step Overs - 4 inch sylwia; x20    Not Today:  Lulu  Long arc quad - x20 bilateral      PATIENT EDUCATION AND HOME EXERCISES      Home Exercises Provided and Patient Education Provided     Education provided:   Anatomy and Pathology.  Symptom management and plan of care progressions.  Home Exercise Program.    Written Home Exercises Provided: Patient instructed to cont prior HEP. Exercises were reviewed and Kevin was able to demonstrate them prior to the end of the session.  Kevin demonstrated good  understanding of the education provided. See EMR under Patient Instructions for exercises provided during therapy sessions    ASSESSMENT     Kevin returns to the clinic with improved gait quality as noted by better heel strike and step length. Began session with cardiovascular training and lower extremity proprioceptive work on shuttle. Concluded with core activation exercises on the table. Improved performance by utilizing pull down technique.     From evaluation on 3/25/2024 - Kevin is a 39 y.o. female referred to outpatient Physical Therapy with a medical diagnosis of Spondylosis of lumbar region without myelopathy or radiculopathy. She is status-post L4-S1 fusion performed on 2/272024 by Dr. Bakari Zaragoza. Patient presents with decreased range of motion, decreased strength, decreased balance, and risk for falls. Signs and symptoms are consistent with the above medical procedure. She will benefit from skilled physical therapy addressing her lower extremity strength, core stabilization, and proprioceptive retraining.    Kevin Is progressing well towards her goals.   Pt prognosis is Fair.     Pt will continue to benefit from skilled outpatient physical therapy to address the deficits listed in the problem list box on initial evaluation, provide pt/family education and to maximize pt's level of independence in the home and community environment. Pt's spiritual, cultural and  educational needs considered and pt agreeable to plan of care and goals.     Anticipated barriers to physical therapy: history of low back pain; multiple surgeries.     Goals:  Short Term Goals: 6 weeks   Patient will have reduced pain complaints from 10/10 to less than or equal to 6/10. (Not Met - Progressing)  Patient will demonstrate increased AROM/PROM by approximately 25% to 50% of initial measurements. (Not Met - Progressing)  Patient will demonstrate increased muscle strength of at least one-half grade as compared to the initial measurements. (Not Met - Progressing)     Long Term Goals: 12 weeks   Patient will have reduced pain complaints from 6/10 to less than or equal to 2/10. (Not Met - Progressing)  Patient will perform the 5 time sit to stand test in under 10 seconds. (Not Met - Progressing)  Patient will demonstrate increased muscle strength of at least one grade as compared to the initial measurements. (Not Met - Progressing)  Patient will improve Lumbar Spine FOTO Intake score from 9% to greater than or equal to 33%. (Not Met - Progressing)  Patient will be independent with their home exercise program. (Not Met - Progressing)    PLAN     Core stabilization  Left lower extremity strengthening  Proprioceptive retraining.    Plan of care Certification: 3/25/2024 to 7/8/2024.  Outpatient Physical Therapy 1 times weekly for 12 weeks.    Faraz Doe, PT , DPT, OCS

## 2024-04-05 ENCOUNTER — CLINICAL SUPPORT (OUTPATIENT)
Dept: REHABILITATION | Facility: HOSPITAL | Age: 39
End: 2024-04-05
Payer: MEDICAID

## 2024-04-05 DIAGNOSIS — M53.86 DECREASED RANGE OF MOTION OF LUMBAR SPINE: Primary | ICD-10-CM

## 2024-04-05 DIAGNOSIS — R26.9 GAIT DIFFICULTY: ICD-10-CM

## 2024-04-05 DIAGNOSIS — R29.898 WEAKNESS OF LEFT LOWER EXTREMITY: ICD-10-CM

## 2024-04-05 PROCEDURE — 97110 THERAPEUTIC EXERCISES: CPT

## 2024-04-05 RX ORDER — OXYCODONE HYDROCHLORIDE 10 MG/1
10 TABLET ORAL EVERY 6 HOURS PRN
Qty: 60 TABLET | Refills: 0 | Status: SHIPPED | OUTPATIENT
Start: 2024-04-05 | End: 2024-04-30 | Stop reason: SDUPTHER

## 2024-04-06 RX ORDER — TRAMADOL HYDROCHLORIDE 50 MG/1
50 TABLET ORAL EVERY 8 HOURS PRN
Qty: 90 TABLET | Refills: 3 | Status: SHIPPED | OUTPATIENT
Start: 2024-04-06

## 2024-04-08 NOTE — PROGRESS NOTES
[facility]   Physical Therapy Treatment Note        Name: Kevin Méndez [facility] Number: [id number]    Therapy Diagnosis:   Encounter Diagnoses   Name Primary?    Decreased range of motion of lumbar spine Yes    Weakness of left lower extremity     Gait difficulty      Physician: Order, Paper    Visit Date: 4/9/2024    Physician Orders: PT Eval and Treat  Medical Diagnosis from Referral: M47.816 (ICD-10-CM) - Spondylosis of lumbar region without myelopathy or radiculopathy   Evaluation Date: 3/25/2024  Authorization Period Expiration: 12/31/2024  Plan of Care Expiration: 7/8/2024  Visit # / Visits authorized: 3/ 20   FOTO #2: 4 / 5   FOTO: #3: 0 / 0    Time In: 1300  Time Out: 1400  Total Billable Time: 55 minutes    Precautions: Standard and Smoker    Date of Surgery: 2/27/2024  Procedure Performed:  Left L4-5 oblique interbody fusion, placement of interbody spacer, DePuy Conduit LLIF cage filled with allograft BMP and DBM  L5-S1 anterior interbody fusion, placement of interbody spacer, DePuy Conduit ALIF cage filled with allograft BMP and DBM  L4-S1 posterior segmental instrumentation using De[employer] Viper Prime system  Registration of navigated instruments using intraoperative 3D imaging Ziehm and BrainLAB station    SUBJECTIVE     Pt reports: her leg is now tingling and burning versus numb.  She was compliant with home exercise program.  Response to previous treatment: soreness  Functional change: Ongoing    Pain: 6/10  Location: Lumbar    OBJECTIVE     Objective Measures updated at progress report unless specified.     TREATMENT     Kevin received the treatments listed below:      Therapeutic Exercises to develop strength, endurance, ROM, posture, and core stabilization for 55 minutes including:  Recumbent Bike - 8 minutes  Shuttle Leg Press - 2 bands + brace; 2x15  Shuttle Leg Press - 0.5 bands + brace; 2x20  Gait Training with Crutch - 60 feet with SBA  Step Lunge - 4 inch  box; x40    Not Today:  Bridges with pulldown - 3x10  Straight Leg Raise with pulldown - 2x10  DKTC with ball and pulldowns - x20  Sit to Stand - 2x10 (high-low table)  Sylwia Step Overs - 4 inch sylwia; x20      PATIENT EDUCATION AND HOME EXERCISES      Home Exercises Provided and Patient Education Provided     Education provided:   Anatomy and Pathology.  Symptom management and plan of care progressions.  Home Exercise Program.    Written Home Exercises Provided: Patient instructed to cont prior HEP. Exercises were reviewed and Kevin was able to demonstrate them prior to the end of the session.  Kevin demonstrated good  understanding of the education provided. See EMR under Patient Instructions for exercises provided during therapy sessions    ASSESSMENT     Kevin returns to the clinic with improved gait quality as noted by better heel strike and step length. Progressed program with increased single leg strengthening activities and gait training with unilateral axillary crutch for neuromotor control in the Left lower extremity.     From evaluation on 3/25/2024 - Kevin is a 39 y.o. female referred to outpatient Physical Therapy with a medical diagnosis of Spondylosis of lumbar region without myelopathy or radiculopathy. She is status-post L4-S1 fusion performed on 2/272024 by Dr. Bakari Zaragoza. Patient presents with decreased range of motion, decreased strength, decreased balance, and risk for falls. Signs and symptoms are consistent with the above medical procedure. She will benefit from skilled physical therapy addressing her lower extremity strength, core stabilization, and proprioceptive retraining.    Kevin Is progressing well towards her goals.   Pt prognosis is Fair.     Pt will continue to benefit from skilled outpatient physical therapy to address the deficits listed in the problem list box on initial evaluation, provide pt/family education and to maximize pt's level of independence in the  home and community environment. Pt's spiritual, cultural and educational needs considered and pt agreeable to plan of care and goals.     Anticipated barriers to physical therapy: history of low back pain; multiple surgeries.     Goals:  Short Term Goals: 6 weeks   Patient will have reduced pain complaints from 10/10 to less than or equal to 6/10. (Not Met - Progressing)  Patient will demonstrate increased AROM/PROM by approximately 25% to 50% of initial measurements. (Not Met - Progressing)  Patient will demonstrate increased muscle strength of at least one-half grade as compared to the initial measurements. (Not Met - Progressing)     Long Term Goals: 12 weeks   Patient will have reduced pain complaints from 6/10 to less than or equal to 2/10. (Not Met - Progressing)  Patient will perform the 5 time sit to stand test in under 10 seconds. (Not Met - Progressing)  Patient will demonstrate increased muscle strength of at least one grade as compared to the initial measurements. (Not Met - Progressing)  Patient will improve Lumbar Spine FOTO Intake score from 9% to greater than or equal to 33%. (Not Met - Progressing)  Patient will be independent with their home exercise program. (Not Met - Progressing)    PLAN     Core stabilization  Left lower extremity strengthening  Proprioceptive retraining.    Plan of care Certification: 3/25/2024 to 7/8/2024.  Outpatient Physical Therapy 1 times weekly for 12 weeks.    Faraz Doe, PT , DPT, OCS

## 2024-04-09 ENCOUNTER — CLINICAL SUPPORT (OUTPATIENT)
Dept: REHABILITATION | Facility: HOSPITAL | Age: 39
End: 2024-04-09
Payer: MEDICAID

## 2024-04-09 DIAGNOSIS — R26.9 GAIT DIFFICULTY: ICD-10-CM

## 2024-04-09 DIAGNOSIS — R29.898 WEAKNESS OF LEFT LOWER EXTREMITY: ICD-10-CM

## 2024-04-09 DIAGNOSIS — M53.86 DECREASED RANGE OF MOTION OF LUMBAR SPINE: Primary | ICD-10-CM

## 2024-04-09 PROCEDURE — 97110 THERAPEUTIC EXERCISES: CPT

## 2024-04-10 NOTE — PROGRESS NOTES
[facility]   Physical Therapy Treatment Note        Name: Kevin Méndez [facility] Number: [id number]    Therapy Diagnosis:   Encounter Diagnoses   Name Primary?    Decreased range of motion of lumbar spine Yes    Weakness of left lower extremity     Gait difficulty      Physician: Order, Paper    Visit Date: 4/12/2024    Physician Orders: PT Eval and Treat  Medical Diagnosis from Referral: M47.816 (ICD-10-CM) - Spondylosis of lumbar region without myelopathy or radiculopathy   Evaluation Date: 3/25/2024  Authorization Period Expiration: 12/31/2024  Plan of Care Expiration: 7/8/2024  Visit # / Visits authorized: 4/ 20   FOTO #2: 5 / 5   FOTO: #3: 0 / 0    Time In: 0958  Time Out: 1100  Total Billable Time: 58 minutes    Precautions: Standard and Smoker    Date of Surgery: 2/27/2024  Procedure Performed:  Left L4-5 oblique interbody fusion, placement of interbody spacer, DePuy Conduit LLIF cage filled with allograft BMP and DBM  L5-S1 anterior interbody fusion, placement of interbody spacer, DePuy Conduit ALIF cage filled with allograft BMP and DBM  L4-S1 posterior segmental instrumentation using DePuy Viper Prime system  Registration of navigated instruments using intraoperative 3D imaging Ziehm and BrainLAB station    SUBJECTIVE     Pt reports: soreness following the last visit.  She was compliant with home exercise program.  Response to previous treatment: soreness  Functional change: Ongoing    Pain: 6/10  Location: Lumbar    OBJECTIVE     Objective Measures updated at progress report unless specified.     TREATMENT     Kevin received the treatments listed below:      Therapeutic Exercises to develop strength, endurance, ROM, posture, and core stabilization for 50 minutes including:  Recumbent Bike - 8 minutes  Shuttle Leg Press - 2 bands + brace; 30  Shuttle Leg Press - 1 band + brace; x30  Seated Straight Leg Raise - 4x8 bilateral  Pallof Press - light orange bands; x15  bilateral    Not Today:  Bridges with pulldown - 3x10  DKTC with ball and pulldowns - x20  Sit to Stand - 2x10 (high-low table)  Step Lunge - 4 inch box; x40    Manual Therapy Techniques: Joint mobilizations were applied to the: hip and ankle for 8 minutes, including:  Left lower extremity long axis distraction - grade V  Left talocrural distraction - grade V        PATIENT EDUCATION AND HOME EXERCISES      Home Exercises Provided and Patient Education Provided     Education provided:   Anatomy and Pathology.  Symptom management and plan of care progressions.  Home Exercise Program.    Written Home Exercises Provided: Patient instructed to cont prior HEP. Exercises were reviewed and Kevin was able to demonstrate them prior to the end of the session.  Kevin demonstrated good  understanding of the education provided. See EMR under Patient Instructions for exercises provided during therapy sessions    ASSESSMENT     Kevin returns to the clinic with steady, gradual improvements in gait, Left lower extremity motion, and sensation. Minimal progressions made in program secondary to soreness from previous session. Improved Left lower extremity motor control as noted by increased speed and range of motion.     From evaluation on 3/25/2024 - Kevin is a 39 y.o. female referred to outpatient Physical Therapy with a medical diagnosis of Spondylosis of lumbar region without myelopathy or radiculopathy. She is status-post L4-S1 fusion performed on 2/272024 by Dr. Bakari Zaragoza. Patient presents with decreased range of motion, decreased strength, decreased balance, and risk for falls. Signs and symptoms are consistent with the above medical procedure. She will benefit from skilled physical therapy addressing her lower extremity strength, core stabilization, and proprioceptive retraining.    Kevin Is progressing well towards her goals.   Pt prognosis is Fair.     Pt will continue to benefit from skilled outpatient  physical therapy to address the deficits listed in the problem list box on initial evaluation, provide pt/family education and to maximize pt's level of independence in the home and community environment. Pt's spiritual, cultural and educational needs considered and pt agreeable to plan of care and goals.     Anticipated barriers to physical therapy: history of low back pain; multiple surgeries.     Goals:  Short Term Goals: 6 weeks   Patient will have reduced pain complaints from 10/10 to less than or equal to 6/10. (Not Met - Progressing)  Patient will demonstrate increased AROM/PROM by approximately 25% to 50% of initial measurements. (Not Met - Progressing)  Patient will demonstrate increased muscle strength of at least one-half grade as compared to the initial measurements. (Not Met - Progressing)     Long Term Goals: 12 weeks   Patient will have reduced pain complaints from 6/10 to less than or equal to 2/10. (Not Met - Progressing)  Patient will perform the 5 time sit to stand test in under 10 seconds. (Not Met - Progressing)  Patient will demonstrate increased muscle strength of at least one grade as compared to the initial measurements. (Not Met - Progressing)  Patient will improve Lumbar Spine FOTO Intake score from 9% to greater than or equal to 33%. (Not Met - Progressing)  Patient will be independent with their home exercise program. (Not Met - Progressing)    PLAN     Core stabilization  Left lower extremity strengthening  Proprioceptive retraining.    Plan of care Certification: 3/25/2024 to 7/8/2024.  Outpatient Physical Therapy 1 times weekly for 12 weeks.    Faraz Doe, PT , DPT, OCS

## 2024-04-12 ENCOUNTER — HOSPITAL ENCOUNTER (OUTPATIENT)
Dept: RADIOLOGY | Facility: HOSPITAL | Age: 39
Discharge: HOME | End: 2024-04-12
Attending: NEUROLOGICAL SURGERY
Payer: MEDICAID

## 2024-04-12 ENCOUNTER — CLINICAL SUPPORT (OUTPATIENT)
Dept: REHABILITATION | Facility: HOSPITAL | Age: 39
End: 2024-04-12
Payer: MEDICAID

## 2024-04-12 ENCOUNTER — OFFICE VISIT (OUTPATIENT)
Dept: NEUROSURGERY | Facility: CLINIC | Age: 39
End: 2024-04-12
Payer: MEDICAID

## 2024-04-12 VITALS
HEIGHT: 64 IN | WEIGHT: 188.69 LBS | BODY MASS INDEX: 32.21 KG/M2 | SYSTOLIC BLOOD PRESSURE: 126 MMHG | DIASTOLIC BLOOD PRESSURE: 85 MMHG | HEART RATE: 82 BPM

## 2024-04-12 DIAGNOSIS — M53.86 DECREASED RANGE OF MOTION OF LUMBAR SPINE: Primary | ICD-10-CM

## 2024-04-12 DIAGNOSIS — Z98.1 S/P LUMBAR SPINAL FUSION: Primary | ICD-10-CM

## 2024-04-12 DIAGNOSIS — M48.07 FORAMINAL STENOSIS OF LUMBOSACRAL REGION: ICD-10-CM

## 2024-04-12 DIAGNOSIS — R29.898 WEAKNESS OF LEFT LOWER EXTREMITY: ICD-10-CM

## 2024-04-12 DIAGNOSIS — R26.9 GAIT DIFFICULTY: ICD-10-CM

## 2024-04-12 PROCEDURE — 99999 PR PBB SHADOW E&M-EST. PATIENT-LVL III: CPT | Mod: PBBFAC,,, | Performed by: PHYSICIAN ASSISTANT

## 2024-04-12 PROCEDURE — 99024 POSTOP FOLLOW-UP VISIT: CPT | Mod: ,,, | Performed by: PHYSICIAN ASSISTANT

## 2024-04-12 PROCEDURE — 72100 X-RAY EXAM L-S SPINE 2/3 VWS: CPT | Mod: 26,,, | Performed by: RADIOLOGY

## 2024-04-12 PROCEDURE — 99213 OFFICE O/P EST LOW 20 MIN: CPT | Mod: PBBFAC,25,PN | Performed by: PHYSICIAN ASSISTANT

## 2024-04-12 PROCEDURE — 1160F RVW MEDS BY RX/DR IN RCRD: CPT | Mod: CPTII,,, | Performed by: PHYSICIAN ASSISTANT

## 2024-04-12 PROCEDURE — 3079F DIAST BP 80-89 MM HG: CPT | Mod: CPTII,,, | Performed by: PHYSICIAN ASSISTANT

## 2024-04-12 PROCEDURE — 3074F SYST BP LT 130 MM HG: CPT | Mod: CPTII,,, | Performed by: PHYSICIAN ASSISTANT

## 2024-04-12 PROCEDURE — 72100 X-RAY EXAM L-S SPINE 2/3 VWS: CPT | Mod: TC,FY

## 2024-04-12 PROCEDURE — 1159F MED LIST DOCD IN RCRD: CPT | Mod: CPTII,,, | Performed by: PHYSICIAN ASSISTANT

## 2024-04-12 PROCEDURE — 97110 THERAPEUTIC EXERCISES: CPT

## 2024-04-12 RX ORDER — ACETAMINOPHEN 325 MG/1
650 TABLET ORAL EVERY 6 HOURS
COMMUNITY
Start: 2024-03-14

## 2024-04-15 ENCOUNTER — PATIENT MESSAGE (OUTPATIENT)
Dept: NEUROSURGERY | Facility: CLINIC | Age: 39
End: 2024-04-15
Payer: MEDICAID

## 2024-04-15 NOTE — PROGRESS NOTES
"  Subjective:     Patient ID:  Kevin Mancini is a 39 y.o. female.    Nik    Chief Complaint: 6 weeks po fu    Date of surgery 02/27/24     Preop diagnosis  1. 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 Foraminal stenosis with radiculopathy  3. Degenerative disc disease     Postop diagnosis   Same     Surgery   1. Left L4-5 oblique interbody fusion, placement of interbody spacer, DePuy Conduit LLIF cage filled with allograft BMP and DBM  2. L5-S1 anterior interbody fusion, placement of interbody spacer, DePuy Conduit ALIF cage filled with allograft BMP and DBM  3. L4-S1 posterior segmental instrumentation using DePQUIQ Viper Prime system  4. Registration of navigated instruments using intraoperative 3D imaging Ziehm and BrainLAB station     Surgeon  Bakari Zaragoza MD             HPI    Kevin Mancini is a 39 y.o. female who presents for follow up.  Overall doing well.  She has some numbness in the left anterior thigh.  Some burning pain in the left calf.  No right leg pain.  She started outpatient PT.    Wearing her back brace and using the BGS.     Patient denies any recent accidents or trauma, no saddle anesthesias, and no bowel or bladder incontinence.      Review of Systems:  Constitution: Negative for chills, fever, night sweats and weight loss.   Musculoskeletal: Negative for falls.   Gastrointestinal: Negative for bowel incontinence, nausea and vomiting.   Genitourinary: Negative for bladder incontinence.   Neurological: Negative for disturbances in coordination and loss of balance.      Objective:      Vitals:    04/12/24 1424   BP: 126/85   Pulse: 82   Weight: 85.6 kg (188 lb 11.4 oz)   Height: 5' 4" (1.626 m)   PainSc:   7   PainLoc: Back         Physical Exam:      General:  Kevin Mancini is well-developed, well-nourished, appears stated age, in no acute distress, alert and oriented to person, place, and time.    Pulmonary/Chest:  Respiratory effort normal  Abdominal: Exhibits no " distension  Psychiatric:  Normal mood and affect.  Behavior is normal.  Judgement and thought content normal      Musculoskeletal:    Lumbar Spine Inspection:  Healed surgical scars with no visible rashes.    Lumbar Spine Palpation:  No tenderness to low back palpation.    SI Joint Palpation:  No tenderness to SI Joint palpation.      Neurological:  Alert and oriented to person, place, and time    Muscle strength against resistance:     Right Left   Hip flexion  5 / 5 5 / 5   Hip extension 5 / 5 5 / 5   Hip abduction 5 / 5 5 / 5   Hip adduction  5 / 5 5 / 5   Knee extension  5 / 5 5 / 5   Knee flexion 5 / 5 5 / 5   Dorsiflexion  5 / 5 4 / 5   EHL  5 / 5 4 / 5   Plantar flexion  5 / 5 5 / 5   Inversion of the feet 5 / 5 5 / 5   Eversion of the feet  5 / 5 5 / 5       Reflexes:    Clonus:  Negative bilaterally    On gross examination of the bilateral upper extremities, patient has full painfree ROM with no signs of clubbing, cyanosis, edema, or weakness.       XRAY Interpretation:     Lumbar spine xrays were personally reviewed today.  No fractures.  Hardware intact.      Assessment:          1. S/P lumbar spinal fusion            Plan:             Patient doing well.  Continue walking and with PT.      Continue wearing back brace and with BGS.    FU in 6 weeks with Dr. Zaragoza for 3 month po fu.      Follow-Up:  Follow up in about 6 weeks (around 5/24/2024). If there are any questions prior to this, the patient was instructed to contact the office.       Stacey Thakur [facility], PA-C  Neurosurgery  [facility]  04/14/2024

## 2024-04-16 ENCOUNTER — CLINICAL SUPPORT (OUTPATIENT)
Dept: REHABILITATION | Facility: HOSPITAL | Age: 39
End: 2024-04-16
Payer: MEDICAID

## 2024-04-16 ENCOUNTER — PATIENT MESSAGE (OUTPATIENT)
Dept: NEUROSURGERY | Facility: CLINIC | Age: 39
End: 2024-04-16
Payer: MEDICAID

## 2024-04-16 DIAGNOSIS — M53.86 DECREASED RANGE OF MOTION OF LUMBAR SPINE: Primary | ICD-10-CM

## 2024-04-16 DIAGNOSIS — R26.9 GAIT DIFFICULTY: ICD-10-CM

## 2024-04-16 DIAGNOSIS — R29.898 WEAKNESS OF LEFT LOWER EXTREMITY: ICD-10-CM

## 2024-04-16 PROCEDURE — 97110 THERAPEUTIC EXERCISES: CPT

## 2024-04-16 NOTE — PROGRESS NOTES
[facility]   Physical Therapy Treatment Note        Name: Kevin Méndez [facility] Number: [id number]    Therapy Diagnosis:   Encounter Diagnoses   Name Primary?    Decreased range of motion of lumbar spine Yes    Weakness of left lower extremity     Gait difficulty      Physician: Order, Paper    Visit Date: 4/16/2024    Physician Orders: PT Eval and Treat  Medical Diagnosis from Referral: M47.816 (ICD-10-CM) - Spondylosis of lumbar region without myelopathy or radiculopathy   Evaluation Date: 3/25/2024  Authorization Period Expiration: 12/31/2024  Plan of Care Expiration: 7/8/2024  Visit # / Visits authorized: 5/ 20   FOTO #2: 6 / 5   FOTO: #3: 0 / 0    Time In: 1300  Time Out: 1400  Total Billable Time: 55 minutes    Precautions: Standard and Smoker    Date of Surgery: 2/27/2024  Procedure Performed:  Left L4-5 oblique interbody fusion, placement of interbody spacer, DePuy Conduit LLIF cage filled with allograft BMP and DBM  L5-S1 anterior interbody fusion, placement of interbody spacer, DePuy Conduit ALIF cage filled with allograft BMP and DBM  L4-S1 posterior segmental instrumentation using DePiWantoo Viper Prime system  Registration of navigated instruments using intraoperative 3D imaging Ziehm and BrainLAB station    SUBJECTIVE     Pt reports: improving neurological symptoms in the Left lower extremity.  She was compliant with home exercise program.  Response to previous treatment: soreness  Functional change: Ongoing    Pain: 6/10  Location: Lumbar    OBJECTIVE     Objective Measures updated at progress report unless specified.     TREATMENT     Kevin received the treatments listed below:      Therapeutic Exercises to develop strength, endurance, ROM, posture, and core stabilization for 55 minutes including:  Recumbent Bike - 8 minutes  Shuttle Leg Press - 2 bands + brace; 30  Shuttle Leg Press - 1 band + brace; x30  Jeff Step overs; x50 bilateral  Gait without rollator - 60  feet    Not Today:  Bridges with pulldown - 3x10  DKTC with ball and pulldowns - x20  Sit to Stand - 2x10 (high-low table)  Step Lunge - 4 inch box; x40  Seated Straight Leg Raise - 4x8 bilateral  Pallof Press - light orange bands; x15 bilateral    Manual Therapy Techniques: Joint mobilizations were applied to the: hip and ankle for 00 minutes, including:  Left lower extremity long axis distraction - grade V  Left talocrural distraction - grade V        PATIENT EDUCATION AND HOME EXERCISES      Home Exercises Provided and Patient Education Provided     Education provided:   Anatomy and Pathology.  Symptom management and plan of care progressions.  Home Exercise Program.    Written Home Exercises Provided: Patient instructed to cont prior HEP. Exercises were reviewed and Kevin was able to demonstrate them prior to the end of the session.  Kevin demonstrated good  understanding of the education provided. See EMR under Patient Instructions for exercises provided during therapy sessions    ASSESSMENT     Kevin returns to the clinic with steady, gradual improvements in gait, Left lower extremity motion, and sensation. Increasing sensation and motor control in the Left lower extremity. Initiated gait without walker but frequent cuing needed for heel strike.     From evaluation on 3/25/2024 - Kevin is a 39 y.o. female referred to outpatient Physical Therapy with a medical diagnosis of Spondylosis of lumbar region without myelopathy or radiculopathy. She is status-post L4-S1 fusion performed on 2/272024 by Dr. Bakari Zaragoza. Patient presents with decreased range of motion, decreased strength, decreased balance, and risk for falls. Signs and symptoms are consistent with the above medical procedure. She will benefit from skilled physical therapy addressing her lower extremity strength, core stabilization, and proprioceptive retraining.    Kevin Is progressing well towards her goals.   Pt prognosis is Fair.      Pt will continue to benefit from skilled outpatient physical therapy to address the deficits listed in the problem list box on initial evaluation, provide pt/family education and to maximize pt's level of independence in the home and community environment. Pt's spiritual, cultural and educational needs considered and pt agreeable to plan of care and goals.     Anticipated barriers to physical therapy: history of low back pain; multiple surgeries.     Goals:  Short Term Goals: 6 weeks   Patient will have reduced pain complaints from 10/10 to less than or equal to 6/10. (Not Met - Progressing)  Patient will demonstrate increased AROM/PROM by approximately 25% to 50% of initial measurements. (Not Met - Progressing)  Patient will demonstrate increased muscle strength of at least one-half grade as compared to the initial measurements. (Not Met - Progressing)     Long Term Goals: 12 weeks   Patient will have reduced pain complaints from 6/10 to less than or equal to 2/10. (Not Met - Progressing)  Patient will perform the 5 time sit to stand test in under 10 seconds. (Not Met - Progressing)  Patient will demonstrate increased muscle strength of at least one grade as compared to the initial measurements. (Not Met - Progressing)  Patient will improve Lumbar Spine FOTO Intake score from 9% to greater than or equal to 33%. (Not Met - Progressing)  Patient will be independent with their home exercise program. (Not Met - Progressing)    PLAN     Core stabilization  Left lower extremity strengthening  Proprioceptive retraining.    Plan of care Certification: 3/25/2024 to 7/8/2024.  Outpatient Physical Therapy 1 times weekly for 12 weeks.    Faraz Doe, PT , DPT, OCS

## 2024-04-19 ENCOUNTER — CLINICAL SUPPORT (OUTPATIENT)
Dept: REHABILITATION | Facility: HOSPITAL | Age: 39
End: 2024-04-19
Payer: MEDICAID

## 2024-04-19 DIAGNOSIS — R29.898 WEAKNESS OF LEFT LOWER EXTREMITY: ICD-10-CM

## 2024-04-19 DIAGNOSIS — R26.9 GAIT DIFFICULTY: ICD-10-CM

## 2024-04-19 DIAGNOSIS — M53.86 DECREASED RANGE OF MOTION OF LUMBAR SPINE: Primary | ICD-10-CM

## 2024-04-19 PROCEDURE — 97110 THERAPEUTIC EXERCISES: CPT

## 2024-04-19 NOTE — PROGRESS NOTES
[facility]   Physical Therapy Treatment Note        Name: Kevin Méndez [facility] Number: [id number]    Therapy Diagnosis:   Encounter Diagnoses   Name Primary?    Decreased range of motion of lumbar spine Yes    Weakness of left lower extremity     Gait difficulty      Physician: Order, Paper    Visit Date: 4/19/2024    Physician Orders: PT Eval and Treat  Medical Diagnosis from Referral: M47.816 (ICD-10-CM) - Spondylosis of lumbar region without myelopathy or radiculopathy   Evaluation Date: 3/25/2024  Authorization Period Expiration: 12/31/2024  Plan of Care Expiration: 7/8/2024  Visit # / Visits authorized: 6/ 20   FOTO #2: % on 4/19/2024  FOTO: #3: 0 / 0    Time In: 1005  Time Out: 1100  Total Billable Time: 55 minutes    Precautions: Standard and Smoker    Date of Surgery: 2/27/2024  Procedure Performed:  Left L4-5 oblique interbody fusion, placement of interbody spacer, DePuy Conduit LLIF cage filled with allograft BMP and DBM  L5-S1 anterior interbody fusion, placement of interbody spacer, DePuy Conduit ALIF cage filled with allograft BMP and DBM  L4-S1 posterior segmental instrumentation using DePuy Viper Prime system  Registration of navigated instruments using intraoperative 3D imaging Ziehm and BrainLAB station    SUBJECTIVE     Pt reports: she is walking faster.  She was compliant with home exercise program.  Response to previous treatment: soreness  Functional change: Ongoing    Pain: 6/10  Location: Lumbar    OBJECTIVE     Objective Measures updated at progress report unless specified.     TREATMENT     Kevin received the treatments listed below:      Therapeutic Exercises to develop strength, endurance, ROM, posture, and core stabilization for 55 minutes including:  Recumbent Bike - 8 minutes  Shuttle Leg Press - 2.5 bands + brace; x30  Shuttle Leg Press - 1.5 bands + brace; x30  Seated push resistance - all planes  Sled Push - 25#; 1 turf lap    Not  Today:  Bridges with pulldown - 3x10  DKTC with ball and pulldowns - x20  Sit to Stand - 2x10 (high-low table)  Step Lunge - 4 inch box; x40  Seated Straight Leg Raise - 4x8 bilateral  Pallof Press - light orange bands; x15 bilateral  Jeff Step overs - x50 bilateral      Manual Therapy Techniques: Joint mobilizations were applied to the: hip and ankle for 00 minutes, including:  Left lower extremity long axis distraction - grade V  Left talocrural distraction - grade V        PATIENT EDUCATION AND HOME EXERCISES      Home Exercises Provided and Patient Education Provided     Education provided:   Anatomy and Pathology.  Symptom management and plan of care progressions.  Home Exercise Program.    Written Home Exercises Provided: Patient instructed to cont prior HEP. Exercises were reviewed and Kevin was able to demonstrate them prior to the end of the session.  Kevin demonstrated good  understanding of the education provided. See EMR under Patient Instructions for exercises provided during therapy sessions    ASSESSMENT     Kevin returns to the clinic with gradual improvements Left lower extremity motor control and sensation. Able to increase resistance on shuttle today. Progressed core activation exercises today with appropriate challenge.     From evaluation on 3/25/2024 - Kevin is a 39 y.o. female referred to outpatient Physical Therapy with a medical diagnosis of Spondylosis of lumbar region without myelopathy or radiculopathy. She is status-post L4-S1 fusion performed on 2/272024 by Dr. Bakari Zaragoza. Patient presents with decreased range of motion, decreased strength, decreased balance, and risk for falls. Signs and symptoms are consistent with the above medical procedure. She will benefit from skilled physical therapy addressing her lower extremity strength, core stabilization, and proprioceptive retraining.    Kevin Is progressing well towards her goals.   Pt prognosis is Fair.     Pt will  continue to benefit from skilled outpatient physical therapy to address the deficits listed in the problem list box on initial evaluation, provide pt/family education and to maximize pt's level of independence in the home and community environment. Pt's spiritual, cultural and educational needs considered and pt agreeable to plan of care and goals.     Anticipated barriers to physical therapy: history of low back pain; multiple surgeries.     Goals:  Short Term Goals: 6 weeks   Patient will have reduced pain complaints from 10/10 to less than or equal to 6/10. (Not Met - Progressing)  Patient will demonstrate increased AROM/PROM by approximately 25% to 50% of initial measurements. (Not Met - Progressing)  Patient will demonstrate increased muscle strength of at least one-half grade as compared to the initial measurements. (Not Met - Progressing)     Long Term Goals: 12 weeks   Patient will have reduced pain complaints from 6/10 to less than or equal to 2/10. (Not Met - Progressing)  Patient will perform the 5 time sit to stand test in under 10 seconds. (Not Met - Progressing)  Patient will demonstrate increased muscle strength of at least one grade as compared to the initial measurements. (Not Met - Progressing)  Patient will improve Lumbar Spine FOTO Intake score from 9% to greater than or equal to 33%. (Not Met - Progressing)  Patient will be independent with their home exercise program. (Not Met - Progressing)    PLAN     Core stabilization  Left lower extremity strengthening  Proprioceptive retraining.    Plan of care Certification: 3/25/2024 to 7/8/2024.  Outpatient Physical Therapy 1 times weekly for 12 weeks.    Faraz Doe, PT , DPT, OCS

## 2024-04-23 ENCOUNTER — CLINICAL SUPPORT (OUTPATIENT)
Dept: REHABILITATION | Facility: HOSPITAL | Age: 39
End: 2024-04-23
Payer: MEDICAID

## 2024-04-23 DIAGNOSIS — R26.9 GAIT DIFFICULTY: ICD-10-CM

## 2024-04-23 DIAGNOSIS — M53.86 DECREASED RANGE OF MOTION OF LUMBAR SPINE: Primary | ICD-10-CM

## 2024-04-23 DIAGNOSIS — R29.898 WEAKNESS OF LEFT LOWER EXTREMITY: ICD-10-CM

## 2024-04-23 PROCEDURE — 97110 THERAPEUTIC EXERCISES: CPT

## 2024-04-23 NOTE — PROGRESS NOTES
[facility]   Physical Therapy Treatment Note        Name: Kevin Méndez [facility] Number: [id number]    Therapy Diagnosis:   Encounter Diagnoses   Name Primary?    Decreased range of motion of lumbar spine Yes    Weakness of left lower extremity     Gait difficulty      Physician: Order, Paper    Visit Date: 4/23/2024    Physician Orders: PT Eval and Treat  Medical Diagnosis from Referral: M47.816 (ICD-10-CM) - Spondylosis of lumbar region without myelopathy or radiculopathy   Evaluation Date: 3/25/2024  Authorization Period Expiration: 12/31/2024  Plan of Care Expiration: 7/8/2024  Visit # / Visits authorized: 7/ 20   FOTO #2: % on 4/19/2024  FOTO: #3: 0 / 3    Time In: 1258  Time Out: 1358  Total Billable Time: 55 minutes    Precautions: Standard and Smoker    Date of Surgery: 2/27/2024  Procedure Performed:  Left L4-5 oblique interbody fusion, placement of interbody spacer, DePuy Conduit LLIF cage filled with allograft BMP and DBM  L5-S1 anterior interbody fusion, placement of interbody spacer, DePuy Conduit ALIF cage filled with allograft BMP and DBM  L4-S1 posterior segmental instrumentation using DePuy Viper Prime system  Registration of navigated instruments using intraoperative 3D imaging [employer]ie[employer] and [employer] station    SUBJECTIVE     Pt reports: gradual improvements in Left lower extremity sensation. Brought her cane today.  She was compliant with home exercise program.  Response to previous treatment: soreness  Functional change: Ongoing    Pain: 6/10  Location: Lumbar    OBJECTIVE     Objective Measures updated at progress report unless specified.     TREATMENT     Kevin received the treatments listed below:      Therapeutic Exercises to develop strength, endurance, ROM, posture, and core stabilization for 55 minutes including:  Recumbent Bike - 8 minutes  Shuttle Leg Press - 2.5 bands + brace; x30  Shuttle Leg Press - 1.5 bands + brace; x30  Pallof Press - light orange  bands; 2x10 bilateral  Straight Leg Raise - 2x10  Step Up  - 4 inch box; x30  Sit to Stand - 2x10 (high-low table)  Gait with cane + brace - 120 feet x2    Not Today:  Bridges with pulldown - 3x10  DKTC with ball and pulldowns - x20  Jeff Step overs - x50 bilateral  Seated push resistance - all planes  Sled Push - 25#; 1 turf lap      Manual Therapy Techniques: Joint mobilizations were applied to the: hip and ankle for 00 minutes, including:  Left lower extremity long axis distraction - grade V  Left talocrural distraction - grade V      PATIENT EDUCATION AND HOME EXERCISES      Home Exercises Provided and Patient Education Provided     Education provided:   Anatomy and Pathology.  Symptom management and plan of care progressions.  Home Exercise Program.    Written Home Exercises Provided: Patient instructed to cont prior HEP. Exercises were reviewed and Kevin was able to demonstrate them prior to the end of the session.  Kevin demonstrated good  understanding of the education provided. See EMR under Patient Instructions for exercises provided during therapy sessions    ASSESSMENT     Kevin returns to the clinic with increasing gait speed. Progressed gait training by using a SPC. Progressed standing core work without the brace today.  Appropriately challenged with today's exercises. Improving tolerance to exercise outside of brace.     From evaluation on 3/25/2024 - Kevin is a 39 y.o. female referred to outpatient Physical Therapy with a medical diagnosis of Spondylosis of lumbar region without myelopathy or radiculopathy. She is status-post L4-S1 fusion performed on 2/272024 by Dr. Bakari Zaragoza. Patient presents with decreased range of motion, decreased strength, decreased balance, and risk for falls. Signs and symptoms are consistent with the above medical procedure. She will benefit from skilled physical therapy addressing her lower extremity strength, core stabilization, and proprioceptive  retraining.    Kevin Is progressing well towards her goals.   Pt prognosis is Fair.     Pt will continue to benefit from skilled outpatient physical therapy to address the deficits listed in the problem list box on initial evaluation, provide pt/family education and to maximize pt's level of independence in the home and community environment. Pt's spiritual, cultural and educational needs considered and pt agreeable to plan of care and goals.     Anticipated barriers to physical therapy: history of low back pain; multiple surgeries.     Goals:  Short Term Goals: 6 weeks   Patient will have reduced pain complaints from 10/10 to less than or equal to 6/10. (Not Met - Progressing)  Patient will demonstrate increased AROM/PROM by approximately 25% to 50% of initial measurements. (Not Met - Progressing)  Patient will demonstrate increased muscle strength of at least one-half grade as compared to the initial measurements. (Not Met - Progressing)     Long Term Goals: 12 weeks   Patient will have reduced pain complaints from 6/10 to less than or equal to 2/10. (Not Met - Progressing)  Patient will perform the 5 time sit to stand test in under 10 seconds. (Not Met - Progressing)  Patient will demonstrate increased muscle strength of at least one grade as compared to the initial measurements. (Not Met - Progressing)  Patient will improve Lumbar Spine FOTO Intake score from 9% to greater than or equal to 33%. (Not Met - Progressing)  Patient will be independent with their home exercise program. (Not Met - Progressing)    PLAN     Core stabilization  Left lower extremity strengthening  Proprioceptive retraining.    Plan of care Certification: 3/25/2024 to 7/8/2024.  Outpatient Physical Therapy 1 times weekly for 12 weeks.    Faraz Doe, PT , DPT, OCS

## 2024-04-25 NOTE — PROGRESS NOTES
[facility]   Physical Therapy Treatment Note        Name: Kevin Mancini  [facility] Number: [id number]    Therapy Diagnosis:   Encounter Diagnoses   Name Primary?    Decreased range of motion of lumbar spine Yes    Weakness of left lower extremity     Gait difficulty      Physician: Order, Paper    Visit Date: 4/26/2024    Physician Orders: PT Eval and Treat  Medical Diagnosis from Referral: M47.816 (ICD-10-CM) - Spondylosis of lumbar region without myelopathy or radiculopathy   Evaluation Date: 3/25/2024  Authorization Period Expiration: 12/31/2024  Plan of Care Expiration: 7/8/2024  Visit # / Visits authorized: 8/ 20   FOTO #2: 21% on 4/19/2024  FOTO: #3: 1 / 3    Time In: 1000  Time Out: 1100  Total Billable Time: 53 minutes    Precautions: Standard and Smoker    Date of Surgery: 2/27/2024  Procedure Performed:  Left L4-5 oblique interbody fusion, placement of interbody spacer, DePuy Conduit LLIF cage filled with allograft BMP and DBM  L5-S1 anterior interbody fusion, placement of interbody spacer, DePuy Conduit ALIF cage filled with allograft BMP and DBM  L4-S1 posterior segmental instrumentation using DePuy Viper Prime system  Registration of navigated instruments using intraoperative 3D imaging [employer]iehm and [employer] station    SUBJECTIVE     Pt reports: increased burning pains in her Left lower extremity.  She was compliant with home exercise program.  Response to previous treatment: soreness  Functional change: Ongoing    Pain: 6/10  Location: Lumbar    OBJECTIVE     Objective Measures updated at progress report unless specified.     TREATMENT     Kevin received the treatments listed below:      Therapeutic Exercises to develop strength, endurance, ROM, posture, and core stabilization for 43 minutes including:  Recumbent Bike - 8 minutes  Straight Leg Raise - 3x10  Sit to Stand - 3x10 (high-low table)  Bridges - 3x10  DKTC with ball - x30    Not Today:  Step Up  - 4 inch box;  x30  Jeff Step overs - x50 bilateral  Seated push resistance - all planes  Sled Push - 25#; 1 turf lap  Shuttle Leg Press - 2.5 bands + brace; x30  Shuttle Leg Press - 1.5 bands + brace; x30  Pallof Press - light orange bands; 2x10 bilateral      Manual Therapy Techniques: Joint mobilizations were applied to the: hip and ankle for 10 minutes, including:  Left lower extremity long axis distraction - grade V  Left talocrural distraction - grade V      PATIENT EDUCATION AND HOME EXERCISES      Home Exercises Provided and Patient Education Provided     Education provided:   Anatomy and Pathology.  Symptom management and plan of care progressions.  Home Exercise Program.    Written Home Exercises Provided: Patient instructed to cont prior HEP. Exercises were reviewed and Kevin was able to demonstrate them prior to the end of the session.  Kevin demonstrated good  understanding of the education provided. See EMR under Patient Instructions for exercises provided during therapy sessions    ASSESSMENT     Kevin returns to the clinic with increased nerve pain. Regressions in program secondary to increased symptoms, which reduced at the end of the session. Improving tolerance to exercise outside of brace. Progressing as expected.    From evaluation on 3/25/2024 - Kevin is a 39 y.o. female referred to outpatient Physical Therapy with a medical diagnosis of Spondylosis of lumbar region without myelopathy or radiculopathy. She is status-post L4-S1 fusion performed on 2/272024 by Dr. Bakari Zaragoza. Patient presents with decreased range of motion, decreased strength, decreased balance, and risk for falls. Signs and symptoms are consistent with the above medical procedure. She will benefit from skilled physical therapy addressing her lower extremity strength, core stabilization, and proprioceptive retraining.    Kevin Is progressing well towards her goals.   Pt prognosis is Fair.     Pt will continue to benefit from  skilled outpatient physical therapy to address the deficits listed in the problem list box on initial evaluation, provide pt/family education and to maximize pt's level of independence in the home and community environment. Pt's spiritual, cultural and educational needs considered and pt agreeable to plan of care and goals.     Anticipated barriers to physical therapy: history of low back pain; multiple surgeries.     Goals:  Short Term Goals: 6 weeks   Patient will have reduced pain complaints from 10/10 to less than or equal to 6/10. (Not Met - Progressing)  Patient will demonstrate increased AROM/PROM by approximately 25% to 50% of initial measurements. (Not Met - Progressing)  Patient will demonstrate increased muscle strength of at least one-half grade as compared to the initial measurements. (Not Met - Progressing)     Long Term Goals: 12 weeks   Patient will have reduced pain complaints from 6/10 to less than or equal to 2/10. (Not Met - Progressing)  Patient will perform the 5 time sit to stand test in under 10 seconds. (Not Met - Progressing)  Patient will demonstrate increased muscle strength of at least one grade as compared to the initial measurements. (Not Met - Progressing)  Patient will improve Lumbar Spine FOTO Intake score from 9% to greater than or equal to 33%. (Not Met - Progressing)  Patient will be independent with their home exercise program. (Not Met - Progressing)    PLAN     Core stabilization  Left lower extremity strengthening  Proprioceptive retraining.    Plan of care Certification: 3/25/2024 to 7/8/2024.  Outpatient Physical Therapy 1 times weekly for 12 weeks.    Farza Doe, PT , DPT, OCS

## 2024-04-26 ENCOUNTER — CLINICAL SUPPORT (OUTPATIENT)
Dept: REHABILITATION | Facility: HOSPITAL | Age: 39
End: 2024-04-26
Payer: MEDICAID

## 2024-04-26 DIAGNOSIS — M53.86 DECREASED RANGE OF MOTION OF LUMBAR SPINE: Primary | ICD-10-CM

## 2024-04-26 DIAGNOSIS — R29.898 WEAKNESS OF LEFT LOWER EXTREMITY: ICD-10-CM

## 2024-04-26 DIAGNOSIS — R26.9 GAIT DIFFICULTY: ICD-10-CM

## 2024-04-26 PROCEDURE — 97110 THERAPEUTIC EXERCISES: CPT

## 2024-04-30 ENCOUNTER — CLINICAL SUPPORT (OUTPATIENT)
Dept: REHABILITATION | Facility: HOSPITAL | Age: 39
End: 2024-04-30
Payer: MEDICAID

## 2024-04-30 DIAGNOSIS — R26.9 GAIT DIFFICULTY: ICD-10-CM

## 2024-04-30 DIAGNOSIS — R29.898 WEAKNESS OF LEFT LOWER EXTREMITY: ICD-10-CM

## 2024-04-30 DIAGNOSIS — M53.86 DECREASED RANGE OF MOTION OF LUMBAR SPINE: Primary | ICD-10-CM

## 2024-04-30 PROCEDURE — 97110 THERAPEUTIC EXERCISES: CPT

## 2024-04-30 RX ORDER — OXYCODONE HYDROCHLORIDE 10 MG/1
10 TABLET ORAL EVERY 6 HOURS PRN
Qty: 60 TABLET | Refills: 0 | Status: SHIPPED | OUTPATIENT
Start: 2024-04-30 | End: 2024-05-16 | Stop reason: SDUPTHER

## 2024-04-30 NOTE — PROGRESS NOTES
[facility]   Physical Therapy Treatment Note        Name: Kevin Méndez [facility] Number: [id number]    Therapy Diagnosis:   Encounter Diagnoses   Name Primary?    Decreased range of motion of lumbar spine Yes    Weakness of left lower extremity     Gait difficulty      Physician: Order, Paper    Visit Date: 4/30/2024    Physician Orders: PT Eval and Treat  Medical Diagnosis from Referral: M47.816 (ICD-10-CM) - Spondylosis of lumbar region without myelopathy or radiculopathy   Evaluation Date: 3/25/2024  Authorization Period Expiration: 12/31/2024  Plan of Care Expiration: 7/8/2024  Visit # / Visits authorized: 9/ 20   FOTO #2: 21% on 4/19/2024  FOTO: #3: 3 / 3    Time In: 1300  Time Out: 1400  Total Billable Time: 54 minutes    Precautions: Standard and Smoker    Date of Surgery: 2/27/2024  Procedure Performed:  Left L4-5 oblique interbody fusion, placement of interbody spacer, DePuy Conduit LLIF cage filled with allograft BMP and DBM  L5-S1 anterior interbody fusion, placement of interbody spacer, DePuy Conduit ALIF cage filled with allograft BMP and DBM  L4-S1 posterior segmental instrumentation using DePuy Viper Prime system  Registration of navigated instruments using intraoperative 3D imaging [employer]iehm and [employer] station    SUBJECTIVE     Pt reports: has feeling in two of her toes. Increasing speed of movement.  She was compliant with home exercise program.  Response to previous treatment: soreness  Functional change: Ongoing    Pain: 5/10  Location: Lumbar    OBJECTIVE     Objective Measures updated at progress report unless specified.     TREATMENT     Kevin received the treatments listed below:      Therapeutic Exercises to develop strength, endurance, ROM, posture, and core stabilization for 54 minutes including:  Recumbent Bike - 8 minutes  Shuttle Leg Press - double leg, 3 bands + brace; x30  Shuttle Leg Press - Left lower extremity, 2 bands + brace; x30  Sit to Stand  - 3x10 (high-low table)  Bridges - 3x10  DKTC with ball + Pallof twist - x20 bilateral  Gait with Brace and no assistive device - 100 feet    Not Today:  Step Up  - 4 inch box; x30  Jeff Step overs - x50 bilateral  Seated push resistance - all planes  Sled Push - 25#; 1 turf lap  Pallof Press - light orange bands; 2x10 bilateral  Straight Leg Raise - 3x10      Manual Therapy Techniques: Joint mobilizations were applied to the: hip and ankle for 00 minutes, including:  Left lower extremity long axis distraction - grade V  Left talocrural distraction - grade V      PATIENT EDUCATION AND HOME EXERCISES      Home Exercises Provided and Patient Education Provided     Education provided:   Anatomy and Pathology.  Symptom management and plan of care progressions.  Home Exercise Program.    Written Home Exercises Provided: Patient instructed to cont prior HEP. Exercises were reviewed and Kevin was able to demonstrate them prior to the end of the session.  Kevin demonstrated good  understanding of the education provided. See EMR under Patient Instructions for exercises provided during therapy sessions    ASSESSMENT     Kevin presents to the clinic with gradual improvements in sensation and Left lower extremity control. Resumed prior program with addition of stomp exercise to incorporate speed for neuromotor control. She will continue to benefit from skilled physical therapy to improve her strength and motor control for functional, independent activities of daily living.    From evaluation on 3/25/2024 - Kevin is a 39 y.o. female referred to outpatient Physical Therapy with a medical diagnosis of Spondylosis of lumbar region without myelopathy or radiculopathy. She is status-post L4-S1 fusion performed on 2/272024 by Dr. Bakari Zaragoza. Patient presents with decreased range of motion, decreased strength, decreased balance, and risk for falls. Signs and symptoms are consistent with the above medical procedure. She  will benefit from skilled physical therapy addressing her lower extremity strength, core stabilization, and proprioceptive retraining.    Kevin Is progressing well towards her goals.   Pt prognosis is Fair.     Pt will continue to benefit from skilled outpatient physical therapy to address the deficits listed in the problem list box on initial evaluation, provide pt/family education and to maximize pt's level of independence in the home and community environment. Pt's spiritual, cultural and educational needs considered and pt agreeable to plan of care and goals.     Anticipated barriers to physical therapy: history of low back pain; multiple surgeries.     Goals:  Short Term Goals: 6 weeks   Patient will have reduced pain complaints from 10/10 to less than or equal to 6/10. (Met - 4/30/2024)  Patient will demonstrate increased AROM/PROM by approximately 25% to 50% of initial measurements. (Not Met - Progressing)  Patient will demonstrate increased muscle strength of at least one-half grade as compared to the initial measurements. (Not Met - Progressing)     Long Term Goals: 12 weeks   Patient will have reduced pain complaints from 6/10 to less than or equal to 2/10. (Not Met - Progressing)  Patient will perform the 5 time sit to stand test in under 10 seconds. (Not Met - Progressing)  Patient will demonstrate increased muscle strength of at least one grade as compared to the initial measurements. (Not Met - Progressing)  Patient will improve Lumbar Spine FOTO Intake score from 9% to greater than or equal to 33%. (Not Met - Progressing)  Patient will be independent with their home exercise program. (Not Met - Progressing)    PLAN     Core stabilization  Left lower extremity strengthening  Proprioceptive retraining.    Plan of care Certification: 3/25/2024 to 7/8/2024.  Outpatient Physical Therapy 1 times weekly for 12 weeks.    Faraz Doe, PT , DPT, OCS

## 2024-05-03 ENCOUNTER — CLINICAL SUPPORT (OUTPATIENT)
Dept: REHABILITATION | Facility: HOSPITAL | Age: 39
End: 2024-05-03
Payer: MEDICAID

## 2024-05-03 DIAGNOSIS — M53.86 DECREASED RANGE OF MOTION OF LUMBAR SPINE: Primary | ICD-10-CM

## 2024-05-03 DIAGNOSIS — R26.9 GAIT DIFFICULTY: ICD-10-CM

## 2024-05-03 DIAGNOSIS — R29.898 WEAKNESS OF LEFT LOWER EXTREMITY: ICD-10-CM

## 2024-05-03 PROCEDURE — 97110 THERAPEUTIC EXERCISES: CPT

## 2024-05-03 NOTE — PROGRESS NOTES
[facility]   Physical Therapy Treatment Note        Name: Kevin Méndez [facility] Number: [id number]    Therapy Diagnosis:   Encounter Diagnoses   Name Primary?    Decreased range of motion of lumbar spine Yes    Weakness of left lower extremity     Gait difficulty      Physician: Order, Paper    Visit Date: 5/6/2024    Physician Orders: PT Eval and Treat  Medical Diagnosis from Referral: M47.816 (ICD-10-CM) - Spondylosis of lumbar region without myelopathy or radiculopathy   Evaluation Date: 3/25/2024  Authorization Period Expiration: 12/31/2024  Plan of Care Expiration: 7/8/2024  Visit # / Visits authorized: 11/ 20   FOTO #2: 21% on 4/19/2024  FOTO: #3: 31% on 5/3/2024    Time In: 1000  Time Out: 1100  Total Billable Time: 57 minutes    Precautions: Standard and Smoker    Date of Surgery: 2/27/2024  Procedure Performed:  Left L4-5 oblique interbody fusion, placement of interbody spacer, DePuy Conduit LLIF cage filled with allograft BMP and DBM  L5-S1 anterior interbody fusion, placement of interbody spacer, DePuy Conduit ALIF cage filled with allograft BMP and DBM  L4-S1 posterior segmental instrumentation using DePuy Viper Prime system  Registration of navigated instruments using intraoperative 3D imaging [facility] and [employer] station    SUBJECTIVE     Pt reports: improving sensation in left heel. Ordered a cane.  She was compliant with home exercise program.  Response to previous treatment: soreness  Functional change: Ongoing    Pain: 5/10  Location: Lumbar    OBJECTIVE     Objective Measures updated at progress report unless specified.     TREATMENT     Kevin received the treatments listed below:      Therapeutic Exercises to develop strength, endurance, ROM, posture, and core stabilization for 57 minutes including:  Recumbent Bike - 8 minutes  Shuttle Leg Press - double leg, 3 bands + brace; x30  Shuttle Leg Press - Left lower extremity, 2 bands + brace; x30  Stomps - 3  trials (90 bmp)  AHSAN Fall  Sit to Stand - 3x10 (high-low table)  Physioball - marches and overhead lift; x30  Pallof Press - orange bands; 2x10 bilateral  Gait with Brace and no assistive device - 100 feet    Not Today:  Step Up  - 4 inch box; x30  Jeff Step overs - x50 bilateral  Sled Push - 45#; 1 turf lap  Straight Leg Raise - 3x10  DKTC with ball - x20  DKTC with ball + Pallof twist - x20 bilateral  Bridges - 3x10      Manual Therapy Techniques: Joint mobilizations were applied to the: hip and ankle for 00 minutes, including:  Left lower extremity long axis distraction - grade V  Left talocrural distraction - grade V      PATIENT EDUCATION AND HOME EXERCISES      Home Exercises Provided and Patient Education Provided     Education provided:   Anatomy and Pathology.  Symptom management and plan of care progressions.  Home Exercise Program.    Written Home Exercises Provided: Patient instructed to cont prior HEP. Exercises were reviewed and Kevin was able to demonstrate them prior to the end of the session.  Kevin demonstrated good  understanding of the education provided. See EMR under Patient Instructions for exercises provided during therapy sessions    ASSESSMENT     Kevin presents to the clinic with steady improvements in sensation and Left lower extremity control. Progressions made to speed and proprioception training. She will continue to benefit from skilled physical therapy to improve her strength and motor control for functional, independent activities of daily living.    From evaluation on 3/25/2024 - Kevin is a 39 y.o. female referred to outpatient Physical Therapy with a medical diagnosis of Spondylosis of lumbar region without myelopathy or radiculopathy. She is status-post L4-S1 fusion performed on 2/272024 by Dr. Bakari Zaragoza. Patient presents with decreased range of motion, decreased strength, decreased balance, and risk for falls. Signs and symptoms are consistent with the above  medical procedure. She will benefit from skilled physical therapy addressing her lower extremity strength, core stabilization, and proprioceptive retraining.    Kevin Is progressing well towards her goals.   Pt prognosis is Fair.     Pt will continue to benefit from skilled outpatient physical therapy to address the deficits listed in the problem list box on initial evaluation, provide pt/family education and to maximize pt's level of independence in the home and community environment. Pt's spiritual, cultural and educational needs considered and pt agreeable to plan of care and goals.     Anticipated barriers to physical therapy: history of low back pain; multiple surgeries.     Goals:  Short Term Goals: 6 weeks   Patient will have reduced pain complaints from 10/10 to less than or equal to 6/10. (Met - 4/30/2024)  Patient will demonstrate increased AROM/PROM by approximately 25% to 50% of initial measurements. (Not Met - Progressing)  Patient will demonstrate increased muscle strength of at least one-half grade as compared to the initial measurements. (Not Met - Progressing)     Long Term Goals: 12 weeks   Patient will have reduced pain complaints from 6/10 to less than or equal to 2/10. (Not Met - Progressing)  Patient will perform the 5 time sit to stand test in under 10 seconds. (Not Met - Progressing)  Patient will demonstrate increased muscle strength of at least one grade as compared to the initial measurements. (Not Met - Progressing)  Patient will improve Lumbar Spine FOTO Intake score from 9% to greater than or equal to 33%. (Met - 5/3/2024)  Patient will be independent with their home exercise program. (Not Met - Progressing)    PLAN     Core stabilization  Left lower extremity strengthening  Proprioceptive retraining.    Plan of care Certification: 3/25/2024 to 7/8/2024.  Outpatient Physical Therapy 1 times weekly for 12 weeks.    Faraz Doe, PT , DPT, OCS

## 2024-05-03 NOTE — PROGRESS NOTES
[facility]   Physical Therapy Treatment Note        Name: Kevin Méndez [facility] Number: [id number]    Therapy Diagnosis:   Encounter Diagnoses   Name Primary?    Decreased range of motion of lumbar spine Yes    Weakness of left lower extremity     Gait difficulty      Physician: Order, Paper    Visit Date: 5/3/2024    Physician Orders: PT Eval and Treat  Medical Diagnosis from Referral: M47.816 (ICD-10-CM) - Spondylosis of lumbar region without myelopathy or radiculopathy   Evaluation Date: 3/25/2024  Authorization Period Expiration: 12/31/2024  Plan of Care Expiration: 7/8/2024  Visit # / Visits authorized: 10/ 20   FOTO #2: 21% on 4/19/2024  FOTO: #3: 31% on 5/3/2024    Time In: 1000  Time Out: 1100  Total Billable Time: 57 minutes    Precautions: Standard and Smoker    Date of Surgery: 2/27/2024  Procedure Performed:  Left L4-5 oblique interbody fusion, placement of interbody spacer, DePuy Conduit LLIF cage filled with allograft BMP and DBM  L5-S1 anterior interbody fusion, placement of interbody spacer, DePuy Conduit ALIF cage filled with allograft BMP and DBM  L4-S1 posterior segmental instrumentation using DePuy Viper Prime system  Registration of navigated instruments using intraoperative 3D imaging [facility] and [employer] station    SUBJECTIVE     Pt reports: increasing activity in calves and glutes.  She was compliant with home exercise program.  Response to previous treatment: soreness  Functional change: Ongoing    Pain: 5/10  Location: Lumbar    OBJECTIVE     Objective Measures updated at progress report unless specified.     TREATMENT     Kevin received the treatments listed below:      Therapeutic Exercises to develop strength, endurance, ROM, posture, and core stabilization for 57 minutes including:  Recumbent Bike - 8 minutes  Shuttle Leg Press - double leg, 3 bands + brace; x30  Shuttle Leg Press - Left lower extremity, 2 bands + brace; x30  Stomps - 3 trials (70  bmp)  Sled Push - 45#; 1 turf lap  DKTC with ball - x20  DKTC with ball + Pallof twist - x20 bilateral  Pallof Press - orange bands; 2x10 bilateral  Gait with Brace and no assistive device - 100 feet    Not Today:  Step Up  - 4 inch box; x30  Jeff Step overs - x50 bilateral  Seated push resistance - all planes  Straight Leg Raise - 3x10  Sit to Stand - 3x10 (high-low table)  Bridges - 3x10      Manual Therapy Techniques: Joint mobilizations were applied to the: hip and ankle for 00 minutes, including:  Left lower extremity long axis distraction - grade V  Left talocrural distraction - grade V      PATIENT EDUCATION AND HOME EXERCISES      Home Exercises Provided and Patient Education Provided     Education provided:   Anatomy and Pathology.  Symptom management and plan of care progressions.  Home Exercise Program.    Written Home Exercises Provided: Patient instructed to cont prior HEP. Exercises were reviewed and Kevin was able to demonstrate them prior to the end of the session.  Kevin demonstrated good  understanding of the education provided. See EMR under Patient Instructions for exercises provided during therapy sessions    ASSESSMENT     Kevin presents to the clinic with steady improvements in sensation and Left lower extremity control. Increased gait speed noted today with no cues required. Progressions made to resistances in sled push and step speed for stomps. Met FOTO goal today. She will continue to benefit from skilled physical therapy to improve her strength and motor control for functional, independent activities of daily living.    From evaluation on 3/25/2024 - Kevin is a 39 y.o. female referred to outpatient Physical Therapy with a medical diagnosis of Spondylosis of lumbar region without myelopathy or radiculopathy. She is status-post L4-S1 fusion performed on 2/272024 by Dr. Bakari Zaragoza. Patient presents with decreased range of motion, decreased strength, decreased balance, and  risk for falls. Signs and symptoms are consistent with the above medical procedure. She will benefit from skilled physical therapy addressing her lower extremity strength, core stabilization, and proprioceptive retraining.    Kevin Is progressing well towards her goals.   Pt prognosis is Fair.     Pt will continue to benefit from skilled outpatient physical therapy to address the deficits listed in the problem list box on initial evaluation, provide pt/family education and to maximize pt's level of independence in the home and community environment. Pt's spiritual, cultural and educational needs considered and pt agreeable to plan of care and goals.     Anticipated barriers to physical therapy: history of low back pain; multiple surgeries.     Goals:  Short Term Goals: 6 weeks   Patient will have reduced pain complaints from 10/10 to less than or equal to 6/10. (Met - 4/30/2024)  Patient will demonstrate increased AROM/PROM by approximately 25% to 50% of initial measurements. (Not Met - Progressing)  Patient will demonstrate increased muscle strength of at least one-half grade as compared to the initial measurements. (Not Met - Progressing)     Long Term Goals: 12 weeks   Patient will have reduced pain complaints from 6/10 to less than or equal to 2/10. (Not Met - Progressing)  Patient will perform the 5 time sit to stand test in under 10 seconds. (Not Met - Progressing)  Patient will demonstrate increased muscle strength of at least one grade as compared to the initial measurements. (Not Met - Progressing)  Patient will improve Lumbar Spine FOTO Intake score from 9% to greater than or equal to 33%. (Met - 5/3/2024)  Patient will be independent with their home exercise program. (Not Met - Progressing)    PLAN     Core stabilization  Left lower extremity strengthening  Proprioceptive retraining.    Plan of care Certification: 3/25/2024 to 7/8/2024.  Outpatient Physical Therapy 1 times weekly for 12  weeks.    Faraz Doe, PT , DPT, OCS

## 2024-05-06 ENCOUNTER — CLINICAL SUPPORT (OUTPATIENT)
Dept: REHABILITATION | Facility: HOSPITAL | Age: 39
End: 2024-05-06
Payer: MEDICAID

## 2024-05-06 DIAGNOSIS — R26.9 GAIT DIFFICULTY: ICD-10-CM

## 2024-05-06 DIAGNOSIS — M53.86 DECREASED RANGE OF MOTION OF LUMBAR SPINE: Primary | ICD-10-CM

## 2024-05-06 DIAGNOSIS — R29.898 WEAKNESS OF LEFT LOWER EXTREMITY: ICD-10-CM

## 2024-05-06 PROCEDURE — 97110 THERAPEUTIC EXERCISES: CPT

## 2024-05-08 NOTE — PROGRESS NOTES
[facility] OUTPATIENT THERAPY AND WELLNESS   Physical Therapy Treatment Note        Name: Kevin Méndez [facility] Number: [id number]    Therapy Diagnosis:   Encounter Diagnoses   Name Primary?    Decreased range of motion of lumbar spine Yes    Weakness of left lower extremity     Gait difficulty      Physician: Order, Paper    Visit Date: 5/9/2024    Physician Orders: PT Eval and Treat  Medical Diagnosis from Referral: M47.816 (ICD-10-CM) - Spondylosis of lumbar region without myelopathy or radiculopathy   Evaluation Date: 3/25/2024  Authorization Period Expiration: 12/31/2024  Plan of Care Expiration: 7/8/2024  Visit # / Visits authorized: 12/ 20   FOTO #2: 21% on 4/19/2024  FOTO: #3: 31% on 5/3/2024    Time In: 1055  Time Out: 1157  Total Billable Time: 58 minutes    Precautions: Standard and Smoker    Date of Surgery: 2/27/2024  Procedure Performed:  Left L4-5 oblique interbody fusion, placement of interbody spacer, DePuy Conduit LLIF cage filled with allograft BMP and DBM  L5-S1 anterior interbody fusion, placement of interbody spacer, DePuy Conduit ALIF cage filled with allograft BMP and DBM  L4-S1 posterior segmental instrumentation using DePuy Viper Prime system  Registration of navigated instruments using intraoperative 3D imaging [facility] and [employer] station    SUBJECTIVE     Pt reports: she brought her cane in today. Increased sensation in the left posterior thigh.  She was compliant with home exercise program.  Response to previous treatment: soreness  Functional change: Ongoing    Pain: 5/10  Location: Lumbar    OBJECTIVE     Objective Measures updated at progress report unless specified.     TREATMENT     Kevin received the treatments listed below:      Therapeutic Exercises to develop strength, endurance, ROM, posture, and core stabilization for 58 minutes including:  Recumbent Bike - 8 minutes  Shuttle Leg Press - double leg, 3 bands + brace; x30  Shuttle Leg Press - Left lower extremity, 2 bands  + brace; x30  Stomps - 3 trials (100 bmp)  Sled Push - 45#; 2 turf lap  Pallof Marches - 5x8  BOSU Lunges - x10 bilateral      Not Today:  Step Up  - 4 inch box; x30  DKTC with ball - x20  DKTC with ball + Pallof twist - x20 bilateral  Bridges - 3x10  Physioball - marches and overhead lift; x30  Pallof Press - orange bands; 2x10 bilateral      Manual Therapy Techniques: Joint mobilizations were applied to the: hip and ankle for 00 minutes, including:  Left lower extremity long axis distraction - grade V  Left talocrural distraction - grade V      PATIENT EDUCATION AND HOME EXERCISES      Home Exercises Provided and Patient Education Provided     Education provided:   Anatomy and Pathology.  Symptom management and plan of care progressions.  Home Exercise Program.    Written Home Exercises Provided: Patient instructed to cont prior HEP. Exercises were reviewed and Kevin was able to demonstrate them prior to the end of the session.  Kevin demonstrated good  understanding of the education provided. See EMR under Patient Instructions for exercises provided during therapy sessions    ASSESSMENT     Kevin presents to the clinic with increased sensation in the posterior Left lower extremity. Continued improvements in speed of movement of the Left lower extremity. Discontinued rollator walker. Continued progressions to speed and proprioception training in conjunction with core training. She will continue to benefit from skilled physical therapy to improve her strength and motor control for functional, independent activities of daily living.    From evaluation on 3/25/2024 - Kevin is a 39 y.o. female referred to outpatient Physical Therapy with a medical diagnosis of Spondylosis of lumbar region without myelopathy or radiculopathy. She is status-post L4-S1 fusion performed on 2/272024 by Dr. Bakari Zaragoza. Patient presents with decreased range of motion, decreased strength, decreased balance, and risk for falls.  Signs and symptoms are consistent with the above medical procedure. She will benefit from skilled physical therapy addressing her lower extremity strength, core stabilization, and proprioceptive retraining.    Kevin Is progressing well towards her goals.   Pt prognosis is Fair.     Pt will continue to benefit from skilled outpatient physical therapy to address the deficits listed in the problem list box on initial evaluation, provide pt/family education and to maximize pt's level of independence in the home and community environment. Pt's spiritual, cultural and educational needs considered and pt agreeable to plan of care and goals.     Anticipated barriers to physical therapy: history of low back pain; multiple surgeries.     Goals:  Short Term Goals: 6 weeks   Patient will have reduced pain complaints from 10/10 to less than or equal to 6/10. (Met - 4/30/2024)  Patient will demonstrate increased AROM/PROM by approximately 25% to 50% of initial measurements. (Not Met - Progressing)  Patient will demonstrate increased muscle strength of at least one-half grade as compared to the initial measurements. (Not Met - Progressing)     Long Term Goals: 12 weeks   Patient will have reduced pain complaints from 6/10 to less than or equal to 2/10. (Not Met - Progressing)  Patient will perform the 5 time sit to stand test in under 10 seconds. (Not Met - Progressing)  Patient will demonstrate increased muscle strength of at least one grade as compared to the initial measurements. (Not Met - Progressing)  Patient will improve Lumbar Spine FOTO Intake score from 9% to greater than or equal to 33%. (Met - 5/3/2024)  Patient will be independent with their home exercise program. (Not Met - Progressing)    PLAN     Core stabilization  Left lower extremity strengthening  Proprioceptive retraining.    Plan of care Certification: 3/25/2024 to 7/8/2024.  Outpatient Physical Therapy 1 times weekly for 12 weeks.    Faraz Doe, PT ,  DPT, OCS

## 2024-05-09 ENCOUNTER — CLINICAL SUPPORT (OUTPATIENT)
Dept: REHABILITATION | Facility: HOSPITAL | Age: 39
End: 2024-05-09
Payer: MEDICAID

## 2024-05-09 DIAGNOSIS — R26.9 GAIT DIFFICULTY: ICD-10-CM

## 2024-05-09 DIAGNOSIS — R29.898 WEAKNESS OF LEFT LOWER EXTREMITY: ICD-10-CM

## 2024-05-09 DIAGNOSIS — M53.86 DECREASED RANGE OF MOTION OF LUMBAR SPINE: Primary | ICD-10-CM

## 2024-05-09 PROCEDURE — 97110 THERAPEUTIC EXERCISES: CPT

## 2024-05-14 ENCOUNTER — CLINICAL SUPPORT (OUTPATIENT)
Dept: REHABILITATION | Facility: HOSPITAL | Age: 39
End: 2024-05-14
Payer: MEDICAID

## 2024-05-14 DIAGNOSIS — R26.9 GAIT DIFFICULTY: ICD-10-CM

## 2024-05-14 DIAGNOSIS — M53.86 DECREASED RANGE OF MOTION OF LUMBAR SPINE: Primary | ICD-10-CM

## 2024-05-14 DIAGNOSIS — R29.898 WEAKNESS OF LEFT LOWER EXTREMITY: ICD-10-CM

## 2024-05-14 PROCEDURE — 97110 THERAPEUTIC EXERCISES: CPT

## 2024-05-14 NOTE — PROGRESS NOTES
[facility] OUTPATIENT THERAPY AND [facility]LLNESS   Physical Therapy Treatment Note        Name: Kevin Méndez [facility] Number: [id number]    Therapy Diagnosis:   Encounter Diagnoses   Name Primary?    Decreased range of motion of lumbar spine Yes    Weakness of left lower extremity     Gait difficulty      Physician: Order, Paper    Visit Date: 5/14/2024    Physician Orders: PT Eval and Treat  Medical Diagnosis from Referral: M47.816 (ICD-10-CM) - Spondylosis of lumbar region without myelopathy or radiculopathy   Evaluation Date: 3/25/2024  Authorization Period Expiration: 12/31/2024  Plan of Care Expiration: 7/8/2024  Visit # / Visits authorized: 13/ 20   FOTO #2: 21% on 4/19/2024  FOTO: #3: 31% on 5/3/2024    Time In: 1000  Time Out: 1100  Total Billable Time: 55 minutes    Precautions: Standard and Smoker    Date of Surgery: 2/27/2024  Procedure Performed:  Left L4-5 oblique interbody fusion, placement of interbody spacer, DePuy Conduit LLIF cage filled with allograft BMP and DBM  L5-S1 anterior interbody fusion, placement of interbody spacer, DePuy Conduit ALIF cage filled with allograft BMP and DBM  L4-S1 posterior segmental instrumentation using DePuy Viper Prime system  Registration of navigated instruments using intraoperative 3D imaging [facility] and [employer] station    SUBJECTIVE     Pt reports: increased sensation in her heel.  She was compliant with home exercise program.  Response to previous treatment: soreness  Functional change: Ongoing    Pain: 5/10  Location: Lumbar    OBJECTIVE     Objective Measures updated at progress report unless specified.     TREATMENT     Kevin received the treatments listed below:      Therapeutic Exercises to develop strength, endurance, ROM, posture, and core stabilization for 55 minutes including:  Recumbent Bike - 8 minutes  Shuttle Leg Press - double leg, 3.5 bands + brace; x30  Shuttle Leg Press - Left lower extremity, 2.5 bands + brace; x30  Stomps - 3 trials (100  bmp)  Step Ups - 4 inch box; x20 bilateral  Agility Ladder - no assistive device; in-outs; 1 lap each    Not Today:  DKTC with ball - x20  DKTC with ball + Pallof twist - x20 bilateral  Bridges - 3x10  Physioball - marches and overhead lift; x30  Pallof Press - orange bands; 2x10 bilateral  Sled Push - 45#; 2 turf lap  Pallof Marches - 5x8      Manual Therapy Techniques: Joint mobilizations were applied to the: hip and ankle for 00 minutes, including:  Left lower extremity long axis distraction - grade V  Left talocrural distraction - grade V      PATIENT EDUCATION AND HOME EXERCISES      Home Exercises Provided and Patient Education Provided     Education provided:   Anatomy and Pathology.  Symptom management and plan of care progressions.  Home Exercise Program.    Written Home Exercises Provided: Patient instructed to cont prior HEP. Exercises were reviewed and Kevin was able to demonstrate them prior to the end of the session.  Kevin demonstrated good  understanding of the education provided. See EMR under Patient Instructions for exercises provided during therapy sessions    ASSESSMENT     Kevin presents to the clinic with increased sensation and speed in the Left lower extremity. Progression in resistance today. Introduced agility ladder drills for Left lower extremity coordination. Cuing needed when leading with the Left lower extremity. No falls or exacerbation in symptoms. She will continue to benefit from skilled physical therapy to improve her strength and motor control for functional, independent activities of daily living. Progressing well under her current plan of care.    From evaluation on 3/25/2024 - Kevin is a 39 y.o. female referred to outpatient Physical Therapy with a medical diagnosis of Spondylosis of lumbar region without myelopathy or radiculopathy. She is status-post L4-S1 fusion performed on 2/272024 by Dr. Bakari Zaragoza. Patient presents with decreased range of motion,  decreased strength, decreased balance, and risk for falls. Signs and symptoms are consistent with the above medical procedure. She will benefit from skilled physical therapy addressing her lower extremity strength, core stabilization, and proprioceptive retraining.    Kevin Is progressing well towards her goals.   Pt prognosis is Fair.     Pt will continue to benefit from skilled outpatient physical therapy to address the deficits listed in the problem list box on initial evaluation, provide pt/family education and to maximize pt's level of independence in the home and community environment. Pt's spiritual, cultural and educational needs considered and pt agreeable to plan of care and goals.     Anticipated barriers to physical therapy: history of low back pain; multiple surgeries.     Goals:  Short Term Goals: 6 weeks   Patient will have reduced pain complaints from 10/10 to less than or equal to 6/10. (Met - 4/30/2024)  Patient will demonstrate increased AROM/PROM by approximately 25% to 50% of initial measurements. (Not Met - Progressing)  Patient will demonstrate increased muscle strength of at least one-half grade as compared to the initial measurements. (Not Met - Progressing)     Long Term Goals: 12 weeks   Patient will have reduced pain complaints from 6/10 to less than or equal to 2/10. (Not Met - Progressing)  Patient will perform the 5 time sit to stand test in under 10 seconds. (Not Met - Progressing)  Patient will demonstrate increased muscle strength of at least one grade as compared to the initial measurements. (Not Met - Progressing)  Patient will improve Lumbar Spine FOTO Intake score from 9% to greater than or equal to 33%. (Met - 5/3/2024)  Patient will be independent with their home exercise program. (Not Met - Progressing)    PLAN     Core stabilization  Left lower extremity strengthening  Proprioceptive retraining.    Plan of care Certification: 3/25/2024 to 7/8/2024.  Outpatient Physical  Therapy 1 times weekly for 12 weeks.    Faraz Doe, PT , DPT, OCS

## 2024-05-17 ENCOUNTER — CLINICAL SUPPORT (OUTPATIENT)
Dept: REHABILITATION | Facility: HOSPITAL | Age: 39
End: 2024-05-17
Payer: MEDICAID

## 2024-05-17 DIAGNOSIS — R26.9 GAIT DIFFICULTY: ICD-10-CM

## 2024-05-17 DIAGNOSIS — R29.898 WEAKNESS OF LEFT LOWER EXTREMITY: ICD-10-CM

## 2024-05-17 DIAGNOSIS — M53.86 DECREASED RANGE OF MOTION OF LUMBAR SPINE: Primary | ICD-10-CM

## 2024-05-17 PROCEDURE — 97110 THERAPEUTIC EXERCISES: CPT

## 2024-05-17 RX ORDER — OXYCODONE HYDROCHLORIDE 10 MG/1
10 TABLET ORAL EVERY 6 HOURS PRN
Qty: 60 TABLET | Refills: 0 | Status: SHIPPED | OUTPATIENT
Start: 2024-05-17 | End: 2024-06-03 | Stop reason: SDUPTHER

## 2024-05-20 NOTE — PROGRESS NOTES
[facility]   Physical Therapy Treatment Note        Name: Kevin Méndez [facility] Number: [id number]    Therapy Diagnosis:   Encounter Diagnoses   Name Primary?    Decreased range of motion of lumbar spine Yes    Weakness of left lower extremity     Gait difficulty      Physician: Order, Paper    Visit Date: 5/21/2024    Physician Orders: PT Eval and Treat  Medical Diagnosis from Referral: M47.816 (ICD-10-CM) - Spondylosis of lumbar region without myelopathy or radiculopathy   Evaluation Date: 3/25/2024  Authorization Period Expiration: 12/31/2024  Plan of Care Expiration: 7/8/2024  Visit # / Visits authorized: 15/ 20   FOTO #2: 21% on 4/19/2024  FOTO: #3: 31% on 5/3/2024    Time In: 1000  Time Out: 1100  Total Billable Time: 55 minutes    Precautions: Standard and Smoker    Date of Surgery: 2/27/2024  Procedure Performed:  Left L4-5 oblique interbody fusion, placement of interbody spacer, DePuy Conduit LLIF cage filled with allograft BMP and DBM  L5-S1 anterior interbody fusion, placement of interbody spacer, DePuy Conduit ALIF cage filled with allograft BMP and DBM  L4-S1 posterior segmental instrumentation using DePuy Viper Prime system  Registration of navigated instruments using intraoperative 3D imaging [facility] and [employer] station    SUBJECTIVE     Pt reports: increased burning symptoms in heel and lower leg which was previously numb. Affecting gait negatively.  She was compliant with home exercise program.  Response to previous treatment: soreness  Functional change: Ongoing    Pain: 5/10  Location: Lumbar    OBJECTIVE     Objective Measures updated at progress report unless specified.     TREATMENT     Kevin received the treatments listed below:      Therapeutic Exercises to develop strength, endurance, ROM, posture, and core stabilization for 55 minutes including:  Recumbent Bike - 8 minutes  Stomps - 3 trials (100 bmp)  Step Ups - 6 inch box; x20 bilateral  Pallof Press  with OH lift - orange bands; 2x10 bilateral  Agility Ladder - no assistive device; in-outs; 1 lap each  Heel Raises - 3x10  Standing Hip Abduction - 3x10 bilateral    Not Today:  DKTC with ball - x20  DKTC with ball + Pallof twist - x20 bilateral  Bridges - 3x10  Physioball - marches and overhead lift; x30  Sled Push - 45#; 2 turf lap  Pallof Marches - 5x8  Shuttle Leg Press - double leg, 3.5 bands + brace; x30  Shuttle Leg Press - Left lower extremity, 2.5 bands + brace; x30      Manual Therapy Techniques: Joint mobilizations were applied to the: hip and ankle for 00 minutes, including:  Left lower extremity long axis distraction - grade V  Left talocrural distraction - grade V      PATIENT EDUCATION AND HOME EXERCISES      Home Exercises Provided and Patient Education Provided     Education provided:   Anatomy and Pathology.  Symptom management and plan of care progressions.  Home Exercise Program.    Written Home Exercises Provided: Patient instructed to cont prior HEP. Exercises were reviewed and Kevin was able to demonstrate them prior to the end of the session.  Kevin demonstrated good  understanding of the education provided. See EMR under Patient Instructions for exercises provided during therapy sessions    ASSESSMENT     Kevin continues to show gradual progressions in Left lower extremity sensation increased burning symptoms today. Minimal progressions to exercise program today. Additional focus on lower extremity strength and stability today. Returns to surgeon on 5/29/2024. She will continue to benefit from skilled physical therapy to improve her strength and motor control for functional, independent activities of daily living.    From evaluation on 3/25/2024 - Kevin is a 39 y.o. female referred to outpatient Physical Therapy with a medical diagnosis of Spondylosis of lumbar region without myelopathy or radiculopathy. She is status-post L4-S1 fusion performed on 2/272024 by Dr. Bakari Zaragoza.  Patient presents with decreased range of motion, decreased strength, decreased balance, and risk for falls. Signs and symptoms are consistent with the above medical procedure. She will benefit from skilled physical therapy addressing her lower extremity strength, core stabilization, and proprioceptive retraining.    Kevin Is progressing well towards her goals.   Pt prognosis is Fair.     Pt will continue to benefit from skilled outpatient physical therapy to address the deficits listed in the problem list box on initial evaluation, provide pt/family education and to maximize pt's level of independence in the home and community environment. Pt's spiritual, cultural and educational needs considered and pt agreeable to plan of care and goals.     Anticipated barriers to physical therapy: history of low back pain; multiple surgeries.     Goals:  Short Term Goals: 6 weeks   Patient will have reduced pain complaints from 10/10 to less than or equal to 6/10. (Met - 4/30/2024)  Patient will demonstrate increased AROM/PROM by approximately 25% to 50% of initial measurements. (Not Met - Progressing)  Patient will demonstrate increased muscle strength of at least one-half grade as compared to the initial measurements. (Not Met - Progressing)     Long Term Goals: 12 weeks   Patient will have reduced pain complaints from 6/10 to less than or equal to 2/10. (Not Met - Progressing)  Patient will perform the 5 time sit to stand test in under 10 seconds. (Not Met - Progressing)  Patient will demonstrate increased muscle strength of at least one grade as compared to the initial measurements. (Not Met - Progressing)  Patient will improve Lumbar Spine FOTO Intake score from 9% to greater than or equal to 33%. (Met - 5/3/2024)  Patient will be independent with their home exercise program. (Not Met - Progressing)    PLAN     Core stabilization  Left lower extremity strengthening  Proprioceptive retraining.    Plan of care  Certification: 3/25/2024 to 7/8/2024.  Outpatient Physical Therapy 1 times weekly for 12 weeks.    Faraz Doe, PT , DPT, OCS

## 2024-05-21 ENCOUNTER — CLINICAL SUPPORT (OUTPATIENT)
Dept: REHABILITATION | Facility: HOSPITAL | Age: 39
End: 2024-05-21
Payer: MEDICAID

## 2024-05-21 DIAGNOSIS — M53.86 DECREASED RANGE OF MOTION OF LUMBAR SPINE: Primary | ICD-10-CM

## 2024-05-21 DIAGNOSIS — R29.898 WEAKNESS OF LEFT LOWER EXTREMITY: ICD-10-CM

## 2024-05-21 DIAGNOSIS — R26.9 GAIT DIFFICULTY: ICD-10-CM

## 2024-05-21 PROCEDURE — 97110 THERAPEUTIC EXERCISES: CPT

## 2024-05-22 RX ORDER — GABAPENTIN 600 MG/1
TABLET ORAL
Qty: 135 TABLET | Refills: 11 | Status: SHIPPED | OUTPATIENT
Start: 2024-05-22

## 2024-05-23 NOTE — PROGRESS NOTES
[facility]   Physical Therapy Treatment Note        Name: Kevin Méndez [facility] Number: [id number]    Therapy Diagnosis:   Encounter Diagnoses   Name Primary?    Decreased range of motion of lumbar spine Yes    Weakness of left lower extremity     Gait difficulty      Physician: Order, Paper    Visit Date: 5/24/2024    Physician Orders: PT Eval and Treat  Medical Diagnosis from Referral: M47.816 (ICD-10-CM) - Spondylosis of lumbar region without myelopathy or radiculopathy   Evaluation Date: 3/25/2024  Authorization Period Expiration: 12/31/2024  Plan of Care Expiration: 7/8/2024  Visit # / Visits authorized: 16/ 20   FOTO #2: 21% on 4/19/2024  FOTO: #3: 31% on 5/3/2024    Time In: 1000  Time Out: 1100  Total Billable Time: 55 minutes    Precautions: Standard and Smoker    Date of Surgery: 2/27/2024  Procedure Performed:  Left L4-5 oblique interbody fusion, placement of interbody spacer, DePuy Conduit LLIF cage filled with allograft BMP and DBM  L5-S1 anterior interbody fusion, placement of interbody spacer, DePuy Conduit ALIF cage filled with allograft BMP and DBM  L4-S1 posterior segmental instrumentation using DePuy Viper Prime system  Registration of navigated instruments using intraoperative 3D imaging [facility] and [facility] station    SUBJECTIVE     Pt reports: now has feeling in 3 of her left toes.  She was compliant with home exercise program.  Response to previous treatment: soreness  Functional change: Ongoing    Pain: 5/10  Location: Lumbar    OBJECTIVE     Objective Measures updated at progress report unless specified.     TREATMENT     Kevin received the treatments listed below:      Therapeutic Exercises to develop strength, endurance, ROM, posture, and core stabilization for 45 minutes including:  Recumbent Bike - 8 minutes  Stomps - 3 trials (100 bmp)  Shuttle Leg Press - double leg, 3.5 bands + brace; x30  Shuttle Leg Press - Left lower extremity, 2.5 bands +  brace; x30  Heel Raises - 3x10  Standing Hip Abduction - yellow band, 3x10 bilateral    Not Today:  DKTC with ball - x20  DKTC with ball + Pallof twist - x20 bilateral  Bridges - 3x10  Physioball - marches and overhead lift; x30  Sled Push - 45#; 2 turf lap  Pallof Marches - 5x8  Agility Ladder - no assistive device; in-outs; 1 lap each  Step Ups - 6 inch box; x20 bilateral  Pallof Press with OH lift - orange bands; 2x10 bilateral        Manual Therapy Techniques: Joint mobilizations were applied to the: hip and ankle for 10 minutes, including:  Cupping To Left lumbar paraspinals    Not Today:  Left lower extremity long axis distraction - grade V  Left talocrural distraction - grade V      PATIENT EDUCATION AND HOME EXERCISES      Home Exercises Provided and Patient Education Provided     Education provided:   Anatomy and Pathology.  Symptom management and plan of care progressions.  Home Exercise Program.    Written Home Exercises Provided: Patient instructed to cont prior HEP. Exercises were reviewed and Kevin was able to demonstrate them prior to the end of the session.  Kevin demonstrated good  understanding of the education provided. See EMR under Patient Instructions for exercises provided during therapy sessions    ASSESSMENT     Kevin continues to show gradual progressions in Left lower extremity sensation increased burning symptoms today. Utilized soft tissue techniques to reduced lumbar symptoms with walking. Continued focus on lower extremity strength and stability today. Returns to surgeon on 5/29/2024. She will continue to benefit from skilled physical therapy to improve her strength and motor control for functional, independent activities of daily living.    From evaluation on 3/25/2024 - Kevin is a 39 y.o. female referred to outpatient Physical Therapy with a medical diagnosis of Spondylosis of lumbar region without myelopathy or radiculopathy. She is status-post L4-S1 fusion performed on  [id number] by Dr. Bakari Zaragoza. Patient presents with decreased range of motion, decreased strength, decreased balance, and risk for falls. Signs and symptoms are consistent with the above medical procedure. She will benefit from skilled physical therapy addressing her lower extremity strength, core stabilization, and proprioceptive retraining.    Kevin Is progressing well towards her goals.   Pt prognosis is Fair.     Pt will continue to benefit from skilled outpatient physical therapy to address the deficits listed in the problem list box on initial evaluation, provide pt/family education and to maximize pt's level of independence in the home and community environment. Pt's spiritual, cultural and educational needs considered and pt agreeable to plan of care and goals.     Anticipated barriers to physical therapy: history of low back pain; multiple surgeries.     Goals:  Short Term Goals: 6 weeks   Patient will have reduced pain complaints from 10/10 to less than or equal to 6/10. (Met - 4/30/2024)  Patient will demonstrate increased AROM/PROM by approximately 25% to 50% of initial measurements. (Not Met - Progressing)  Patient will demonstrate increased muscle strength of at least one-half grade as compared to the initial measurements. (Not Met - Progressing)     Long Term Goals: 12 weeks   Patient will have reduced pain complaints from 6/10 to less than or equal to 2/10. (Not Met - Progressing)  Patient will perform the 5 time sit to stand test in under 10 seconds. (Not Met - Progressing)  Patient will demonstrate increased muscle strength of at least one grade as compared to the initial measurements. (Not Met - Progressing)  Patient will improve Lumbar Spine FOTO Intake score from 9% to greater than or equal to 33%. (Met - 5/3/2024)  Patient will be independent with their home exercise program. (Not Met - Progressing)    PLAN     Core stabilization  Left lower extremity strengthening  Proprioceptive  retraining.    Plan of care Certification: 3/25/2024 to 7/8/2024.  Outpatient Physical Therapy 1 times weekly for 12 weeks.    Faraz Doe, PT , DPT, OCS

## 2024-05-24 ENCOUNTER — CLINICAL SUPPORT (OUTPATIENT)
Dept: REHABILITATION | Facility: HOSPITAL | Age: 39
End: 2024-05-24
Payer: MEDICAID

## 2024-05-24 DIAGNOSIS — R26.9 GAIT DIFFICULTY: ICD-10-CM

## 2024-05-24 DIAGNOSIS — R29.898 WEAKNESS OF LEFT LOWER EXTREMITY: ICD-10-CM

## 2024-05-24 DIAGNOSIS — M53.86 DECREASED RANGE OF MOTION OF LUMBAR SPINE: Primary | ICD-10-CM

## 2024-05-24 PROCEDURE — 97110 THERAPEUTIC EXERCISES: CPT

## 2024-05-27 NOTE — PROGRESS NOTES
[facility]   Physical Therapy Treatment Note        Name: Kevin Méndez [facility] Number: [id number]    Therapy Diagnosis:   Encounter Diagnoses   Name Primary?    Decreased range of motion of lumbar spine Yes    Weakness of left lower extremity     Gait difficulty      Physician: Order, Paper    Visit Date: 5/28/2024    Physician Orders: PT Eval and Treat  Medical Diagnosis from Referral: M47.816 (ICD-10-CM) - Spondylosis of lumbar region without myelopathy or radiculopathy   Evaluation Date: 3/25/2024  Authorization Period Expiration: 12/31/2024  Plan of Care Expiration: 7/8/2024  Visit # / Visits authorized: 17/ 20   FOTO #2: 21% on 4/19/2024  FOTO: #3: 31% on 5/3/2024    Time In: 1000  Time Out: 1100  Total Billable Time: 55 minutes    Precautions: Standard and Smoker    Date of Surgery: 2/27/2024  Procedure Performed:  Left L4-5 oblique interbody fusion, placement of interbody spacer, DePuy Conduit LLIF cage filled with allograft BMP and DBM  L5-S1 anterior interbody fusion, placement of interbody spacer, DePuy Conduit ALIF cage filled with allograft BMP and DBM  L4-S1 posterior segmental instrumentation using DePuy Viper Prime system  Registration of navigated instruments using intraoperative 3D imaging [facility] and [facility] station    SUBJECTIVE     Pt reports: now has feeling in lateral knee. Walked 2 miles on Saturday, Sunday, and Monday.  She was compliant with home exercise program.  Response to previous treatment: soreness  Functional change: Ongoing    Pain: 5/10  Location: Lumbar    OBJECTIVE     Objective Measures updated at progress report unless specified.     TREATMENT     Kevin received the treatments listed below:      Therapeutic Exercises to develop strength, endurance, ROM, posture, and core stabilization for 55 minutes including:  Recumbent Bike - 8 minutes  Step Ups - 6 inch box; x20 bilateral  Shuttle Leg Press - double leg, 3.5 bands + brace; x30  Shuttle  Leg Press - Left lower extremity, 2.5 bands + brace; x30  Marches - 3x10 bilateral  Lunges - x20 bilateral  Standing Chest press and Overhead lift - 4# ball; 3x10    Not Today:  DKTC with ball - x20  DKTC with ball + Pallof twist - x20 bilateral  Bridges - 3x10  Physioball - marches and overhead lift; x30  Sled Push - 45#; 2 turf lap  Pallof Marches - 5x8  Agility Ladder - no assistive device; in-outs; 1 lap each  Pallof Press with OH lift - orange bands; 2x10 bilateral  Stomps - 3 trials (100 bmp)  Heel Raises - 3x10  Standing Hip Abduction - yellow band, 3x10 bilateral      Manual Therapy Techniques: Joint mobilizations were applied to the: hip and ankle for 00 minutes, including:  Cupping To Left lumbar paraspinals    Not Today:  Left lower extremity long axis distraction - grade V  Left talocrural distraction - grade V      PATIENT EDUCATION AND HOME EXERCISES      Home Exercises Provided and Patient Education Provided     Education provided:   Anatomy and Pathology.  Symptom management and plan of care progressions.  Home Exercise Program.    Written Home Exercises Provided: Patient instructed to cont prior HEP. Exercises were reviewed and Kevin was able to demonstrate them prior to the end of the session.  Kevin demonstrated good  understanding of the education provided. See EMR under Patient Instructions for exercises provided during therapy sessions    ASSESSMENT     Kevin continues to show gradual progressions in Left lower extremity sensation as noted by feeling in left knee today. Began performing some standing exercises without the abdominal brace. Appropriately challenged with today's progressions. Returns to surgeon on 5/29/2024. She will continue to benefit from skilled physical therapy to improve her strength and motor control for functional, independent activities of daily living.    From evaluation on 3/25/2024 - Kevin is a 39 y.o. female referred to outpatient Physical Therapy with a  medical diagnosis of Spondylosis of lumbar region without myelopathy or radiculopathy. She is status-post L4-S1 fusion performed on 2/272024 by Dr. Bakari Zaragoza. Patient presents with decreased range of motion, decreased strength, decreased balance, and risk for falls. Signs and symptoms are consistent with the above medical procedure. She will benefit from skilled physical therapy addressing her lower extremity strength, core stabilization, and proprioceptive retraining.    Kevin Is progressing well towards her goals.   Pt prognosis is Fair.     Pt will continue to benefit from skilled outpatient physical therapy to address the deficits listed in the problem list box on initial evaluation, provide pt/family education and to maximize pt's level of independence in the home and community environment. Pt's spiritual, cultural and educational needs considered and pt agreeable to plan of care and goals.     Anticipated barriers to physical therapy: history of low back pain; multiple surgeries.     Goals:  Short Term Goals: 6 weeks   Patient will have reduced pain complaints from 10/10 to less than or equal to 6/10. (Met - 4/30/2024)  Patient will demonstrate increased AROM/PROM by approximately 25% to 50% of initial measurements. (Not Met - Progressing)  Patient will demonstrate increased muscle strength of at least one-half grade as compared to the initial measurements. (Not Met - Progressing)     Long Term Goals: 12 weeks   Patient will have reduced pain complaints from 6/10 to less than or equal to 2/10. (Not Met - Progressing)  Patient will perform the 5 time sit to stand test in under 10 seconds. (Not Met - Progressing)  Patient will demonstrate increased muscle strength of at least one grade as compared to the initial measurements. (Not Met - Progressing)  Patient will improve Lumbar Spine FOTO Intake score from 9% to greater than or equal to 33%. (Met - 5/3/2024)  Patient will be independent with their home  exercise program. (Not Met - Progressing)    PLAN     Core stabilization  Left lower extremity strengthening  Proprioceptive retraining.    Plan of care Certification: 3/25/2024 to 7/8/2024.  Outpatient Physical Therapy 1 times weekly for 12 weeks.    Faraz Doe, PT , DPT, OCS

## 2024-05-28 ENCOUNTER — CLINICAL SUPPORT (OUTPATIENT)
Dept: REHABILITATION | Facility: HOSPITAL | Age: 39
End: 2024-05-28
Payer: MEDICAID

## 2024-05-28 DIAGNOSIS — M53.86 DECREASED RANGE OF MOTION OF LUMBAR SPINE: Primary | ICD-10-CM

## 2024-05-28 DIAGNOSIS — R29.898 WEAKNESS OF LEFT LOWER EXTREMITY: ICD-10-CM

## 2024-05-28 DIAGNOSIS — R26.9 GAIT DIFFICULTY: ICD-10-CM

## 2024-05-28 PROCEDURE — 97110 THERAPEUTIC EXERCISES: CPT

## 2024-05-29 ENCOUNTER — OFFICE VISIT (OUTPATIENT)
Dept: NEUROSURGERY | Facility: CLINIC | Age: 39
End: 2024-05-29
Payer: MEDICAID

## 2024-05-29 ENCOUNTER — HOSPITAL ENCOUNTER (OUTPATIENT)
Dept: RADIOLOGY | Facility: HOSPITAL | Age: 39
Discharge: HOME | End: 2024-05-29
Attending: NEUROLOGICAL SURGERY
Payer: MEDICAID

## 2024-05-29 VITALS
DIASTOLIC BLOOD PRESSURE: 77 MMHG | SYSTOLIC BLOOD PRESSURE: 119 MMHG | BODY MASS INDEX: 32.39 KG/M2 | HEART RATE: 73 BPM | HEIGHT: 64 IN

## 2024-05-29 DIAGNOSIS — Z98.1 STATUS POST LUMBAR SPINAL FUSION: Primary | ICD-10-CM

## 2024-05-29 DIAGNOSIS — M48.07 FORAMINAL STENOSIS OF LUMBOSACRAL REGION: ICD-10-CM

## 2024-05-29 PROCEDURE — 3074F SYST BP LT 130 MM HG: CPT | Mod: CPTII,,, | Performed by: NEUROLOGICAL SURGERY

## 2024-05-29 PROCEDURE — 3078F DIAST BP <80 MM HG: CPT | Mod: CPTII,,, | Performed by: NEUROLOGICAL SURGERY

## 2024-05-29 PROCEDURE — 3008F BODY MASS INDEX DOCD: CPT | Mod: CPTII,,, | Performed by: NEUROLOGICAL SURGERY

## 2024-05-29 PROCEDURE — 1159F MED LIST DOCD IN RCRD: CPT | Mod: CPTII,,, | Performed by: NEUROLOGICAL SURGERY

## 2024-05-29 PROCEDURE — 99999 PR PBB SHADOW E&M-EST. PATIENT-LVL III: CPT | Mod: PBBFAC,,, | Performed by: NEUROLOGICAL SURGERY

## 2024-05-29 PROCEDURE — 72082 X-RAY EXAM ENTIRE SPI 2/3 VW: CPT | Mod: 26,,, | Performed by: RADIOLOGY

## 2024-05-29 PROCEDURE — 99213 OFFICE O/P EST LOW 20 MIN: CPT | Mod: PBBFAC,25,PN | Performed by: NEUROLOGICAL SURGERY

## 2024-05-29 PROCEDURE — 72082 X-RAY EXAM ENTIRE SPI 2/3 VW: CPT | Mod: TC,FY

## 2024-05-29 PROCEDURE — 99213 OFFICE O/P EST LOW 20 MIN: CPT | Mod: S$PBB,,, | Performed by: NEUROLOGICAL SURGERY

## 2024-05-29 NOTE — PROGRESS NOTES
[employer] Low Back Pain Disability Questionnaire    DATE OF SERVICE:  05/29/2024    ATTENDING PHYSICIAN:  Bakari Zaragoza MD    Instructions    This questionnaire has been designed to give us information as to how your back or leg pain is affecting your ability to manage in everyday life. Please answer by checking ONE box in each section for the statement which best applies to you. We realize you may consider that two or more statements in any one section apply but please just shade out the spot that indicates the statement which most clearly describes your problem.    Section 1 - Pain intensity    * I have no pain at the moment.  * The pain is very mild at the moment.  * The pain is moderate at the moment.  * The pain is fairly severe at the moment.  * The pain is very severe at the moment.  * The pain is the worst imaginable at the moment.    Score : 4    Section 2 - Personal care (washing, dressing etc)    * I can look after myself normally without causing extra pain.  * I can look after myself normally but it causes extra pain.  * It is painful to look after myself and I am slow and careful.  * I need some help but manage most of my personal care.  * I need help every day in most aspects of self-care.  * I do not get dressed, I wash with difficulty and stay in bed.    Score : 3    Section 3 - Lifting    * I can lift heavy weights without extra pain.  * I can lift heavy weights but it gives extra pain.  * Pain prevents me from lifting heavy weights off the floor, but I can manage if they are conveniently placed eg. on a table.  * Pain prevents me from lifting heavy weights, but I can manage light to medium weights if they are conveniently positioned.  * I can lift very light weights.  * I cannot lift or carry anything at all.    Score : 4    Section 4 - Walking    * Pain does not prevent me walking any distance.  * Pain prevents me from walking more than 1 mile.         * Pain prevents me from walking more than  1/2 mile.         * Pain prevents me from walking more than 100 yards.            * I can only walk using a stick or crutches.  * I am in bed most of the time.    Score : 4    Section 5 - Sitting    * I can sit in any chair as long as I like.  * I can only sit in my favourite chair as long as I like.  * Pain prevents me sitting more than one hour.  * Pain prevents me from sitting more than 30 minutes.  * Pain prevents me from sitting more than 10 minutes.  * Pain prevents me from sitting at all.    Score : 3    Section 6 - Standing    * I can stand as long as I want without extra pain.  * I can stand as long as I want but it gives me extra pain.  * Pain prevents me from standing for more than 1 hour  * Pain prevents me from standing for more than 30 minutes.  * Pain prevents me from standing for more than 10 minutes.  * Pain prevents me from standing at all.     Score : 3    Section 7 - Sleeping    * My sleep is never disturbed by pain.  * My sleep is occasionally disturbed by pain.  * Because of pain I have less than 6 hours sleep.   * Because of pain I have less than 4 hours sleep.   * Because of pain I have less than 2 hours sleep.  * Pain prevents me from sleeping at all.    Score : 3    Section 8 - Sex life (if applicable)    * My sex life is normal and causes no extra pain.  * My sex life is normal but causes some extra pain.  * My sex life is nearly normal but is very painful.   * My sex life is severely restricted by pain.  * My sex life is nearly absent because of pain.   * Pain prevents any sex life at all.    Score : 5    Section 9 - Social life    * My social life is normal and gives me no extra pain.  * My social life is normal but increases the degree of pain.  * Pain has no significant effect on my social life apart from limiting my more energetic interests eg, sport.  * Pain has restricted my social life and I do not go out as often.  * Pain has restricted my social life to my home.   * I have no  social life because of pain.     Score : 4    Section 10 - Travelling    * I can travel anywhere without pain.  * I can travel anywhere but it gives me extra pain.  * Pain is bad but I manage journeys over two hours.  * Pain restricts me to journeys of less than one hour.  * Pain restricts me to short necessary journeys under 30 minutes.  * Pain prevents me from travelling except to receive treatment.    Score : 3      TOTAL SCORE :  36 / 50 x 2 = 72 %      References  1. [place] JONATHON, Dani PB. The Osw[employer] Disability Index. Spine 2000 Nov 15;25(22):2943-52; discussion 52.  from standing for more than from standing for more than from standing for more than from standing at all

## 2024-05-29 NOTE — PROGRESS NOTES
NEUROSURGICAL OUTPATIENT PROGRESS NOTE    DATE OF SERVICE:  2024    ATTENDING PHYSICIAN:  Bakari Zaragoza MD    MEMO:72%    NRS low back:5/10    NRS right le/10    NRS left le/10, persistent numbness    Opioid use daily:  Oxycodone 10 mg q.6 hours p.r.n.    Neuropathic pain meds daily:  Gabapentin 600 mg 3 times daily    NSAIDs daily:NA    Not taking calcium and vitamin-D supplementation    SUBJECTIVE:    INTERIM HISTORY:  This is a 39 y.o. female who is s/p L4-5 oblique and L5-S1 anterior interbody fusion with posterior instrumentation 3 months ago.  Patient reports improvement in her back pain compared to before surgery.  Her left leg pain is also improving.  She still has some numbness in the left anterior thigh.  She has walking using a cane.  Overall she appears satisfied with her outcome.  She sees some improvement in her condition progressively.  No new onset of motor weakness.  She is doing physical therapy with improvement in her left leg strength.      PAST MEDICAL HISTORY:  Active Ambulatory Problems     Diagnosis Date Noted    Chronic left-sided low back pain with left-sided sciatica 2021    Decreased range of motion of lumbar spine 2021    Weakness of left lower extremity 2021    Gait difficulty 2021    Tobacco user 2019    Chronic asthmatic bronchitis with acute exacerbation 2019    Exercise-induced asthma 2019    Lumbar disc herniation with radiculopathy 2022    Nonspecific abnormal electrocardiogram (ECG) (EKG) 2024    Preop cardiovascular exam 2024    Electrocardiogram showing T wave abnormalities 2023    Synovial cyst of left popliteal space 2021    Foraminal stenosis of lumbar region 2024    Recurrent herniation of lumbar disc 2024    Narcotic dependence 2024     Resolved Ambulatory Problems     Diagnosis Date Noted    No Resolved Ambulatory Problems     Past Medical History:   Diagnosis Date     Asthma        PAST SURGICAL HISTORY:  Past Surgical History:   Procedure Laterality Date    FUSION OF SPINE WITH INSTRUMENTATION Left 2/27/2024    Procedure: FUSION, SPINE, WITH INSTRUMENTATION;  Surgeon: Bakari Zaragoza MD;  Location: [facility] OR;  Service: Neurosurgery;  Laterality: Left;  Left L4-5 OLIF, L5-S1 Alif Depuy Conduit BMP  Anterior Fusion interspace 1  Anterior instrumentation insterspace 2  -lop 2 hrs   -general  -type and screen   -c arm   -brain lab shelbi angeles   -LSO   -regular bed   -lateral right down   -depuy (Ron)   -ort    FUSION OF SPINE WITH INSTRUMENTATION N/A 2/27/2024    Procedure: FUSION, SPINE, WITH INSTRUMENTATION;  Surgeon: Bakari Zaragoza MD;  Location: [facility] OR;  Service: Neurosurgery;  Laterality: N/A;  L4-S1 Posterior Instrumentation Viper Prime   Anterior Fusion interspace 1  Anterior Instrumentation interspace 2  -general   -type and screen   -Ziehm angeles   -LSO   -Girish 4 Poster   -prone   -Depuy ron   -Orthofix douglas    MICRODISCECTOMY OF SPINE N/A 3/2/2022    Procedure: MICRODISCECTOMY, SPINE Left L5-S1 Microdiscectomy;  Surgeon: Bakari Zaragoza MD;  Location: [facility] OR;  Service: Neurosurgery;  Laterality: N/A;  Procedure: Left L5-S1 Microdiscectomy  Surgery Time: 1.5hr  LOS: 0-1  Anesthesia: General  Blood: Type & Screen  Radiology: C-arm  Microscope: Metrx  Bed: [place] [id number] Poster  Position: Prone       SOCIAL HISTORY:   Social History     Socioeconomic History    Marital status: Single   Tobacco Use    Smoking status: Every Day     Types: Cigars     Passive exposure: Never    Smokeless tobacco: Never   Substance and Sexual Activity    Alcohol use: Yes     Alcohol/week: 1.0 standard drink of alcohol     Types: 1 Glasses of wine per week     Comment: social    Drug use: Not Currently     Social Determinants of Health     Financial Resource Strain: Medium Risk (1/9/2024)    Overall Financial Resource Strain (CARDIA)     Difficulty of Paying Living Expenses: Somewhat hard    Food Insecurity: Food Insecurity Present (1/9/2024)    Hunger Vital Sign     Worried About Running Out of Food in the Last Year: Sometimes true     Ran Out of Food in the Last Year: Sometimes true   Transportation Needs: No Transportation Needs (1/9/2024)    PRAPARE - Transportation     Lack of Transportation (Medical): No     Lack of Transportation (Non-Medical): No   Physical Activity: Inactive (1/9/2024)    Exercise Vital Sign     Days of Exercise per Week: 0 days     Minutes of Exercise per Session: 0 min   Stress: No Stress Concern Present (1/9/2024)    [employer] [place] [employer] - Occupational Stress Questionnaire     Feeling of Stress : Not at all   Housing Stability: High Risk (1/9/2024)    Housing Stability Vital Sign     Unable to Pay for Housing in the Last Year: Yes     Number of Places Lived in the Last Year: 0     Unstable Housing in the Last Year: No       FAMILY HISTORY:  Family History   Problem Relation Name Age of Onset    Pacemaker/defibrilator Paternal Grandfather         CURRENTS MEDICATIONS:  Current Outpatient Medications on File Prior to Visit   Medication Sig Dispense Refill    acetaminophen (TYLENOL) 325 MG tablet Take 650 mg by mouth every 6 (six) hours.      albuterol (PROVENTIL/VENTOLIN HFA) 90 mcg/actuation inhaler Inhale 1 puff into the lungs every 4 (four) hours as needed for Wheezing. Rescue 18 g 0    aluminum-magnesium hydroxide-simethicone (MAALOX) 200-200-20 mg/5 mL Susp Take 30 mLs by mouth every 4 (four) hours as needed (indigestion). 1 mL 0    baclofen (LIORESAL) 10 MG tablet Take 1 tablet (10 mg total) by mouth 3 (three) times daily. 90 tablet 2    bisacodyL (DULCOLAX) 10 mg Supp Place 1 suppository (10 mg total) rectally daily as needed (constipation). 1 suppository 0    gabapentin (NEURONTIN) 300 MG capsule Take 2 capsules (600 mg total) by mouth 3 (three) times daily. 180 capsule 11    gabapentin (NEURONTIN) 600 MG tablet TAKE 1 AND 1/2 TABLETS(900 MG) BY  MOUTH THREE TIMES DAILY 135 tablet 11    HYDROmorphone (DILAUDID) 4 MG tablet Take 1 tablet (4 mg total) by mouth every 4 (four) hours as needed for Pain (severe pain 7-10/10). 1 tablet 0    nicotine (NICODERM CQ) 14 mg/24 hr Place 1 patch onto the skin once daily. 1 patch 0    ondansetron (ZOFRAN-ODT) 8 MG TbDL Take 1 tablet (8 mg total) by mouth every 6 (six) hours as needed (n/v). 1 tablet 0    oxyCODONE (ROXICODONE) 10 mg Tab immediate release tablet Take 1 tablet (10 mg total) by mouth every 6 (six) hours as needed for Pain. 60 tablet 0    polyethylene glycol (GLYCOLAX) 17 gram PwPk Take 17 g by mouth 2 (two) times daily. 1 packet 0    senna-docusate 8.6-50 mg (PERICOLACE) 8.6-50 mg per tablet Take 2 tablets by mouth nightly as needed for Constipation. 1 tablet 0    traMADoL (ULTRAM) 50 mg tablet Take 1 tablet (50 mg total) by mouth every 6 (six) hours as needed for Pain (Mild pain 1-3/10). 1 tablet 0    traMADoL (ULTRAM) 50 mg tablet Take 1 tablet (50 mg total) by mouth every 8 (eight) hours as needed for Pain. 90 tablet 3     No current facility-administered medications on file prior to visit.       ALLERGIES:  Review of patient's allergies indicates:  No Known Allergies    REVIEW OF SYSTEMS:  Review of Systems   Constitutional:  Negative for diaphoresis, fever and weight loss.   Respiratory:  Negative for shortness of breath.    Cardiovascular:  Negative for chest pain.   Gastrointestinal:  Negative for blood in stool.   Genitourinary:  Negative for hematuria.   Endo/Heme/Allergies:  Does not bruise/bleed easily.   All other systems reviewed and are negative.      OBJECTIVE:    PHYSICAL EXAMINATION:   Vitals:    05/29/24 1452   BP: 119/77   Pulse: 73       Physical Exam:  Vitals reviewed.    Constitutional: She appears well-developed and well-nourished.     Eyes: Pupils are equal, round, and reactive to light. Conjunctivae and EOM are normal.     Cardiovascular: Normal distal pulses and no edema.      Abdominal: Soft.     Skin: Skin displays no rash on trunk and no rash on extremities. Skin displays no lesions on trunk and no lesions on extremities.     Psych/Behavior: She is alert. She is oriented to person, place, and time. She has a normal mood and affect.     Musculoskeletal:        Neck: Range of motion is full.       Ortho Exam      Neurologic Exam     Mental Status   Oriented to person, place, and time.     Cranial Nerves     CN III, IV, VI   Pupils are equal, round, and reactive to light.  Extraocular motions are normal.     Motor Exam     Strength   Strength 5/5 throughout.         DIAGNOSTIC DATA:  I personally interpreted the following imaging:   Scoliosis film today shows good positioning of hardware, no lucency around hardware        ASSESSMENT:  This is a 39 y.o. female with     Problem List Items Addressed This Visit    None  Visit Diagnoses       Status post lumbar spinal fusion    -  Primary            PLAN:  Recommending calcium and vitamin-D supplementation twice daily  Okay to start taking NSAIDs as needed such as Aleve once her twice a day  Try to reduce oxycodone use    More than 50% of the time was spent on discussing conservative management treatments (medication, physical therapy exercises) and possible interventions (spinal injections and surgical procedures). Care coordination was discussed.    20 min            Bakari Zaragoza MD  Cell:[phone]

## 2024-05-31 ENCOUNTER — CLINICAL SUPPORT (OUTPATIENT)
Dept: REHABILITATION | Facility: HOSPITAL | Age: 39
End: 2024-05-31
Payer: MEDICAID

## 2024-05-31 DIAGNOSIS — R29.898 WEAKNESS OF LEFT LOWER EXTREMITY: ICD-10-CM

## 2024-05-31 DIAGNOSIS — R26.9 GAIT DIFFICULTY: ICD-10-CM

## 2024-05-31 DIAGNOSIS — M53.86 DECREASED RANGE OF MOTION OF LUMBAR SPINE: Primary | ICD-10-CM

## 2024-05-31 PROCEDURE — 97110 THERAPEUTIC EXERCISES: CPT

## 2024-05-31 NOTE — PROGRESS NOTES
[facility]   Physical Therapy Treatment Note        Name: Kevin Méndez [facility] Number: [id number]    Therapy Diagnosis:   Encounter Diagnoses   Name Primary?    Decreased range of motion of lumbar spine Yes    Weakness of left lower extremity     Gait difficulty      Physician: Order, Paper    Visit Date: 5/31/2024    Physician Orders: PT Eval and Treat  Medical Diagnosis from Referral: M47.816 (ICD-10-CM) - Spondylosis of lumbar region without myelopathy or radiculopathy   Evaluation Date: 3/25/2024  Authorization Period Expiration: 12/31/2024  Plan of Care Expiration: 7/8/2024  Visit # / Visits authorized: 18/ 20   FOTO #2: 21% on 4/19/2024  FOTO: #3: 31% on 5/3/2024    Time In: 1000  Time Out: 1100  Total Billable Time: 55 minutes    Precautions: Standard and Smoker    Date of Surgery: 2/27/2024  Procedure Performed:  Left L4-5 oblique interbody fusion, placement of interbody spacer, DePuy Conduit LLIF cage filled with allograft BMP and DBM  L5-S1 anterior interbody fusion, placement of interbody spacer, DePuy Conduit ALIF cage filled with allograft BMP and DBM  L4-S1 posterior segmental instrumentation using DePuy Viper Prime system  Registration of navigated instruments using intraoperative 3D imaging [facility] and [facility] station    SUBJECTIVE     Pt reports: surgeon is pleased with her current progress. No new symptoms to report.  She was compliant with home exercise program.  Response to previous treatment: soreness  Functional change: Ongoing    Pain: 5/10  Location: Lumbar    OBJECTIVE     Objective Measures updated at progress report unless specified.     TREATMENT     Kevin received the treatments listed below:      Therapeutic Exercises to develop strength, endurance, ROM, posture, and core stabilization for 55 minutes including:    Step Ups - 6 inch box; x20 bilateral  Shuttle Leg Press - double leg, 3.5 bands + brace; x30  Shuttle Leg Press - Left lower extremity,  2.5 bands + brace; x30  Heel Raises - 3x10  Standing Chest press and Overhead lift - 4# ball; 3x10  Hip Hinging - x30  Sled Push - 45#; 2 turf lap  Gait - brace only; 200 feet    Not Today:  Recumbent Bike - 8 minutes  DKTC with ball - x20  DKTC with ball + Pallof twist - x20 bilateral  Bridges - 3x10  Marches - 3x10 bilateral  Lunges - x20 bilateral  Agility Ladder - no assistive device; in-outs; 1 lap each  Pallof Press with OH lift - orange bands; 2x10 bilateral  Stomps - 3 trials (100 bmp)    Standing Hip Abduction - yellow band, 3x10 bilateral      Manual Therapy Techniques: Joint mobilizations were applied to the: hip and ankle for 00 minutes, including:  Cupping To Left lumbar paraspinals    Not Today:  Left lower extremity long axis distraction - grade V  Left talocrural distraction - grade V      PATIENT EDUCATION AND HOME EXERCISES      Home Exercises Provided and Patient Education Provided     Education provided:   Anatomy and Pathology.  Symptom management and plan of care progressions.  Home Exercise Program.    Written Home Exercises Provided: Patient instructed to cont prior HEP. Exercises were reviewed and Kevin was able to demonstrate them prior to the end of the session.  Kevin demonstrated good  understanding of the education provided. See EMR under Patient Instructions for exercises provided during therapy sessions    ASSESSMENT     Kevin continues to demonstrate improved function as noted by improved gait speed today. Progressive challenges to gait and core utilization as noted by today's exercises. Increased rest breaks required due to increased challenge. Began addressing hip hinge movement mechanics. Appropriately challenged with today's progressions. She will continue to benefit from skilled physical therapy to improve her strength and motor control for functional, independent activities of daily living.    From evaluation on 3/25/2024 - Kevin is a 39 y.o. female referred to  outpatient Physical Therapy with a medical diagnosis of Spondylosis of lumbar region without myelopathy or radiculopathy. She is status-post L4-S1 fusion performed on 2/272024 by Dr. Bakari Zaragoza. Patient presents with decreased range of motion, decreased strength, decreased balance, and risk for falls. Signs and symptoms are consistent with the above medical procedure. She will benefit from skilled physical therapy addressing her lower extremity strength, core stabilization, and proprioceptive retraining.    Kevin Is progressing well towards her goals.   Pt prognosis is Fair.     Pt will continue to benefit from skilled outpatient physical therapy to address the deficits listed in the problem list box on initial evaluation, provide pt/family education and to maximize pt's level of independence in the home and community environment. Pt's spiritual, cultural and educational needs considered and pt agreeable to plan of care and goals.     Anticipated barriers to physical therapy: history of low back pain; multiple surgeries.     Goals:  Short Term Goals: 6 weeks   Patient will have reduced pain complaints from 10/10 to less than or equal to 6/10. (Met - 4/30/2024)  Patient will demonstrate increased AROM/PROM by approximately 25% to 50% of initial measurements. (Not Met - Progressing)  Patient will demonstrate increased muscle strength of at least one-half grade as compared to the initial measurements. (Not Met - Progressing)     Long Term Goals: 12 weeks   Patient will have reduced pain complaints from 6/10 to less than or equal to 2/10. (Not Met - Progressing)  Patient will perform the 5 time sit to stand test in under 10 seconds. (Not Met - Progressing)  Patient will demonstrate increased muscle strength of at least one grade as compared to the initial measurements. (Not Met - Progressing)  Patient will improve Lumbar Spine FOTO Intake score from 9% to greater than or equal to 33%. (Met - 5/3/2024)  Patient will  be independent with their home exercise program. (Not Met - Progressing)    PLAN     Core stabilization  Left lower extremity strengthening  Proprioceptive retraining.    Plan of care Certification: 3/25/2024 to 7/8/2024.  Outpatient Physical Therapy 1 times weekly for 12 weeks.    Faraz Doe, PT , DPT, OCS

## 2024-06-03 ENCOUNTER — PATIENT MESSAGE (OUTPATIENT)
Dept: NEUROSURGERY | Facility: CLINIC | Age: 39
End: 2024-06-03
Payer: MEDICAID

## 2024-06-03 ENCOUNTER — CLINICAL SUPPORT (OUTPATIENT)
Dept: REHABILITATION | Facility: HOSPITAL | Age: 39
End: 2024-06-03
Payer: MEDICAID

## 2024-06-03 DIAGNOSIS — R26.9 GAIT DIFFICULTY: ICD-10-CM

## 2024-06-03 DIAGNOSIS — M53.86 DECREASED RANGE OF MOTION OF LUMBAR SPINE: Primary | ICD-10-CM

## 2024-06-03 DIAGNOSIS — R29.898 WEAKNESS OF LEFT LOWER EXTREMITY: ICD-10-CM

## 2024-06-03 PROCEDURE — 97110 THERAPEUTIC EXERCISES: CPT

## 2024-06-03 NOTE — PROGRESS NOTES
[facility]   Physical Therapy Treatment Note        Name: Kevin Méndez [facility] Number: [id number]    Therapy Diagnosis:   Encounter Diagnoses   Name Primary?    Decreased range of motion of lumbar spine Yes    Weakness of left lower extremity     Gait difficulty      Physician: Order, Paper    Visit Date: 6/3/2024    Physician Orders: PT Eval and Treat  Medical Diagnosis from Referral: M47.816 (ICD-10-CM) - Spondylosis of lumbar region without myelopathy or radiculopathy   Evaluation Date: 3/25/2024  Authorization Period Expiration: 12/31/2024  Plan of Care Expiration: 7/8/2024  Visit # / Visits authorized: 18/ 20   FOTO #2: 21% on 4/19/2024  FOTO: #3: 31% on 5/3/2024    Time In: 1100  Time Out: 1200  Total Billable Time: 60 minutes    Precautions: Standard and Smoker    Date of Surgery: 2/27/2024  Procedure Performed:  Left L4-5 oblique interbody fusion, placement of interbody spacer, DePuy Conduit LLIF cage filled with allograft BMP and DBM  L5-S1 anterior interbody fusion, placement of interbody spacer, DePuy Conduit ALIF cage filled with allograft BMP and DBM  L4-S1 posterior segmental instrumentation using DePuy Viper Prime system  Registration of navigated instruments using intraoperative 3D imaging [facility] and [facility] station    SUBJECTIVE     Pt reports: she was able to stand and cook yesterday for several days.  She was compliant with home exercise program.  Response to previous treatment: soreness  Functional change: Ongoing    Pain: 5/10  Location: Lumbar    OBJECTIVE     Objective Measures updated at progress report unless specified.     TREATMENT     Kevin received the treatments listed below:      Therapeutic Exercises to develop strength, endurance, ROM, posture, and core stabilization for 55 minutes including:  Recumbent Bike - 8 minutes  Step Ups - 6 inch box; no brace; x20 bilateral  Shuttle Leg Press - double leg, 3.5 bands + brace; x30  Shuttle Leg Press -  Left lower extremity, 2.5 bands + brace; x30  Airex Balance - marches, power band perturbations, around the worlds (10# KB); no brace  Heel Raises - 3x10  Hip Hinging - x30 (no brace)  Gait - No brace; 200 feet    Not Today:  Bridges - 3x10  Lunges - x20 bilateral  Agility Ladder - no assistive device; in-outs; 1 lap each  Pallof Press with OH lift - orange bands; 2x10 bilateral  Stomps - 3 trials (100 bmp)  Standing Hip Abduction - yellow band, 3x10 bilateral  Standing Chest press and Overhead lift - 4# ball; 3x10  Sled Push - 45#; 2 turf lap      Manual Therapy Techniques: Joint mobilizations were applied to the: hip and ankle for 00 minutes, including:  Cupping To Left lumbar paraspinals    Not Today:  Left lower extremity long axis distraction - grade V  Left talocrural distraction - grade V      PATIENT EDUCATION AND HOME EXERCISES      Home Exercises Provided and Patient Education Provided     Education provided:   Anatomy and Pathology.  Symptom management and plan of care progressions.  Home Exercise Program.    Written Home Exercises Provided: Patient instructed to cont prior HEP. Exercises were reviewed and Kevin was able to demonstrate them prior to the end of the session.  Kevin demonstrated good  understanding of the education provided. See EMR under Patient Instructions for exercises provided during therapy sessions    ASSESSMENT     Kevin continues to demonstrate improved function as noted by sensation present in 3rd toe. Progressive challenges to gait and core utilization as noted by today's exercises with no brace use. Increased rest breaks required due to increased challenge. Continue addressing hip hinge movement mechanics and proprioceptive challenges. No exacerbation in symptoms following today's treatments. She will continue to benefit from skilled physical therapy to improve her strength and motor control for functional, independent activities of daily living.    From evaluation on  3/25/2024 - Kevin is a 39 y.o. female referred to outpatient Physical Therapy with a medical diagnosis of Spondylosis of lumbar region without myelopathy or radiculopathy. She is status-post L4-S1 fusion performed on 2/272024 by Dr. Bakari Zaragoza. Patient presents with decreased range of motion, decreased strength, decreased balance, and risk for falls. Signs and symptoms are consistent with the above medical procedure. She will benefit from skilled physical therapy addressing her lower extremity strength, core stabilization, and proprioceptive retraining.    Kevin Is progressing well towards her goals.   Pt prognosis is Fair.     Pt will continue to benefit from skilled outpatient physical therapy to address the deficits listed in the problem list box on initial evaluation, provide pt/family education and to maximize pt's level of independence in the home and community environment. Pt's spiritual, cultural and educational needs considered and pt agreeable to plan of care and goals.     Anticipated barriers to physical therapy: history of low back pain; multiple surgeries.     Goals:  Short Term Goals: 6 weeks   Patient will have reduced pain complaints from 10/10 to less than or equal to 6/10. (Met - 4/30/2024)  Patient will demonstrate increased AROM/PROM by approximately 25% to 50% of initial measurements. (Not Met - Progressing)  Patient will demonstrate increased muscle strength of at least one-half grade as compared to the initial measurements. (Not Met - Progressing)     Long Term Goals: 12 weeks   Patient will have reduced pain complaints from 6/10 to less than or equal to 2/10. (Not Met - Progressing)  Patient will perform the 5 time sit to stand test in under 10 seconds. (Not Met - Progressing)  Patient will demonstrate increased muscle strength of at least one grade as compared to the initial measurements. (Not Met - Progressing)  Patient will improve Lumbar Spine FOTO Intake score from 9% to greater  than or equal to 33%. (Met - 5/3/2024)  Patient will be independent with their home exercise program. (Not Met - Progressing)    PLAN     Core stabilization  Left lower extremity strengthening  Proprioceptive retraining.    Plan of care Certification: 3/25/2024 to 7/8/2024.  Outpatient Physical Therapy 1 times weekly for 12 weeks.    Faraz Doe, PT , DPT, OCS

## 2024-06-04 RX ORDER — OXYCODONE HYDROCHLORIDE 10 MG/1
10 TABLET ORAL EVERY 6 HOURS PRN
Qty: 60 TABLET | Refills: 0 | Status: SHIPPED | OUTPATIENT
Start: 2024-06-04 | End: 2024-06-18 | Stop reason: SDUPTHER

## 2024-06-05 NOTE — PROGRESS NOTES
[facility]   Physical Therapy Treatment Note        Name: Kevin Méndez [facility] Number: [id number]    Therapy Diagnosis:   Encounter Diagnoses   Name Primary?    Decreased range of motion of lumbar spine Yes    Weakness of left lower extremity     Gait difficulty      Physician: Order, Paper    Visit Date: 6/7/2024    Physician Orders: PT Eval and Treat  Medical Diagnosis from Referral: M47.816 (ICD-10-CM) - Spondylosis of lumbar region without myelopathy or radiculopathy   Evaluation Date: 3/25/2024  Authorization Period Expiration: 12/31/2024  Plan of Care Expiration: 7/8/2024  Visit # / Visits authorized: 18/ 20   FOTO #2: 21% on 4/19/2024  FOTO: #3: 31% on 5/3/2024    Time In: 1100  Time Out: 1200  Total Billable Time: 55 minutes    Precautions: Standard and Smoker    Date of Surgery: 2/27/2024  Procedure Performed:  Left L4-5 oblique interbody fusion, placement of interbody spacer, DePuy Conduit LLIF cage filled with allograft BMP and DBM  L5-S1 anterior interbody fusion, placement of interbody spacer, DePuy Conduit ALIF cage filled with allograft BMP and DBM  L4-S1 posterior segmental instrumentation using DePuy Viper Prime system  Registration of navigated instruments using intraoperative 3D imaging [facility] and [facility] station    SUBJECTIVE     Pt reports: no major changes in sensation since the last visit, but her ambulating abilities are steadily improving.  She was compliant with home exercise program.  Response to previous treatment: soreness  Functional change: Ongoing    Pain: 5/10  Location: Lumbar    OBJECTIVE     Objective Measures updated at progress report unless specified.     TREATMENT     Kevin received the treatments listed below:      Therapeutic Exercises to develop strength, endurance, ROM, posture, and core stabilization for 55 minutes including:  Recumbent Bike - 8 minutes  Step Ups - 6 inch box; no brace; x20 bilateral  Shuttle Leg Press - double  leg, 3.5 bands + brace; x30  Shuttle Leg Press - Left lower extremity, 2.5 bands + brace; x30  Cable Column Hip Hinging - 13#; 3x10 (brace)  Gait - No brace; 200 feet  Golf Snowflake Anti-rotation steps - light band; 3x5 bilateral    Not Today:  Bridges - 3x10  Lunges - x20 bilateral  Agility Ladder - no assistive device; in-outs; 1 lap each  Stomps - 3 trials (100 bmp)  Standing Hip Abduction - yellow band, 3x10 bilateral  Standing Chest press and Overhead lift - 4# ball; 3x10  Sled Push - 45#; 2 turf lap  Airex Balance - marches, power band perturbations, around the worlds (10# KB); no brace  Heel Raises - 3x10      Manual Therapy Techniques: Joint mobilizations were applied to the: hip and ankle for 00 minutes, including:  Cupping To Left lumbar paraspinals    Not Today:  Left lower extremity long axis distraction - grade V  Left talocrural distraction - grade V      PATIENT EDUCATION AND HOME EXERCISES      Home Exercises Provided and Patient Education Provided     Education provided:   Anatomy and Pathology.  Symptom management and plan of care progressions.  Home Exercise Program.    Written Home Exercises Provided: Patient instructed to cont prior HEP. Exercises were reviewed and Kevin was able to demonstrate them prior to the end of the session.  Kevin demonstrated good  understanding of the education provided. See EMR under Patient Instructions for exercises provided during therapy sessions    ASSESSMENT     Kevin returns to the clinic with steady functional improvements. Ongoing progressions to functional gait and core utilization training as noted by today's exercises with no brace use and increased resistances. Increased gait speed and improved heel strike during gait without brace today. No exacerbation in symptoms following today's treatments. She will continue to benefit from skilled physical therapy to improve her strength and motor control for functional, independent activities of daily  living.    From evaluation on 3/25/2024 - Kevin is a 39 y.o. female referred to outpatient Physical Therapy with a medical diagnosis of Spondylosis of lumbar region without myelopathy or radiculopathy. She is status-post L4-S1 fusion performed on 2/272024 by Dr. Bakari Zaragoza. Patient presents with decreased range of motion, decreased strength, decreased balance, and risk for falls. Signs and symptoms are consistent with the above medical procedure. She will benefit from skilled physical therapy addressing her lower extremity strength, core stabilization, and proprioceptive retraining.    Kevin Is progressing well towards her goals.   Pt prognosis is Fair.     Pt will continue to benefit from skilled outpatient physical therapy to address the deficits listed in the problem list box on initial evaluation, provide pt/family education and to maximize pt's level of independence in the home and community environment. Pt's spiritual, cultural and educational needs considered and pt agreeable to plan of care and goals.     Anticipated barriers to physical therapy: history of low back pain; multiple surgeries.     Goals:  Short Term Goals: 6 weeks   Patient will have reduced pain complaints from 10/10 to less than or equal to 6/10. (Met - 4/30/2024)  Patient will demonstrate increased AROM/PROM by approximately 25% to 50% of initial measurements. (Not Met - Progressing)  Patient will demonstrate increased muscle strength of at least one-half grade as compared to the initial measurements. (Not Met - Progressing)     Long Term Goals: 12 weeks   Patient will have reduced pain complaints from 6/10 to less than or equal to 2/10. (Not Met - Progressing)  Patient will perform the 5 time sit to stand test in under 10 seconds. (Not Met - Progressing)  Patient will demonstrate increased muscle strength of at least one grade as compared to the initial measurements. (Not Met - Progressing)  Patient will improve Lumbar Spine FOTO  Intake score from 9% to greater than or equal to 33%. (Met - 5/3/2024)  Patient will be independent with their home exercise program. (Not Met - Progressing)    PLAN     Core stabilization  Left lower extremity strengthening  Proprioceptive retraining.    Plan of care Certification: 3/25/2024 to 7/8/2024.  Outpatient Physical Therapy 1 times weekly for 12 weeks.    Faraz Doe, PT , DPT, OCS

## 2024-06-07 ENCOUNTER — CLINICAL SUPPORT (OUTPATIENT)
Dept: REHABILITATION | Facility: HOSPITAL | Age: 39
End: 2024-06-07
Payer: MEDICAID

## 2024-06-07 DIAGNOSIS — M53.86 DECREASED RANGE OF MOTION OF LUMBAR SPINE: Primary | ICD-10-CM

## 2024-06-07 DIAGNOSIS — R29.898 WEAKNESS OF LEFT LOWER EXTREMITY: ICD-10-CM

## 2024-06-07 DIAGNOSIS — R26.9 GAIT DIFFICULTY: ICD-10-CM

## 2024-06-07 PROCEDURE — 97110 THERAPEUTIC EXERCISES: CPT

## 2024-06-11 ENCOUNTER — CLINICAL SUPPORT (OUTPATIENT)
Dept: REHABILITATION | Facility: HOSPITAL | Age: 39
End: 2024-06-11
Payer: MEDICAID

## 2024-06-11 DIAGNOSIS — R29.898 WEAKNESS OF LEFT LOWER EXTREMITY: ICD-10-CM

## 2024-06-11 DIAGNOSIS — R26.9 GAIT DIFFICULTY: ICD-10-CM

## 2024-06-11 DIAGNOSIS — M53.86 DECREASED RANGE OF MOTION OF LUMBAR SPINE: Primary | ICD-10-CM

## 2024-06-11 PROCEDURE — 97110 THERAPEUTIC EXERCISES: CPT

## 2024-06-11 NOTE — PROGRESS NOTES
[facility] OUTPATIENT THERAPY AND WELLNESS   Physical Therapy Treatment Note        Name: Kevin Méndez [facility] Number: [id number]    Therapy Diagnosis:   Encounter Diagnoses   Name Primary?    Decreased range of motion of lumbar spine Yes    Weakness of left lower extremity     Gait difficulty      Physician: Order, Paper    Visit Date: 6/11/2024    Physician Orders: PT Eval and Treat  Medical Diagnosis from Referral: M47.816 (ICD-10-CM) - Spondylosis of lumbar region without myelopathy or radiculopathy   Evaluation Date: 3/25/2024  Authorization Period Expiration: 12/31/2024  Plan of Care Expiration: 7/8/2024  Visit # / Visits authorized: 21/ 20   FOTO #2: 21% on 4/19/2024  FOTO: #3: 31% on 5/3/2024    Time In: 1000  Time Out: 1100  Total Billable Time: 55 minutes    Precautions: Standard and Smoker    Date of Surgery: 2/27/2024  Procedure Performed:  Left L4-5 oblique interbody fusion, placement of interbody spacer, DePuy Conduit LLIF cage filled with allograft BMP and DBM  L5-S1 anterior interbody fusion, placement of interbody spacer, DePuy Conduit ALIF cage filled with allograft BMP and DBM  L4-S1 posterior segmental instrumentation using DePuy Viper Prime system  Registration of navigated instruments using intraoperative 3D imaging [facility] and [facility] station    SUBJECTIVE     Pt reports: gradual improvements in Left lower extremity sensation.  She was compliant with home exercise program.  Response to previous treatment: soreness  Functional change: Ongoing    Pain: 5/10  Location: Lumbar    OBJECTIVE     Objective Measures updated at progress report unless specified.     TREATMENT     Kevin received the treatments listed below:      Therapeutic Exercises to develop strength, endurance, ROM, posture, and core stabilization for 55 minutes including:  Recumbent Bike - 8 minutes  Shuttle Leg Press - double leg, 4 bands + brace; x30  Shuttle Leg Press - Left lower extremity, 3 bands + brace; x30  Gait -  dual task and multi-directional training with brace to improve balance and coordination    Not Today:  Lunges - x20 bilateral  Agility Ladder - no assistive device; in-outs; 1 lap each  Stomps - 3 trials (100 bmp)  Standing Hip Abduction - yellow band, 3x10 bilateral  Standing Chest press and Overhead lift - 4# ball; 3x10  Sled Push - 45#; 2 turf lap  Airex Balance - marches, power band perturbations, around the worlds (10# KB); no brace  Golf Trainer Anti-rotation steps - light band; 3x5 bilateral  Step Ups - 6 inch box; no brace; x20 bilateral  Cable Column Hip Hinging - 13#; 3x10 (brace)      Manual Therapy Techniques: Joint mobilizations were applied to the: hip and ankle for 00 minutes, including:  Cupping To Left lumbar paraspinals    Not Today:  Left lower extremity long axis distraction - grade V  Left talocrural distraction - grade V      PATIENT EDUCATION AND HOME EXERCISES      Home Exercises Provided and Patient Education Provided     Education provided:   Anatomy and Pathology.  Symptom management and plan of care progressions.  Home Exercise Program.    Written Home Exercises Provided: Patient instructed to cont prior HEP. Exercises were reviewed and Kevin was able to demonstrate them prior to the end of the session.  Kevin demonstrated good  understanding of the education provided. See EMR under Patient Instructions for exercises provided during therapy sessions    ASSESSMENT     Kevin returns to the clinic with steady functional improvements. Ongoing progressions to functional gait. Demonstrated significant improvements in gait speed and stability following today's training. Discontinued use of cane. No exacerbation in symptoms following today's treatments. She will continue to benefit from skilled physical therapy to improve her strength and motor control for functional, independent activities of daily living.    From evaluation on 3/25/2024 - Kevin is a 39 y.o. female referred to  outpatient Physical Therapy with a medical diagnosis of Spondylosis of lumbar region without myelopathy or radiculopathy. She is status-post L4-S1 fusion performed on 2/272024 by Dr. Bakari Zaragoza. Patient presents with decreased range of motion, decreased strength, decreased balance, and risk for falls. Signs and symptoms are consistent with the above medical procedure. She will benefit from skilled physical therapy addressing her lower extremity strength, core stabilization, and proprioceptive retraining.    Kevin Is progressing well towards her goals.   Pt prognosis is Fair.     Pt will continue to benefit from skilled outpatient physical therapy to address the deficits listed in the problem list box on initial evaluation, provide pt/family education and to maximize pt's level of independence in the home and community environment. Pt's spiritual, cultural and educational needs considered and pt agreeable to plan of care and goals.     Anticipated barriers to physical therapy: history of low back pain; multiple surgeries.     Goals:  Short Term Goals: 6 weeks   Patient will have reduced pain complaints from 10/10 to less than or equal to 6/10. (Met - 4/30/2024)  Patient will demonstrate increased AROM/PROM by approximately 25% to 50% of initial measurements. (Not Met - Progressing)  Patient will demonstrate increased muscle strength of at least one-half grade as compared to the initial measurements. (Not Met - Progressing)     Long Term Goals: 12 weeks   Patient will have reduced pain complaints from 6/10 to less than or equal to 2/10. (Not Met - Progressing)  Patient will perform the 5 time sit to stand test in under 10 seconds. (Not Met - Progressing)  Patient will demonstrate increased muscle strength of at least one grade as compared to the initial measurements. (Not Met - Progressing)  Patient will improve Lumbar Spine FOTO Intake score from 9% to greater than or equal to 33%. (Met - 5/3/2024)  Patient will  be independent with their home exercise program. (Not Met - Progressing)    PLAN     Core stabilization  Left lower extremity strengthening  Proprioceptive retraining.    Plan of care Certification: 3/25/2024 to 7/8/2024.  Outpatient Physical Therapy 1 times weekly for 12 weeks.    Faraz Doe, PT , DPT, OCS

## 2024-06-12 NOTE — PROGRESS NOTES
[facility]   Physical Therapy Treatment Note        Name: Kevin Méndez [facility] Number: [id number]    Therapy Diagnosis:   Encounter Diagnoses   Name Primary?    Decreased range of motion of lumbar spine Yes    Weakness of left lower extremity     Gait difficulty      Physician: Order, Paper    Visit Date: 6/13/2024    Physician Orders: PT Eval and Treat  Medical Diagnosis from Referral: M47.816 (ICD-10-CM) - Spondylosis of lumbar region without myelopathy or radiculopathy   Evaluation Date: 3/25/2024  Authorization Period Expiration: 12/31/2024  Plan of Care Expiration: 7/8/2024  Visit # / Visits authorized: 21/ 20   FOTO #2: 21% on 4/19/2024  FOTO: #3: 31% on 5/3/2024    Time In: 1000  Time Out: 1100  Total Billable Time: 55 minutes    Precautions: Standard and Smoker    Date of Surgery: 2/27/2024  Procedure Performed:  Left L4-5 oblique interbody fusion, placement of interbody spacer, DePuy Conduit LLIF cage filled with allograft BMP and DBM  L5-S1 anterior interbody fusion, placement of interbody spacer, DePuy Conduit ALIF cage filled with allograft BMP and DBM  L4-S1 posterior segmental instrumentation using DePuy Viper Prime system  Registration of navigated instruments using intraoperative 3D imaging [facility] and [facility] station    SUBJECTIVE     Pt reports: gradual improvements in Left lower extremity sensation.  She was compliant with home exercise program.  Response to previous treatment: soreness  Functional change: Ongoing    Pain: 5/10  Location: Lumbar    OBJECTIVE     6/13/2024    Manual Muscle Testing:  Lower Extremity   Action Left Right   Hip Flexion 23.3 kg 31.5 kg   Hip Extension 4- / 5 4 / 5   Hip Abduction 3+ / 5 4- / 5   Knee Extension 16.8 kg 37.8 kg   Knee Flexion 15.1 kg 30.1 kg   Ankle Dorsiflexion 8.5 kg 17.9 kg        TREATMENT     Kevin received the treatments listed below:      Therapeutic Exercises to develop strength, endurance, ROM, posture, and  core stabilization for 55 minutes including:  Recumbent Bike - 8 minutes  Shuttle Leg Press - double leg, 4 bands + brace; x30  Shuttle Leg Press - Left lower extremity, 3 bands + brace; x30  Dead Lifts - with brace; 6 inch box + 10# DB; 2x10 (heavy cuing needed for form)    Not Today:  Lunges - x20 bilateral  Agility Ladder - no assistive device; in-outs; 1 lap each  Stomps - 3 trials (100 bmp)  Standing Hip Abduction - yellow band, 3x10 bilateral  Standing Chest press and Overhead lift - 4# ball; 3x10  Sled Push - 45#; 2 turf lap  Airex Balance - marches, power band perturbations, around the worlds (10# KB); no brace  Golf Trainer Anti-rotation steps - light band; 3x5 bilateral  Step Ups - 6 inch box; no brace; x20 bilateral  Cable Column Hip Hinging - 13#; 3x10 (brace)  Gait - dual task and multi-directional training with brace to improve balance and coordination      Manual Therapy Techniques: Joint mobilizations were applied to the: hip and ankle for 00 minutes, including:  Cupping To Left lumbar paraspinals    Not Today:  Left lower extremity long axis distraction - grade V  Left talocrural distraction - grade V      PATIENT EDUCATION AND HOME EXERCISES      Home Exercises Provided and Patient Education Provided     Education provided:   Anatomy and Pathology.  Symptom management and plan of care progressions.  Home Exercise Program.    Written Home Exercises Provided: Patient instructed to cont prior HEP. Exercises were reviewed and Kevin was able to demonstrate them prior to the end of the session.  Kevin demonstrated good  understanding of the education provided. See EMR under Patient Instructions for exercises provided during therapy sessions    ASSESSMENT     Kevin has met 3 out of 8 goals as of the 23rd visit and is progressing gradually towards her remaining goals. She is currently ambulating without any assistive device, but is still using the abdominal brace. She is making steady progress in  the strength and motor control of her Left lower extremity. She will continue to benefit from skilled physical therapy to improve her strength and motor control for functional, independent activities of daily living.    From evaluation on 3/25/2024 - Kevin is a 39 y.o. female referred to outpatient Physical Therapy with a medical diagnosis of Spondylosis of lumbar region without myelopathy or radiculopathy. She is status-post L4-S1 fusion performed on 2/272024 by Dr. Bakari Zaragoza. Patient presents with decreased range of motion, decreased strength, decreased balance, and risk for falls. Signs and symptoms are consistent with the above medical procedure. She will benefit from skilled physical therapy addressing her lower extremity strength, core stabilization, and proprioceptive retraining.    Kevin Is progressing well towards her goals.   Pt prognosis is Fair.     Pt will continue to benefit from skilled outpatient physical therapy to address the deficits listed in the problem list box on initial evaluation, provide pt/family education and to maximize pt's level of independence in the home and community environment. Pt's spiritual, cultural and educational needs considered and pt agreeable to plan of care and goals.     Anticipated barriers to physical therapy: history of low back pain; multiple surgeries.     Goals:  Short Term Goals: 6 weeks   Patient will have reduced pain complaints from 10/10 to less than or equal to 6/10. (Met - 4/30/2024)  Patient will demonstrate increased AROM/PROM by approximately 25% to 50% of initial measurements. (Not Met - Progressing)  Patient will demonstrate increased muscle strength of at least one-half grade as compared to the initial measurements. (Met - 6/13/2024)     Long Term Goals: 12 weeks   Patient will have reduced pain complaints from 6/10 to less than or equal to 2/10. (Not Met - Progressing)  Patient will perform the 5 time sit to stand test in under 10 seconds.  (Not Met - Progressing)  Patient will demonstrate increased muscle strength of at least one grade as compared to the initial measurements. (Not Met - Progressing)  Patient will improve Lumbar Spine FOTO Intake score from 9% to greater than or equal to 33%. (Met - 5/3/2024)  Patient will be independent with their home exercise program. (Not Met - Progressing)    PLAN     Core stabilization  Left lower extremity strengthening  Proprioceptive retraining.    Plan of care Certification: 3/25/2024 to 7/8/2024.  Outpatient Physical Therapy 1 times weekly for 12 weeks.    Faraz Doe, PT , DPT, OCS

## 2024-06-13 ENCOUNTER — CLINICAL SUPPORT (OUTPATIENT)
Dept: REHABILITATION | Facility: HOSPITAL | Age: 39
End: 2024-06-13
Payer: MEDICAID

## 2024-06-13 DIAGNOSIS — M53.86 DECREASED RANGE OF MOTION OF LUMBAR SPINE: Primary | ICD-10-CM

## 2024-06-13 DIAGNOSIS — R29.898 WEAKNESS OF LEFT LOWER EXTREMITY: ICD-10-CM

## 2024-06-13 DIAGNOSIS — R26.9 GAIT DIFFICULTY: ICD-10-CM

## 2024-06-13 PROCEDURE — 97110 THERAPEUTIC EXERCISES: CPT

## 2024-06-17 ENCOUNTER — CLINICAL SUPPORT (OUTPATIENT)
Dept: REHABILITATION | Facility: HOSPITAL | Age: 39
End: 2024-06-17
Payer: MEDICAID

## 2024-06-17 DIAGNOSIS — R29.898 WEAKNESS OF LEFT LOWER EXTREMITY: ICD-10-CM

## 2024-06-17 DIAGNOSIS — M53.86 DECREASED RANGE OF MOTION OF LUMBAR SPINE: Primary | ICD-10-CM

## 2024-06-17 DIAGNOSIS — R26.9 GAIT DIFFICULTY: ICD-10-CM

## 2024-06-17 PROCEDURE — 97110 THERAPEUTIC EXERCISES: CPT | Mod: PO

## 2024-06-17 NOTE — PROGRESS NOTES
[facility]   Physical Therapy Treatment Note        Name: Kevin Méndez [facility] Number: [id number]    Therapy Diagnosis:   Encounter Diagnoses   Name Primary?    Decreased range of motion of lumbar spine Yes    Weakness of left lower extremity     Gait difficulty      Physician: Order, Paper    Visit Date: 6/17/2024    Physician Orders: PT Eval and Treat  Medical Diagnosis from Referral: M47.816 (ICD-10-CM) - Spondylosis of lumbar region without myelopathy or radiculopathy   Evaluation Date: 3/25/2024  Authorization Period Expiration: 12/31/2024  Plan of Care Expiration: 7/8/2024  Visit # / Visits authorized: 23/ 20   FOTO #2: 21% on 4/19/2024  FOTO: #3: 31% on 5/3/2024    Time In: 1038  Time Out: 1157  Total Billable Time: 70 minutes    Precautions: Standard and Smoker    Date of Surgery: 2/27/2024  Procedure Performed:  Left L4-5 oblique interbody fusion, placement of interbody spacer, DePuy Conduit LLIF cage filled with allograft BMP and DBM  L5-S1 anterior interbody fusion, placement of interbody spacer, DePuy Conduit ALIF cage filled with allograft BMP and DBM  L4-S1 posterior segmental instrumentation using DePuy Viper Prime system  Registration of navigated instruments using intraoperative 3D imaging [facility] and [facility] station    SUBJECTIVE     Pt reports: gradual improvements in Left lower extremity sensation.  She was compliant with home exercise program.  Response to previous treatment: soreness  Functional change: Ongoing    Pain: 5/10  Location: Lumbar    OBJECTIVE     6/13/2024    Manual Muscle Testing:  Lower Extremity   Action Left Right   Hip Flexion 23.3 kg 31.5 kg   Hip Extension 4- / 5 4 / 5   Hip Abduction 3+ / 5 4- / 5   Knee Extension 16.8 kg 37.8 kg   Knee Flexion 15.1 kg 30.1 kg   Ankle Dorsiflexion 8.5 kg 17.9 kg        TREATMENT     Kevin received the treatments listed below:      Therapeutic Exercises to develop strength, endurance, ROM, posture, and  core stabilization for 70 minutes including:  Recumbent Bike - 8 minutes  Shuttle Leg Press - double leg, 4 bands + brace; x30  Shuttle Leg Press - Left lower extremity, 3 bands + brace; x30  Cable Column Hip Hinging - 10#; 3x10 (brace)  Lateral Step with Lunge - x10 bilateral  Tidal Tank Carries - 3 laps  Farmer's Carries - 5#; 3 laps  Suitcase Carries - 10# KB; 3 laps  Golf Trainer Anti-rotation steps - light band; 3x5 bilateral  Table Plank - 20 seconds x4    Not Today:  Agility Ladder - no assistive device; in-outs; 1 lap each  Stomps - 3 trials (100 bmp)  Standing Hip Abduction - yellow band, 3x10 bilateral  Standing Chest press and Overhead lift - 4# ball; 3x10  Sled Push - 45#; 2 turf lap  Airex Balance - marches, power band perturbations, around the worlds (10# KB); no brace    Step Ups - 6 inch box; no brace; x20 bilateral  Dead Lifts - with brace; 6 inch box + 10# DB; 2x10 (heavy cuing needed for form)  Gait - dual task and multi-directional training with brace to improve balance and coordination      Manual Therapy Techniques: Joint mobilizations were applied to the: hip and ankle for 00 minutes, including:  Cupping To Left lumbar paraspinals    Not Today:  Left lower extremity long axis distraction - grade V  Left talocrural distraction - grade V      PATIENT EDUCATION AND HOME EXERCISES      Home Exercises Provided and Patient Education Provided     Education provided:   Anatomy and Pathology.  Symptom management and plan of care progressions.  Home Exercise Program.    Written Home Exercises Provided: Patient instructed to cont prior HEP. Exercises were reviewed and Kevin was able to demonstrate them prior to the end of the session.  Kevin demonstrated good  understanding of the education provided. See EMR under Patient Instructions for exercises provided during therapy sessions    ASSESSMENT     Seashley returns to the clinic with improving gait and lower extremity sensation. Gradual  progressions in functional mobility and strengthening activities as noted by weighted carries, planks, and reactive training. No exacerbation in symptoms following today's treatments. She will continue to benefit from skilled physical therapy to improve her strength and motor control for functional, independent activities of daily living.    From evaluation on 3/25/2024 - Kevin is a 39 y.o. female referred to outpatient Physical Therapy with a medical diagnosis of Spondylosis of lumbar region without myelopathy or radiculopathy. She is status-post L4-S1 fusion performed on 2/272024 by Dr. Bakari Zaragoza. Patient presents with decreased range of motion, decreased strength, decreased balance, and risk for falls. Signs and symptoms are consistent with the above medical procedure. She will benefit from skilled physical therapy addressing her lower extremity strength, core stabilization, and proprioceptive retraining.    Kevin Is progressing well towards her goals.   Pt prognosis is Fair.     Pt will continue to benefit from skilled outpatient physical therapy to address the deficits listed in the problem list box on initial evaluation, provide pt/family education and to maximize pt's level of independence in the home and community environment. Pt's spiritual, cultural and educational needs considered and pt agreeable to plan of care and goals.     Anticipated barriers to physical therapy: history of low back pain; multiple surgeries.     Goals:  Short Term Goals: 6 weeks   Patient will have reduced pain complaints from 10/10 to less than or equal to 6/10. (Met - 4/30/2024)  Patient will demonstrate increased AROM/PROM by approximately 25% to 50% of initial measurements. (Not Met - Progressing)  Patient will demonstrate increased muscle strength of at least one-half grade as compared to the initial measurements. (Met - 6/13/2024)     Long Term Goals: 12 weeks   Patient will have reduced pain complaints from 6/10 to  less than or equal to 2/10. (Not Met - Progressing)  Patient will perform the 5 time sit to stand test in under 10 seconds. (Not Met - Progressing)  Patient will demonstrate increased muscle strength of at least one grade as compared to the initial measurements. (Not Met - Progressing)  Patient will improve Lumbar Spine FOTO Intake score from 9% to greater than or equal to 33%. (Met - 5/3/2024)  Patient will be independent with their home exercise program. (Not Met - Progressing)    PLAN     Core stabilization  Left lower extremity strengthening  Proprioceptive retraining.    Plan of care Certification: 3/25/2024 to 7/8/2024.  Outpatient Physical Therapy 1 times weekly for 12 weeks.    Faraz Doe, PT , DPT, OCS   CONSTITUTIONAL: + Mildly appropriately fussy when examined, otherwise well-appearing, NAD, well appearing, nontoxic  SKIN: + Diffuse, erythematous, confluent maculopapular rash to torso, bilateral upper and lower extremities including palms and soles consistent with hand-foot-and-mouth disease, otherwise no cyanosis or abnormal skin color changes, well-perfused, warm and dry  HEAD: normocephalic, atraumatic  EYES:  normal inspection; conjunctivae without injection, drainage or discharge  ENT: Airway patent, no nasal discharge, MMM  NECK:  supple, full ROM, no nuchal rigidity  CARD:  RRR, cap refill <2s no peripheral cyanosis  RESP:  CTAB, symmetric chest wall expansion, no increased work of breathing  ABD:  S/NT, no R/G  MSK:  moving all extremities, no swelling or deformity  NEURO:  normal movement, normal tone, no lethargy

## 2024-06-19 ENCOUNTER — PATIENT MESSAGE (OUTPATIENT)
Dept: NEUROSURGERY | Facility: CLINIC | Age: 39
End: 2024-06-19
Payer: COMMERCIAL

## 2024-06-19 RX ORDER — OXYCODONE HYDROCHLORIDE 10 MG/1
10 TABLET ORAL EVERY 6 HOURS PRN
Qty: 60 TABLET | Refills: 0 | Status: SHIPPED | OUTPATIENT
Start: 2024-06-19

## 2024-06-19 NOTE — PROGRESS NOTES
[facility]   Physical Therapy Treatment Note        Name: Kevin Méndez [facility] Number: [id number]    Therapy Diagnosis:   Encounter Diagnoses   Name Primary?    Decreased range of motion of lumbar spine Yes    Weakness of left lower extremity     Gait difficulty        Physician: Order, Paper    Visit Date: 6/20/2024    Physician Orders: PT Eval and Treat  Medical Diagnosis from Referral: M47.816 (ICD-10-CM) - Spondylosis of lumbar region without myelopathy or radiculopathy   Evaluation Date: 3/25/2024  Authorization Period Expiration: 12/31/2024  Plan of Care Expiration: 7/8/2024  Visit # / Visits authorized: 24/ 20   FOTO #2: 21% on 4/19/2024  FOTO: #3: 31% on 5/3/2024    Time In: 1100  Time Out: 1200  Total Billable Time: 60 minutes    Precautions: Standard and Smoker    Date of Surgery: 2/27/2024  Procedure Performed:  Left L4-5 oblique interbody fusion, placement of interbody spacer, DePuy Conduit LLIF cage filled with allograft BMP and DBM  L5-S1 anterior interbody fusion, placement of interbody spacer, DePuy Conduit ALIF cage filled with allograft BMP and DBM  L4-S1 posterior segmental instrumentation using DePuy Viper Prime system  Registration of navigated instruments using intraoperative 3D imaging [facility] and [facility] station    SUBJECTIVE     Pt reports: was sore for one day after the last visit.  She was compliant with home exercise program.  Response to previous treatment: soreness  Functional change: Ongoing    Pain: 5/10  Location: Lumbar    OBJECTIVE     6/13/2024    Manual Muscle Testing:  Lower Extremity   Action Left Right   Hip Flexion 23.3 kg 31.5 kg   Hip Extension 4- / 5 4 / 5   Hip Abduction 3+ / 5 4- / 5   Knee Extension 16.8 kg 37.8 kg   Knee Flexion 15.1 kg 30.1 kg   Ankle Dorsiflexion 8.5 kg 17.9 kg        TREATMENT     Kevin received the treatments listed below:      Therapeutic Exercises to develop strength, endurance, ROM, posture, and core  stabilization for 60 minutes including:  Recumbent Bike - 8 minutes  Shuttle Leg Press - double leg, 4 bands + brace; x30  Shuttle Leg Press - Left lower extremity, 3 bands + brace; x30  Tidal Tank Carries - 3 laps  Farmer's Carries - 5#; 3 laps  Agility Ladder - no assistive device; in-outs; 1 lap each    Not Today:    Stomps - 3 trials (100 bmp)  Standing Hip Abduction - yellow band, 3x10 bilateral  Standing Chest press and Overhead lift - 4# ball; 3x10  Sled Push - 45#; 2 turf lap  Airex Balance - marches, power band perturbations, around the worlds (10# KB); no brace  Step Ups - 6 inch box; no brace; x20 bilateral  Dead Lifts - with brace; 6 inch box + 10# DB; 2x10 (heavy cuing needed for form)  Suitcase Carries - 10# KB; 3 laps  Golf Trainer Anti-rotation steps - light band; 3x5 bilateral  Table Plank - 20 seconds x4  Cable Column Hip Hinging - 10#; 3x10 (brace)  Lateral Step with Lunge - x10 bilateral      Manual Therapy Techniques: Joint mobilizations were applied to the: hip and ankle for 00 minutes, including:  Cupping To Left lumbar paraspinals    Not Today:  Left lower extremity long axis distraction - grade V  Left talocrural distraction - grade V      PATIENT EDUCATION AND HOME EXERCISES      Home Exercises Provided and Patient Education Provided     Education provided:   Anatomy and Pathology.  Symptom management and plan of care progressions.  Home Exercise Program.    Written Home Exercises Provided: Patient instructed to cont prior HEP. Exercises were reviewed and Kevin was able to demonstrate them prior to the end of the session.  Kevin demonstrated good  understanding of the education provided. See EMR under Patient Instructions for exercises provided during therapy sessions    ASSESSMENT     Kevin returns to the clinic gradual functional improvements. Continued general lower extremity strengthening with increased neuromotor control activities today. Slightly more emphasis on core control  today with appropriate challenge and no exacerbation in symptoms.No exacerbation in symptoms following today's treatments. She will continue to benefit from skilled physical therapy to improve her strength and motor control for functional, independent activities of daily living.    From evaluation on 3/25/2024 - Kevin is a 39 y.o. female referred to outpatient Physical Therapy with a medical diagnosis of Spondylosis of lumbar region without myelopathy or radiculopathy. She is status-post L4-S1 fusion performed on 2/272024 by Dr. Bakari Zaragoza. Patient presents with decreased range of motion, decreased strength, decreased balance, and risk for falls. Signs and symptoms are consistent with the above medical procedure. She will benefit from skilled physical therapy addressing her lower extremity strength, core stabilization, and proprioceptive retraining.    Kevin Is progressing well towards her goals.   Pt prognosis is Fair.     Pt will continue to benefit from skilled outpatient physical therapy to address the deficits listed in the problem list box on initial evaluation, provide pt/family education and to maximize pt's level of independence in the home and community environment. Pt's spiritual, cultural and educational needs considered and pt agreeable to plan of care and goals.     Anticipated barriers to physical therapy: history of low back pain; multiple surgeries.     Goals:  Short Term Goals: 6 weeks   Patient will have reduced pain complaints from 10/10 to less than or equal to 6/10. (Met - 4/30/2024)  Patient will demonstrate increased AROM/PROM by approximately 25% to 50% of initial measurements. (Not Met - Progressing)  Patient will demonstrate increased muscle strength of at least one-half grade as compared to the initial measurements. (Met - 6/13/2024)     Long Term Goals: 12 weeks   Patient will have reduced pain complaints from 6/10 to less than or equal to 2/10. (Not Met - Progressing)  Patient  will perform the 5 time sit to stand test in under 10 seconds. (Not Met - Progressing)  Patient will demonstrate increased muscle strength of at least one grade as compared to the initial measurements. (Not Met - Progressing)  Patient will improve Lumbar Spine FOTO Intake score from 9% to greater than or equal to 33%. (Met - 5/3/2024)  Patient will be independent with their home exercise program. (Not Met - Progressing)    PLAN     Core stabilization  Left lower extremity strengthening  Proprioceptive retraining.    Plan of care Certification: 3/25/2024 to 7/8/2024.  Outpatient Physical Therapy 1 times weekly for 12 weeks.    Faraz Doe, PT , DPT, OCS

## 2024-06-20 ENCOUNTER — CLINICAL SUPPORT (OUTPATIENT)
Dept: REHABILITATION | Facility: HOSPITAL | Age: 39
End: 2024-06-20
Payer: MEDICAID

## 2024-06-20 DIAGNOSIS — R29.898 WEAKNESS OF LEFT LOWER EXTREMITY: ICD-10-CM

## 2024-06-20 DIAGNOSIS — M53.86 DECREASED RANGE OF MOTION OF LUMBAR SPINE: Primary | ICD-10-CM

## 2024-06-20 DIAGNOSIS — R26.9 GAIT DIFFICULTY: ICD-10-CM

## 2024-06-20 PROCEDURE — 97110 THERAPEUTIC EXERCISES: CPT | Mod: PO

## 2024-06-25 ENCOUNTER — CLINICAL SUPPORT (OUTPATIENT)
Dept: REHABILITATION | Facility: HOSPITAL | Age: 39
End: 2024-06-25
Payer: MEDICAID

## 2024-06-25 DIAGNOSIS — R29.898 WEAKNESS OF LEFT LOWER EXTREMITY: ICD-10-CM

## 2024-06-25 DIAGNOSIS — M53.86 DECREASED RANGE OF MOTION OF LUMBAR SPINE: Primary | ICD-10-CM

## 2024-06-25 DIAGNOSIS — R26.9 GAIT DIFFICULTY: ICD-10-CM

## 2024-06-25 PROCEDURE — 97110 THERAPEUTIC EXERCISES: CPT | Mod: PO

## 2024-06-25 NOTE — PROGRESS NOTES
[facility]   Physical Therapy Treatment Note        Name: Kevin Méndez [facility] Number: [id number]    Therapy Diagnosis:   Encounter Diagnoses   Name Primary?    Decreased range of motion of lumbar spine Yes    Weakness of left lower extremity     Gait difficulty      Physician: Order, Paper    Visit Date: 6/25/2024    Physician Orders: PT Eval and Treat  Medical Diagnosis from Referral: M47.816 (ICD-10-CM) - Spondylosis of lumbar region without myelopathy or radiculopathy   Evaluation Date: 3/25/2024  Authorization Period Expiration: 12/31/2024  Plan of Care Expiration: 7/8/2024  Visit # / Visits authorized: 25/ 20   FOTO #2: 21% on 4/19/2024  FOTO: #3: 31% on 5/3/2024    Time In: 1050  Time Out: 1159  Total Billable Time: 60 minutes    Precautions: Standard and Smoker    Date of Surgery: 2/27/2024  Procedure Performed:  Left L4-5 oblique interbody fusion, placement of interbody spacer, DePuy Conduit LLIF cage filled with allograft BMP and DBM  L5-S1 anterior interbody fusion, placement of interbody spacer, DePuy Conduit ALIF cage filled with allograft BMP and DBM  L4-S1 posterior segmental instrumentation using DePuy Viper Prime system  Registration of navigated instruments using intraoperative 3D imaging [facility] and [facility] station    SUBJECTIVE     Pt reports: she has been able to cook more at home.  She was compliant with home exercise program.  Response to previous treatment: soreness  Functional change: Ongoing    Pain: 5/10  Location: Lumbar    OBJECTIVE     6/13/2024    Manual Muscle Testing:  Lower Extremity   Action Left Right   Hip Flexion 23.3 kg 31.5 kg   Hip Extension 4- / 5 4 / 5   Hip Abduction 3+ / 5 4- / 5   Knee Extension 16.8 kg 37.8 kg   Knee Flexion 15.1 kg 30.1 kg   Ankle Dorsiflexion 8.5 kg 17.9 kg        TREATMENT     Kevin received the treatments listed below:      Therapeutic Exercises to develop strength, endurance, ROM, posture, and core  stabilization for 60 minutes including:  Recumbent Bike - 8 minutes  Shuttle Leg Press - double leg, 4 bands + brace; x30  Shuttle Leg Press - Left lower extremity, 3 bands + brace; x30  Sled Push - 45#; 2 turf lap  Table Plank - 30 seconds x3  Cable Column Hip Hinging - 10#; 3x10 (brace)  Shuttle Hip Extension - 1 band; Left lower extremity; 3x10    Not Today:  Tidal Tank Carries - 3 laps  Farmer's Carries - 5#; 3 laps  Agility Ladder - no assistive device; in-outs; 1 lap each  Stomps - 3 trials (100 bmp)  Standing Hip Abduction - yellow band, 3x10 bilateral  Standing Chest press and Overhead lift - 4# ball; 3x10  Airex Balance - marches, power band perturbations, around the worlds (10# KB); no brace  Step Ups - 6 inch box; no brace; x20 bilateral  Suitcase Carries - 10# KB; 3 laps  Golf Trainer Anti-rotation steps - light band; 3x5 bilateral  Dead Lifts - with brace; 6 inch box + 10# DB; 2x10 (heavy cuing needed for form)  Lateral Step with Lunge - x10 bilateral      Manual Therapy Techniques: Joint mobilizations were applied to the: hip and ankle for 00 minutes, including:  Cupping To Left lumbar paraspinals    Not Today:  Left lower extremity long axis distraction - grade V  Left talocrural distraction - grade V      PATIENT EDUCATION AND HOME EXERCISES      Home Exercises Provided and Patient Education Provided     Education provided:   Anatomy and Pathology.  Symptom management and plan of care progressions.  Home Exercise Program.    Written Home Exercises Provided: Patient instructed to cont prior HEP. Exercises were reviewed and Kevin was able to demonstrate them prior to the end of the session.  Kevin demonstrated good  understanding of the education provided. See EMR under Patient Instructions for exercises provided during therapy sessions    ASSESSMENT     Kevin presents with increased Left lower extremity sensation. Increased strengthening activities today with appropriate challenge. Performing  more exercises without abdominal brace. No exacerbation in symptoms following today's treatments. She will continue to benefit from skilled physical therapy to improve her strength and motor control for functional, independent activities of daily living.    From evaluation on 3/25/2024 - Kevin is a 39 y.o. female referred to outpatient Physical Therapy with a medical diagnosis of Spondylosis of lumbar region without myelopathy or radiculopathy. She is status-post L4-S1 fusion performed on 2/272024 by Dr. Bakari Zaragoza. Patient presents with decreased range of motion, decreased strength, decreased balance, and risk for falls. Signs and symptoms are consistent with the above medical procedure. She will benefit from skilled physical therapy addressing her lower extremity strength, core stabilization, and proprioceptive retraining.    Kevin Is progressing well towards her goals.   Pt prognosis is Fair.     Pt will continue to benefit from skilled outpatient physical therapy to address the deficits listed in the problem list box on initial evaluation, provide pt/family education and to maximize pt's level of independence in the home and community environment. Pt's spiritual, cultural and educational needs considered and pt agreeable to plan of care and goals.     Anticipated barriers to physical therapy: history of low back pain; multiple surgeries.     Goals:  Short Term Goals: 6 weeks   Patient will have reduced pain complaints from 10/10 to less than or equal to 6/10. (Met - 4/30/2024)  Patient will demonstrate increased AROM/PROM by approximately 25% to 50% of initial measurements. (Not Met - Progressing)  Patient will demonstrate increased muscle strength of at least one-half grade as compared to the initial measurements. (Met - 6/13/2024)     Long Term Goals: 12 weeks   Patient will have reduced pain complaints from 6/10 to less than or equal to 2/10. (Not Met - Progressing)  Patient will perform the 5 time  sit to stand test in under 10 seconds. (Not Met - Progressing)  Patient will demonstrate increased muscle strength of at least one grade as compared to the initial measurements. (Not Met - Progressing)  Patient will improve Lumbar Spine FOTO Intake score from 9% to greater than or equal to 33%. (Met - 5/3/2024)  Patient will be independent with their home exercise program. (Not Met - Progressing)    PLAN     Core stabilization  Left lower extremity strengthening  Proprioceptive retraining.    Plan of care Certification: 3/25/2024 to 7/8/2024.  Outpatient Physical Therapy 1 times weekly for 12 weeks.    Faraz Doe, PT , DPT, OCS

## 2024-06-27 NOTE — PROGRESS NOTES
[facility]   Physical Therapy Treatment Note        Name: Kevin Méndez [facility] Number: [id number]    Therapy Diagnosis:   Encounter Diagnoses   Name Primary?    Decreased range of motion of lumbar spine Yes    Weakness of left lower extremity     Gait difficulty      Physician: Order, Paper    Visit Date: 6/28/2024    Physician Orders: PT Eval and Treat  Medical Diagnosis from Referral: M47.816 (ICD-10-CM) - Spondylosis of lumbar region without myelopathy or radiculopathy   Evaluation Date: 3/25/2024  Authorization Period Expiration: 12/31/2024  Plan of Care Expiration: 7/8/2024  Visit # / Visits authorized: 26/ 20   FOTO #2: 21% on 4/19/2024  FOTO: #3: 31% on 5/3/2024    Time In: 1100  Time Out: 1158  Total Billable Time: 55 minutes    Precautions: Standard and Smoker    Date of Surgery: 2/27/2024  Procedure Performed:  Left L4-5 oblique interbody fusion, placement of interbody spacer, DePuy Conduit LLIF cage filled with allograft BMP and DBM  L5-S1 anterior interbody fusion, placement of interbody spacer, DePuy Conduit ALIF cage filled with allograft BMP and DBM  L4-S1 posterior segmental instrumentation using DePuy Viper Prime system  Registration of navigated instruments using intraoperative 3D imaging [facility] and [facility] station    SUBJECTIVE     Pt reports: increased sensation in thigh.  She was compliant with home exercise program.  Response to previous treatment: soreness  Functional change: Ongoing    Pain: 5/10  Location: Lumbar    OBJECTIVE     6/13/2024    Manual Muscle Testing:  Lower Extremity   Action Left Right   Hip Flexion 23.3 kg 31.5 kg   Hip Extension 4- / 5 4 / 5   Hip Abduction 3+ / 5 4- / 5   Knee Extension 16.8 kg 37.8 kg   Knee Flexion 15.1 kg 30.1 kg   Ankle Dorsiflexion 8.5 kg 17.9 kg        TREATMENT     Kevin received the treatments listed below:      Therapeutic Exercises to develop strength, endurance, ROM, posture, and core stabilization for 55  minutes including:  Nu-Step - Progressive hills; 8 minutes  Shuttle Leg Press - double leg, 4 bands; x30  Shuttle Leg Press - Left lower extremity, 3 bands; x30  Table Plank - 60 seconds x1  Cable Column Anti-extension and rotation - 3#; x20 bilateral  Matrix Hip Abduction - 25#; 2x10 bilateral  Theraband pull down + march - green theraband; 3x10    Not Today:  Tidal Tank Carries - 3 laps  Farmer's Carries - 5#; 3 laps  Agility Ladder - no assistive device; in-outs; 1 lap each  Sled Push - 45#; 2 turf lap  Cable Column Hip Hinging - 10#; 3x10 (brace)  Shuttle Hip Extension - 1 band; Left lower extremity; 3x10  Standing Chest press and Overhead lift - 4# ball; 3x10  Airex Balance - marches, power band perturbations, around the worlds (10# KB); no brace  Step Ups - 6 inch box; no brace; x20 bilateral  Suitcase Carries - 10# KB; 3 laps  Dead Lifts - with brace; 6 inch box + 10# DB; 2x10 (heavy cuing needed for form)  Lateral Step with Lunge - x10 bilateral      Manual Therapy Techniques: Joint mobilizations were applied to the: hip and ankle for 00 minutes, including:  Cupping To Left lumbar paraspinals    Not Today:  Left lower extremity long axis distraction - grade V  Left talocrural distraction - grade V      PATIENT EDUCATION AND HOME EXERCISES      Home Exercises Provided and Patient Education Provided     Education provided:   Anatomy and Pathology.  Symptom management and plan of care progressions.  Home Exercise Program.    Written Home Exercises Provided: Patient instructed to cont prior HEP. Exercises were reviewed and Kevin was able to demonstrate them prior to the end of the session.  Kevin demonstrated good  understanding of the education provided. See EMR under Patient Instructions for exercises provided during therapy sessions    ASSESSMENT     Kevin presents with increased Left lower extremity sensation. Performed all exercises without the abdominal brace today. Increased emphasis on  anti-rotation and anti-extension activities today. No exacerbation in symptoms following today's treatments. She will continue to benefit from skilled physical therapy to improve her strength and motor control for functional, independent activities of daily living.    From evaluation on 3/25/2024 - Kevin is a 39 y.o. female referred to outpatient Physical Therapy with a medical diagnosis of Spondylosis of lumbar region without myelopathy or radiculopathy. She is status-post L4-S1 fusion performed on 2/272024 by Dr. Bakari Zaragoza. Patient presents with decreased range of motion, decreased strength, decreased balance, and risk for falls. Signs and symptoms are consistent with the above medical procedure. She will benefit from skilled physical therapy addressing her lower extremity strength, core stabilization, and proprioceptive retraining.    Kevin Is progressing well towards her goals.   Pt prognosis is Fair.     Pt will continue to benefit from skilled outpatient physical therapy to address the deficits listed in the problem list box on initial evaluation, provide pt/family education and to maximize pt's level of independence in the home and community environment. Pt's spiritual, cultural and educational needs considered and pt agreeable to plan of care and goals.     Anticipated barriers to physical therapy: history of low back pain; multiple surgeries.     Goals:  Short Term Goals: 6 weeks   Patient will have reduced pain complaints from 10/10 to less than or equal to 6/10. (Met - 4/30/2024)  Patient will demonstrate increased AROM/PROM by approximately 25% to 50% of initial measurements. (Not Met - Progressing)  Patient will demonstrate increased muscle strength of at least one-half grade as compared to the initial measurements. (Met - 6/13/2024)     Long Term Goals: 12 weeks   Patient will have reduced pain complaints from 6/10 to less than or equal to 2/10. (Not Met - Progressing)  Patient will perform  the 5 time sit to stand test in under 10 seconds. (Not Met - Progressing)  Patient will demonstrate increased muscle strength of at least one grade as compared to the initial measurements. (Not Met - Progressing)  Patient will improve Lumbar Spine FOTO Intake score from 9% to greater than or equal to 33%. (Met - 5/3/2024)  Patient will be independent with their home exercise program. (Not Met - Progressing)    PLAN     Core stabilization  Left lower extremity strengthening  Proprioceptive retraining.    Plan of care Certification: 3/25/2024 to 7/8/2024.  Outpatient Physical Therapy 1 times weekly for 12 weeks.    Faraz Doe, PT , DPT, OCS

## 2024-06-28 ENCOUNTER — CLINICAL SUPPORT (OUTPATIENT)
Dept: REHABILITATION | Facility: HOSPITAL | Age: 39
End: 2024-06-28
Payer: MEDICAID

## 2024-06-28 DIAGNOSIS — M53.86 DECREASED RANGE OF MOTION OF LUMBAR SPINE: Primary | ICD-10-CM

## 2024-06-28 DIAGNOSIS — R26.9 GAIT DIFFICULTY: ICD-10-CM

## 2024-06-28 DIAGNOSIS — R29.898 WEAKNESS OF LEFT LOWER EXTREMITY: ICD-10-CM

## 2024-06-28 PROCEDURE — 97110 THERAPEUTIC EXERCISES: CPT | Mod: PO

## 2024-07-01 NOTE — PROGRESS NOTES
[facility]   Physical Therapy Treatment Note        Name: Kevin Méndez [facility] Number: [id number]    Therapy Diagnosis:   Encounter Diagnoses   Name Primary?    Decreased range of motion of lumbar spine Yes    Weakness of left lower extremity     Gait difficulty      Physician: Order, Paper    Visit Date: 7/2/2024    Physician Orders: PT Eval and Treat  Medical Diagnosis from Referral: M47.816 (ICD-10-CM) - Spondylosis of lumbar region without myelopathy or radiculopathy   Evaluation Date: 3/25/2024  Authorization Period Expiration: 12/31/2024  Plan of Care Expiration: 7/8/2024  Visit # / Visits authorized: 27/ 20   FOTO #2: 21% on 4/19/2024  FOTO: #3: 31% on 5/3/2024    Time In: 1310  Time Out: 1400  Total Billable Time: 45 minutes    Precautions: Standard and Smoker    Date of Surgery: 2/27/2024  Procedure Performed:  Left L4-5 oblique interbody fusion, placement of interbody spacer, DePuy Conduit LLIF cage filled with allograft BMP and DBM  L5-S1 anterior interbody fusion, placement of interbody spacer, DePuy Conduit ALIF cage filled with allograft BMP and DBM  L4-S1 posterior segmental instrumentation using DePuy Viper Prime system  Registration of navigated instruments using intraoperative 3D imaging [facility] and [facility] station    SUBJECTIVE     Pt reports: she was able to perform two bouts of 45 minute standing and cooking sessions on Sunday with no symptoms.  She was compliant with home exercise program.  Response to previous treatment: soreness  Functional change: Ongoing    Pain: 5/10  Location: Lumbar    OBJECTIVE     6/13/2024    Manual Muscle Testing:  Lower Extremity   Action Left Right   Hip Flexion 23.3 kg 31.5 kg   Hip Extension 4- / 5 4 / 5   Hip Abduction 3+ / 5 4- / 5   Knee Extension 16.8 kg 37.8 kg   Knee Flexion 15.1 kg 30.1 kg   Ankle Dorsiflexion 8.5 kg 17.9 kg        TREATMENT     Kevin received the treatments listed below:      Therapeutic Exercises  to develop strength, endurance, ROM, posture, and core stabilization for 45 minutes including:  Nu-Step - Progressive hills; 8 minutes  Table Plank - 60 seconds x5  Floor transfers - x4  Bird dogs - lower extremity only; 3x12      Shuttle Leg Press - double leg, 4 bands; x30  Shuttle Leg Press - Left lower extremity, 3 bands; x30  Cable Column Anti-extension and rotation - 3#; x20 bilateral  Matrix Hip Abduction - 25#; 2x10 bilateral  Theraband pull down + march - green theraband; 3x10    Not Today:  Tidal Tank Carries - 3 laps  Farmer's Carries - 5#; 3 laps  Agility Ladder - no assistive device; in-outs; 1 lap each  Sled Push - 45#; 2 turf lap  Cable Column Hip Hinging - 10#; 3x10 (brace)  Shuttle Hip Extension - 1 band; Left lower extremity; 3x10  Standing Chest press and Overhead lift - 4# ball; 3x10  Airex Balance - marches, power band perturbations, around the worlds (10# KB); no brace  Step Ups - 6 inch box; no brace; x20 bilateral  Suitcase Carries - 10# KB; 3 laps  Dead Lifts - with brace; 6 inch box + 10# DB; 2x10 (heavy cuing needed for form)  Lateral Step with Lunge - x10 bilateral      Manual Therapy Techniques: Joint mobilizations were applied to the: hip and ankle for 00 minutes, including:  Cupping To Left lumbar paraspinals    Not Today:  Left lower extremity long axis distraction - grade V  Left talocrural distraction - grade V      PATIENT EDUCATION AND HOME EXERCISES      Home Exercises Provided and Patient Education Provided     Education provided:   Anatomy and Pathology.  Symptom management and plan of care progressions.  Home Exercise Program.    Written Home Exercises Provided: Patient instructed to cont prior HEP. Exercises were reviewed and Kevin was able to demonstrate them prior to the end of the session.  Kevin demonstrated good  understanding of the education provided. See EMR under Patient Instructions for exercises provided during therapy sessions    ASSESSMENT     Kevin  presents with improving endurance as noted by ability to stand and cook. Performed all exercises without the abdominal brace today. Introduced floor transfers today with step by step instructions. She was able to demonstrate excellent Increased emphasis on anti-rotation and anti-extension activities today. No exacerbation in symptoms following today's treatments. She will continue to benefit from skilled physical therapy to improve her strength and motor control for functional, independent activities of daily living.    From evaluation on 3/25/2024 - Kevin is a 39 y.o. female referred to outpatient Physical Therapy with a medical diagnosis of Spondylosis of lumbar region without myelopathy or radiculopathy. She is status-post L4-S1 fusion performed on 2/272024 by Dr. Bakari Zaragoza. Patient presents with decreased range of motion, decreased strength, decreased balance, and risk for falls. Signs and symptoms are consistent with the above medical procedure. She will benefit from skilled physical therapy addressing her lower extremity strength, core stabilization, and proprioceptive retraining.    Kevin Is progressing well towards her goals.   Pt prognosis is Fair.     Pt will continue to benefit from skilled outpatient physical therapy to address the deficits listed in the problem list box on initial evaluation, provide pt/family education and to maximize pt's level of independence in the home and community environment. Pt's spiritual, cultural and educational needs considered and pt agreeable to plan of care and goals.     Anticipated barriers to physical therapy: history of low back pain; multiple surgeries.     Goals:  Short Term Goals: 6 weeks   Patient will have reduced pain complaints from 10/10 to less than or equal to 6/10. (Met - 4/30/2024)  Patient will demonstrate increased AROM/PROM by approximately 25% to 50% of initial measurements. (Not Met - Progressing)  Patient will demonstrate increased muscle  strength of at least one-half grade as compared to the initial measurements. (Met - 6/13/2024)     Long Term Goals: 12 weeks   Patient will have reduced pain complaints from 6/10 to less than or equal to 2/10. (Not Met - Progressing)  Patient will perform the 5 time sit to stand test in under 10 seconds. (Not Met - Progressing)  Patient will demonstrate increased muscle strength of at least one grade as compared to the initial measurements. (Not Met - Progressing)  Patient will improve Lumbar Spine FOTO Intake score from 9% to greater than or equal to 33%. (Met - 5/3/2024)  Patient will be independent with their home exercise program. (Not Met - Progressing)    PLAN     Core stabilization  Left lower extremity strengthening  Proprioceptive retraining.    Plan of care Certification: 3/25/2024 to 7/8/2024.  Outpatient Physical Therapy 1 times weekly for 12 weeks.    Faraz Doe, PT , DPT, OCS

## 2024-07-02 ENCOUNTER — CLINICAL SUPPORT (OUTPATIENT)
Dept: REHABILITATION | Facility: HOSPITAL | Age: 39
End: 2024-07-02

## 2024-07-02 DIAGNOSIS — R26.9 GAIT DIFFICULTY: ICD-10-CM

## 2024-07-02 DIAGNOSIS — R29.898 WEAKNESS OF LEFT LOWER EXTREMITY: ICD-10-CM

## 2024-07-02 DIAGNOSIS — M53.86 DECREASED RANGE OF MOTION OF LUMBAR SPINE: Primary | ICD-10-CM

## 2024-07-02 PROCEDURE — 97110 THERAPEUTIC EXERCISES: CPT | Mod: PO

## 2024-07-04 RX ORDER — OXYCODONE HYDROCHLORIDE 10 MG/1
10 TABLET ORAL EVERY 6 HOURS PRN
Qty: 60 TABLET | Refills: 0 | Status: SHIPPED | OUTPATIENT
Start: 2024-07-04

## 2024-07-09 ENCOUNTER — PATIENT MESSAGE (OUTPATIENT)
Dept: REHABILITATION | Facility: HOSPITAL | Age: 39
End: 2024-07-09
Payer: MEDICAID

## 2024-07-17 ENCOUNTER — PATIENT MESSAGE (OUTPATIENT)
Dept: NEUROSURGERY | Facility: CLINIC | Age: 39
End: 2024-07-17
Payer: MEDICAID

## 2024-07-17 DIAGNOSIS — M54.9 DORSALGIA, UNSPECIFIED: Primary | ICD-10-CM

## 2024-07-17 RX ORDER — GABAPENTIN 600 MG/1
600 TABLET ORAL 3 TIMES DAILY
Qty: 90 TABLET | Refills: 11 | Status: SHIPPED | OUTPATIENT
Start: 2024-07-17 | End: 2024-07-18 | Stop reason: SDUPTHER

## 2024-07-17 RX ORDER — TRAMADOL HYDROCHLORIDE 50 MG/1
50 TABLET ORAL EVERY 8 HOURS PRN
Qty: 90 TABLET | Refills: 3 | Status: SHIPPED | OUTPATIENT
Start: 2024-07-17 | End: 2024-07-18 | Stop reason: SDUPTHER

## 2024-07-17 RX ORDER — OXYCODONE HYDROCHLORIDE 10 MG/1
10 TABLET ORAL EVERY 6 HOURS PRN
Qty: 60 TABLET | Refills: 0 | Status: SHIPPED | OUTPATIENT
Start: 2024-07-17 | End: 2024-07-18 | Stop reason: SDUPTHER

## 2024-07-18 ENCOUNTER — TELEPHONE (OUTPATIENT)
Dept: NEUROSURGERY | Facility: CLINIC | Age: 39
End: 2024-07-18
Payer: COMMERCIAL

## 2024-07-18 ENCOUNTER — HOSPITAL ENCOUNTER (EMERGENCY)
Facility: HOSPITAL | Age: 39
Discharge: HOME | End: 2024-07-18
Attending: EMERGENCY MEDICINE
Payer: MEDICAID

## 2024-07-18 VITALS
HEIGHT: 64 IN | WEIGHT: 188 LBS | BODY MASS INDEX: 32.1 KG/M2 | DIASTOLIC BLOOD PRESSURE: 73 MMHG | HEART RATE: 65 BPM | SYSTOLIC BLOOD PRESSURE: 116 MMHG | RESPIRATION RATE: 16 BRPM | TEMPERATURE: 98 F | OXYGEN SATURATION: 98 %

## 2024-07-18 DIAGNOSIS — M71.22 SYNOVIAL CYST OF LEFT POPLITEAL SPACE: Primary | ICD-10-CM

## 2024-07-18 DIAGNOSIS — M79.606 LOWER EXTREMITY PAIN: ICD-10-CM

## 2024-07-18 LAB
ALBUMIN SERPL BCP-MCNC: 3.4 G/DL (ref 3.5–5.2)
ALP SERPL-CCNC: 75 U/L (ref 55–135)
ALT SERPL W/O P-5'-P-CCNC: 9 U/L (ref 10–44)
ANION GAP SERPL CALC-SCNC: 8 MMOL/L (ref 8–16)
AST SERPL-CCNC: 14 U/L (ref 10–40)
BASOPHILS # BLD AUTO: 0.07 K/UL (ref 0–0.2)
BASOPHILS NFR BLD: 0.7 % (ref 0–1.9)
BILIRUB SERPL-MCNC: 0.1 MG/DL (ref 0.1–1)
BUN SERPL-MCNC: 9 MG/DL (ref 6–20)
CALCIUM SERPL-MCNC: 9.5 MG/DL (ref 8.7–10.5)
CHLORIDE SERPL-SCNC: 109 MMOL/L (ref 95–110)
CO2 SERPL-SCNC: 24 MMOL/L (ref 23–29)
CREAT SERPL-MCNC: 0.8 MG/DL (ref 0.5–1.4)
DIFFERENTIAL METHOD BLD: ABNORMAL
EOSINOPHIL # BLD AUTO: 0.4 K/UL (ref 0–0.5)
EOSINOPHIL NFR BLD: 3.7 % (ref 0–8)
ERYTHROCYTE [DISTWIDTH] IN BLOOD BY AUTOMATED COUNT: 19 % (ref 11.5–14.5)
EST. GFR  (NO RACE VARIABLE): >60 ML/MIN/1.73 M^2
GLUCOSE SERPL-MCNC: 83 MG/DL (ref 70–110)
HCT VFR BLD AUTO: 36.4 % (ref 37–48.5)
HGB BLD-MCNC: 11.1 G/DL (ref 12–16)
IMM GRANULOCYTES # BLD AUTO: 0.03 K/UL (ref 0–0.04)
IMM GRANULOCYTES NFR BLD AUTO: 0.3 % (ref 0–0.5)
LYMPHOCYTES # BLD AUTO: 2.9 K/UL (ref 1–4.8)
LYMPHOCYTES NFR BLD: 26.9 % (ref 18–48)
MCH RBC QN AUTO: 27.3 PG (ref 27–31)
MCHC RBC AUTO-ENTMCNC: 30.5 G/DL (ref 32–36)
MCV RBC AUTO: 89 FL (ref 82–98)
MONOCYTES # BLD AUTO: 0.8 K/UL (ref 0.3–1)
MONOCYTES NFR BLD: 7.6 % (ref 4–15)
NEUTROPHILS # BLD AUTO: 6.6 K/UL (ref 1.8–7.7)
NEUTROPHILS NFR BLD: 60.8 % (ref 38–73)
NRBC BLD-RTO: 0 /100 WBC
PLATELET # BLD AUTO: 369 K/UL (ref 150–450)
PMV BLD AUTO: 9.5 FL (ref 9.2–12.9)
POTASSIUM SERPL-SCNC: 3.6 MMOL/L (ref 3.5–5.1)
PROT SERPL-MCNC: 6.8 G/DL (ref 6–8.4)
RBC # BLD AUTO: 4.07 M/UL (ref 4–5.4)
SODIUM SERPL-SCNC: 141 MMOL/L (ref 136–145)
WBC # BLD AUTO: 10.76 K/UL (ref 3.9–12.7)

## 2024-07-18 PROCEDURE — 99284 EMERGENCY DEPT VISIT MOD MDM: CPT | Mod: 25

## 2024-07-18 PROCEDURE — 80053 COMPREHEN METABOLIC PANEL: CPT

## 2024-07-18 PROCEDURE — 85025 COMPLETE CBC W/AUTO DIFF WBC: CPT

## 2024-07-18 RX ORDER — GABAPENTIN 600 MG/1
600 TABLET ORAL 3 TIMES DAILY
Qty: 90 TABLET | Refills: 11 | Status: SHIPPED | OUTPATIENT
Start: 2024-07-18

## 2024-07-18 RX ORDER — OXYCODONE HYDROCHLORIDE 10 MG/1
10 TABLET ORAL EVERY 8 HOURS PRN
Qty: 60 TABLET | Refills: 0 | Status: SHIPPED | OUTPATIENT
Start: 2024-07-18

## 2024-07-18 RX ORDER — NAPROXEN 500 MG/1
500 TABLET ORAL 2 TIMES DAILY WITH MEALS
Qty: 28 TABLET | Refills: 0 | Status: SHIPPED | OUTPATIENT
Start: 2024-07-18 | End: 2024-08-01

## 2024-07-18 RX ORDER — TRAMADOL HYDROCHLORIDE 50 MG/1
50 TABLET ORAL EVERY 8 HOURS PRN
Qty: 90 TABLET | Refills: 3 | Status: SHIPPED | OUTPATIENT
Start: 2024-07-18

## 2024-07-18 NOTE — ED PROVIDER NOTES
ED Physician Hand-off Note:    ED Course: I assumed care of patient from off-going ED physician team. Briefly, Patient is a 39-year-old female who presents ED for left leg pain.    At the time of signout plan was pending DVT ultrasound.    Medications given in the ED:    Medications - No data to display    Disposition:  Reviewed ultrasound which was negative for acute DVT.  There is a left popliteal cyst.  Just ice elevation and NSAIDs for pain.  PCP follow-up as needed.  Return ED precautions given.    Patient comfortable with discharge. Patient counseled regarding exam, results, diagnosis, treatment, and plan.    Impression:  Osei cyst       Tucker Emerson PA-C  07/18/24 [id number]

## 2024-07-18 NOTE — ED NOTES
LOC: The patient is awake and alert; oriented x 3 and speaking appropriately.  APPEARANCE: Patient resting comfortably, patient is clean and well groomed  SKIN: warm and dry, normal skin turgor & moist mucus membranes, skin intact, no breakdown noted.  MUSCULOSKELETAL: Patient moving all extremities well, no obvious swelling or deformities noted, pain in left calf radiating to left foot.   RESPIRATORY: Airway is open and patent, respirations are spontaneous, normal effort and rate  CARDIAC: Patient has a normal rate, no peripheral edema noted, capillary refill < 3 seconds; No complaints of chest pain   ABDOMEN: Soft and non tender to palpation, no distention noted.

## 2024-07-18 NOTE — ED PROVIDER NOTES
Encounter Date: 7/18/2024       History     Chief Complaint   Patient presents with    Leg Pain     Left leg pain, states feels like a spasm 8 out of 10. Denies any trauma.      39-year-old female history of lumbar fusion 0 2/24 presenting to emergency department due to left lower extremity pain.  Patient reports she was instructed to presents emergency department by PCP for DVT rule out.  Patient reports mild warmth and constant pain over the last 3 weeks of the left lower extremity specifically in the calf.  She reports before the surgery she was dealt with left lower extremity pain however it initially improved after the surgery but now returned.  Denies fever or chills.      Review of patient's allergies indicates:  No Known Allergies  Past Medical History:   Diagnosis Date    Asthma      Past Surgical History:   Procedure Laterality Date    FUSION OF SPINE WITH INSTRUMENTATION Left 2/27/2024    Procedure: FUSION, SPINE, WITH INSTRUMENTATION;  Surgeon: Bakari Zaragoza MD;  Location: [facility] OR;  Service: Neurosurgery;  Laterality: Left;  Left L4-5 OLIF, L5-S1 Alif Depuy Conduit BMP  Anterior Fusion interspace 1  Anterior instrumentation insterspace 2  -lop 2 hrs   -general  -type and screen   -c arm   -brain lab shelbi angeles   -LSO   -regular bed   -lateral right down   -depuy (Ron)   -ort    FUSION OF SPINE WITH INSTRUMENTATION N/A 2/27/2024    Procedure: FUSION, SPINE, WITH INSTRUMENTATION;  Surgeon: Bakari Zaragoza MD;  Location: [facility] OR;  Service: Neurosurgery;  Laterality: N/A;  L4-S1 Posterior Instrumentation Viper Prime   Anterior Fusion interspace 1  Anterior Instrumentation interspace 2  -general   -type and screen   -Ziehm angeles   -LSO   -Girish 4 Poster   -prone   -Depuy ron   -Orthofix douglas    MICRODISCECTOMY OF SPINE N/A 3/2/2022    Procedure: MICRODISCECTOMY, SPINE Left L5-S1 Microdiscectomy;  Surgeon: Bakari Zaragoza MD;  Location: [facility] OR;  Service: Neurosurgery;  Laterality: N/A;   Procedure: Left L5-S1 Microdiscectomy  Surgery Time: 1.5hr  LOS: 0-1  Anesthesia: General  Blood: Type & Screen  Radiology: C-arm  Microscope: Metrx  Bed: [place] [id number] Poster  Position: Prone     Family History   Problem Relation Name Age of Onset    Pacemaker/defibrilator Paternal Grandfather       Social History     Tobacco Use    Smoking status: Every Day     Types: Cigars     Passive exposure: Never    Smokeless tobacco: Never   Substance Use Topics    Alcohol use: Yes     Alcohol/week: 1.0 standard drink of alcohol     Types: 1 Glasses of wine per week     Comment: social    Drug use: Not Currently     Review of Systems  See HPI  Physical Exam     Initial Vitals [07/18/24 1241]   BP Pulse Resp Temp SpO2   124/66 88 16 98.2 °F (36.8 °C) 97 %      MAP       --         Physical Exam    Vitals reviewed.  Constitutional: She appears well-developed.   HENT:   Head: Normocephalic and atraumatic.   Eyes: Conjunctivae and EOM are normal.   Neck:   Normal range of motion.  Cardiovascular:  Normal rate.           Pulmonary/Chest: No respiratory distress.   Abdominal: Abdomen is soft. She exhibits no distension.   Musculoskeletal:         General: No edema. Normal range of motion.      Cervical back: Normal range of motion.      Comments: Minimal erythema on anterior aspect of the left lower extremity, not circumferential.  No induration, no warmth minimal posterior calf tenderness.  Patient ambulatory with cane although chronic due to chronic lumbar pain     Neurological: She is alert and oriented to person, place, and time. No cranial nerve deficit.   Skin: Skin is warm and dry.   Psychiatric: She has a normal mood and affect. Thought content normal.         ED Course   Procedures  Labs Reviewed   CBC W/ AUTO DIFFERENTIAL - Abnormal; Notable for the following components:       Result Value    Hemoglobin 11.1 (*)     Hematocrit 36.4 (*)     MCHC 30.5 (*)     RDW 19.0 (*)     All other components within normal limits    COMPREHENSIVE METABOLIC PANEL - Abnormal; Notable for the following components:    Albumin 3.4 (*)     ALT 9 (*)     All other components within normal limits          Imaging Results              US Lower Extremity Veins Left (In process)  Result time 07/18/24 15:51:10                     Medications - No data to display  Medical Decision Making  Patient  is a 39 y.o.  female  presenting to the emergency department with complaints of  left lower extremity pain    Differential diagnosis includes but  not limited to DVT, Baker's cyst muscle strain    Significant labwork and diagnostic findings ultrasound pending at time of sign-out    Reassessment of patient patient resting comfortable in CCR    Specialty consult services none  Patient was signed out to jose rafael NAILS pending imaging and disposition.    Amount and/or Complexity of Data Reviewed  Labs: ordered.                                      Clinical Impression:  Final diagnoses:  [M79.606] Lower extremity pain                 Jesenia Gonzalez, PA-C  07/18/24 1556

## 2024-07-18 NOTE — ED TRIAGE NOTES
Had a spinal fusion  in February 2024. Has been having pain left calf radiating to foot.C/O warmth and pain and redness to left calf   Onset 3 wks ago. Referred to ER by PCP . Denies SOb or chest pain . Pt reports hx  of DVT's in the past , not on blood thinners at this time.

## 2024-07-18 NOTE — TELEPHONE ENCOUNTER
Returned call to pharmacy spoke w/ Jose G she stated that they need to confirm that the pt is on all three medications which is Gabapentin, Tramadol and Oxycodone. I stated that she is in all three medications. Jose G also stated that they had a escribing error so the meds need to be sent again. I stated to pharmacist that I will send a message over to  to send the meds over again. Jose G voiced understanding

## 2024-07-24 ENCOUNTER — PATIENT MESSAGE (OUTPATIENT)
Dept: REHABILITATION | Facility: HOSPITAL | Age: 39
End: 2024-07-24
Payer: MEDICAID

## 2024-07-29 ENCOUNTER — PATIENT MESSAGE (OUTPATIENT)
Dept: REHABILITATION | Facility: HOSPITAL | Age: 39
End: 2024-07-29
Payer: MEDICAID

## 2024-07-30 ENCOUNTER — HOSPITAL ENCOUNTER (OUTPATIENT)
Dept: RADIOLOGY | Facility: HOSPITAL | Age: 39
Discharge: HOME | End: 2024-07-30
Attending: NEUROLOGICAL SURGERY
Payer: MEDICAID

## 2024-07-30 DIAGNOSIS — M54.9 DORSALGIA, UNSPECIFIED: ICD-10-CM

## 2024-07-30 PROCEDURE — 72148 MRI LUMBAR SPINE W/O DYE: CPT | Mod: TC

## 2024-07-30 PROCEDURE — 72148 MRI LUMBAR SPINE W/O DYE: CPT | Mod: 26,,, | Performed by: RADIOLOGY

## 2024-07-31 ENCOUNTER — PATIENT MESSAGE (OUTPATIENT)
Dept: NEUROSURGERY | Facility: CLINIC | Age: 39
End: 2024-07-31
Payer: MEDICAID

## 2024-07-31 DIAGNOSIS — M71.22 BAKER'S CYST OF KNEE, LEFT: Primary | ICD-10-CM

## 2024-08-06 ENCOUNTER — PATIENT MESSAGE (OUTPATIENT)
Dept: NEUROSURGERY | Facility: CLINIC | Age: 39
End: 2024-08-06
Payer: MEDICAID

## 2024-08-06 RX ORDER — OXYCODONE HYDROCHLORIDE 10 MG/1
10 TABLET ORAL EVERY 8 HOURS PRN
Qty: 60 TABLET | Refills: 0 | Status: SHIPPED | OUTPATIENT
Start: 2024-08-06

## 2024-08-08 DIAGNOSIS — M25.562 LEFT KNEE PAIN, UNSPECIFIED CHRONICITY: Primary | ICD-10-CM

## 2024-08-15 ENCOUNTER — HOSPITAL ENCOUNTER (OUTPATIENT)
Dept: RADIOLOGY | Facility: HOSPITAL | Age: 39
Discharge: HOME | End: 2024-08-15
Attending: ORTHOPAEDIC SURGERY
Payer: MEDICAID

## 2024-08-15 ENCOUNTER — OFFICE VISIT (OUTPATIENT)
Dept: ORTHOPEDICS | Facility: CLINIC | Age: 39
End: 2024-08-15
Payer: MEDICAID

## 2024-08-15 VITALS — HEIGHT: 64 IN | BODY MASS INDEX: 32.11 KG/M2 | WEIGHT: 188.06 LBS

## 2024-08-15 DIAGNOSIS — M71.22 BAKER'S CYST OF KNEE, LEFT: ICD-10-CM

## 2024-08-15 DIAGNOSIS — M62.81 QUADRICEPS WEAKNESS: ICD-10-CM

## 2024-08-15 DIAGNOSIS — M25.562 LEFT KNEE PAIN, UNSPECIFIED CHRONICITY: ICD-10-CM

## 2024-08-15 DIAGNOSIS — M25.562 ACUTE PAIN OF LEFT KNEE: Primary | ICD-10-CM

## 2024-08-15 PROCEDURE — 73564 X-RAY EXAM KNEE 4 OR MORE: CPT | Mod: 26,LT,, | Performed by: RADIOLOGY

## 2024-08-15 PROCEDURE — 73564 X-RAY EXAM KNEE 4 OR MORE: CPT | Mod: TC,PN,LT

## 2024-08-15 PROCEDURE — 99214 OFFICE O/P EST MOD 30 MIN: CPT | Mod: PBBFAC,25,PN | Performed by: ORTHOPAEDIC SURGERY

## 2024-08-15 PROCEDURE — 99999 PR PBB SHADOW E&M-EST. PATIENT-LVL IV: CPT | Mod: PBBFAC,,, | Performed by: ORTHOPAEDIC SURGERY

## 2024-08-15 PROCEDURE — 20610 DRAIN/INJ JOINT/BURSA W/O US: CPT | Mod: PBBFAC,PN | Performed by: ORTHOPAEDIC SURGERY

## 2024-08-15 PROCEDURE — 99999PBSHW PR PBB SHADOW TECHNICAL ONLY FILED TO HB: Mod: JW,PBBFAC,,

## 2024-08-15 RX ORDER — BUPIVACAINE HYDROCHLORIDE 5 MG/ML
5 INJECTION, SOLUTION PERINEURAL
Status: DISCONTINUED | OUTPATIENT
Start: 2024-08-15 | End: 2024-08-15 | Stop reason: HOSPADM

## 2024-08-15 RX ORDER — KETOROLAC TROMETHAMINE 30 MG/ML
30 INJECTION, SOLUTION INTRAMUSCULAR; INTRAVENOUS
Status: DISCONTINUED | OUTPATIENT
Start: 2024-08-15 | End: 2024-08-15 | Stop reason: HOSPADM

## 2024-08-15 RX ORDER — LIDOCAINE HYDROCHLORIDE 10 MG/ML
4 INJECTION, SOLUTION INFILTRATION; PERINEURAL
Status: DISCONTINUED | OUTPATIENT
Start: 2024-08-15 | End: 2024-08-15 | Stop reason: HOSPADM

## 2024-08-15 RX ADMIN — KETOROLAC TROMETHAMINE 30 MG: 30 INJECTION, SOLUTION INTRAMUSCULAR; INTRAVENOUS at 10:08

## 2024-08-15 RX ADMIN — BUPIVACAINE HYDROCHLORIDE 5 ML: 5 INJECTION, SOLUTION PERINEURAL at 10:08

## 2024-08-15 RX ADMIN — LIDOCAINE HYDROCHLORIDE 4 ML: 10 INJECTION, SOLUTION INFILTRATION; PERINEURAL at 10:08

## 2024-08-15 NOTE — PROGRESS NOTES
Subjective:      Patient ID: Kevin Mancini is a 39 y.o. female.    Chief Complaint: Pain of the Left Knee      Patient ID: Kevin Mancini is a 39 y.o. female.    Chief Complaint: Pain of the Left Knee      KNEE PAIN: Complains of pain to the leftknee.   PAIN LOCATED: posterior  ONSET: 2 months ago.  QUALITY:  Patient states the pain is worsening  HISTORY: Previous knee injury/surgery: No  Hx:   ASSOCIATED SYMPTOM AND TRIGGERS:Standing/Weightbearing, walking, trouble w stairs, stiffness, swelling, limping, stiffness w sitting   Has tried and failed cardiovascular activities such as walking, biking and resistance exercises  USES ASSISTIVE DEVICE: none  RELIEVED BY:rest, heat, ice, avoiding painful activities  PATIENT DENIES: bruising, redness, deformity        Social History     Occupational History    Not on file   Tobacco Use    Smoking status: Every Day     Types: Cigars     Passive exposure: Never    Smokeless tobacco: Never   Substance and Sexual Activity    Alcohol use: Yes     Alcohol/week: 1.0 standard drink of alcohol     Types: 1 Glasses of wine per week     Comment: social    Drug use: Not Currently    Sexual activity: Not on file      Social Determinants of Health     Tobacco Use: High Risk (8/15/2024)    Patient History     Smoking Tobacco Use: Every Day     Smokeless Tobacco Use: Never     Passive Exposure: Never   Alcohol Use: Alcohol Misuse (1/9/2024)    AUDIT-C     Frequency of Alcohol Consumption: 2-4 times a month     Average Number of Drinks: 3 or 4     Frequency of Binge Drinking: Never   Financial Resource Strain: Medium Risk (1/9/2024)    Overall Financial Resource Strain (CARDIA)     Difficulty of Paying Living Expenses: Somewhat hard   Food Insecurity: Food Insecurity Present (1/9/2024)    Hunger Vital Sign     Worried About Running Out of Food in the Last Year: Sometimes true     Ran Out of Food in the Last Year: Sometimes true   Transportation Needs: No Transportation  Needs (1/9/2024)    PRAPARE - Transportation     Lack of Transportation (Medical): No     Lack of Transportation (Non-Medical): No   Physical Activity: Inactive (1/9/2024)    Exercise Vital Sign     Days of Exercise per Week: 0 days     Minutes of Exercise per Session: 0 min   Stress: No Stress Concern Present (1/9/2024)    [employer] - Occupational Stress Questionnaire     Feeling of Stress : Not at all   Housing Stability: High Risk (1/9/2024)    Housing Stability Vital Sign     Unable to Pay for Housing in the Last Year: Yes     Number of Places Lived in the Last Year: 0     Unstable Housing in the Last Year: No   Depression: Low Risk  (10/21/2021)    Depression     Last PHQ-4: Flowsheet Data: 0   Utilities: Not on file   Health Literacy: Not on file   Social Isolation: Not on file      Review of Systems   Constitutional: Negative for diaphoresis.   HENT:  Negative for ear discharge, nosebleeds and stridor.    Eyes:  Negative for photophobia.   Cardiovascular:  Negative for syncope.   Respiratory:  Negative for hemoptysis, shortness of breath and wheezing.    Neurological:  Negative for tremors.   Psychiatric/Behavioral: Negative.           Objective:    General    Constitutional: She is oriented to person, place, and time. She appears well-developed and well-nourished.   HENT:   Head: Normocephalic and atraumatic.   Right Ear: External ear normal.   Left Ear: External ear normal.   Eyes: EOM are normal.   Pulmonary/Chest: Effort normal.   Neurological: She is alert and oriented to person, place, and time.   Psychiatric: She has a normal mood and affect. Her behavior is normal. Judgment and thought content normal.     General Musculoskeletal Exam   Gait: antalgic and abnormal         Left Knee Exam     Inspection   Swelling: present  Effusion: present    Tenderness   The patient tender to palpation of the medial joint line.    Crepitus   The patient has crepitus of the  patella.    Range of Motion   Extension:  5   Flexion:  100     Tests   Meniscus   Suresh:   Lateral - negative  Stability   MCL - Valgus: normal (0 to 2mm)  LCL - Varus: normal (0 to 2mm)    Other   Sensation: normal    Muscle Strength   Left Lower Extremity   Quadriceps:  4/5   Hamstrin/5          Assessment:       1. Baker's cyst of knee, left          Plan:   we injected the left knee w 1cc of ketorolac, marcaine and lidocaine under sterile conditions with the patient's informed consent for mild/moderate knee oa. KL score 2   we will try a course of PT before ordering an MRI. Patient should f/u with me after approx 1 mo of PT to consider an MRI at that point for possible meniscal pathology

## 2024-08-15 NOTE — PROCEDURES
Large Joint Aspiration/Injection: L knee    Date/Time: 8/15/2024 10:00 AM    Performed by: Cameron Guzman MD  Authorized by: Cameron Guzman MD    Consent Done?:  Yes (Verbal)  Indications:  Arthritis  Site marked: the procedure site was marked    Timeout: prior to procedure the correct patient, procedure, and site was verified    Prep: patient was prepped and draped in usual sterile fashion      Local anesthesia used?: Yes    Local anesthetic:  Topical anesthetic    Details:  Needle Size:  22 G  Ultrasonic Guidance for needle placement?: No    Approach:  Anterolateral  Location:  Knee  Site:  L knee  Medications:  30 mg ketorolac 30 mg/mL (1 mL); 5 mL BUPivacaine 0.5 % (5 mg/mL); 4 mL LIDOcaine HCL 10 mg/ml (1%) 10 mg/mL (1 %)  Patient tolerance:  Patient tolerated the procedure well with no immediate complications

## 2024-08-23 RX ORDER — OXYCODONE HYDROCHLORIDE 10 MG/1
10 TABLET ORAL EVERY 8 HOURS PRN
Qty: 60 TABLET | Refills: 0 | Status: SHIPPED | OUTPATIENT
Start: 2024-08-23

## 2024-09-12 RX ORDER — OXYCODONE HYDROCHLORIDE 10 MG/1
10 TABLET ORAL EVERY 8 HOURS PRN
Qty: 60 TABLET | Refills: 0 | Status: SHIPPED | OUTPATIENT
Start: 2024-09-12

## 2024-09-17 ENCOUNTER — OFFICE VISIT (OUTPATIENT)
Dept: ORTHOPEDICS | Facility: CLINIC | Age: 39
End: 2024-09-17
Payer: MEDICAID

## 2024-09-17 VITALS — HEIGHT: 64 IN | WEIGHT: 188.06 LBS | BODY MASS INDEX: 32.11 KG/M2

## 2024-09-17 DIAGNOSIS — M25.562 ACUTE PAIN OF LEFT KNEE: Primary | ICD-10-CM

## 2024-09-17 PROCEDURE — 3008F BODY MASS INDEX DOCD: CPT | Mod: CPTII,,, | Performed by: ORTHOPAEDIC SURGERY

## 2024-09-17 PROCEDURE — 99213 OFFICE O/P EST LOW 20 MIN: CPT | Mod: 25,S$PBB,, | Performed by: ORTHOPAEDIC SURGERY

## 2024-09-17 PROCEDURE — 99999 PR PBB SHADOW E&M-EST. PATIENT-LVL III: CPT | Mod: PBBFAC,,, | Performed by: ORTHOPAEDIC SURGERY

## 2024-09-17 PROCEDURE — 1159F MED LIST DOCD IN RCRD: CPT | Mod: CPTII,,, | Performed by: ORTHOPAEDIC SURGERY

## 2024-09-17 PROCEDURE — 99213 OFFICE O/P EST LOW 20 MIN: CPT | Mod: PBBFAC,PN | Performed by: ORTHOPAEDIC SURGERY

## 2024-09-17 NOTE — PROGRESS NOTES
[facility] [address]          Subjective:     Patient ID: Kevin Mancini is a 39 y.o. female.    Chief Complaint: Pain of the Left Knee    Knee Pain: left  swelling: Yes  worsens with activity: Yes  relieved by: injections    Previously evaluated 1 month ago.  Continued pain to left knee mostly a posterior aspect.  Has not started physical therapy but is scheduled to start this week.  Previous Toradol injection provided about 1 week of relief.    Past Medical History:   Diagnosis Date    Asthma         Past Surgical History:   Procedure Laterality Date    FUSION OF SPINE WITH INSTRUMENTATION Left 2/27/2024    Procedure: FUSION, SPINE, WITH INSTRUMENTATION;  Surgeon: Bakari Zaragoza MD;  Location: [facility] OR;  Service: Neurosurgery;  Laterality: Left;  Left L4-5 OLIF, L5-S1 Alif Depuy Conduit BMP  Anterior Fusion interspace 1  Anterior instrumentation insterspace 2  -lop 2 hrs   -general  -type and screen   -c arm   -brain lab shelbi angeles   -LSO   -regular bed   -lateral right down   -depuy (Ron)   -ort    FUSION OF SPINE WITH INSTRUMENTATION N/A 2/27/2024    Procedure: FUSION, SPINE, WITH INSTRUMENTATION;  Surgeon: Bakari Zaragoza MD;  Location: [facility] OR;  Service: Neurosurgery;  Laterality: N/A;  L4-S1 Posterior Instrumentation Viper Prime   Anterior Fusion interspace 1  Anterior Instrumentation interspace 2  -general   -type and screen   -Ziehm angeles   -O   -Girish 4 Poster   -prone   -Depuy ron   -Orthofix douglas    MICRODISCECTOMY OF SPINE N/A 3/2/2022    Procedure: MICRODISCECTOMY, SPINE Left L5-S1 Microdiscectomy;  Surgeon: Bakari Zaragoza MD;  Location: [facility] OR;  Service: Neurosurgery;  Laterality: N/A;  Procedure: Left L5-S1 Microdiscectomy  Surgery Time: 1.5hr  LOS: 0-1  Anesthesia: General  Blood: Type & Screen  Radiology: C-arm  Microscope: Metrx  Bed: [place] [id number] Poster  Position: Prone        Current Outpatient Medications   Medication Instructions    acetaminophen (TYLENOL) 650 mg,  Oral, Every 6 hours    albuterol (PROVENTIL/VENTOLIN HFA) 90 mcg/actuation inhaler 1 puff, Inhalation, Every 4 hours PRN, Rescue    aluminum-magnesium hydroxide-simethicone (MAALOX) 200-200-20 mg/5 mL Susp 30 mLs, Oral, Every 4 hours PRN    baclofen (LIORESAL) 10 mg, Oral, 3 times daily    bisacodyL (DULCOLAX) 10 mg, Rectal, Daily PRN    gabapentin (NEURONTIN) 600 mg, Oral, 3 times daily    nicotine (NICODERM CQ) 14 mg/24 hr 1 patch, Transdermal, Daily    ondansetron (ZOFRAN-ODT) 8 mg, Oral, Every 6 hours PRN    oxyCODONE (ROXICODONE) 10 mg, Oral, Every 8 hours PRN    polyethylene glycol (GLYCOLAX) 17 g, Oral, 2 times daily    senna-docusate 8.6-50 mg (PERICOLACE) 8.6-50 mg per tablet 2 tablets, Oral, Nightly PRN    traMADoL (ULTRAM) 50 mg, Oral, Every 8 hours PRN        Review of patient's allergies indicates:  No Known Allergies    Social History     Socioeconomic History    Marital status: Single   Tobacco Use    Smoking status: Every Day     Types: Cigars     Passive exposure: Never    Smokeless tobacco: Never   Substance and Sexual Activity    Alcohol use: Yes     Alcohol/week: 1.0 standard drink of alcohol     Types: 1 Glasses of wine per week     Comment: social    Drug use: Not Currently     Social Determinants of Health     Financial Resource Strain: Medium Risk (1/9/2024)    Overall Financial Resource Strain (CARDIA)     Difficulty of Paying Living Expenses: Somewhat hard   Food Insecurity: Food Insecurity Present (1/9/2024)    Hunger Vital Sign     Worried About Running Out of Food in the Last Year: Sometimes true     Ran Out of Food in the Last Year: Sometimes true   Transportation Needs: No Transportation Needs (1/9/2024)    PRAPARE - Transportation     Lack of Transportation (Medical): No     Lack of Transportation (Non-Medical): No   Physical Activity: Inactive (1/9/2024)    Exercise Vital Sign     Days of Exercise per Week: 0 days     Minutes of Exercise per Session: 0 min   Stress: No Stress Concern  Present (1/9/2024)    [employer] - Occupational Stress Questionnaire     Feeling of Stress : Not at all   Housing Stability: High Risk (1/9/2024)    Housing Stability Vital Sign     Unable to Pay for Housing in the Last Year: Yes     Number of Places Lived in the Last Year: 0     Unstable Housing in the Last Year: No       Family History   Problem Relation Name Age of Onset    Pacemaker/defibrilator Paternal Grandfather           Objective:   Physical Exam:     Left knee  Skin atraumatic   + effusion  Mild tenderness to palpation medial joint line, mild tenderness to palpation lateral joint line  ROM 5-95 (contralateral 0-120 degrees flexion)  Posterior knee pain with passive dorsiflexion of ankle  Stable anterior/posterior   Stable varus/valgus  No groin pain with rotation of hip  Grossly NVI distally      Assessment:      Kevin Mancini is a 39 y.o. female with left knee pain    Plan :    -Left knee Toradol injection today:  we injected the left knee w 1cc of ketorolac, marcaine and lidocaine under sterile conditions with the patient's informed consent for mild/moderate knee oa. KL score 2    -physical therapy; patient starts this Thursday    -we will try a course of PT before ordering an MRI. Patient should f/u with me after approx 1 mo of PT to consider an MRI at that point for possible meniscal pathology    Vanessa Carrion MD  [employer] PGY-3

## 2024-09-18 ENCOUNTER — TELEPHONE (OUTPATIENT)
Dept: SMOKING CESSATION | Facility: CLINIC | Age: 39
End: 2024-09-18
Payer: MEDICAID

## 2024-09-18 NOTE — TELEPHONE ENCOUNTER
1st attempt, patient called in regards to 6 month f/u after attempting to start quit 1 with Smoking Cessation.  Voicemail with contact information given.

## 2024-09-20 ENCOUNTER — CLINICAL SUPPORT (OUTPATIENT)
Dept: REHABILITATION | Facility: HOSPITAL | Age: 39
End: 2024-09-20
Payer: MEDICAID

## 2024-09-20 DIAGNOSIS — M25.562 ACUTE PAIN OF LEFT KNEE: Primary | ICD-10-CM

## 2024-09-20 DIAGNOSIS — M62.81 QUADRICEPS WEAKNESS: ICD-10-CM

## 2024-09-20 PROBLEM — R26.9 GAIT DIFFICULTY: Status: RESOLVED | Noted: 2021-11-18 | Resolved: 2024-09-20

## 2024-09-20 PROBLEM — M53.86 DECREASED RANGE OF MOTION OF LUMBAR SPINE: Status: RESOLVED | Noted: 2021-11-18 | Resolved: 2024-09-20

## 2024-09-20 PROCEDURE — 97110 THERAPEUTIC EXERCISES: CPT | Mod: PO

## 2024-09-20 PROCEDURE — 97161 PT EVAL LOW COMPLEX 20 MIN: CPT | Mod: PO

## 2024-09-20 PROCEDURE — 97140 MANUAL THERAPY 1/> REGIONS: CPT | Mod: PO

## 2024-09-20 PROCEDURE — 97530 THERAPEUTIC ACTIVITIES: CPT | Mod: PO

## 2024-09-20 NOTE — PLAN OF CARE
"[facility]   Physical Therapy Initial Evaluation      Name: Kevin Mancini  [facility] Number: [id number]    Therapy Diagnosis:   Encounter Diagnoses   Name Primary?    Acute pain of left knee Yes    Quadriceps weakness         Physician: Cameron Guzman MD    Physician Orders: PT Eval and Treat  Medical Diagnosis from Referral:   M25.562 (ICD-10-CM) - Acute pain of left knee   M62.81 (ICD-10-CM) - Quadriceps weakness   Evaluation Date: 9/20/2024  Authorization Period Expiration: 8/15/2025  Plan of Care Expiration: 11/29/2024  Visit # / Visits authorized: 1/ 1      Foto  Date  Score    #1/3 9/20/2024 36%   #2/3     #3/3          Precautions: Standard and spine precautions    Time In: 0815  Time Out: 0900  Total Appointment Time (timed & untimed codes): 45 minutes    SUBJECTIVE   Date of onset: August 2024    History of current condition - Kevin reports: gradually increased left posterior knee pain in August with no mechanism of injury. She describes her symptoms as dull, aching, throbbing, and tingling in the left posterior knee and calf. Also notes increasing posterior left glute and left metatarsalgia symptoms in conjunction with these new Left lower extremity symptoms. Symptoms worse with transitional movements, prolonged sitting, standing, and walking. She denies any numbness, weakness, or bowel and bladder dysfunction. She was being previously seen by me for her lumbar fusion surgery on 2/27/2024, but care was interrupted secondary to insurance dispute.     Falls: None    Imaging, x-ray (8/15/2024): "Lateral femorotibial and patellofemoral cartilage space appear maintained. There may be some mild narrowing at the medial femorotibial compartment, not significantly changed. No acute fracture, dislocation or osseous destruction. No large suprapatellar effusion."    Prior Therapy: Yes - for lumbar spine.   Social History: 16 steps; lives with their spouse  Occupation: Disabled  Prior " Level of Function: mild pain and difficulties with activities of daily living.  Current Level of Function: moderate to severe pain and difficulties with activities of daily living.    Pain:  Current 8/10, worst 10/10, best 3/10   Location: Left Knee  Description: Aching, Dull, and Throbbing  Aggravating Factors: morning, walking, standing, transfers  Easing Factors: rest and medication    Patients goals: Improve mobility to return to work.     Medical History:   Past Medical History:   Diagnosis Date    Asthma        Surgical History:   Kevin Mancini  has a past surgical history that includes Microdiscectomy of spine (N/A, 3/2/2022); Fusion of spine with instrumentation (Left, 2/27/2024); and Fusion of spine with instrumentation (N/A, 2/27/2024).    Medications:   Kevin has a current medication list which includes the following prescription(s): acetaminophen, albuterol, aluminum-magnesium hydroxide-simethicone, baclofen, bisacodyl, gabapentin, nicotine, ondansetron, oxycodone, polyethylene glycol, senna-docusate 8.6-50 mg, and tramadol.    Allergies:   Review of patient's allergies indicates:  No Known Allergies     OBJECTIVE     Observations:  Cervical: forward head and rounded shoulders  Thoracic: increased kyphosis  Lumbar: reduced lordosis  Standing: increased hip internal rotation bilateral      Gait:  Non-ataxic  Antalgic  Increased bilateral trunk rotation  Bilateral hip drop (non-compensated)      Active Range of Motion:  Lumbar Spine   Action Degrees Dysfunction   Flexion 50 Increased Left lower extremity symptoms   Extension 10 Stiffness   Left Rotation 60% Stiffness   Right Rotation 60% Increased Left lower extremity symptoms      Knee   Action Left Right   Flexion 122 degrees 135 degrees   Extension 0 degrees +1 degrees   *Decreased speed with performance on left*    Passive Range of Motion:  Lower Extremity   Action Left Right   Hip Flexion 100 degrees 110 degrees   Hip Extension Not  Tested Not Tested   Hip Abduction 35 degrees 40 degrees   Knee Flexion 135 degrees 140 degrees   Knee Extension +1 degrees +1 degrees       Manual Muscle Testing:  Lower Extremity   Action Left Right   Hip Flexion 4- / 5 4+ / 5   Hip Extension See Sit to Stand See Sit to Stand   Hip Abduction Not Tested Not Tested   Knee Flexion 4- / 5 5 / 5   Knee Extension 4- / 5 5 / 5   *No myotomal weakness*    Special Tests:  Positive (+) Tests:  Slump  Straight Leg Raise   Negative (-) Tests:  SI cluster  DTR:  Right L4 = 2+  Left L4 = 3+  Girth:  Right Knee = 35.4 cm  Left Knee = 36.4 cm      Palpation and Additional Assessment:  Poor quad contraction left  Boggy Left quad  Hypomobile patella left      Intake Outcome Measure for FOTO Upper Leg Survey    Therapist reviewed FOTO scores for Kevin Mancini on 9/20/2024.   FOTO documents entered into EPIC - see Media section.    Intake Score: 36%           TREATMENT     Total Treatment time (time-based codes) separate from Evaluation: 18 minutes      Kevin received the treatments listed below:      Therapeutic Exercises to develop strength, endurance, ROM, flexibility, posture, and core stabilization for 00 minutes including:  Seated thoracic extension  Seated Slump slider  Bridge  Braced Marching  Braced Knee Out    Manual Therapy Techniques: Joint mobilizations were applied to the: thoracic spine for 8 minutes, including:  Supine thoracic extension mobilization - grade V      Therapeutic Activities to improve functional performance for 10 minutes, including:  Education (see below)  Questions and answers    PATIENT EDUCATION AND HOME EXERCISES     Education provided:   Anatomy and Pathology.  Symptom management and plan of care progressions.  Home Exercise Program.    Written Home Exercises Provided: yes. Exercises were reviewed and Kevin was able to demonstrate them prior to the end of the session.  Kristophermauricio demonstrated good  understanding of the education  provided. See EMR under Patient Instructions for exercises provided during therapy sessions.    ASSESSMENT     Kevin is a 39 y.o. female referred to outpatient Physical Therapy with a medical diagnosis of Acute pain of left knee and quadriceps weakness. Patient presents with decreased range of motion, decreased strength, gait deviations, and adverse neural tension. Signs and symptoms are consistent with adverse neural tension in the lumbar nerve roots secondary to prior lumbar fusion surgery performed earlier in the year. Significant decrease in symptoms, improved motion quality, and gait mechanics following thoracic manipulation. She will benefit from skilled physical therapy addressing her neural mobility, hip strengthening, and core utilization.    Patient prognosis is Good.   Patientt will benefit from skilled outpatient Physical Therapy to address the deficits stated above and in the chart below, provide patient /family education, and to maximize patientt's level of independence.     Plan of care discussed with patient: Yes  Patient's spiritual, cultural and educational needs considered and patient is agreeable to the plan of care and goals as stated below:     Anticipated Barriers for therapy: Lumbar fusion (2/27/2024)    Medical Necessity is demonstrated by the following  History  Co-morbidities and personal factors that may impact the plan of care [] LOW: no personal factors / co-morbidities  [x] MODERATE: 1-2 personal factors / co-morbidities  [] HIGH: 3+ personal factors / co-morbidities    Moderate / High Support Documentation:   Co-morbidities affecting plan of care: L4-S1 fusion, tobacco use    Personal Factors:   no deficits     Examination  Body Structures and Functions, activity limitations and participation restrictions that may impact the plan of care [] LOW: addressing 1-2 elements  [] MODERATE: 3+ elements  [x] HIGH: 4+ elements (please support below)    Moderate / High Support Documentation:  transfers, sitting, standing, walking, driving, dressing, house work, work duties.     Clinical Presentation [x] LOW: stable  [] MODERATE: Evolving  [] HIGH: Unstable     Decision Making/ Complexity Score: low     Goals:  Short Term Goals: 4-5 weeks   Patient will have reduced pain complaints from 10/10 to less than or equal to 6/10. (Not Met - Progressing)  Patient will demonstrate increased AROM/PROM by approximately 25% to 50% of initial measurements. (Not Met - Progressing)  Patient will demonstrate increased muscle strength of at least one-half grade as compared to the initial measurements. (Not Met - Progressing)    Long Term Goals: 8-10 weeks   Patient will have reduced pain complaints from 6/10 to less than or equal to 2/10. (Not Met - Progressing)  Patient will demonstrate an improved Timed Up and Go performance from 19.89 seconds to less than or equal to 10 seconds. (Not Met - Progressing)  Patient will demonstrate an improved 30 Second Sit to Stand performance from 4 reps to greater than or equal to 8 reps.   Patient will demonstrate increased muscle strength of at least one grade as compared to the initial measurements. (Not Met - Progressing)  Patient will improve Upper Leg FOTO Intake score from 36% to greater than or equal to 61%. (Not Met - Progressing)  Patient will be independent with their home exercise program. (Not Met - Progressing)    PLAN   Plan of care Certification: 9/20/2024 to 11/29/2024.    Outpatient Physical Therapy 1 times weekly for 10 weeks to include the following interventions: Gait Training, Manual Therapy, Neuromuscular Re-ed, Orthotic Management and Training, Patient Education, Self Care, Therapeutic Activities, Therapeutic Exercise, and dry needling and taping.       Faraz Doe, PT, DPT, OCS

## 2024-09-23 NOTE — PROGRESS NOTES
[facility]   Physical Therapy Treatment Note        Name: Kevin Mancini  Clinic Number: [id number]    Therapy Diagnosis:   Encounter Diagnoses   Name Primary?    Chronic left-sided low back pain with left-sided sciatica Yes    Weakness of left lower extremity      Physician: Cameron Guzman MD    Visit Date: 9/25/2024    Physician Orders: PT Eval and Treat  Medical Diagnosis from Referral:   M25.562 (ICD-10-CM) - Acute pain of left knee   M62.81 (ICD-10-CM) - Quadriceps weakness   Evaluation Date: 9/20/2024  Authorization Period Expiration: 8/15/2025  Plan of Care Expiration: 11/29/2024  Visit # / Visits authorized: 1/ 20     Foto  Date  Score    #1/3 9/20/2024 36%   #2/3       #3/3         Time In: 1542  Time Out: 1644  Total Billable Time: 54 minutes    Precautions: Standard and spine precautions    SUBJECTIVE     Pt reports: decreased left posterior knee symptoms.  She was compliant with home exercise program.  Response to previous treatment: Evaluation  Functional change: Ongoing    Pain: 5/10  Location: Left lower extremity     OBJECTIVE     9/25/2024:  Straight Leg Raise (pre-manual) = 45 degrees  Straight Leg Raise (post-manual) = 55 degrees    TREATMENT     Kevin received the treatments listed below:      Therapeutic Exercises to develop strength, endurance, ROM, posture, and core stabilization for 15 minutes including:  Seated Slump slider - x15  Bridge - 3x10  Matrix Knee Extension - 25#; 2x10 bilateral    Not Today:  Seated thoracic extension      Manual Therapy Techniques: Joint mobilizations were applied to the: thoracic spine for 8 minutes, including:  Supine thoracic extension mobilization - grade V       Neuromuscular Re-education activities to improve: Balance, Proprioception, Posture, and Motor Control for 23 minutes. The following activities were included:  TA Contraction with BP Cuff - x10  Bent Knee Fall Out with BP Cuff - x20  Marching with BP Cuff -  x20    NEXT Heel Slides with BP Cuff      Therapeutic Activities to improve functional performance for 8 minutes, including:  Education (see below)  Questions and answers      PATIENT EDUCATION AND HOME EXERCISES      Home Exercises Provided and Patient Education Provided     Education provided:   Anatomy and Pathology.  Symptom management and plan of care progressions.  Home Exercise Program.    Written Home Exercises Provided: Patient instructed to cont prior HEP. Exercises were reviewed and Kevin was able to demonstrate them prior to the end of the session.  Kevin demonstrated good  understanding of the education provided. See EMR under Patient Instructions for exercises provided during therapy sessions    ASSESSMENT     Kevin presents back to the clinic for her first follow-up appointment. She continues to present with adverse neural tension down the Left lower extremity which significantly improves with spinal manipulations. Performed and reviewed exercises in home exercise program with moderate cuing needed for correct performance. Initiated BP cuff core retraining program in conjunction with hip strengthening exercises. Improved gait upon completion.    From evaluation on 9/20/2024 - Kevin is a 39 y.o. female referred to outpatient Physical Therapy with a medical diagnosis of Acute pain of left knee and quadriceps weakness. Patient presents with decreased range of motion, decreased strength, gait deviations, and adverse neural tension. Signs and symptoms are consistent with adverse neural tension in the lumbar nerve roots secondary to prior lumbar fusion surgery performed earlier in the year. Significant decrease in symptoms, improved motion quality, and gait mechanics following thoracic manipulation. She will benefit from skilled physical therapy addressing her neural mobility, hip strengthening, and core utilization.    Kevin Is progressing well towards her goals.   Pt prognosis is Good.      Pt will continue to benefit from skilled outpatient physical therapy to address the deficits listed in the problem list box on initial evaluation, provide pt/family education and to maximize pt's level of independence in the home and community environment. Pt's spiritual, cultural and educational needs considered and pt agreeable to plan of care and goals.     Anticipated barriers to physical therapy: Lumbar fusion (2/27/2024)     Goals:  Short Term Goals: 4-5 weeks   Patient will have reduced pain complaints from 10/10 to less than or equal to 6/10. (Not Met - Progressing)  Patient will demonstrate increased AROM/PROM by approximately 25% to 50% of initial measurements. (Not Met - Progressing)  Patient will demonstrate increased muscle strength of at least one-half grade as compared to the initial measurements. (Not Met - Progressing)     Long Term Goals: 8-10 weeks   Patient will have reduced pain complaints from 6/10 to less than or equal to 2/10. (Not Met - Progressing)  Patient will demonstrate an improved Timed Up and Go performance from 19.89 seconds to less than or equal to 10 seconds. (Not Met - Progressing)  Patient will demonstrate an improved 30 Second Sit to Stand performance from 4 reps to greater than or equal to 8 reps.   Patient will demonstrate increased muscle strength of at least one grade as compared to the initial measurements. (Not Met - Progressing)  Patient will improve Upper Leg FOTO Intake score from 36% to greater than or equal to 61%. (Not Met - Progressing)  Patient will be independent with their home exercise program. (Not Met - Progressing)    PLAN     Core stabilization  Hip strengthening  Proprioceptive retraining    Plan of care Certification: 9/20/2024 to 11/29/2024.  Outpatient Physical Therapy 1 times weekly for 10 weeks.    Faraz Doe, PT , DPT, OCS

## 2024-09-25 ENCOUNTER — CLINICAL SUPPORT (OUTPATIENT)
Dept: REHABILITATION | Facility: HOSPITAL | Age: 39
End: 2024-09-25
Payer: MEDICAID

## 2024-09-25 DIAGNOSIS — G89.29 CHRONIC LEFT-SIDED LOW BACK PAIN WITH LEFT-SIDED SCIATICA: Primary | ICD-10-CM

## 2024-09-25 DIAGNOSIS — M54.42 CHRONIC LEFT-SIDED LOW BACK PAIN WITH LEFT-SIDED SCIATICA: Primary | ICD-10-CM

## 2024-09-25 DIAGNOSIS — R29.898 WEAKNESS OF LEFT LOWER EXTREMITY: ICD-10-CM

## 2024-09-25 PROCEDURE — 97110 THERAPEUTIC EXERCISES: CPT | Mod: PO

## 2024-09-27 ENCOUNTER — CLINICAL SUPPORT (OUTPATIENT)
Dept: REHABILITATION | Facility: HOSPITAL | Age: 39
End: 2024-09-27
Payer: MEDICAID

## 2024-09-27 DIAGNOSIS — R29.898 WEAKNESS OF LEFT LOWER EXTREMITY: ICD-10-CM

## 2024-09-27 DIAGNOSIS — G89.29 CHRONIC LEFT-SIDED LOW BACK PAIN WITH LEFT-SIDED SCIATICA: Primary | ICD-10-CM

## 2024-09-27 DIAGNOSIS — M54.42 CHRONIC LEFT-SIDED LOW BACK PAIN WITH LEFT-SIDED SCIATICA: Primary | ICD-10-CM

## 2024-09-27 PROCEDURE — 97110 THERAPEUTIC EXERCISES: CPT | Mod: PO

## 2024-09-27 NOTE — PROGRESS NOTES
[facility]   Physical Therapy Treatment Note        Name: Kevin Mancini  Clinic Number: [id number]    Therapy Diagnosis:   Encounter Diagnoses   Name Primary?    Chronic left-sided low back pain with left-sided sciatica Yes    Weakness of left lower extremity      Physician: Cameron Guzman MD    Visit Date: 9/27/2024    Physician Orders: PT Eval and Treat  Medical Diagnosis from Referral:   M25.562 (ICD-10-CM) - Acute pain of left knee   M62.81 (ICD-10-CM) - Quadriceps weakness   Evaluation Date: 9/20/2024  Authorization Period Expiration: 8/15/2025  Plan of Care Expiration: 11/29/2024  Visit # / Visits authorized: 1/ 20     Foto  Date  Score    #1/3 9/20/2024 36%   #2/3       #3/3         Time In: 0802  Time Out: 0915  Total Billable Time: 68 minutes    Precautions: Standard and spine precautions    SUBJECTIVE     Pt reports: decreased left posterior knee symptoms.  She was compliant with home exercise program.  Response to previous treatment: Evaluation  Functional change: Ongoing    Pain: 5/10  Location: Left lower extremity     OBJECTIVE     9/25/2024:  Straight Leg Raise (pre-manual) = 45 degrees  Straight Leg Raise (post-manual) = 55 degrees    TREATMENT     Kevin received the treatments listed below:      Therapeutic Exercises to develop strength, endurance, ROM, posture, and core stabilization for 12 minutes including:  Seated Slump slider - x15  Bridge - 3x10  Matrix Knee Extension - 40#; 2x10 bilateral    Not Today:  Seated thoracic extension      Manual Therapy Techniques: Joint mobilizations were applied to the: thoracic spine for 8 minutes, including:  Supine thoracic extension mobilization - grade V       Neuromuscular Re-education activities to improve: Balance, Proprioception, Posture, and Motor Control for 38 minutes. The following activities were included:  Bent Knee Fall Out with BP Cuff - x20 bilateral  Marching with BP Cuff - x20 bilateral  Straight Leg  Raise with BP Cuff - x10 bilateral  T-ball pelvic rocks - mirror for feedback; 5 minutes    NEXT Heel Slides with BP Cuff      Therapeutic Activities to improve functional performance for 10 minutes, including:  Nu-Step - 10 minutes; level 1 sprints to improve lower extremity strength and cardiovascular endurance utilizing different speeds and resistances.   Education (see below)  Questions and answers      PATIENT EDUCATION AND HOME EXERCISES      Home Exercises Provided and Patient Education Provided     Education provided:   Anatomy and Pathology.  Symptom management and plan of care progressions.  Home Exercise Program.    Written Home Exercises Provided: Patient instructed to cont prior HEP. Exercises were reviewed and Kevin was able to demonstrate them prior to the end of the session.  Kevin demonstrated good  understanding of the education provided. See EMR under Patient Instructions for exercises provided during therapy sessions    ASSESSMENT     Kevin returns to the clinic with improved anterior thigh sensation and decreased cramping symptoms since the last visit. She continues to present with adverse neural tension down the Left lower extremity which significantly improves with spinal manipulations. Continued BP cuff core retraining program in conjunction with hip strengthening exercises. Added cardiovascular and lumbopelvic control training today with appropriate challenge.    From evaluation on 9/20/2024 - Kevin is a 39 y.o. female referred to outpatient Physical Therapy with a medical diagnosis of Acute pain of left knee and quadriceps weakness. Patient presents with decreased range of motion, decreased strength, gait deviations, and adverse neural tension. Signs and symptoms are consistent with adverse neural tension in the lumbar nerve roots secondary to prior lumbar fusion surgery performed earlier in the year. Significant decrease in symptoms, improved motion quality, and gait mechanics  following thoracic manipulation. She will benefit from skilled physical therapy addressing her neural mobility, hip strengthening, and core utilization.    Kevin Is progressing well towards her goals.   Pt prognosis is Good.     Pt will continue to benefit from skilled outpatient physical therapy to address the deficits listed in the problem list box on initial evaluation, provide pt/family education and to maximize pt's level of independence in the home and community environment. Pt's spiritual, cultural and educational needs considered and pt agreeable to plan of care and goals.     Anticipated barriers to physical therapy: Lumbar fusion (2/27/2024)     Goals:  Short Term Goals: 4-5 weeks   Patient will have reduced pain complaints from 10/10 to less than or equal to 6/10. (Not Met - Progressing)  Patient will demonstrate increased AROM/PROM by approximately 25% to 50% of initial measurements. (Not Met - Progressing)  Patient will demonstrate increased muscle strength of at least one-half grade as compared to the initial measurements. (Not Met - Progressing)     Long Term Goals: 8-10 weeks   Patient will have reduced pain complaints from 6/10 to less than or equal to 2/10. (Not Met - Progressing)  Patient will demonstrate an improved Timed Up and Go performance from 19.89 seconds to less than or equal to 10 seconds. (Not Met - Progressing)  Patient will demonstrate an improved 30 Second Sit to Stand performance from 4 reps to greater than or equal to 8 reps.   Patient will demonstrate increased muscle strength of at least one grade as compared to the initial measurements. (Not Met - Progressing)  Patient will improve Upper Leg FOTO Intake score from 36% to greater than or equal to 61%. (Not Met - Progressing)  Patient will be independent with their home exercise program. (Not Met - Progressing)    PLAN     Core stabilization  Hip strengthening  Proprioceptive retraining    Plan of care Certification: 9/20/2024  to 11/29/2024.  Outpatient Physical Therapy 1 times weekly for 10 weeks.    Faraz Doe, PT , DPT, OCS

## 2024-09-30 RX ORDER — TRAMADOL HYDROCHLORIDE 50 MG/1
50 TABLET ORAL EVERY 8 HOURS PRN
Qty: 90 TABLET | Refills: 3 | Status: SHIPPED | OUTPATIENT
Start: 2024-09-30

## 2024-10-02 NOTE — PROGRESS NOTES
"[facility] OUTPATIENT THERAPY AND WELLNESS   Physical Therapy Treatment Note        Name: Kevin Mancini  Clinic Number: [id number]    Therapy Diagnosis:   Encounter Diagnoses   Name Primary?    Chronic left-sided low back pain with left-sided sciatica Yes    Weakness of left lower extremity      Physician: Cameron Guzman MD    Visit Date: 10/3/2024    Physician Orders: PT Eval and Treat  Medical Diagnosis from Referral:   M25.562 (ICD-10-CM) - Acute pain of left knee   M62.81 (ICD-10-CM) - Quadriceps weakness   Evaluation Date: 9/20/2024  Authorization Period Expiration: 8/15/2025  Plan of Care Expiration: 11/29/2024  Visit # / Visits authorized: 3/ 20     Foto  Date  Score    #1/3 9/20/2024 36%   #2/3       #3/3         Time In: 1500  Time Out: 1600  Total Billable Time: 58 minutes    Precautions: Standard and spine precautions    SUBJECTIVE     Pt reports: that she continues to have ongoing spasms in the left thigh.  She was compliant with home exercise program.  Response to previous treatment: "My leg is still jumping."  Functional change: Ongoing    Pain: 5/10  Location: Left lower extremity     OBJECTIVE     10/3/2024:  Straight Leg Raise (pre-manual) = 45 degrees  Straight Leg Raise (post-manual) = 57 degrees    TREATMENT     Kevin received the treatments listed below:      Therapeutic Exercises to develop strength, endurance, ROM, posture, and core stabilization for 8 minutes including:  Seated Slump slider - x15  Bridge - 3x10    Not Today:  Seated thoracic extension  Matrix Knee Extension - 40#; 2x10 bilateral      Manual Therapy Techniques: Joint mobilizations were applied to the: thoracic spine for 2 minutes, including:  Supine thoracic extension mobilization - grade V       Neuromuscular Re-education activities to improve: Balance, Proprioception, Posture, and Motor Control for 38 minutes. The following activities were included:  Bent Knee Fall Out with BP Cuff - yellow band; x20 " bilateral  Marching with BP Cuff - x20 bilateral  Straight Leg Raise with BP Cuff - x10 bilateral  Heel Slides with BP Cuff - x20 bilateral  Hip Hinging - x40    Not Today:  T-ball pelvic rocks - mirror for feedback; 5 minutes      Therapeutic Activities to improve functional performance for 10 minutes, including:  Nu-Step - 10 minutes; level 1 hills to improve lower extremity strength and cardiovascular endurance utilizing different speeds and resistances.   Education (see below)  Questions and answers      PATIENT EDUCATION AND HOME EXERCISES      Home Exercises Provided and Patient Education Provided     Education provided:   Anatomy and Pathology.  Symptom management and plan of care progressions.  Home Exercise Program.    Written Home Exercises Provided: Patient instructed to cont prior HEP. Exercises were reviewed and Kevin was able to demonstrate them prior to the end of the session.  Kevin demonstrated good  understanding of the education provided. See EMR under Patient Instructions for exercises provided during therapy sessions    ASSESSMENT     Kevin returns to the clinic with no changes in cramping symptoms since the last visit. She continues to present with adverse neural tension down the Left lower extremity which significantly improves with spinal manipulations. Continued BP cuff core retraining program in conjunction with hip strengthening exercises. Added hip hinging activity to start incorporating core stabilization activities to functional activities.    From evaluation on 9/20/2024 - Kevin is a 39 y.o. female referred to outpatient Physical Therapy with a medical diagnosis of Acute pain of left knee and quadriceps weakness. Patient presents with decreased range of motion, decreased strength, gait deviations, and adverse neural tension. Signs and symptoms are consistent with adverse neural tension in the lumbar nerve roots secondary to prior lumbar fusion surgery performed earlier in  the year. Significant decrease in symptoms, improved motion quality, and gait mechanics following thoracic manipulation. She will benefit from skilled physical therapy addressing her neural mobility, hip strengthening, and core utilization.    Kevin Is progressing well towards her goals.   Pt prognosis is Good.     Pt will continue to benefit from skilled outpatient physical therapy to address the deficits listed in the problem list box on initial evaluation, provide pt/family education and to maximize pt's level of independence in the home and community environment. Pt's spiritual, cultural and educational needs considered and pt agreeable to plan of care and goals.     Anticipated barriers to physical therapy: Lumbar fusion (2/27/2024)     Goals:  Short Term Goals: 4-5 weeks   Patient will have reduced pain complaints from 10/10 to less than or equal to 6/10. (Not Met - Progressing)  Patient will demonstrate increased AROM/PROM by approximately 25% to 50% of initial measurements. (Not Met - Progressing)  Patient will demonstrate increased muscle strength of at least one-half grade as compared to the initial measurements. (Not Met - Progressing)     Long Term Goals: 8-10 weeks   Patient will have reduced pain complaints from 6/10 to less than or equal to 2/10. (Not Met - Progressing)  Patient will demonstrate an improved Timed Up and Go performance from 19.89 seconds to less than or equal to 10 seconds. (Not Met - Progressing)  Patient will demonstrate an improved 30 Second Sit to Stand performance from 4 reps to greater than or equal to 8 reps.   Patient will demonstrate increased muscle strength of at least one grade as compared to the initial measurements. (Not Met - Progressing)  Patient will improve Upper Leg FOTO Intake score from 36% to greater than or equal to 61%. (Not Met - Progressing)  Patient will be independent with their home exercise program. (Not Met - Progressing)    PLAN     Core  stabilization  Hip strengthening  Proprioceptive retraining    Plan of care Certification: 9/20/2024 to 11/29/2024.  Outpatient Physical Therapy 1 times weekly for 10 weeks.    Faraz Doe, PT , DPT, OCS

## 2024-10-03 ENCOUNTER — PATIENT MESSAGE (OUTPATIENT)
Dept: NEUROSURGERY | Facility: CLINIC | Age: 39
End: 2024-10-03
Payer: MEDICAID

## 2024-10-03 ENCOUNTER — CLINICAL SUPPORT (OUTPATIENT)
Dept: REHABILITATION | Facility: HOSPITAL | Age: 39
End: 2024-10-03
Payer: MEDICAID

## 2024-10-03 DIAGNOSIS — G89.29 CHRONIC LEFT-SIDED LOW BACK PAIN WITH LEFT-SIDED SCIATICA: Primary | ICD-10-CM

## 2024-10-03 DIAGNOSIS — R29.898 WEAKNESS OF LEFT LOWER EXTREMITY: ICD-10-CM

## 2024-10-03 DIAGNOSIS — M54.42 CHRONIC LEFT-SIDED LOW BACK PAIN WITH LEFT-SIDED SCIATICA: Primary | ICD-10-CM

## 2024-10-03 PROCEDURE — 97110 THERAPEUTIC EXERCISES: CPT | Mod: PO | Performed by: PHYSICAL THERAPIST

## 2024-10-07 ENCOUNTER — TELEPHONE (OUTPATIENT)
Dept: NEUROSURGERY | Facility: CLINIC | Age: 39
End: 2024-10-07
Payer: MEDICAID

## 2024-10-08 ENCOUNTER — TELEPHONE (OUTPATIENT)
Dept: NEUROSURGERY | Facility: CLINIC | Age: 39
End: 2024-10-08
Payer: MEDICAID

## 2024-10-08 ENCOUNTER — CLINICAL SUPPORT (OUTPATIENT)
Dept: REHABILITATION | Facility: HOSPITAL | Age: 39
End: 2024-10-08
Payer: MEDICAID

## 2024-10-08 DIAGNOSIS — G89.29 CHRONIC LEFT-SIDED LOW BACK PAIN WITH LEFT-SIDED SCIATICA: Primary | ICD-10-CM

## 2024-10-08 DIAGNOSIS — R29.898 WEAKNESS OF LEFT LOWER EXTREMITY: ICD-10-CM

## 2024-10-08 DIAGNOSIS — M54.42 CHRONIC LEFT-SIDED LOW BACK PAIN WITH LEFT-SIDED SCIATICA: Primary | ICD-10-CM

## 2024-10-08 PROCEDURE — 97110 THERAPEUTIC EXERCISES: CPT | Mod: PO | Performed by: PHYSICAL THERAPIST

## 2024-10-08 RX ORDER — OXYCODONE HYDROCHLORIDE 10 MG/1
10 TABLET ORAL EVERY 8 HOURS PRN
Qty: 60 TABLET | Refills: 0 | Status: SHIPPED | OUTPATIENT
Start: 2024-10-08

## 2024-10-08 RX ORDER — BACLOFEN 10 MG/1
10 TABLET ORAL 3 TIMES DAILY
Qty: 90 TABLET | Refills: 2 | Status: SHIPPED | OUTPATIENT
Start: 2024-10-08 | End: 2025-10-08

## 2024-10-08 RX ORDER — GABAPENTIN 600 MG/1
600 TABLET ORAL 3 TIMES DAILY
Qty: 90 TABLET | Refills: 11 | Status: SHIPPED | OUTPATIENT
Start: 2024-10-08

## 2024-10-08 NOTE — TELEPHONE ENCOUNTER
Returned call to pharmacy, spoke with pharmacist provided her with the diagnosis code for the pt. Pharmacist voiced understanding

## 2024-10-08 NOTE — PROGRESS NOTES
"[facility]   Physical Therapy Treatment Note        Name: Kevin Mancini  Clinic Number: [id number]    Therapy Diagnosis:   Encounter Diagnoses   Name Primary?    Chronic left-sided low back pain with left-sided sciatica Yes    Weakness of left lower extremity      Physician: Cameron Guzman MD    Visit Date: 10/8/2024    Physician Orders: PT Eval and Treat  Medical Diagnosis from Referral:   M25.562 (ICD-10-CM) - Acute pain of left knee   M62.81 (ICD-10-CM) - Quadriceps weakness   Evaluation Date: 9/20/2024  Authorization Period Expiration: 8/15/2025  Plan of Care Expiration: 11/29/2024  Visit # / Visits authorized: 4/ 20     Foto  Date  Score    #1/3 9/20/2024 19%   #2/3  10/8/2024 34%   #3/3         Time In: 0857  Time Out: 1000  Total Billable Time: 59 minutes    Precautions: Standard and spine precautions    SUBJECTIVE     Pt reports: she was able to sit for over an hour.  She was compliant with home exercise program.  Response to previous treatment: "My leg is still jumping."  Functional change: Ongoing    Pain: 5/10  Location: Left lower extremity     OBJECTIVE     10/8/2024:  Straight Leg Raise (pre-manual) = 50 degrees  Straight Leg Raise (post-manual) = 60 degrees    TREATMENT     Kevin received the treatments listed below:      Therapeutic Exercises to develop strength, endurance, ROM, posture, and core stabilization for 8 minutes including:  Seated Slump Tensioner - x15  Pallof Press - green handled bands; x20 bilateral    Not Today:  Seated thoracic extension  Matrix Knee Extension - 40#; 2x10 bilateral  Bridge - 3x10      Manual Therapy Techniques: Joint mobilizations were applied to the: thoracic spine for 2 minutes, including:  Supine thoracic extension mobilization - grade V       Neuromuscular Re-education activities to improve: Balance, Proprioception, Posture, and Motor Control for 9 minutes. The following activities were included:  Hip Hinging - x40  Dead Lift " - with dowel; x30    Not Today:  T-ball pelvic rocks - mirror for feedback; 5 minutes  Bent Knee Fall Out with BP Cuff - yellow band; x20 bilateral  Marching with BP Cuff - x20 bilateral  Straight Leg Raise with BP Cuff - x10 bilateral  Heel Slides with BP Cuff - x20 bilateral      Therapeutic Activities to improve functional performance for 40 minutes, including:  Elliptical - 8 minutes; level 1 to improve lower extremity strength and cardiovascular endurance utilizing different speeds and resistances.   Sled Push / Pull - 45#; 1 turf lap  Tidal Tank Carry - 1 clinic lap  Suitcase carry - 15# bilateral; 1 clinic lap  Farmer's carry - 30#; 1 turf lap each upper extremity  Education (see below)  Questions and answers      PATIENT EDUCATION AND HOME EXERCISES      Home Exercises Provided and Patient Education Provided     Education provided:   Anatomy and Pathology.  Symptom management and plan of care progressions.  Home Exercise Program.    Written Home Exercises Provided: Patient instructed to cont prior HEP. Exercises were reviewed and Kevin was able to demonstrate them prior to the end of the session.  Kevin demonstrated good  understanding of the education provided. See EMR under Patient Instructions for exercises provided during therapy sessions    ASSESSMENT     Kevin returns to the clinic with gradual decreases in radiating Left lower extremity symptoms and increased sitting tolerance. Progressed program away from table exercises to more functional, closed chain activities with appropriate challenge. She continues to present with adverse neural tension down the Left lower extremity which significantly improves with spinal manipulations. Progressing well under current plan of care.    From evaluation on 9/20/2024 - Kevin is a 39 y.o. female referred to outpatient Physical Therapy with a medical diagnosis of Acute pain of left knee and quadriceps weakness. Patient presents with decreased range of  motion, decreased strength, gait deviations, and adverse neural tension. Signs and symptoms are consistent with adverse neural tension in the lumbar nerve roots secondary to prior lumbar fusion surgery performed earlier in the year. Significant decrease in symptoms, improved motion quality, and gait mechanics following thoracic manipulation. She will benefit from skilled physical therapy addressing her neural mobility, hip strengthening, and core utilization.    Kevin Is progressing well towards her goals.   Pt prognosis is Good.     Pt will continue to benefit from skilled outpatient physical therapy to address the deficits listed in the problem list box on initial evaluation, provide pt/family education and to maximize pt's level of independence in the home and community environment. Pt's spiritual, cultural and educational needs considered and pt agreeable to plan of care and goals.     Anticipated barriers to physical therapy: Lumbar fusion (2/27/2024)     Goals:  Short Term Goals: 4-5 weeks   Patient will have reduced pain complaints from 10/10 to less than or equal to 6/10. (Not Met - Progressing)  Patient will demonstrate increased AROM/PROM by approximately 25% to 50% of initial measurements. (Not Met - Progressing)  Patient will demonstrate increased muscle strength of at least one-half grade as compared to the initial measurements. (Not Met - Progressing)     Long Term Goals: 8-10 weeks   Patient will have reduced pain complaints from 6/10 to less than or equal to 2/10. (Not Met - Progressing)  Patient will demonstrate an improved Timed Up and Go performance from 19.89 seconds to less than or equal to 10 seconds. (Not Met - Progressing)  Patient will demonstrate an improved 30 Second Sit to Stand performance from 4 reps to greater than or equal to 8 reps.   Patient will demonstrate increased muscle strength of at least one grade as compared to the initial measurements. (Not Met - Progressing)  Patient  will improve Upper Leg FOTO Intake score from 36% to greater than or equal to 61%. (Not Met - Progressing)  Patient will be independent with their home exercise program. (Not Met - Progressing)    PLAN     Core stabilization  Hip strengthening  Proprioceptive retraining    Plan of care Certification: 9/20/2024 to 11/29/2024.  Outpatient Physical Therapy 1 times weekly for 10 weeks.    Faraz Doe, PT , DPT, OCS

## 2024-10-09 NOTE — PROGRESS NOTES
[facility]   Physical Therapy Treatment Note        Name: Kevin Mancini  Clinic Number: [id number]    Therapy Diagnosis:   Encounter Diagnoses   Name Primary?    Chronic left-sided low back pain with left-sided sciatica Yes    Weakness of left lower extremity        Physician: Cameron Guzman MD    Visit Date: 10/10/2024    Physician Orders: PT Eval and Treat  Medical Diagnosis from Referral:   M25.562 (ICD-10-CM) - Acute pain of left knee   M62.81 (ICD-10-CM) - Quadriceps weakness   Evaluation Date: 9/20/2024  Authorization Period Expiration: 8/15/2025  Plan of Care Expiration: 11/29/2024  Visit # / Visits authorized: 5/ 20     Foto  Date  Score    #1/3 9/20/2024 19%   #2/3  10/8/2024 34%   #3/3         Time In: 1000  Time Out: 1100  Total Billable Time: 58 minutes    Precautions: Standard and spine precautions    SUBJECTIVE     Pt reports: increasing sensation in left posterior calf  She was compliant with home exercise program.  Response to previous treatment: soreness  Functional change: Ongoing    Pain: 5/10  Location: Left lower extremity     OBJECTIVE     10/8/2024:  Straight Leg Raise (pre-manual) = 50 degrees  Straight Leg Raise (post-manual) = 60 degrees    TREATMENT     Kevin received the treatments listed below:      Therapeutic Exercises to develop strength, endurance, ROM, posture, and core stabilization for 8 minutes including:  Seated Slump Tensioner - x15  Pallof Press - green handled bands; x20 bilateral    Not Today:  Seated thoracic extension  Matrix Knee Extension - 40#; 2x10 bilateral  Bridge - 3x10      Manual Therapy Techniques: Joint mobilizations were applied to the: thoracic spine for 2 minutes, including:  Supine thoracic extension mobilization - grade V       Neuromuscular Re-education activities to improve: Balance, Proprioception, Posture, and Motor Control for 8 minutes. The following activities were included:  Hip Hinging - x40  Dead Lift - 10#  with dowel; x30    Not Today:  T-ball pelvic rocks - mirror for feedback; 5 minutes  Bent Knee Fall Out with BP Cuff - yellow band; x20 bilateral  Marching with BP Cuff - x20 bilateral  Straight Leg Raise with BP Cuff - x10 bilateral  Heel Slides with BP Cuff - x20 bilateral      Therapeutic Activities to improve functional performance for 40 minutes, including:  Elliptical - 8 minutes; level 1 to improve lower extremity strength and cardiovascular endurance utilizing different speeds and resistances.   Sled Push / Pull - 45#; 1 turf lap  Tidal Tank Carry - 2 clinic lap  Suitcase carry - 15# bilateral; 2 clinic lap  Geller's carry - 30#; 2 turf lap each upper extremity  Education (see below)  Questions and answers      PATIENT EDUCATION AND HOME EXERCISES      Home Exercises Provided and Patient Education Provided     Education provided:   Anatomy and Pathology.  Symptom management and plan of care progressions.  Home Exercise Program.    Written Home Exercises Provided: Patient instructed to cont prior HEP. Exercises were reviewed and Kevin was able to demonstrate them prior to the end of the session.  Kevin demonstrated good  understanding of the education provided. See EMR under Patient Instructions for exercises provided during therapy sessions    ASSESSMENT     Kevin returns to the clinic with gradual decreases in radiating Left lower extremity symptoms and increased Left lower extremity sensation. Progressed laps of closed chain activities with appropriate challenge. She continues to present with adverse neural tension down the Left lower extremity which significantly improves with spinal manipulations. Progressing well under current plan of care.    From evaluation on 9/20/2024 - Kevin is a 39 y.o. female referred to outpatient Physical Therapy with a medical diagnosis of Acute pain of left knee and quadriceps weakness. Patient presents with decreased range of motion, decreased strength, gait  deviations, and adverse neural tension. Signs and symptoms are consistent with adverse neural tension in the lumbar nerve roots secondary to prior lumbar fusion surgery performed earlier in the year. Significant decrease in symptoms, improved motion quality, and gait mechanics following thoracic manipulation. She will benefit from skilled physical therapy addressing her neural mobility, hip strengthening, and core utilization.    Kevin Is progressing well towards her goals.   Pt prognosis is Good.     Pt will continue to benefit from skilled outpatient physical therapy to address the deficits listed in the problem list box on initial evaluation, provide pt/family education and to maximize pt's level of independence in the home and community environment. Pt's spiritual, cultural and educational needs considered and pt agreeable to plan of care and goals.     Anticipated barriers to physical therapy: Lumbar fusion (2/27/2024)     Goals:  Short Term Goals: 4-5 weeks   Patient will have reduced pain complaints from 10/10 to less than or equal to 6/10. (Not Met - Progressing)  Patient will demonstrate increased AROM/PROM by approximately 25% to 50% of initial measurements. (Not Met - Progressing)  Patient will demonstrate increased muscle strength of at least one-half grade as compared to the initial measurements. (Not Met - Progressing)     Long Term Goals: 8-10 weeks   Patient will have reduced pain complaints from 6/10 to less than or equal to 2/10. (Not Met - Progressing)  Patient will demonstrate an improved Timed Up and Go performance from 19.89 seconds to less than or equal to 10 seconds. (Not Met - Progressing)  Patient will demonstrate an improved 30 Second Sit to Stand performance from 4 reps to greater than or equal to 8 reps.   Patient will demonstrate increased muscle strength of at least one grade as compared to the initial measurements. (Not Met - Progressing)  Patient will improve Upper Leg FOTO Intake  score from 36% to greater than or equal to 61%. (Not Met - Progressing)  Patient will be independent with their home exercise program. (Not Met - Progressing)    PLAN     Core stabilization  Hip strengthening  Proprioceptive retraining    Plan of care Certification: 9/20/2024 to 11/29/2024.  Outpatient Physical Therapy 1 times weekly for 10 weeks.    Faraz Doe, PT , DPT, OCS

## 2024-10-10 ENCOUNTER — CLINICAL SUPPORT (OUTPATIENT)
Dept: REHABILITATION | Facility: HOSPITAL | Age: 39
End: 2024-10-10
Payer: MEDICAID

## 2024-10-10 DIAGNOSIS — M54.42 CHRONIC LEFT-SIDED LOW BACK PAIN WITH LEFT-SIDED SCIATICA: Primary | ICD-10-CM

## 2024-10-10 DIAGNOSIS — G89.29 CHRONIC LEFT-SIDED LOW BACK PAIN WITH LEFT-SIDED SCIATICA: Primary | ICD-10-CM

## 2024-10-10 DIAGNOSIS — R29.898 WEAKNESS OF LEFT LOWER EXTREMITY: ICD-10-CM

## 2024-10-10 PROCEDURE — 97110 THERAPEUTIC EXERCISES: CPT | Mod: PO | Performed by: PHYSICAL THERAPIST

## 2024-10-13 NOTE — PROGRESS NOTES
[facility]   Physical Therapy Treatment Note        Name: Kevin Mancini  Clinic Number: [id number]    Therapy Diagnosis:   Encounter Diagnoses   Name Primary?    Chronic left-sided low back pain with left-sided sciatica Yes    Weakness of left lower extremity      Physician: Cameron Guzman MD    Visit Date: 10/14/2024    Physician Orders: PT Eval and Treat  Medical Diagnosis from Referral:   M25.562 (ICD-10-CM) - Acute pain of left knee   M62.81 (ICD-10-CM) - Quadriceps weakness   Evaluation Date: 9/20/2024  Authorization Period Expiration: 8/15/2025  Plan of Care Expiration: 11/29/2024  Visit # / Visits authorized: 6/ 20     Foto  Date  Score    #1/3 9/20/2024 19%   #2/3  10/8/2024 34%   #3/3         Time In: 1000  Time Out: 1100  Total Billable Time: 54 minutes    Precautions: Standard and spine precautions    SUBJECTIVE     Pt reports: she was able to stand and walk for a lot longer than usual at a family reunion over the weekend. Negative left posterior knee pain.  She was compliant with home exercise program.  Response to previous treatment: soreness  Functional change: Ongoing    Pain: 5/10  Location: Left lower extremity     OBJECTIVE     10/8/2024:  Straight Leg Raise (pre-manual) = 50 degrees  Straight Leg Raise (post-manual) = 60 degrees    TREATMENT     Kevin received the treatments listed below:      Therapeutic Exercises to develop strength, endurance, ROM, posture, and core stabilization for 00 minutes including:  Seated Slump Tensioner - x15  Pallof Press - green handled bands; x20 bilateral    Not Today:  Seated thoracic extension  Matrix Knee Extension - 40#; 2x10 bilateral  Bridge - 3x10      Manual Therapy Techniques: Joint mobilizations were applied to the: thoracic spine for 0 minutes, including:  Supine thoracic extension mobilization - grade V       Neuromuscular Re-education activities to improve: Balance, Proprioception, Posture, and Motor Control for 9  minutes. The following activities were included:  Hip Hinging - x40  Dead Lift - 10# with dowel; x30    Not Today:  T-ball pelvic rocks - mirror for feedback; 5 minutes  Bent Knee Fall Out with BP Cuff - yellow band; x20 bilateral  Marching with BP Cuff - x20 bilateral  Straight Leg Raise with BP Cuff - x10 bilateral  Heel Slides with BP Cuff - x20 bilateral      Therapeutic Activities to improve functional performance for 45 minutes, including:  Elliptical - 8 minutes; level 1 to improve lower extremity strength and cardiovascular endurance utilizing different speeds and resistances.   Sled Push / Pull - 45#; 2 turf lap  Tidal Tank Carry - 2 clinic lap  Chops - 10#; x20 bilateral  Lifts - 7#; x20 bilateral    Not Today:  Suitcase carry - 15# bilateral; 2 clinic lap  Geller's carry - 30#; 2 turf lap each upper extremity      PATIENT EDUCATION AND HOME EXERCISES      Home Exercises Provided and Patient Education Provided     Education provided:   Anatomy and Pathology.  Symptom management and plan of care progressions.  Home Exercise Program.    Written Home Exercises Provided: Patient instructed to cont prior HEP. Exercises were reviewed and Kevin was able to demonstrate them prior to the end of the session.  Kevin demonstrated good  understanding of the education provided. See EMR under Patient Instructions for exercises provided during therapy sessions    ASSESSMENT     Kevin returns to the clinic with gradual decreases in radiating Left lower extremity symptoms and increased Left lower extremity sensation. Progressed multiplanar motions today using chops and lifts. Patient was appropriately challenge with movement mechanics and needed cuing for correct posture and arm use. Improved performance of dead lifts and hip hinging.    From evaluation on 9/20/2024 - Kevin is a 39 y.o. female referred to outpatient Physical Therapy with a medical diagnosis of Acute pain of left knee and quadriceps weakness.  Patient presents with decreased range of motion, decreased strength, gait deviations, and adverse neural tension. Signs and symptoms are consistent with adverse neural tension in the lumbar nerve roots secondary to prior lumbar fusion surgery performed earlier in the year. Significant decrease in symptoms, improved motion quality, and gait mechanics following thoracic manipulation. She will benefit from skilled physical therapy addressing her neural mobility, hip strengthening, and core utilization.    eKvin Is progressing well towards her goals.   Pt prognosis is Good.     Pt will continue to benefit from skilled outpatient physical therapy to address the deficits listed in the problem list box on initial evaluation, provide pt/family education and to maximize pt's level of independence in the home and community environment. Pt's spiritual, cultural and educational needs considered and pt agreeable to plan of care and goals.     Anticipated barriers to physical therapy: Lumbar fusion (2/27/2024)     Goals:  Short Term Goals: 4-5 weeks   Patient will have reduced pain complaints from 10/10 to less than or equal to 6/10. (Not Met - Progressing)  Patient will demonstrate increased AROM/PROM by approximately 25% to 50% of initial measurements. (Not Met - Progressing)  Patient will demonstrate increased muscle strength of at least one-half grade as compared to the initial measurements. (Not Met - Progressing)     Long Term Goals: 8-10 weeks   Patient will have reduced pain complaints from 6/10 to less than or equal to 2/10. (Not Met - Progressing)  Patient will demonstrate an improved Timed Up and Go performance from 19.89 seconds to less than or equal to 10 seconds. (Not Met - Progressing)  Patient will demonstrate an improved 30 Second Sit to Stand performance from 4 reps to greater than or equal to 8 reps.   Patient will demonstrate increased muscle strength of at least one grade as compared to the initial  measurements. (Not Met - Progressing)  Patient will improve Upper Leg FOTO Intake score from 36% to greater than or equal to 61%. (Not Met - Progressing)  Patient will be independent with their home exercise program. (Not Met - Progressing)    PLAN     Core stabilization  Hip strengthening  Proprioceptive retraining    Plan of care Certification: 9/20/2024 to 11/29/2024.  Outpatient Physical Therapy 1 times weekly for 10 weeks.    Faraz Doe, PT , DPT, OCS

## 2024-10-14 ENCOUNTER — CLINICAL SUPPORT (OUTPATIENT)
Dept: REHABILITATION | Facility: HOSPITAL | Age: 39
End: 2024-10-14
Payer: MEDICAID

## 2024-10-14 DIAGNOSIS — G89.29 CHRONIC LEFT-SIDED LOW BACK PAIN WITH LEFT-SIDED SCIATICA: Primary | ICD-10-CM

## 2024-10-14 DIAGNOSIS — M54.42 CHRONIC LEFT-SIDED LOW BACK PAIN WITH LEFT-SIDED SCIATICA: Primary | ICD-10-CM

## 2024-10-14 DIAGNOSIS — R29.898 WEAKNESS OF LEFT LOWER EXTREMITY: ICD-10-CM

## 2024-10-14 PROCEDURE — 97110 THERAPEUTIC EXERCISES: CPT | Mod: PO | Performed by: PHYSICAL THERAPIST

## 2024-10-16 NOTE — PROGRESS NOTES
[facility]   Physical Therapy Treatment Note        Name: Kevin Mancini  Clinic Number: [id number]    Therapy Diagnosis:   Encounter Diagnoses   Name Primary?    Chronic left-sided low back pain with left-sided sciatica Yes    Weakness of left lower extremity      Physician: Cameron Guzman MD    Visit Date: 10/17/2024    Physician Orders: PT Eval and Treat  Medical Diagnosis from Referral:   M25.562 (ICD-10-CM) - Acute pain of left knee   M62.81 (ICD-10-CM) - Quadriceps weakness   Evaluation Date: 9/20/2024  Authorization Period Expiration: 8/15/2025  Plan of Care Expiration: 11/29/2024  Visit # / Visits authorized: 7/ 20     Foto  Date  Score    #1/3 9/20/2024 19%   #2/3  10/8/2024 34%   #3/3         Time In: 1300  Time Out: 1400  Total Billable Time: 55 minutes    Precautions: Standard and spine precautions    SUBJECTIVE     Pt reports: increased Left lower extremity spasms and sensations.  She was compliant with home exercise program.  Response to previous treatment: soreness  Functional change: Ongoing    Pain: 5/10  Location: Left lower extremity     OBJECTIVE     10/8/2024:  Straight Leg Raise (pre-manual) = 50 degrees  Straight Leg Raise (post-manual) = 60 degrees    TREATMENT     Kevin received the treatments listed below:      Therapeutic Exercises to develop strength, endurance, ROM, posture, and core stabilization for 00 minutes including:  Seated Slump Tensioner - x15  Pallof Press - green handled bands; x20 bilateral    Not Today:  Seated thoracic extension  Matrix Knee Extension - 40#; 2x10 bilateral  Bridge - 3x10      Manual Therapy Techniques: Joint mobilizations were applied to the: thoracic spine for 0 minutes, including:  Supine thoracic extension mobilization - grade V       Neuromuscular Re-education activities to improve: Balance, Proprioception, Posture, and Motor Control for 25 minutes. The following activities were included:  T-Ball Pallof Press - green  handles; x20 bilateral  T-Ball med ball lift - 4#; 2x10  T-Ball med ball twist and press - 4#; 2x10 bilateral  T-Ball marches - 3 minutes    Not Today:  Bent Knee Fall Out with BP Cuff - yellow band; x20 bilateral  Marching with BP Cuff - x20 bilateral  Straight Leg Raise with BP Cuff - x10 bilateral  Heel Slides with BP Cuff - x20 bilateral  Hip Hinging - x40  Dead Lift - 10# with dowel; x30      Therapeutic Activities to improve functional performance for 30 minutes, including:  Elliptical - 8 minutes; level 1 to improve lower extremity strength and cardiovascular endurance utilizing different speeds and resistances.   Tidal Tank Carry - 2 clinic lap  Chops - 10#; x20 bilateral  Lifts - 7#; x20 bilateral    Not Today:  Suitcase carry - 15# bilateral; 2 clinic lap  Geller's carry - 30#; 2 turf lap each upper extremity  Sled Push / Pull - 45#; 2 turf lap      PATIENT EDUCATION AND HOME EXERCISES      Home Exercises Provided and Patient Education Provided     Education provided:   Anatomy and Pathology.  Symptom management and plan of care progressions.  Home Exercise Program.    Written Home Exercises Provided: Patient instructed to cont prior HEP. Exercises were reviewed and Kevin was able to demonstrate them prior to the end of the session.  Kevin demonstrated good  understanding of the education provided. See EMR under Patient Instructions for exercises provided during therapy sessions    ASSESSMENT     Kevin returns to the clinic with increasing left lower extremity function as noted by improved sensation and spasms consistent with post-operative procedure in February 2024. Increased emphasis on lumbopelvic control and core strengthening today with fatigue at end of session.    From evaluation on 9/20/2024 - Kevin is a 39 y.o. female referred to outpatient Physical Therapy with a medical diagnosis of Acute pain of left knee and quadriceps weakness. Patient presents with decreased range of motion,  decreased strength, gait deviations, and adverse neural tension. Signs and symptoms are consistent with adverse neural tension in the lumbar nerve roots secondary to prior lumbar fusion surgery performed earlier in the year. Significant decrease in symptoms, improved motion quality, and gait mechanics following thoracic manipulation. She will benefit from skilled physical therapy addressing her neural mobility, hip strengthening, and core utilization.    Kevin Is progressing well towards her goals.   Pt prognosis is Good.     Pt will continue to benefit from skilled outpatient physical therapy to address the deficits listed in the problem list box on initial evaluation, provide pt/family education and to maximize pt's level of independence in the home and community environment. Pt's spiritual, cultural and educational needs considered and pt agreeable to plan of care and goals.     Anticipated barriers to physical therapy: Lumbar fusion (2/27/2024)     Goals:  Short Term Goals: 4-5 weeks   Patient will have reduced pain complaints from 10/10 to less than or equal to 6/10. (Not Met - Progressing)  Patient will demonstrate increased AROM/PROM by approximately 25% to 50% of initial measurements. (Not Met - Progressing)  Patient will demonstrate increased muscle strength of at least one-half grade as compared to the initial measurements. (Not Met - Progressing)     Long Term Goals: 8-10 weeks   Patient will have reduced pain complaints from 6/10 to less than or equal to 2/10. (Not Met - Progressing)  Patient will demonstrate an improved Timed Up and Go performance from 19.89 seconds to less than or equal to 10 seconds. (Not Met - Progressing)  Patient will demonstrate an improved 30 Second Sit to Stand performance from 4 reps to greater than or equal to 8 reps.   Patient will demonstrate increased muscle strength of at least one grade as compared to the initial measurements. (Not Met - Progressing)  Patient will  improve Upper Leg FOTO Intake score from 36% to greater than or equal to 61%. (Not Met - Progressing)  Patient will be independent with their home exercise program. (Not Met - Progressing)    PLAN     Core stabilization  Hip strengthening  Proprioceptive retraining    Plan of care Certification: 9/20/2024 to 11/29/2024.  Outpatient Physical Therapy 1 times weekly for 10 weeks.    Faraz Doe, PT , DPT, OCS

## 2024-10-17 ENCOUNTER — CLINICAL SUPPORT (OUTPATIENT)
Dept: REHABILITATION | Facility: HOSPITAL | Age: 39
End: 2024-10-17
Payer: MEDICAID

## 2024-10-17 DIAGNOSIS — G89.29 CHRONIC LEFT-SIDED LOW BACK PAIN WITH LEFT-SIDED SCIATICA: Primary | ICD-10-CM

## 2024-10-17 DIAGNOSIS — M54.42 CHRONIC LEFT-SIDED LOW BACK PAIN WITH LEFT-SIDED SCIATICA: Primary | ICD-10-CM

## 2024-10-17 DIAGNOSIS — R29.898 WEAKNESS OF LEFT LOWER EXTREMITY: ICD-10-CM

## 2024-10-17 PROCEDURE — 97110 THERAPEUTIC EXERCISES: CPT | Mod: PO | Performed by: PHYSICAL THERAPIST

## 2024-10-22 ENCOUNTER — OFFICE VISIT (OUTPATIENT)
Dept: ORTHOPEDICS | Facility: CLINIC | Age: 39
End: 2024-10-22
Payer: MEDICAID

## 2024-10-22 VITALS — HEIGHT: 64 IN | BODY MASS INDEX: 28.46 KG/M2 | WEIGHT: 166.69 LBS

## 2024-10-22 DIAGNOSIS — M25.562 ACUTE PAIN OF LEFT KNEE: Primary | ICD-10-CM

## 2024-10-22 DIAGNOSIS — M62.81 QUADRICEPS WEAKNESS: ICD-10-CM

## 2024-10-22 PROCEDURE — G2211 COMPLEX E/M VISIT ADD ON: HCPCS | Mod: S$PBB,,, | Performed by: ORTHOPAEDIC SURGERY

## 2024-10-22 PROCEDURE — 3008F BODY MASS INDEX DOCD: CPT | Mod: CPTII,,, | Performed by: ORTHOPAEDIC SURGERY

## 2024-10-22 PROCEDURE — 99213 OFFICE O/P EST LOW 20 MIN: CPT | Mod: S$PBB,,, | Performed by: ORTHOPAEDIC SURGERY

## 2024-10-22 PROCEDURE — 99211 OFF/OP EST MAY X REQ PHY/QHP: CPT | Mod: PBBFAC,PN | Performed by: ORTHOPAEDIC SURGERY

## 2024-10-22 PROCEDURE — 99999 PR PBB SHADOW E&M-EST. PATIENT-LVL I: CPT | Mod: PBBFAC,,, | Performed by: ORTHOPAEDIC SURGERY

## 2024-10-22 NOTE — PROGRESS NOTES
[facility] [address]          Subjective:     Patient ID: Kevin Mancini is a 39 y.o. female.    Chief Complaint: Pain of the Left Knee    Knee Pain: left  swelling: Yes  worsens with activity: Yes  relieved by: injections     Patient last seen in clinic a month ago which time she received a Toradol injection and started therapy.  Patient reports pain is slowly improving since then but continues to have muscle spasms secondary to spine.    Past Medical History:   Diagnosis Date    Asthma         Past Surgical History:   Procedure Laterality Date    FUSION OF SPINE WITH INSTRUMENTATION Left 2/27/2024    Procedure: FUSION, SPINE, WITH INSTRUMENTATION;  Surgeon: Bakari Zaragoza MD;  Location: [facility] OR;  Service: Neurosurgery;  Laterality: Left;  Left L4-5 OLIF, L5-S1 Alif Depuy Conduit BMP  Anterior Fusion interspace 1  Anterior instrumentation insterspace 2  -lop 2 hrs   -general  -type and screen   -c arm   -brain lab shelbi angeles   -LSO   -regular bed   -lateral right down   -depuy (Ron)   -ort    FUSION OF SPINE WITH INSTRUMENTATION N/A 2/27/2024    Procedure: FUSION, SPINE, WITH INSTRUMENTATION;  Surgeon: Bakari Zaragoza MD;  Location: [facility] OR;  Service: Neurosurgery;  Laterality: N/A;  L4-S1 Posterior Instrumentation Viper Prime   Anterior Fusion interspace 1  Anterior Instrumentation interspace 2  -general   -type and screen   -Ziehm angeles   -O   -Girish 4 Poster   -prone   -Depuy ron   -Orthofix douglas    MICRODISCECTOMY OF SPINE N/A 3/2/2022    Procedure: MICRODISCECTOMY, SPINE Left L5-S1 Microdiscectomy;  Surgeon: Bakari Zaragoza MD;  Location: [facility] OR;  Service: Neurosurgery;  Laterality: N/A;  Procedure: Left L5-S1 Microdiscectomy  Surgery Time: 1.5hr  LOS: 0-1  Anesthesia: General  Blood: Type & Screen  Radiology: C-arm  Microscope: Metrx  Bed: [place] [id number] Poster  Position: Prone        Current Outpatient Medications   Medication Instructions    acetaminophen (TYLENOL) 650 mg, Oral,  Every 6 hours    albuterol (PROVENTIL/VENTOLIN HFA) 90 mcg/actuation inhaler 1 puff, Inhalation, Every 4 hours PRN, Rescue    aluminum-magnesium hydroxide-simethicone (MAALOX) 200-200-20 mg/5 mL Susp 30 mLs, Oral, Every 4 hours PRN    baclofen (LIORESAL) 10 mg, Oral, 3 times daily    bisacodyL (DULCOLAX) 10 mg, Rectal, Daily PRN    gabapentin (NEURONTIN) 600 mg, Oral, 3 times daily    nicotine (NICODERM CQ) 14 mg/24 hr 1 patch, Transdermal, Daily    ondansetron (ZOFRAN-ODT) 8 mg, Oral, Every 6 hours PRN    oxyCODONE (ROXICODONE) 10 mg, Oral, Every 8 hours PRN    polyethylene glycol (GLYCOLAX) 17 g, Oral, 2 times daily    senna-docusate 8.6-50 mg (PERICOLACE) 8.6-50 mg per tablet 2 tablets, Oral, Nightly PRN    traMADoL (ULTRAM) 50 mg, Oral, Every 8 hours PRN        Review of patient's allergies indicates:  No Known Allergies    Social History     Socioeconomic History    Marital status: Single   Tobacco Use    Smoking status: Every Day     Types: Cigars     Passive exposure: Never    Smokeless tobacco: Never   Substance and Sexual Activity    Alcohol use: Yes     Alcohol/week: 1.0 standard drink of alcohol     Types: 1 Glasses of wine per week     Comment: social    Drug use: Not Currently     Social Drivers of Health     Financial Resource Strain: Medium Risk (1/9/2024)    Overall Financial Resource Strain (CARDIA)     Difficulty of Paying Living Expenses: Somewhat hard   Food Insecurity: Food Insecurity Present (1/9/2024)    Hunger Vital Sign     Worried About Running Out of Food in the Last Year: Sometimes true     Ran Out of Food in the Last Year: Sometimes true   Transportation Needs: No Transportation Needs (1/9/2024)    PRAPARE - Transportation     Lack of Transportation (Medical): No     Lack of Transportation (Non-Medical): No   Physical Activity: Inactive (1/9/2024)    Exercise Vital Sign     Days of Exercise per Week: 0 days     Minutes of Exercise per Session: 0 min   Stress: No Stress Concern Present  (1/9/2024)    [employer] [place] [employer] - Occupational Stress Questionnaire     Feeling of Stress : Not at all   Housing Stability: High Risk (1/9/2024)    Housing Stability Vital Sign     Unable to Pay for Housing in the Last Year: Yes     Number of Places Lived in the Last Year: 0     Unstable Housing in the Last Year: No       Family History   Problem Relation Name Age of Onset    Pacemaker/defibrilator Paternal Grandfather           Review of systems negative except for noted in HPI    Objective:   Physical Exam:     left knee  Skin atraumatic   mild effusion   Tender to palpation over medial joint line, tender to palpation lateral joint line  ROM 0-105  Stable anterior/posterior   Stable varus/valgus  No groin pain with rotation of hip  Grossly NVI distally    Imaging:   X-Ray knee reviewed, KL 3 changes with joint space narrowing, sclerosis, and osteophytosis.      Assessment:        Kevin Mancini is a 39 y.o. female    Encounter Diagnoses   Name Primary?    Acute pain of left knee Yes    Quadriceps weakness        Plan :  Patient with improving pain since last visit from Toradol injection and starting therapy.  Discussed possibly obtaining an MRI versus patient continuing therapy at this time.  Patient would like to see how she does with some more therapy.  We will have patient follow up in 1 month.        Tim Li MD  [employer] PGY-3

## 2024-10-23 NOTE — PROGRESS NOTES
[facility]   Physical Therapy Treatment Note        Name: Kevin Mancini  Clinic Number: [id number]    Therapy Diagnosis:   Encounter Diagnoses   Name Primary?    Chronic left-sided low back pain with left-sided sciatica Yes    Weakness of left lower extremity      Physician: Cameron Guzman MD    Visit Date: 10/25/2024    Physician Orders: PT Eval and Treat  Medical Diagnosis from Referral:   M25.562 (ICD-10-CM) - Acute pain of left knee   M62.81 (ICD-10-CM) - Quadriceps weakness   Evaluation Date: 9/20/2024  Authorization Period Expiration: 8/15/2025  Plan of Care Expiration: 11/29/2024  Visit # / Visits authorized: 8/ 20     Foto  Date  Score    #1/3 9/20/2024 19%   #2/3  10/8/2024 34%   #3/3         Time In: 0900  Time Out: 1000  Total Billable Time: 53 minutes    Precautions: Standard and spine precautions    SUBJECTIVE     Pt reports: steady decreases in pain and improvements in symptoms.  She was compliant with home exercise program.  Response to previous treatment: soreness  Functional change: Ongoing    Pain: 5/10  Location: Left lower extremity     OBJECTIVE     10/8/2024:  Straight Leg Raise (pre-manual) = 50 degrees  Straight Leg Raise (post-manual) = 60 degrees    TREATMENT     Kevin received the treatments listed below:      Therapeutic Exercises to develop strength, endurance, ROM, posture, and core stabilization for 00 minutes including:  Seated Slump Tensioner - x15  Pallof Press - green handled bands; x20 bilateral    Not Today:  Seated thoracic extension  Matrix Knee Extension - 40#; 2x10 bilateral  Bridge - 3x10      Manual Therapy Techniques: Joint mobilizations were applied to the: thoracic spine for 8 minutes, including:  Bilateral Prone SI distraction - grade V    Not Today:  Supine thoracic extension mobilization - grade V      Neuromuscular Re-education activities to improve: Balance, Proprioception, Posture, and Motor Control for 20 minutes. The  following activities were included:  Bicycles - green theraband; 4x6  T-Ball med ball lift - 4#; 2x10  T-Ball med ball twist and press - 4#; 2x10 bilateral    Not Today:  Bent Knee Fall Out with BP Cuff - yellow band; x20 bilateral  Marching with BP Cuff - x20 bilateral  Straight Leg Raise with BP Cuff - x10 bilateral  Heel Slides with BP Cuff - x20 bilateral  Hip Hinging - x40  Dead Lift - 10# with dowel; x30  T-Ball Pallof Press - green handles; x20 bilateral  T-Ball marches - 3 minutes      Therapeutic Activities to improve functional performance for 25 minutes, including:  Elliptical - 8 minutes; level 1 to improve lower extremity strength and cardiovascular endurance utilizing different speeds and resistances.  Theraband Pulldown with Step up - blue + 6 inch box; x20 bilateral   Sled Push / Pull - 25#; 2 turf lap    Not Today:  Suitcase carry - 15# bilateral; 2 clinic lap  Geller's carry - 30#; 2 turf lap each upper extremity    Tidal Tank Carry - 2 clinic lap  Chops - 10#; x20 bilateral  Lifts - 7#; x20 bilateral      PATIENT EDUCATION AND HOME EXERCISES      Home Exercises Provided and Patient Education Provided     Education provided:   Anatomy and Pathology.  Symptom management and plan of care progressions.  Home Exercise Program.    Written Home Exercises Provided: Patient instructed to cont prior HEP. Exercises were reviewed and Kevin was able to demonstrate them prior to the end of the session.  Kevin demonstrated good  understanding of the education provided. See EMR under Patient Instructions for exercises provided during therapy sessions    ASSESSMENT     Kevin returns to the clinic with increasing left lower extremity function as noted by improved sensation and spasms consistent with post-operative procedure in February 2024. Some increases in bilateral SI joint symptoms today secondary to inferior segment below fusion. Added SI stabilization activities in conjunction to lumbar  stabilization activities today. Decreased pain with sit to stand and improved gait quality upon completion.    From evaluation on 9/20/2024 - Kevin is a 39 y.o. female referred to outpatient Physical Therapy with a medical diagnosis of Acute pain of left knee and quadriceps weakness. Patient presents with decreased range of motion, decreased strength, gait deviations, and adverse neural tension. Signs and symptoms are consistent with adverse neural tension in the lumbar nerve roots secondary to prior lumbar fusion surgery performed earlier in the year. Significant decrease in symptoms, improved motion quality, and gait mechanics following thoracic manipulation. She will benefit from skilled physical therapy addressing her neural mobility, hip strengthening, and core utilization.    Kevin Is progressing well towards her goals.   Pt prognosis is Good.     Pt will continue to benefit from skilled outpatient physical therapy to address the deficits listed in the problem list box on initial evaluation, provide pt/family education and to maximize pt's level of independence in the home and community environment. Pt's spiritual, cultural and educational needs considered and pt agreeable to plan of care and goals.     Anticipated barriers to physical therapy: Lumbar fusion (2/27/2024)     Goals:  Short Term Goals: 4-5 weeks   Patient will have reduced pain complaints from 10/10 to less than or equal to 6/10. (Not Met - Progressing)  Patient will demonstrate increased AROM/PROM by approximately 25% to 50% of initial measurements. (Not Met - Progressing)  Patient will demonstrate increased muscle strength of at least one-half grade as compared to the initial measurements. (Not Met - Progressing)     Long Term Goals: 8-10 weeks   Patient will have reduced pain complaints from 6/10 to less than or equal to 2/10. (Not Met - Progressing)  Patient will demonstrate an improved Timed Up and Go performance from 19.89 seconds to  less than or equal to 10 seconds. (Not Met - Progressing)  Patient will demonstrate an improved 30 Second Sit to Stand performance from 4 reps to greater than or equal to 8 reps.   Patient will demonstrate increased muscle strength of at least one grade as compared to the initial measurements. (Not Met - Progressing)  Patient will improve Upper Leg FOTO Intake score from 36% to greater than or equal to 61%. (Not Met - Progressing)  Patient will be independent with their home exercise program. (Not Met - Progressing)    PLAN     Core stabilization  Hip strengthening  Proprioceptive retraining    Plan of care Certification: 9/20/2024 to 11/29/2024.  Outpatient Physical Therapy 1 times weekly for 10 weeks.    Faraz Doe, PT , DPT, OCS

## 2024-10-24 RX ORDER — OXYCODONE HYDROCHLORIDE 10 MG/1
10 TABLET ORAL EVERY 8 HOURS PRN
Qty: 60 TABLET | Refills: 0 | Status: SHIPPED | OUTPATIENT
Start: 2024-10-24

## 2024-10-25 ENCOUNTER — CLINICAL SUPPORT (OUTPATIENT)
Dept: REHABILITATION | Facility: HOSPITAL | Age: 39
End: 2024-10-25
Payer: MEDICAID

## 2024-10-25 DIAGNOSIS — R29.898 WEAKNESS OF LEFT LOWER EXTREMITY: ICD-10-CM

## 2024-10-25 DIAGNOSIS — G89.29 CHRONIC LEFT-SIDED LOW BACK PAIN WITH LEFT-SIDED SCIATICA: Primary | ICD-10-CM

## 2024-10-25 DIAGNOSIS — M54.42 CHRONIC LEFT-SIDED LOW BACK PAIN WITH LEFT-SIDED SCIATICA: Primary | ICD-10-CM

## 2024-10-25 PROCEDURE — 97110 THERAPEUTIC EXERCISES: CPT | Mod: PO | Performed by: PHYSICAL THERAPIST

## 2024-11-08 ENCOUNTER — CLINICAL SUPPORT (OUTPATIENT)
Dept: REHABILITATION | Facility: HOSPITAL | Age: 39
End: 2024-11-08
Payer: MEDICAID

## 2024-11-08 DIAGNOSIS — G89.29 CHRONIC LEFT-SIDED LOW BACK PAIN WITH LEFT-SIDED SCIATICA: Primary | ICD-10-CM

## 2024-11-08 DIAGNOSIS — M54.42 CHRONIC LEFT-SIDED LOW BACK PAIN WITH LEFT-SIDED SCIATICA: Primary | ICD-10-CM

## 2024-11-08 DIAGNOSIS — R29.898 WEAKNESS OF LEFT LOWER EXTREMITY: ICD-10-CM

## 2024-11-08 PROCEDURE — 97110 THERAPEUTIC EXERCISES: CPT | Mod: PO | Performed by: PHYSICAL THERAPIST

## 2024-11-08 NOTE — PROGRESS NOTES
[facility]   Physical Therapy Treatment Note        Name: Kevin Mancini  Clinic Number: [id number]    Therapy Diagnosis:   Encounter Diagnoses   Name Primary?    Chronic left-sided low back pain with left-sided sciatica Yes    Weakness of left lower extremity        Physician: Cameron Guzman MD    Visit Date: 11/8/2024    Physician Orders: PT Eval and Treat  Medical Diagnosis from Referral:   M25.562 (ICD-10-CM) - Acute pain of left knee   M62.81 (ICD-10-CM) - Quadriceps weakness   Evaluation Date: 9/20/2024  Authorization Period Expiration: 8/15/2025  Plan of Care Expiration: 11/29/2024  Visit # / Visits authorized: 9/ 20     Foto  Date  Score    #1/3 9/20/2024 19%   #2/3  10/8/2024 34%   #3/3         Time In: 1000  Time Out: 1100  Total Billable Time: 53 minutes    Precautions: Standard and spine precautions    SUBJECTIVE     Pt reports: symptoms now mostly in her Left SI area.  She was compliant with home exercise program.  Response to previous treatment: soreness  Functional change: Ongoing    Pain: 5/10  Location: Left lower extremity     OBJECTIVE     10/8/2024:  + Right Flick test (hypomobile)  Left SI ligamentous tenderness  + Left SI pain with single leg loaded activities    TREATMENT     Kevin received the treatments listed below:      Therapeutic Exercises to develop strength, endurance, ROM, posture, and core stabilization for 00 minutes including:  Seated Slump Tensioner - x15  Pallof Press - green handled bands; x20 bilateral    Not Today:  Seated thoracic extension  Matrix Knee Extension - 40#; 2x10 bilateral      Manual Therapy Techniques: Joint mobilizations were applied to the: thoracic spine for 8 minutes, including:  Right SI rotation mobilization - grade V    Not Today:  Supine thoracic extension mobilization - grade V  Bilateral Prone SI distraction - grade V    Neuromuscular Re-education activities to improve: Balance, Proprioception, Posture, and Motor  Control for 15 minutes. The following activities were included:  Single leg Bridge - 2x10 bilateral  Clamshells - green; 2x20 bilateral    Not Today:  Bent Knee Fall Out with BP Cuff - yellow band; x20 bilateral  Marching with BP Cuff - x20 bilateral  Straight Leg Raise with BP Cuff - x10 bilateral  Heel Slides with BP Cuff - x20 bilateral  Hip Hinging - x40  Dead Lift - 10# with dowel; x30  T-Ball Pallof Press - green handles; x20 bilateral  T-Ball marches - 3 minutes  Bicycles - green theraband; 4x6  T-Ball med ball lift - 4#; 2x10  T-Ball med ball twist and press - 4#; 2x10 bilateral    Therapeutic Activities to improve functional performance for 30 minutes, including:  Theraband Pulldown with Step up - blue + 6 inch box; x20 bilateral   Assessment and reassess of asterisk signs (stair negotiations and walking)  Tidal Tank Carry - 2 clinic lap    Not Today:  Suitcase carry - 15# bilateral; 2 clinic lap  Geller's carry - 30#; 2 turf lap each upper extremity  Elliptical - 8 minutes; level 1 to improve lower extremity strength and cardiovascular endurance utilizing different speeds and resistances.  Chops - 10#; x20 bilateral  Lifts - 7#; x20 bilateral  Sled Push / Pull - 25#; 2 turf lap    PATIENT EDUCATION AND HOME EXERCISES      Home Exercises Provided and Patient Education Provided     Education provided:   Anatomy and Pathology.  Symptom management and plan of care progressions.  Home Exercise Program.    Written Home Exercises Provided: Patient instructed to cont prior HEP. Exercises were reviewed and Kevin was able to demonstrate them prior to the end of the session.  Kevin demonstrated good  understanding of the education provided. See EMR under Patient Instructions for exercises provided during therapy sessions    ASSESSMENT     Kevin returns to the clinic with increasing left lower extremity function as noted by improved sensation and spasms consistent with post-operative procedure in February  2024. Ongoing Left SI joint issues address with today's manual techniques and SI stabilization activities. Decreased pain with sit to stand and improved gait quality upon completion.    From evaluation on 9/20/2024 - Kevin is a 39 y.o. female referred to outpatient Physical Therapy with a medical diagnosis of Acute pain of left knee and quadriceps weakness. Patient presents with decreased range of motion, decreased strength, gait deviations, and adverse neural tension. Signs and symptoms are consistent with adverse neural tension in the lumbar nerve roots secondary to prior lumbar fusion surgery performed earlier in the year. Significant decrease in symptoms, improved motion quality, and gait mechanics following thoracic manipulation. She will benefit from skilled physical therapy addressing her neural mobility, hip strengthening, and core utilization.    Kevin Is progressing well towards her goals.   Pt prognosis is Good.     Pt will continue to benefit from skilled outpatient physical therapy to address the deficits listed in the problem list box on initial evaluation, provide pt/family education and to maximize pt's level of independence in the home and community environment. Pt's spiritual, cultural and educational needs considered and pt agreeable to plan of care and goals.     Anticipated barriers to physical therapy: Lumbar fusion (2/27/2024)     Goals:  Short Term Goals: 4-5 weeks   Patient will have reduced pain complaints from 10/10 to less than or equal to 6/10. (Not Met - Progressing)  Patient will demonstrate increased AROM/PROM by approximately 25% to 50% of initial measurements. (Not Met - Progressing)  Patient will demonstrate increased muscle strength of at least one-half grade as compared to the initial measurements. (Not Met - Progressing)     Long Term Goals: 8-10 weeks   Patient will have reduced pain complaints from 6/10 to less than or equal to 2/10. (Not Met - Progressing)  Patient will  demonstrate an improved Timed Up and Go performance from 19.89 seconds to less than or equal to 10 seconds. (Not Met - Progressing)  Patient will demonstrate an improved 30 Second Sit to Stand performance from 4 reps to greater than or equal to 8 reps.   Patient will demonstrate increased muscle strength of at least one grade as compared to the initial measurements. (Not Met - Progressing)  Patient will improve Upper Leg FOTO Intake score from 36% to greater than or equal to 61%. (Not Met - Progressing)  Patient will be independent with their home exercise program. (Not Met - Progressing)    PLAN     Core stabilization  Hip strengthening  Proprioceptive retraining    Plan of care Certification: 9/20/2024 to 11/29/2024.  Outpatient Physical Therapy 1 times weekly for 10 weeks.    Faraz Doe, PT , DPT, OCS

## 2024-11-13 NOTE — PROGRESS NOTES
[facility]   Physical Therapy Treatment Note        Name: Kevin Mancini  Clinic Number: [id number]    Therapy Diagnosis:   Encounter Diagnoses   Name Primary?    Chronic left-sided low back pain with left-sided sciatica Yes    Weakness of left lower extremity      Physician: Cameron Guzman MD    Visit Date: 11/15/2024    Physician Orders: PT Eval and Treat  Medical Diagnosis from Referral:   M25.562 (ICD-10-CM) - Acute pain of left knee   M62.81 (ICD-10-CM) - Quadriceps weakness   Evaluation Date: 9/20/2024  Authorization Period Expiration: 8/15/2025  Plan of Care Expiration: 11/29/2024  Visit # / Visits authorized: 10/ 20     Foto  Date  Score    #1/3 9/20/2024 19%   #2/3 10/8/2024 34%   #3/3        Time In: 1005  Time Out: 1105  Total Billable Time: 53 minutes    Precautions: Standard and spine precautions    SUBJECTIVE     Pt reports: continued symptoms in Left SI region. Pain on her lateral thigh with laying on her left side at night.  She was compliant with home exercise program.  Response to previous treatment: soreness  Functional change: Ongoing    Pain: 5/10  Location: Left lower extremity     OBJECTIVE     11/15/2024:  + Right Flick test (hypomobile)  Left SI ligamentous tenderness  + Left SI pain with single leg loaded activities    TREATMENT     Kevin received the treatments listed below:      Therapeutic Exercises to develop strength, endurance, ROM, posture, and core stabilization for 00 minutes including:  Seated Slump Tensioner - x15  Pallof Press - green handled bands; x20 bilateral    Not Today:  Seated thoracic extension  Matrix Knee Extension - 40#; 2x10 bilateral      Manual Therapy Techniques: Joint mobilizations were applied to the: thoracic spine for 8 minutes, including:  Right SI rotation mobilization - grade V    Not Today:  Supine thoracic extension mobilization - grade V  Bilateral Prone SI distraction - grade V      Neuromuscular Re-education  activities to improve: Balance, Proprioception, Posture, and Motor Control for 12 minutes. The following activities were included:  Single leg Bridge - 3x10 bilateral  Lateral Plank + Clamshells - green; 3x10 bilateral    Not Today:  Bent Knee Fall Out with BP Cuff - yellow band; x20 bilateral  Marching with BP Cuff - x20 bilateral  Straight Leg Raise with BP Cuff - x10 bilateral  Heel Slides with BP Cuff - x20 bilateral  Bicycles - green theraband; 4x6      Therapeutic Activities to improve functional performance for 33 minutes, including:  Theraband Pulldown with Step up - blue + 6 inch box; x20 bilateral  Dead Lift - 15# KB on 6 inch box; 2x10  Tidal Tank Carry - 2 clinic lap  Elliptical - 8 minutes; level 1 to improve lower extremity strength and cardiovascular endurance utilizing different speeds and resistances.    Not Today:  Suitcase carry - 15# bilateral; 2 clinic lap  Geller's carry - 30#; 2 turf lap each upper extremity  Chops - 10#; x20 bilateral  Lifts - 7#; x20 bilateral  Sled Push / Pull - 25#; 2 turf lap    PATIENT EDUCATION AND HOME EXERCISES      Home Exercises Provided and Patient Education Provided     Education provided:   Anatomy and Pathology.  Symptom management and plan of care progressions.  Home Exercise Program - updated 11/15/2024    Written Home Exercises Provided: Patient instructed to cont prior HEP. Exercises were reviewed and Kevin was able to demonstrate them prior to the end of the session.  Kristophermauricio demonstrated good  understanding of the education provided. See EMR under Patient Instructions for exercises provided during therapy sessions    ASSESSMENT     Kevin returns to the clinic with increasing left lower extremity function as noted by improved sensation and spasms consistent with post-operative procedure in February 2024. Minor decrease in Left SI joint pain. Consistent findings as compared to the previous visit. Addressed with today's manual techniques and SI  stabilization activities. Progressed exercises for additional hip strengthening and functional movement patterns. Negative antalgic gait upon completion.    From evaluation on 9/20/2024 - Kevin is a 39 y.o. female referred to outpatient Physical Therapy with a medical diagnosis of Acute pain of left knee and quadriceps weakness. Patient presents with decreased range of motion, decreased strength, gait deviations, and adverse neural tension. Signs and symptoms are consistent with adverse neural tension in the lumbar nerve roots secondary to prior lumbar fusion surgery performed earlier in the year. Significant decrease in symptoms, improved motion quality, and gait mechanics following thoracic manipulation. She will benefit from skilled physical therapy addressing her neural mobility, hip strengthening, and core utilization.    Kevin Is progressing well towards her goals.   Pt prognosis is Good.     Pt will continue to benefit from skilled outpatient physical therapy to address the deficits listed in the problem list box on initial evaluation, provide pt/family education and to maximize pt's level of independence in the home and community environment. Pt's spiritual, cultural and educational needs considered and pt agreeable to plan of care and goals.     Anticipated barriers to physical therapy: Lumbar fusion (2/27/2024)     Goals:  Short Term Goals: 4-5 weeks   Patient will have reduced pain complaints from 10/10 to less than or equal to 6/10. (Met - 11/15/2024)  Patient will demonstrate increased AROM/PROM by approximately 25% to 50% of initial measurements. (Not Met - Progressing)  Patient will demonstrate increased muscle strength of at least one-half grade as compared to the initial measurements. (Not Met - Progressing)     Long Term Goals: 8-10 weeks   Patient will have reduced pain complaints from 6/10 to less than or equal to 2/10. (Not Met - Progressing)  Patient will demonstrate an improved Timed Up  and Go performance from 19.89 seconds to less than or equal to 10 seconds. (Not Met - Progressing)  Patient will demonstrate an improved 30 Second Sit to Stand performance from 4 reps to greater than or equal to 8 reps.   Patient will demonstrate increased muscle strength of at least one grade as compared to the initial measurements. (Not Met - Progressing)  Patient will improve Upper Leg FOTO Intake score from 36% to greater than or equal to 61%. (Not Met - Progressing)  Patient will be independent with their home exercise program. (Not Met - Progressing)    PLAN     Core stabilization  Hip strengthening  Proprioceptive retraining    Plan of care Certification: 9/20/2024 to 11/29/2024.  Outpatient Physical Therapy 1 times weekly for 10 weeks.    Faraz Doe, PT , DPT, OCS

## 2024-11-14 DIAGNOSIS — G89.29 OTHER CHRONIC PAIN: Primary | ICD-10-CM

## 2024-11-14 RX ORDER — OXYCODONE HYDROCHLORIDE 10 MG/1
10 TABLET ORAL EVERY 8 HOURS PRN
Qty: 60 TABLET | Refills: 0 | Status: SHIPPED | OUTPATIENT
Start: 2024-11-14

## 2024-11-15 ENCOUNTER — CLINICAL SUPPORT (OUTPATIENT)
Dept: REHABILITATION | Facility: HOSPITAL | Age: 39
End: 2024-11-15
Payer: MEDICAID

## 2024-11-15 DIAGNOSIS — G89.29 CHRONIC LEFT-SIDED LOW BACK PAIN WITH LEFT-SIDED SCIATICA: Primary | ICD-10-CM

## 2024-11-15 DIAGNOSIS — R29.898 WEAKNESS OF LEFT LOWER EXTREMITY: ICD-10-CM

## 2024-11-15 DIAGNOSIS — M54.42 CHRONIC LEFT-SIDED LOW BACK PAIN WITH LEFT-SIDED SCIATICA: Primary | ICD-10-CM

## 2024-11-15 PROCEDURE — 97110 THERAPEUTIC EXERCISES: CPT | Mod: PO | Performed by: PHYSICAL THERAPIST

## 2024-11-19 ENCOUNTER — OFFICE VISIT (OUTPATIENT)
Dept: ORTHOPEDICS | Facility: CLINIC | Age: 39
End: 2024-11-19
Payer: MEDICAID

## 2024-11-19 VITALS — WEIGHT: 166.69 LBS | BODY MASS INDEX: 28.46 KG/M2 | HEIGHT: 64 IN

## 2024-11-19 DIAGNOSIS — M62.81 QUADRICEPS WEAKNESS: ICD-10-CM

## 2024-11-19 DIAGNOSIS — M25.562 ACUTE PAIN OF LEFT KNEE: Primary | ICD-10-CM

## 2024-11-19 PROCEDURE — 99213 OFFICE O/P EST LOW 20 MIN: CPT | Mod: S$PBB,,, | Performed by: ORTHOPAEDIC SURGERY

## 2024-11-19 PROCEDURE — 3008F BODY MASS INDEX DOCD: CPT | Mod: CPTII,,, | Performed by: ORTHOPAEDIC SURGERY

## 2024-11-19 PROCEDURE — 1159F MED LIST DOCD IN RCRD: CPT | Mod: CPTII,,, | Performed by: ORTHOPAEDIC SURGERY

## 2024-11-19 PROCEDURE — 99213 OFFICE O/P EST LOW 20 MIN: CPT | Mod: PBBFAC,PN | Performed by: ORTHOPAEDIC SURGERY

## 2024-11-19 PROCEDURE — 99999 PR PBB SHADOW E&M-EST. PATIENT-LVL III: CPT | Mod: PBBFAC,,, | Performed by: ORTHOPAEDIC SURGERY

## 2024-11-19 PROCEDURE — G2211 COMPLEX E/M VISIT ADD ON: HCPCS | Mod: S$PBB,,, | Performed by: ORTHOPAEDIC SURGERY

## 2024-11-19 NOTE — PROGRESS NOTES
[facility] [address]          Subjective:     Patient ID: Kevin Mancini is a 39 y.o. female.    Chief Complaint: Pain of the Left Knee    Knee Pain: left  swelling:  Occasional  worsens with activity: Yes  relieved by: injections, PT     Patient reports significant improvement knee pain with therapy and injections.  Still reports left lower extremity pain and spasm secondary to spine but reports knee is overall doing well and improving.        Past Medical History:   Diagnosis Date    Asthma         Past Surgical History:   Procedure Laterality Date    FUSION OF SPINE WITH INSTRUMENTATION Left 2/27/2024    Procedure: FUSION, SPINE, WITH INSTRUMENTATION;  Surgeon: Bakari Zaragoza MD;  Location: [facility] OR;  Service: Neurosurgery;  Laterality: Left;  Left L4-5 OLIF, L5-S1 Alif Depuy Conduit BMP  Anterior Fusion interspace 1  Anterior instrumentation insterspace 2  -lop 2 hrs   -general  -type and screen   -c arm   -brain lab shelbi angeles   -LSO   -regular bed   -lateral right down   -depuy (Ron)   -ort    FUSION OF SPINE WITH INSTRUMENTATION N/A 2/27/2024    Procedure: FUSION, SPINE, WITH INSTRUMENTATION;  Surgeon: Bakari Zaragzoa MD;  Location: [facility] OR;  Service: Neurosurgery;  Laterality: N/A;  L4-S1 Posterior Instrumentation Viper Prime   Anterior Fusion interspace 1  Anterior Instrumentation interspace 2  -general   -type and screen   -Ziehm angeles   -O   -Girish 4 Poster   -prone   -Depuy ron   -Orthofix douglas    MICRODISCECTOMY OF SPINE N/A 3/2/2022    Procedure: MICRODISCECTOMY, SPINE Left L5-S1 Microdiscectomy;  Surgeon: Bakari Zaragoza MD;  Location: [facility] OR;  Service: Neurosurgery;  Laterality: N/A;  Procedure: Left L5-S1 Microdiscectomy  Surgery Time: 1.5hr  LOS: 0-1  Anesthesia: General  Blood: Type & Screen  Radiology: C-arm  Microscope: Metrx  Bed: [place] [id number] Poster  Position: Prone        Current Outpatient Medications   Medication Instructions    acetaminophen (TYLENOL) 650 mg,  Every 6 hours    albuterol (PROVENTIL/VENTOLIN HFA) 90 mcg/actuation inhaler 1 puff, Inhalation, Every 4 hours PRN, Rescue    aluminum-magnesium hydroxide-simethicone (MAALOX) 200-200-20 mg/5 mL Susp 30 mLs, Oral, Every 4 hours PRN    baclofen (LIORESAL) 10 mg, Oral, 3 times daily    bisacodyL (DULCOLAX) 10 mg, Rectal, Daily PRN    gabapentin (NEURONTIN) 600 mg, Oral, 3 times daily    nicotine (NICODERM CQ) 14 mg/24 hr 1 patch, Transdermal, Daily    ondansetron (ZOFRAN-ODT) 8 mg, Oral, Every 6 hours PRN    oxyCODONE (ROXICODONE) 10 mg, Oral, Every 8 hours PRN    polyethylene glycol (GLYCOLAX) 17 g, Oral, 2 times daily    senna-docusate 8.6-50 mg (PERICOLACE) 8.6-50 mg per tablet 2 tablets, Oral, Nightly PRN    traMADoL (ULTRAM) 50 mg, Oral, Every 8 hours PRN        Review of patient's allergies indicates:  No Known Allergies    Social History     Socioeconomic History    Marital status: Single   Tobacco Use    Smoking status: Every Day     Types: Cigars     Passive exposure: Never    Smokeless tobacco: Never   Substance and Sexual Activity    Alcohol use: Yes     Alcohol/week: 1.0 standard drink of alcohol     Types: 1 Glasses of wine per week     Comment: social    Drug use: Not Currently     Social Drivers of Health     Financial Resource Strain: Medium Risk (1/9/2024)    Overall Financial Resource Strain (CARDIA)     Difficulty of Paying Living Expenses: Somewhat hard   Food Insecurity: Food Insecurity Present (1/9/2024)    Hunger Vital Sign     Worried About Running Out of Food in the Last Year: Sometimes true     Ran Out of Food in the Last Year: Sometimes true   Transportation Needs: No Transportation Needs (1/9/2024)    PRAPARE - Transportation     Lack of Transportation (Medical): No     Lack of Transportation (Non-Medical): No   Physical Activity: Inactive (1/9/2024)    Exercise Vital Sign     Days of Exercise per Week: 0 days     Minutes of Exercise per Session: 0 min   Stress: No Stress Concern Present  (1/9/2024)    [employer] - Occupational Stress Questionnaire     Feeling of Stress : Not at all   Housing Stability: High Risk (1/9/2024)    Housing Stability Vital Sign     Unable to Pay for Housing in the Last Year: Yes     Number of Places Lived in the Last Year: 0     Unstable Housing in the Last Year: No       Family History   Problem Relation Name Age of Onset    Pacemaker/defibrilator Paternal Grandfather           Review of systems negative except for noted in HPI    Objective:   Physical Exam:     left knee  Skin atraumatic   no effusion  No tenderness  ROM 0-115  Stable anterior/posterior   Stable varus/valgus  No groin pain with rotation of hip  Grossly NVI distally      Assessment:        Kevin Mancini is a 39 y.o. female    Encounter Diagnoses   Name Primary?    Acute pain of left knee Yes    Quadriceps weakness        Plan :  Patient improving well with therapy and injection.  Reports knee pain overall well improved he is not interested in any further intervention.  Wants to continue therapy.  Discussed transitioning from therapy to gym and continuing exercises learned from PT.  Follow up as needed, can obtain an MRI in the future if continuing to have issues         Tim Li MD  [facility] PGY-3

## 2024-11-21 ENCOUNTER — CLINICAL SUPPORT (OUTPATIENT)
Dept: REHABILITATION | Facility: HOSPITAL | Age: 39
End: 2024-11-21
Payer: MEDICAID

## 2024-11-21 DIAGNOSIS — M54.42 CHRONIC LEFT-SIDED LOW BACK PAIN WITH LEFT-SIDED SCIATICA: Primary | ICD-10-CM

## 2024-11-21 DIAGNOSIS — G89.29 CHRONIC LEFT-SIDED LOW BACK PAIN WITH LEFT-SIDED SCIATICA: Primary | ICD-10-CM

## 2024-11-21 DIAGNOSIS — R29.898 WEAKNESS OF LEFT LOWER EXTREMITY: ICD-10-CM

## 2024-11-21 PROCEDURE — 97110 THERAPEUTIC EXERCISES: CPT | Mod: PO | Performed by: PHYSICAL THERAPIST

## 2024-11-21 NOTE — PROGRESS NOTES
[facility]   Physical Therapy Treatment Note        Name: Kevin Mancini  Clinic Number: [id number]    Therapy Diagnosis:   Encounter Diagnoses   Name Primary?    Chronic left-sided low back pain with left-sided sciatica Yes    Weakness of left lower extremity      Physician: Cameron Guzman MD    Visit Date: 11/21/2024    Physician Orders: PT Eval and Treat  Medical Diagnosis from Referral:   M25.562 (ICD-10-CM) - Acute pain of left knee   M62.81 (ICD-10-CM) - Quadriceps weakness   Evaluation Date: 9/20/2024  Authorization Period Expiration: 8/15/2025  Plan of Care Expiration: 11/29/2024  Visit # / Visits authorized: 11/ 20     Foto  Date  Score    #1/3 9/20/2024 19%   #2/3 10/8/2024 34%   #3/3 12/6/2024       Time In: 0905  Time Out: 1000  Total Billable Time: 53 minutes    Precautions: Standard and spine precautions    SUBJECTIVE     Pt reports: continued symptoms in Left SI region, but improving sensation.  She was compliant with home exercise program.  Response to previous treatment: soreness  Functional change: Ongoing    Pain: 5/10  Location: Left lower extremity     OBJECTIVE     11/15/2024:  + Right Flick test (hypomobile)  - Left SI ligamentous tenderness  + Left SI pain with single leg loaded activities (decreased)    TREATMENT     Kevin received the treatments listed below:      Therapeutic Exercises to develop strength, endurance, ROM, posture, and core stabilization for 00 minutes including:  Seated Slump Tensioner - x15  Pallof Press - green handled bands; x20 bilateral    Not Today:  Seated thoracic extension  Matrix Knee Extension - 40#; 2x10 bilateral      Manual Therapy Techniques: Joint mobilizations were applied to the: thoracic spine for 8 minutes, including:  Right SI rotation mobilization - grade V    Not Today:  Supine thoracic extension mobilization - grade V  Bilateral Prone SI distraction - grade V      Neuromuscular Re-education activities to improve:  Balance, Proprioception, Posture, and Motor Control for 45 minutes. The following activities were included:  Single leg Bridge - 3x10 bilateral  Lateral Plank + Clamshells - blue; 3x10 bilateral  Theraband Pulldown with Step up - burgundy + 6 inch box; x20 bilateral  Goblet Sit to Stand with Abduction - blue + 15#; 3x10 (mirror ofr visual feedback)    Not Today:  Bent Knee Fall Out with BP Cuff - yellow band; x20 bilateral  Marching with BP Cuff - x20 bilateral  Straight Leg Raise with BP Cuff - x10 bilateral  Heel Slides with BP Cuff - x20 bilateral  Bicycles - green theraband; 4x6      Therapeutic Activities to improve functional performance for 33 minutes, including:  Dead Lift - 15# KB on 6 inch box; 2x10    Not Today:  Suitcase carry - 15# bilateral; 2 clinic lap  Geller's carry - 30#; 2 turf lap each upper extremity  Chops - 10#; x20 bilateral  Lifts - 7#; x20 bilateral  Sled Push / Pull - 25#; 2 turf lap  Tidal Tank Carry - 2 clinic lap  Elliptical - 8 minutes; level 1 to improve lower extremity strength and cardiovascular endurance utilizing different speeds and resistances.    PATIENT EDUCATION AND HOME EXERCISES      Home Exercises Provided and Patient Education Provided     Education provided:   Anatomy and Pathology.  Symptom management and plan of care progressions.  Home Exercise Program - updated 11/15/2024    Written Home Exercises Provided: Patient instructed to cont prior HEP. Exercises were reviewed and Kevin was able to demonstrate them prior to the end of the session.  Kevin demonstrated good  understanding of the education provided. See EMR under Patient Instructions for exercises provided during therapy sessions    ASSESSMENT     Kevin returns to the clinic with increasing left lower extremity function as noted by improved sensation and spasms consistent with post-operative procedure in February 2024. Minor decrease in Left SI joint pain as noted by decreased symptoms with single leg  activities prior to manual. Consistent findings as compared to the previous visit. Continued SI stabilization activities by incorporating additional muscles groups with transitional activities. Tentative discharge at the next visit.    From evaluation on 9/20/2024 - Kevin is a 39 y.o. female referred to outpatient Physical Therapy with a medical diagnosis of Acute pain of left knee and quadriceps weakness. Patient presents with decreased range of motion, decreased strength, gait deviations, and adverse neural tension. Signs and symptoms are consistent with adverse neural tension in the lumbar nerve roots secondary to prior lumbar fusion surgery performed earlier in the year. Significant decrease in symptoms, improved motion quality, and gait mechanics following thoracic manipulation. She will benefit from skilled physical therapy addressing her neural mobility, hip strengthening, and core utilization.    Kevin Is progressing well towards her goals.   Pt prognosis is Good.     Pt will continue to benefit from skilled outpatient physical therapy to address the deficits listed in the problem list box on initial evaluation, provide pt/family education and to maximize pt's level of independence in the home and community environment. Pt's spiritual, cultural and educational needs considered and pt agreeable to plan of care and goals.     Anticipated barriers to physical therapy: Lumbar fusion (2/27/2024)     Goals:  Short Term Goals: 4-5 weeks   Patient will have reduced pain complaints from 10/10 to less than or equal to 6/10. (Met - 11/15/2024)  Patient will demonstrate increased AROM/PROM by approximately 25% to 50% of initial measurements. (Not Met - Progressing)  Patient will demonstrate increased muscle strength of at least one-half grade as compared to the initial measurements. (Not Met - Progressing)     Long Term Goals: 8-10 weeks   Patient will have reduced pain complaints from 6/10 to less than or equal  to 2/10. (Not Met - Progressing)  Patient will demonstrate an improved Timed Up and Go performance from 19.89 seconds to less than or equal to 10 seconds. (Not Met - Progressing)  Patient will demonstrate an improved 30 Second Sit to Stand performance from 4 reps to greater than or equal to 8 reps.   Patient will demonstrate increased muscle strength of at least one grade as compared to the initial measurements. (Not Met - Progressing)  Patient will improve Upper Leg FOTO Intake score from 36% to greater than or equal to 61%. (Not Met - Progressing)  Patient will be independent with their home exercise program. (Not Met - Progressing)    PLAN     Core stabilization  Hip strengthening  Proprioceptive retraining    Plan of care Certification: 9/20/2024 to 11/29/2024.  Outpatient Physical Therapy 1 times weekly for 10 weeks.    Faraz Doe, PT , DPT, OCS

## 2024-12-04 DIAGNOSIS — G89.29 OTHER CHRONIC PAIN: ICD-10-CM

## 2024-12-05 ENCOUNTER — PATIENT MESSAGE (OUTPATIENT)
Dept: NEUROSURGERY | Facility: CLINIC | Age: 39
End: 2024-12-05
Payer: MEDICAID

## 2024-12-05 RX ORDER — OXYCODONE HYDROCHLORIDE 10 MG/1
10 TABLET ORAL EVERY 8 HOURS PRN
Qty: 60 TABLET | Refills: 0 | Status: SHIPPED | OUTPATIENT
Start: 2024-12-05

## 2024-12-06 ENCOUNTER — CLINICAL SUPPORT (OUTPATIENT)
Dept: REHABILITATION | Facility: HOSPITAL | Age: 39
End: 2024-12-06
Payer: MEDICAID

## 2024-12-06 DIAGNOSIS — R29.898 WEAKNESS OF LEFT LOWER EXTREMITY: ICD-10-CM

## 2024-12-06 DIAGNOSIS — G89.29 CHRONIC LEFT-SIDED LOW BACK PAIN WITH LEFT-SIDED SCIATICA: Primary | ICD-10-CM

## 2024-12-06 DIAGNOSIS — M54.42 CHRONIC LEFT-SIDED LOW BACK PAIN WITH LEFT-SIDED SCIATICA: Primary | ICD-10-CM

## 2024-12-06 PROCEDURE — 97110 THERAPEUTIC EXERCISES: CPT | Mod: PO | Performed by: PHYSICAL THERAPIST

## 2024-12-06 NOTE — PROGRESS NOTES
[facility]   Physical Therapy Treatment Note and Discharge Summary       Name: Kevin Mancini  Clinic Number: [id number]    Therapy Diagnosis:   Encounter Diagnoses   Name Primary?    Chronic left-sided low back pain with left-sided sciatica Yes    Weakness of left lower extremity        Physician: Cameron Guzman MD    Visit Date: 12/6/2024    Physician Orders: PT Eval and Treat  Medical Diagnosis from Referral:   M25.562 (ICD-10-CM) - Acute pain of left knee   M62.81 (ICD-10-CM) - Quadriceps weakness   Evaluation Date: 9/20/2024  Authorization Period Expiration: 8/15/2025  Plan of Care Expiration: 11/29/2024  Visit # / Visits authorized: 12/ 20     Foto  Date  Score    #1/3 9/20/2024 19%   #2/3 10/8/2024 34%   #3/3 12/6/2024 69%      Time In: 0905  Time Out: 1000  Total Billable Time: 53 minutes    Precautions: Standard and spine precautions    SUBJECTIVE     Pt reports: improving SI symptoms. Ready for discharge.  She was compliant with home exercise program.  Response to previous treatment: soreness  Functional change: Ongoing    Pain: 2/10  Location: Left lower extremity     OBJECTIVE     12/06/2024    Lumbar Spine AROM   Action Degrees Comments   Flexion 55 None   Extension 10 None   Left Rotation 75% None   Right Rotation 75% None      Manual Muscle Testing:  Lower Extremity   Action Left Right   Hip Flexion 4+ / 5 4+ / 5   Hip Extension See Sit to Stand See Sit to Stand   Knee Flexion 4+ / 5 5 / 5   Knee Extension 4 / 5 5 / 5   *No myotomal weakness*    Timed Up and Go = 6.77 seconds  30 Second Sit to Stand = 15 reps  Lift and carry - 25# up and down 2 flights of stairs      TREATMENT     Kevin received the treatments listed below:      Therapeutic Exercises to develop strength, endurance, ROM, posture, and core stabilization for 00 minutes including:  Seated Slump Tensioner - x15  Pallof Press - green handled bands; x20 bilateral      Manual Therapy Techniques: Joint  mobilizations were applied to the: thoracic spine for 00 minutes, including:  Right SI rotation mobilization - grade V  Supine thoracic extension mobilization - grade V  Bilateral Prone SI distraction - grade V      Neuromuscular Re-education activities to improve: Balance, Proprioception, Posture, and Motor Control for 00 minutes. The following activities were included:  Single leg Bridge - 3x10 bilateral  Lateral Plank + Clamshells - blue; 3x10 bilateral  Theraband Pulldown with Step up - burgundy + 6 inch box; x20 bilateral  Goblet Sit to Stand with Abduction - blue + 15#; 3x10 (mirror ofr visual feedback)    Not Today:  Bent Knee Fall Out with BP Cuff - yellow band; x20 bilateral  Marching with BP Cuff - x20 bilateral  Straight Leg Raise with BP Cuff - x10 bilateral  Heel Slides with BP Cuff - x20 bilateral  Bicycles - green theraband; 4x6      Therapeutic Activities to improve functional performance for 53 minutes, including:  Assessment as above  Functional Testing  FOTO  Dead Lift - 25# KB from floor; 2x10  Education, questions, and answers    Not Today:  Suitcase carry - 15# bilateral; 2 clinic lap  Geller's carry - 30#; 2 turf lap each upper extremity  Chops - 10#; x20 bilateral  Lifts - 7#; x20 bilateral  Sled Push / Pull - 25#; 2 turf lap  Tidal Tank Carry - 2 clinic lap    PATIENT EDUCATION AND HOME EXERCISES      Home Exercises Provided and Patient Education Provided     Education provided:   Anatomy and Pathology.  Symptom management and plan of care progressions.  Home Exercise Program - updated 11/15/2024    Written Home Exercises Provided: Patient instructed to cont prior HEP. Exercises were reviewed and Kevin was able to demonstrate them prior to the end of the session.  Kevin demonstrated good  understanding of the education provided. See EMR under Patient Instructions for exercises provided during therapy sessions    ASSESSMENT     Kevin has met 100% of her goals as of the 13th visit and  is independent with her home exercise program; therefore, she is discharged from skilled physical therapy. She demonstrated excellent performance of lifting mechanics and stair negotiations today with weight. She will call or reach out electronically if her symptoms return or if any new symptoms arise. She gave verbal acknowledgement and understanding of all instructions and education, and is in agreement with the plan. Patient is discharged.    From evaluation on 9/20/2024 - Kevin is a 39 y.o. female referred to outpatient Physical Therapy with a medical diagnosis of Acute pain of left knee and quadriceps weakness. Patient presents with decreased range of motion, decreased strength, gait deviations, and adverse neural tension. Signs and symptoms are consistent with adverse neural tension in the lumbar nerve roots secondary to prior lumbar fusion surgery performed earlier in the year. Significant decrease in symptoms, improved motion quality, and gait mechanics following thoracic manipulation. She will benefit from skilled physical therapy addressing her neural mobility, hip strengthening, and core utilization.    Kevin Is progressing well towards her goals.   Pt prognosis is Good.     Pt will continue to benefit from skilled outpatient physical therapy to address the deficits listed in the problem list box on initial evaluation, provide pt/family education and to maximize pt's level of independence in the home and community environment. Pt's spiritual, cultural and educational needs considered and pt agreeable to plan of care and goals.     Anticipated barriers to physical therapy: Lumbar fusion (2/27/2024)     Goals:  Short Term Goals: 4-5 weeks   Patient will have reduced pain complaints from 10/10 to less than or equal to 6/10. (Met - 11/15/2024)  Patient will demonstrate increased AROM/PROM by approximately 25% to 50% of initial measurements. (Met - 12/6/2024)  Patient will demonstrate increased muscle  strength of at least one-half grade as compared to the initial measurements. (Met - 12/6/2024)     Long Term Goals: 8-10 weeks   Patient will have reduced pain complaints from 6/10 to less than or equal to 2/10. (Met - 12/6/2024)  Patient will demonstrate an improved Timed Up and Go performance from 19.89 seconds to less than or equal to 10 seconds. (Met - 12/6/2024)  Patient will demonstrate an improved 30 Second Sit to Stand performance from 4 reps to greater than or equal to 8 reps. (Met - 12/6/2024)  Patient will demonstrate increased muscle strength of at least one grade as compared to the initial measurements. (Met - 12/6/2024)  Patient will improve Upper Leg FOTO Intake score from 36% to greater than or equal to 61%. (Met - 16/6/2024)  Patient will be independent with their home exercise program. (Met - 12/6/2024)    PLAN     Patient is discharged.    Plan of care Certification: 9/20/2024 to 11/29/2024.  Outpatient Physical Therapy 1 times weekly for 10 weeks.    Faraz Doe, PT, DPT, OCS

## 2024-12-26 DIAGNOSIS — G89.29 OTHER CHRONIC PAIN: ICD-10-CM

## 2024-12-27 ENCOUNTER — PATIENT MESSAGE (OUTPATIENT)
Dept: NEUROSURGERY | Facility: CLINIC | Age: 39
End: 2024-12-27
Payer: MEDICAID

## 2024-12-27 RX ORDER — OXYCODONE HYDROCHLORIDE 10 MG/1
10 TABLET ORAL EVERY 8 HOURS PRN
Qty: 60 TABLET | Refills: 0 | Status: SHIPPED | OUTPATIENT
Start: 2024-12-27

## 2025-01-16 DIAGNOSIS — G89.29 OTHER CHRONIC PAIN: ICD-10-CM

## 2025-01-16 RX ORDER — GABAPENTIN 600 MG/1
600 TABLET ORAL 3 TIMES DAILY
Qty: 90 TABLET | Refills: 11 | Status: SHIPPED | OUTPATIENT
Start: 2025-01-16

## 2025-01-17 ENCOUNTER — PATIENT MESSAGE (OUTPATIENT)
Dept: NEUROSURGERY | Facility: CLINIC | Age: 40
End: 2025-01-17
Payer: MEDICAID

## 2025-01-17 RX ORDER — OXYCODONE HYDROCHLORIDE 10 MG/1
10 TABLET ORAL EVERY 8 HOURS PRN
Qty: 60 TABLET | Refills: 0 | Status: SHIPPED | OUTPATIENT
Start: 2025-01-17

## 2025-02-03 RX ORDER — BACLOFEN 10 MG/1
10 TABLET ORAL 3 TIMES DAILY
Qty: 90 TABLET | Refills: 2 | Status: SHIPPED | OUTPATIENT
Start: 2025-02-03

## 2025-02-05 DIAGNOSIS — G89.29 OTHER CHRONIC PAIN: ICD-10-CM

## 2025-02-06 DIAGNOSIS — G89.29 OTHER CHRONIC PAIN: Primary | ICD-10-CM

## 2025-02-06 RX ORDER — OXYCODONE HYDROCHLORIDE 10 MG/1
10 TABLET ORAL EVERY 8 HOURS PRN
Qty: 60 TABLET | Refills: 0 | Status: SHIPPED | OUTPATIENT
Start: 2025-02-06 | End: 2025-02-25 | Stop reason: SDUPTHER

## 2025-02-07 RX ORDER — TRAMADOL HYDROCHLORIDE 50 MG/1
50 TABLET ORAL EVERY 8 HOURS PRN
Qty: 90 TABLET | Refills: 3 | Status: SHIPPED | OUTPATIENT
Start: 2025-02-07

## 2025-02-25 DIAGNOSIS — G89.29 OTHER CHRONIC PAIN: ICD-10-CM

## 2025-02-26 RX ORDER — OXYCODONE HYDROCHLORIDE 10 MG/1
10 TABLET ORAL EVERY 8 HOURS PRN
Qty: 60 TABLET | Refills: 0 | Status: SHIPPED | OUTPATIENT
Start: 2025-02-26

## 2025-03-18 DIAGNOSIS — G89.29 OTHER CHRONIC PAIN: ICD-10-CM

## 2025-03-18 RX ORDER — OXYCODONE HYDROCHLORIDE 10 MG/1
10 TABLET ORAL EVERY 8 HOURS PRN
Qty: 60 TABLET | Refills: 0 | Status: SHIPPED | OUTPATIENT
Start: 2025-03-18

## 2025-03-20 ENCOUNTER — CLINICAL SUPPORT (OUTPATIENT)
Dept: SMOKING CESSATION | Facility: CLINIC | Age: 40
End: 2025-03-20

## 2025-03-20 DIAGNOSIS — F17.200 NICOTINE DEPENDENCE, UNCOMPLICATED: Primary | ICD-10-CM

## 2025-03-20 PROCEDURE — 99999 PR PBB SHADOW E&M-EST. PATIENT-LVL I: CPT | Mod: PBBFAC,,,

## 2025-03-20 NOTE — PROGRESS NOTES
Spoke with patient today in regard to smoking cessation progress for 12 month follow up. She states she is not tobacco free but would like to schedule an appointment. Patient has scheduled appointment to return to the program for Quit attempt # 1. Upon reviewing CTTS notes, patient did not complete intake. Informed patient of benefit period, future follow ups and contact information if any further help is needed. Will not drop charge for this encounter. MEDICAID patient has 8 visits for 2025.

## 2025-03-25 ENCOUNTER — CLINICAL SUPPORT (OUTPATIENT)
Dept: SMOKING CESSATION | Facility: CLINIC | Age: 40
End: 2025-03-25
Payer: MEDICAID

## 2025-03-25 DIAGNOSIS — F17.200 NICOTINE DEPENDENCE: Primary | ICD-10-CM

## 2025-03-25 PROCEDURE — 99404 PREV MED CNSL INDIV APPRX 60: CPT | Mod: 95,,, | Performed by: FAMILY MEDICINE

## 2025-03-25 RX ORDER — DM/P-EPHED/ACETAMINOPH/DOXYLAM 30-7.5/3
2 LIQUID (ML) ORAL
Qty: 144 LOZENGE | Refills: 0 | Status: SHIPPED | OUTPATIENT
Start: 2025-03-25

## 2025-03-25 RX ORDER — IBUPROFEN 200 MG
1 TABLET ORAL DAILY
Qty: 28 PATCH | Refills: 0 | Status: SHIPPED | OUTPATIENT
Start: 2025-03-25

## 2025-03-25 NOTE — PROGRESS NOTES
FTND score of 9 indicates a high dependence to Nicotine.  Patient is smoking 4 Black N Mild cigars daily.  LOREE-D score of 23 is perceived as significant mental distress and probable depression.  NRT patches and lozenges ordered, and instructions given for use.  Behavioral changes discussed.  Patient must smoke outside at all times.  Patient motivation is high.

## 2025-04-01 ENCOUNTER — TELEPHONE (OUTPATIENT)
Dept: SMOKING CESSATION | Facility: CLINIC | Age: 40
End: 2025-04-01
Payer: MEDICAID

## 2025-04-01 NOTE — TELEPHONE ENCOUNTER
1st attempt, patient called after not checking in for her virtual visit.  Voicemail with contact information given.

## 2025-04-01 NOTE — TELEPHONE ENCOUNTER
2nd attempt, patient called after not checking in for her virtual visit.  Patient states that she is currently sick with bronchitis and rescheduled for tomorrow at 10:00.

## 2025-04-02 ENCOUNTER — CLINICAL SUPPORT (OUTPATIENT)
Dept: SMOKING CESSATION | Facility: CLINIC | Age: 40
End: 2025-04-02
Payer: MEDICAID

## 2025-04-02 DIAGNOSIS — F17.200 NICOTINE DEPENDENCE: ICD-10-CM

## 2025-04-02 PROCEDURE — 99403 PREV MED CNSL INDIV APPRX 45: CPT | Mod: 95,,, | Performed by: FAMILY MEDICINE

## 2025-04-02 RX ORDER — IBUPROFEN 200 MG
1 TABLET ORAL DAILY
Qty: 28 PATCH | Refills: 0 | Status: SHIPPED | OUTPATIENT
Start: 2025-04-02

## 2025-04-02 RX ORDER — DM/P-EPHED/ACETAMINOPH/DOXYLAM 30-7.5/3
2 LIQUID (ML) ORAL
Qty: 144 LOZENGE | Refills: 0 | Status: SHIPPED | OUTPATIENT
Start: 2025-04-02

## 2025-04-02 NOTE — PROGRESS NOTES
Individual Follow-Up Form    4/2/2025    Quit Date: N/A    Clinical Status of Patient: Outpatient    Length of Service: 45 minutes    Continuing Medication: no    Other Medications: NRT patches and lozenges ordered     Target Symptoms: Withdrawal and medication side effects. The following were  rated moderate (3) to severe (4) on TCRS:  Moderate (3): urges and cravings throughout the day  Severe (4): urges and cravings throughout the day    Comments: completion of TCRS (Tobacco Cessation Rating Scale) reviewed strategies, cues, and triggers. Introduced the negative impact of tobacco on health, the health advantages of discontinuing the use of tobacco, time line improved health changes after a quit, withdrawal issues to expect from nicotine and habit, and ways to achieve the goal of a quit.  Patient states she has reduced to 2 Black n Milds a day.  I congratulated her for the reduction.  Patient states she is smoking outside and is keeping a journal about her smoking habit.  Patient states that she is able to have a bowel movement without smoking.  Patient states that not keeping the tobacco with her has helped reduce her smoking.  We discussed a reduction to 1.5 cigars a day and no more smoking in her vehicle.  Patient states that she was unable to get NRT from the pharmacy due to issues with her medicaid.  NRT reordered to [facility].  Patient is happy with progress and is confident moving forward.  Patient remains on prescribed tobacco cessation medication regimen of 21 mg patch without any negative side effects at this time.  The patient will continue with virtual therapy sessions and medication monitoring by CTTS. Prescribed medication management will be by physician.     Diagnosis: F17.200    Next Visit: 1 week

## 2025-04-06 DIAGNOSIS — G89.29 OTHER CHRONIC PAIN: ICD-10-CM

## 2025-04-07 ENCOUNTER — TELEPHONE (OUTPATIENT)
Dept: SMOKING CESSATION | Facility: CLINIC | Age: 40
End: 2025-04-07
Payer: MEDICAID

## 2025-04-07 DIAGNOSIS — F17.200 NICOTINE DEPENDENCE: ICD-10-CM

## 2025-04-07 RX ORDER — IBUPROFEN 200 MG
1 TABLET ORAL DAILY
Qty: 28 PATCH | Refills: 0 | Status: SHIPPED | OUTPATIENT
Start: 2025-04-07

## 2025-04-07 RX ORDER — OXYCODONE HYDROCHLORIDE 10 MG/1
10 TABLET ORAL EVERY 8 HOURS PRN
Qty: 60 TABLET | Refills: 0 | Status: SHIPPED | OUTPATIENT
Start: 2025-04-07

## 2025-04-07 NOTE — TELEPHONE ENCOUNTER
Patient's voicemail returned.  Message left confirming NRT patch order was sent to [facility] with [place] coupon.  My contact information was given.

## 2025-04-08 ENCOUNTER — CLINICAL SUPPORT (OUTPATIENT)
Dept: SMOKING CESSATION | Facility: CLINIC | Age: 40
End: 2025-04-08
Payer: MEDICAID

## 2025-04-08 DIAGNOSIS — F17.200 NICOTINE DEPENDENCE: Primary | ICD-10-CM

## 2025-04-08 NOTE — PROGRESS NOTES
Individual Follow-Up Form    4/8/2025    Quit Date: N/A    Clinical Status of Patient: Outpatient    Length of Service: 45 minutes    Continuing Medication: yes  Patches    Other Medications:      Target Symptoms: Withdrawal and medication side effects. The following were  rated moderate (3) to severe (4) on TCRS:  Moderate (3): urges and cravings throughout the day  Severe (4): urges and cravings throughout the day    Comments: completion of TCRS (Tobacco Cessation Rating Scale) reviewed strategies, controlling environment, cues, triggers, new goals set. Introduced high risk situations with preparation interventions, caffeine similarities with withdrawal issues of habit and nicotine, Alcohol, Understanding urges, cravings, stress and relaxation. Open discussion with intervention discussion.  Patient states that she continues to smoke 2 small cigars a day.  Patient picked up her NRT patches yesterday and placed her first patch this AM.  Patient states that she continues to pay attention to her habit and has not smoked when arriving home after work the past two days. We discussed maintaining this new routine.  We discussed a rate reduction to 1.5 cigars and counting how many times she lights a cigar and smokes.  We discussed a 30 minute delay in the morning to smoke after waking, and no coffee while smoking.  We discussed the importance of no more social smoking with her boyfriend.  Patient states she has a plan today to stay distracted on break and work on [employer] baskets for her niece and nephew.  Patient remains on prescribed tobacco cessation medication regimen of 21 mg patch without any negative side effects at this time.  The patient will continue with virtual therapy sessions and medication monitoring by CTTS. Prescribed medication management will be by physician.     Diagnosis: F17.200    Next Visit: 1 week

## 2025-04-15 ENCOUNTER — TELEPHONE (OUTPATIENT)
Dept: SMOKING CESSATION | Facility: CLINIC | Age: 40
End: 2025-04-15

## 2025-04-15 NOTE — TELEPHONE ENCOUNTER
1st attempt, patient called after not checking in for her virtual appointment.  Patient stated she was busy at work and rescheduled for tomorrow at noon.

## 2025-04-16 ENCOUNTER — CLINICAL SUPPORT (OUTPATIENT)
Dept: SMOKING CESSATION | Facility: CLINIC | Age: 40
End: 2025-04-16
Payer: COMMERCIAL

## 2025-04-16 DIAGNOSIS — F17.200 NICOTINE DEPENDENCE: Primary | ICD-10-CM

## 2025-04-16 RX ORDER — DM/P-EPHED/ACETAMINOPH/DOXYLAM 30-7.5/3
2 LIQUID (ML) ORAL
Qty: 144 LOZENGE | Refills: 0 | Status: SHIPPED | OUTPATIENT
Start: 2025-04-16

## 2025-04-16 NOTE — PROGRESS NOTES
Individual Follow-Up Form    4/16/2025    Quit Date: N/A    Clinical Status of Patient: Outpatient    Length of Service: 45 minutes    Continuing Medication: yes  Patches or Nicotine Lozenges    Other Medications: none     Target Symptoms: Withdrawal and medication side effects. The following were  rated moderate (3) to severe (4) on TCRS:  Moderate (3): urges and cravings through the day  Severe (4): urges and cravings through the day    Comments: completion of TCRS (Tobacco Cessation Rating Scale) reviewed strategies, controlling environment, cues, triggers, new goals set. Introduced high risk situations with preparation interventions, caffeine similarities with withdrawal issues of habit and nicotine, Alcohol, Understanding urges, cravings, stress and relaxation. Open discussion with intervention discussion.  Patient states that she is smoking 1.5-2 cigars per day.  Patient states she smoked 0.5-1 cigar over the weekend.  I congratulated her for the reduction.  Patient states that she is paying attention to her smoking, but still not counting how many times she lights each cigar.  Patient states that she has not smoked today and will delay her first smoke as long as possible.  We discussed attempting to not smoke before work tomorrow.  Patient is feeling good about her quit and continued reduction in smoking.  Patient remains on prescribed tobacco cessation medication regimen of 21 mg patch without any negative side effects at this time.  The patient will continue with virtual therapy sessions and medication monitoring by CTTS. Prescribed medication management will be by physician.     Diagnosis: F17.200    Next Visit: 1 week

## 2025-04-22 ENCOUNTER — TELEPHONE (OUTPATIENT)
Dept: SMOKING CESSATION | Facility: CLINIC | Age: 40
End: 2025-04-22
Payer: COMMERCIAL

## 2025-04-28 DIAGNOSIS — G89.29 OTHER CHRONIC PAIN: ICD-10-CM

## 2025-04-28 RX ORDER — OXYCODONE HYDROCHLORIDE 10 MG/1
10 TABLET ORAL EVERY 8 HOURS PRN
Qty: 60 TABLET | Refills: 0 | Status: SHIPPED | OUTPATIENT
Start: 2025-04-28

## 2025-05-19 DIAGNOSIS — G89.29 OTHER CHRONIC PAIN: ICD-10-CM

## 2025-05-20 RX ORDER — OXYCODONE HYDROCHLORIDE 10 MG/1
10 TABLET ORAL EVERY 8 HOURS PRN
Qty: 60 TABLET | Refills: 0 | Status: SHIPPED | OUTPATIENT
Start: 2025-05-20

## 2025-06-07 RX ORDER — BACLOFEN 10 MG/1
10 TABLET ORAL 3 TIMES DAILY
Qty: 90 TABLET | Refills: 2 | Status: SHIPPED | OUTPATIENT
Start: 2025-06-07

## 2025-06-08 DIAGNOSIS — G89.4 CHRONIC PAIN SYNDROME: ICD-10-CM

## 2025-06-08 DIAGNOSIS — G89.4 CHRONIC PAIN SYNDROME: Primary | ICD-10-CM

## 2025-06-08 DIAGNOSIS — G89.29 OTHER CHRONIC PAIN: ICD-10-CM

## 2025-06-08 RX ORDER — OXYCODONE AND ACETAMINOPHEN 7.5; 325 MG/1; MG/1
1 TABLET ORAL EVERY 8 HOURS PRN
Qty: 90 TABLET | Refills: 0 | Status: SHIPPED | OUTPATIENT
Start: 2025-06-08 | End: 2025-06-09 | Stop reason: SDUPTHER

## 2025-06-08 RX ORDER — OXYCODONE HYDROCHLORIDE 10 MG/1
10 TABLET ORAL EVERY 8 HOURS PRN
Qty: 60 TABLET | Refills: 0 | OUTPATIENT
Start: 2025-06-08

## 2025-06-09 ENCOUNTER — PATIENT MESSAGE (OUTPATIENT)
Dept: NEUROSURGERY | Facility: CLINIC | Age: 40
End: 2025-06-09
Payer: COMMERCIAL

## 2025-06-10 RX ORDER — OXYCODONE AND ACETAMINOPHEN 7.5; 325 MG/1; MG/1
1 TABLET ORAL EVERY 8 HOURS PRN
Qty: 90 TABLET | Refills: 0 | Status: SHIPPED | OUTPATIENT
Start: 2025-06-10

## 2025-06-13 DIAGNOSIS — G89.29 OTHER CHRONIC PAIN: ICD-10-CM

## 2025-06-16 RX ORDER — TRAMADOL HYDROCHLORIDE 50 MG/1
50 TABLET, FILM COATED ORAL EVERY 8 HOURS PRN
Qty: 90 TABLET | Refills: 3 | Status: SHIPPED | OUTPATIENT
Start: 2025-06-16

## 2025-06-19 ENCOUNTER — CLINICAL SUPPORT (OUTPATIENT)
Dept: SMOKING CESSATION | Facility: CLINIC | Age: 40
End: 2025-06-19
Payer: COMMERCIAL

## 2025-06-19 DIAGNOSIS — F17.200 NICOTINE DEPENDENCE: Primary | ICD-10-CM

## 2025-06-19 PROCEDURE — 99407 BEHAV CHNG SMOKING > 10 MIN: CPT | Mod: S$GLB,,, | Performed by: FAMILY MEDICINE

## 2025-06-19 PROCEDURE — 99999 PR PBB SHADOW E&M-EST. PATIENT-LVL I: CPT | Mod: PBBFAC,,,

## 2025-06-19 NOTE — PROGRESS NOTES
Called pt to f/u on her 3 month smoking cessation quit status. Pt stated she remains tobacco free for 3 weeks now. Congratulated her on her hard work and success. Informed her of benefit period, phone follow ups, and contact information. Will complete smart form and will continue to follow up on quit #1 episode.

## 2025-07-09 DIAGNOSIS — G89.4 CHRONIC PAIN SYNDROME: ICD-10-CM

## 2025-07-09 RX ORDER — OXYCODONE AND ACETAMINOPHEN 7.5; 325 MG/1; MG/1
1 TABLET ORAL EVERY 8 HOURS PRN
Qty: 90 TABLET | Refills: 0 | Status: SHIPPED | OUTPATIENT
Start: 2025-07-09

## 2025-07-30 DIAGNOSIS — G89.4 CHRONIC PAIN SYNDROME: Primary | ICD-10-CM

## 2025-07-31 RX ORDER — BACLOFEN 10 MG/1
10 TABLET ORAL 3 TIMES DAILY
Qty: 90 TABLET | Refills: 2 | Status: SHIPPED | OUTPATIENT
Start: 2025-07-31

## 2025-07-31 RX ORDER — GABAPENTIN 600 MG/1
600 TABLET ORAL 3 TIMES DAILY
Qty: 90 TABLET | Refills: 11 | Status: SHIPPED | OUTPATIENT
Start: 2025-07-31

## 2025-08-07 DIAGNOSIS — G89.4 CHRONIC PAIN SYNDROME: ICD-10-CM

## 2025-08-09 RX ORDER — OXYCODONE AND ACETAMINOPHEN 7.5; 325 MG/1; MG/1
1 TABLET ORAL EVERY 8 HOURS PRN
Qty: 90 TABLET | Refills: 0 | Status: SHIPPED | OUTPATIENT
Start: 2025-08-09

## (undated) DEVICE — DRAPE LAP T SHT W/ INSTR PAD

## (undated) DEVICE — DRESSING TRANS 4X4 3/4

## (undated) DEVICE — DRAPE THREE-QTR REINF 53X77IN

## (undated) DEVICE — TOWEL OR NONABSORB ADH 17X26

## (undated) DEVICE — DRESSING AQUACEL SACRAL 8 X 7

## (undated) DEVICE — DRAPE INCISE IOBAN 2 23X23IN

## (undated) DEVICE — DRAPE C-ARM/MOBILE XRAY 44X80

## (undated) DEVICE — DRESSING SURGICAL 1/2X1/2

## (undated) DEVICE — PACK DRAPE UNIVERSAL CONVERTOR

## (undated) DEVICE — GLOVE BIOGEL PI MICRO SZ 7.5

## (undated) DEVICE — KIT SPINAL PATIENT CARE JACK

## (undated) DEVICE — DRAPE STERILE Z BACK TABLE

## (undated) DEVICE — DRAPE STERI-DRAPE 1000 17X11IN

## (undated) DEVICE — SPHERE MARKER REFLECTIVE DISP

## (undated) DEVICE — DRESSING AQUACEL FOAM 4X4IN

## (undated) DEVICE — ADHESIVE MASTISOL VIAL 48/BX

## (undated) DEVICE — SEE MEDLINE ITEM 157125

## (undated) DEVICE — BURR MIS CURVED 3.0MM

## (undated) DEVICE — TUBING SUC UNIV W/CONN 12FT

## (undated) DEVICE — NDL SPINAL SPINOCAN 22GX3.5

## (undated) DEVICE — BLADE ELECTRO EDGE INSULATED

## (undated) DEVICE — DRAPE TOP 53X102IN

## (undated) DEVICE — ELECTRODE REM PLYHSV RETURN 9

## (undated) DEVICE — SEE MEDLINE ITEM 146292

## (undated) DEVICE — DRAPE LEICA MICROSCOPE 48X120

## (undated) DEVICE — GLOVE BIOGEL PI MICRO INDIC 7

## (undated) DEVICE — COVER BACK TBL HD 2-TIER 72IN

## (undated) DEVICE — APPLICATOR CHLORAPREP ORN 26ML

## (undated) DEVICE — SUT 0 VICRYL / UR6 (J603)

## (undated) DEVICE — SEE MEDLINE ITEM 156905

## (undated) DEVICE — GELATIN SURGIPOWDER ABSORBABLE

## (undated) DEVICE — GLOVE BIOGEL ECLIPSE SZ 7

## (undated) DEVICE — COVER OVERHEAD SURG LT BLUE

## (undated) DEVICE — DRESSING AQUACEL FOAM 5 X 5

## (undated) DEVICE — SEE MEDLINE ITEM 157148

## (undated) DEVICE — SUT MONOCRYL 3-0 PS-2 UND

## (undated) DEVICE — GOWN POLY REINF BRTH SLV XL

## (undated) DEVICE — DRESSING TEGADERM 2 3/8 X 2.75

## (undated) DEVICE — CORD BIPOLAR 12 FOOT

## (undated) DEVICE — COVER SNAP KAP 30

## (undated) DEVICE — SEE MEDLINE ITEM 157150

## (undated) DEVICE — NDL 22GA X1 1/2 REG BEVEL

## (undated) DEVICE — GLOVE BIOGEL SKINSENSE PI 7.0

## (undated) DEVICE — GAUZE SPONGE 4X4 12PLY

## (undated) DEVICE — SEE MEDLINE ITEM 157117

## (undated) DEVICE — CLOSURE SKIN STERI STRIP 1/2X4

## (undated) DEVICE — SUT VICRYL PLUS 0 CT1 18IN

## (undated) DEVICE — BURR PRECISION ROUND 3.0MM

## (undated) DEVICE — DRESSING TELFA N ADH 3X8

## (undated) DEVICE — SUT CTD VICRYL 3-0 CR/SH

## (undated) DEVICE — SUT VICRYL 2 0 CT 2